# Patient Record
Sex: FEMALE | Race: BLACK OR AFRICAN AMERICAN | NOT HISPANIC OR LATINO | URBAN - METROPOLITAN AREA
[De-identification: names, ages, dates, MRNs, and addresses within clinical notes are randomized per-mention and may not be internally consistent; named-entity substitution may affect disease eponyms.]

---

## 2019-06-28 ENCOUNTER — INPATIENT (INPATIENT)
Facility: HOSPITAL | Age: 54
LOS: 58 days | Discharge: HOME | DRG: 64 | End: 2019-08-26
Attending: INTERNAL MEDICINE | Admitting: PSYCHIATRY & NEUROLOGY
Payer: MEDICAID

## 2019-06-28 VITALS
RESPIRATION RATE: 24 BRPM | OXYGEN SATURATION: 100 % | HEART RATE: 96 BPM | DIASTOLIC BLOOD PRESSURE: 60 MMHG | WEIGHT: 293 LBS | SYSTOLIC BLOOD PRESSURE: 135 MMHG

## 2019-06-28 DIAGNOSIS — R29.898 OTHER SYMPTOMS AND SIGNS INVOLVING THE MUSCULOSKELETAL SYSTEM: ICD-10-CM

## 2019-06-28 LAB
ALBUMIN SERPL ELPH-MCNC: 3.8 G/DL — SIGNIFICANT CHANGE UP (ref 3.3–5)
ALP SERPL-CCNC: 84 U/L — SIGNIFICANT CHANGE UP (ref 40–120)
ALT FLD-CCNC: 11 U/L — SIGNIFICANT CHANGE UP (ref 10–45)
ANION GAP SERPL CALC-SCNC: 12 MMOL/L — SIGNIFICANT CHANGE UP (ref 5–17)
APTT BLD: 25.6 SEC — LOW (ref 27.5–36.3)
AST SERPL-CCNC: 7 U/L — LOW (ref 10–40)
BASOPHILS # BLD AUTO: 0.1 K/UL — SIGNIFICANT CHANGE UP (ref 0–0.2)
BASOPHILS NFR BLD AUTO: 0.6 % — SIGNIFICANT CHANGE UP (ref 0–2)
BILIRUB SERPL-MCNC: 0.8 MG/DL — SIGNIFICANT CHANGE UP (ref 0.2–1.2)
BUN SERPL-MCNC: 18 MG/DL — SIGNIFICANT CHANGE UP (ref 7–23)
CALCIUM SERPL-MCNC: 9.3 MG/DL — SIGNIFICANT CHANGE UP (ref 8.4–10.5)
CHLORIDE SERPL-SCNC: 98 MMOL/L — SIGNIFICANT CHANGE UP (ref 96–108)
CO2 SERPL-SCNC: 24 MMOL/L — SIGNIFICANT CHANGE UP (ref 22–31)
CREAT SERPL-MCNC: 1.11 MG/DL — SIGNIFICANT CHANGE UP (ref 0.5–1.3)
EOSINOPHIL # BLD AUTO: 0.1 K/UL — SIGNIFICANT CHANGE UP (ref 0–0.5)
EOSINOPHIL NFR BLD AUTO: 1.4 % — SIGNIFICANT CHANGE UP (ref 0–6)
GLUCOSE SERPL-MCNC: 315 MG/DL — HIGH (ref 70–99)
HCT VFR BLD CALC: 37.1 % — SIGNIFICANT CHANGE UP (ref 34.5–45)
HGB BLD-MCNC: 13.1 G/DL — SIGNIFICANT CHANGE UP (ref 11.5–15.5)
INR BLD: 0.95 RATIO — SIGNIFICANT CHANGE UP (ref 0.88–1.16)
LYMPHOCYTES # BLD AUTO: 2.3 K/UL — SIGNIFICANT CHANGE UP (ref 1–3.3)
LYMPHOCYTES # BLD AUTO: 21.9 % — SIGNIFICANT CHANGE UP (ref 13–44)
MCHC RBC-ENTMCNC: 29.9 PG — SIGNIFICANT CHANGE UP (ref 27–34)
MCHC RBC-ENTMCNC: 35.2 GM/DL — SIGNIFICANT CHANGE UP (ref 32–36)
MCV RBC AUTO: 84.9 FL — SIGNIFICANT CHANGE UP (ref 80–100)
MONOCYTES # BLD AUTO: 0.7 K/UL — SIGNIFICANT CHANGE UP (ref 0–0.9)
MONOCYTES NFR BLD AUTO: 7.1 % — SIGNIFICANT CHANGE UP (ref 2–14)
NEUTROPHILS # BLD AUTO: 7.2 K/UL — SIGNIFICANT CHANGE UP (ref 1.8–7.4)
NEUTROPHILS NFR BLD AUTO: 69.2 % — SIGNIFICANT CHANGE UP (ref 43–77)
PLATELET # BLD AUTO: 301 K/UL — SIGNIFICANT CHANGE UP (ref 150–400)
POTASSIUM SERPL-MCNC: 4.4 MMOL/L — SIGNIFICANT CHANGE UP (ref 3.5–5.3)
POTASSIUM SERPL-SCNC: 4.4 MMOL/L — SIGNIFICANT CHANGE UP (ref 3.5–5.3)
PROT SERPL-MCNC: 6.5 G/DL — SIGNIFICANT CHANGE UP (ref 6–8.3)
PROTHROM AB SERPL-ACNC: 10.9 SEC — SIGNIFICANT CHANGE UP (ref 10–12.9)
RBC # BLD: 4.37 M/UL — SIGNIFICANT CHANGE UP (ref 3.8–5.2)
RBC # FLD: 13.2 % — SIGNIFICANT CHANGE UP (ref 10.3–14.5)
SODIUM SERPL-SCNC: 134 MMOL/L — LOW (ref 135–145)
WBC # BLD: 10.5 K/UL — SIGNIFICANT CHANGE UP (ref 3.8–10.5)
WBC # FLD AUTO: 10.5 K/UL — SIGNIFICANT CHANGE UP (ref 3.8–10.5)

## 2019-06-28 PROCEDURE — 70498 CT ANGIOGRAPHY NECK: CPT | Mod: 26

## 2019-06-28 PROCEDURE — 70450 CT HEAD/BRAIN W/O DYE: CPT | Mod: 26,77

## 2019-06-28 PROCEDURE — 99291 CRITICAL CARE FIRST HOUR: CPT

## 2019-06-28 PROCEDURE — 71045 X-RAY EXAM CHEST 1 VIEW: CPT | Mod: 26

## 2019-06-28 PROCEDURE — 70450 CT HEAD/BRAIN W/O DYE: CPT | Mod: 26,59

## 2019-06-28 PROCEDURE — 70496 CT ANGIOGRAPHY HEAD: CPT | Mod: 26

## 2019-06-28 RX ORDER — ASPIRIN/CALCIUM CARB/MAGNESIUM 324 MG
1 TABLET ORAL
Qty: 0 | Refills: 0 | DISCHARGE

## 2019-06-28 RX ORDER — LEVETIRACETAM 250 MG/1
500 TABLET, FILM COATED ORAL EVERY 12 HOURS
Refills: 0 | Status: DISCONTINUED | OUTPATIENT
Start: 2019-06-29 | End: 2019-06-30

## 2019-06-28 RX ORDER — METFORMIN HYDROCHLORIDE 850 MG/1
1 TABLET ORAL
Qty: 0 | Refills: 0 | DISCHARGE

## 2019-06-28 RX ORDER — LEVETIRACETAM 250 MG/1
500 TABLET, FILM COATED ORAL ONCE
Refills: 0 | Status: COMPLETED | OUTPATIENT
Start: 2019-06-28 | End: 2019-06-28

## 2019-06-28 RX ADMIN — LEVETIRACETAM 400 MILLIGRAM(S): 250 TABLET, FILM COATED ORAL at 22:45

## 2019-06-28 RX ADMIN — LEVETIRACETAM 400 MILLIGRAM(S): 250 TABLET, FILM COATED ORAL at 23:26

## 2019-06-28 RX ADMIN — Medication 2 MILLIGRAM(S): at 22:43

## 2019-06-28 RX ADMIN — Medication 2 MILLIGRAM(S): at 23:26

## 2019-06-28 NOTE — H&P ADULT - ASSESSMENT
54 year old Female w/ PMHx of HTN, HLD, DM transferred from [facility] for evaluation of stroke.  Neurological exam significant for L sided hemiplegia, R gaze preference, L facial droop.  CTA head/neck showing R ICA occlusion.  Patient s/p tPA at 5:30 from [facility].      Impression: L hemiplegia secondary to R ICA occlusion    Plan:  - goal BP < 180/110   - MRI brain w/o cont  - MRA brain w/o cont   - MRA neck w/ cont  - HgA1c  - Lipid profile  - c/w ASA 81mg after 24hrs,  - Lipitor 40mg qhs  - TTE  - Telemetry monitoring   - Neuro checks q2hr in Stroke Unit  - Repeat Head CT in 24hrs evaluate for hemorrhagic conversion or earlier for change in mental status  - PT/ OT  - NPO x 24hrs  - Dysphagia screen in 12-24hrs  - Speech and Swallow Evaluation

## 2019-06-28 NOTE — ED PROVIDER NOTE - CRITICAL CARE PROVIDED
direct patient care (not related to procedure)/consultation with other physicians/additional history taking/interpretation of diagnostic studies/documentation

## 2019-06-28 NOTE — H&P ADULT - HISTORY OF PRESENT ILLNESS
Patient is a 54 year old Female w/ PMHx of HTN, HLD, DM transferred from [facility] for evaluation of stroke. Per daughter at bedside, last known normal 2pm, patient had sudden onset left sided paralysis, slurred speech, received TPA at at [place] at 5;30PM, completed at 6PM. NIHSS 13, MRS 1

## 2019-06-28 NOTE — ED PROVIDER NOTE - ATTENDING CONTRIBUTION TO CARE
***Anshul Sanderson MD FACEP*** Attending physician was available for the key components of the procedure, the patient tolerated well. There were no complications with the procedure.   Based on patient's history and physical exam, as well as the results of today's workup, I feel that patient warrants admission to the hospital for further workup/evaluation and continued management. I discussed the findings of today's workup with the patient and addressed the patient's questions and concerns. The patient was agreeable with admission. Our team spoke with the inpatient receiving team who accepted the patient for admission and subsequently took over the patient's care at the time of admission. Anshul Sanderson MD, FACEP   Based on patient's history and physical exam, as well as the results of today's workup, I feel that patient warrants admission to the hospital for further workup/evaluation and continued management. I discussed the findings of today's workup with the patient and addressed the patient's questions and concerns. The patient was agreeable with admission. Our team spoke with the inpatient receiving team who accepted the patient for admission and subsequently took over the patient's care at the time of admission.

## 2019-06-28 NOTE — ED PROVIDER NOTE - OBJECTIVE STATEMENT
AV: 54y F transferred from outside hospital for stroke. Per daughter at bedside, last known normal 2pm, patient had sudden onset left sided paralysis, slurred speech, received TPA at outside hospital. On arrival patient awake and alert, slurring speech, unable to move LUE or LLE, left sided facial droop. Code stroke called, neuro escorted patient to CT. AV: 54y F transferred from outside hospital for stroke. Per daughter at bedside, last known normal 2pm, patient had sudden onset left sided paralysis, slurred speech, received TPA at outside hospital. On arrival patient awake and alert, slurring speech, unable to move LUE or LLE, left sided facial droop. Code stroke called, neuro escorted patient to CT. 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 AV: 54y F transferred from outside hospital for stroke. Per daughter at bedside, last known normal 2pm, patient had sudden onset left sided paralysis, slurred speech, dropped her cell phone, patient received TPA at outside hospital. On arrival patient awake and alert, slurring speech, unable to move LUE or LLE, left sided facial droop. Code stroke called, neuro escorted patient to CT. 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

## 2019-06-28 NOTE — ED ADULT NURSE NOTE - NSIMPLEMENTINTERV_GEN_ALL_ED
Implemented All Fall with Harm Risk Interventions:  [place] to call system. Call bell, personal items and telephone within reach. Instruct patient to call for assistance. Room bathroom lighting operational. Non-slip footwear when patient is off stretcher. Physically safe environment: no spills, clutter or unnecessary equipment. Stretcher in lowest position, wheels locked, appropriate side rails in place. Provide visual cue, wrist band, yellow gown, etc. Monitor gait and stability. Monitor for mental status changes and reorient to person, place, and time. Review medications for side effects contributing to fall risk. Reinforce activity limits and safety measures with patient and family. Provide visual clues: red socks.

## 2019-06-28 NOTE — H&P ADULT - CRANIAL NERVE
Cranial Nerves: L eye chronic vision loss, R no BTT, pupils equal round and reactive to light, R gaze preferrence, facial sensation intact symmetrically in V1-V3 bilaterally, Mild L facial droop

## 2019-06-28 NOTE — ED ADULT NURSE NOTE - OBJECTIVE STATEMENT
55 YO female with PMH HTN, HLD, DMII on insulin, & arthritis, via EMS from [facility] presenting with complaints of extremity weakness. As per patient, EMS, and family at bedside, at approximately 1500, for ten minutes, pt was exhibiting slurred speech, and left sided extremity weakness, which resolved. Pt initially was evaluated by [facility], and tPA was initated at 1730 with appropriate bolus and drip finished at 1830, prior to arrival to [facility] ED. Upon arrival to [facility] ED, pt directly to CT scan and code stroke protocol initiated. Pt denies chest pain, shortness of breath, visual disturbances, numbness/tingling, fever, chills, diaphoresis, headache, nausea, vomiting, constipation, diarrhea, or urinary symptoms.   Pt Axox4, gross neuro intact, PERRL 3 mm. Lungs clear throughout bilateral. S1S2 heard. Abdomen soft, non-tender, distended. Skin warm, dry, and intact, no obvious signs of bleeding noted. Pt placed in position of comfort. Pt educated on call bell system and provided call bell. Bed in lowest position, wheels locked, appropriate side rails raised. Pt denies needs at this time.

## 2019-06-28 NOTE — ED PROVIDER NOTE - PMH
Diabetes mellitus type 2 in obese    Hyperlipidemia, unspecified hyperlipidemia type    Hypertension

## 2019-06-28 NOTE — H&P ADULT - NSICDXPASTMEDICALHX_GEN_ALL_CORE_FT
PAST MEDICAL HISTORY:  Diabetes mellitus type 2 in obese     Hyperlipidemia, unspecified hyperlipidemia type     Hypertension

## 2019-06-28 NOTE — H&P ADULT - ATTENDING COMMENTS
This is a 53y/o R handed  descendant female morbidly obese with vascular risk factors as stated above and including apparent CHF. As per niece who provide history at bedside patient was enjoying a summer day at the backyard when she was noticed with droopy face and left sided weakness unable to walk and slurred speech. She was initially evaluated at [place] and was deemed candidate for tPA. CT angiography was obtain and showed right carotid stenosis and she was transfer for further management. Upon initial evaluation at [facility] her NIHSS was deemed to be approximately 13 and repeated CT angiography of head and neck did not show any intracranial large vessel occlusion reason why endovascular therapy was not offered. Head CT showed chronic right parietal and frontal hypodensities, the latter less conspicuous. Also noticeable left paramedian pontine hypodensity.   Patient was reported to have several episodes of seizure upon admission and was given ativan and loaded with levetiracetam.  Neurological exam today pertinent for:    Mental status:  The patient is alert, eyes closed(possible eyelid apraxia) left eye blindness, attentive, and oriented in person, time and place. Speech severely dysarthric almost unintelligible but fluent with good repetition and comprehension. No asomatognosia or anosognosia.     Cranial nerves:  CN II: Visual fields difficult to asses. Left eye blind.  CN III, IV, VI: Partial left gaze palsy.   CN V: Facial sensation is intact to soft touch in all 3 divisions bilaterally.   CN VII: Severe left facial droop.  CN VII: Hearing is normal to voice tone.   CN IX, X: Not assessed.  CN XI: Not assessed.  CN XII: Not assessed.     Motor:  Increase tone left upper extremity with flexor posture. Decrease tone left lower extremity with paretic posture.   She moves right side spontaneously.      	Deltoid	Biceps	Triceps	Handgrip	  L	0	0	0	0	  R            at least 3/5 moves against gravity       Hip flexion	Hip extension	Knee flexion	Knee extension  L	0	0		0                                0  R	at least 3/5 moves against gravity  Sensory:  Diminished pinprick left upper and lower extremities  Coordination:  Unable to asses. No nystagmus.            Impression diagnosis: Cerebral infarction right MCA stroke watershed region in the setting of right cervical internal carotid artery stenosis with calcified component most probable etiology symptomatic large vessel disease due to hypoperfusion vs artery to artery embolism. Other source of embolism as cardiac and paradoxical source of embolism can not be completely excluded.    Plan:   - Cont with IV tPA precautions including BP goal <180/105 mmHg for first 24 hours after bolus followed by gradual normotension    -  Aspirin and plavix in preparation for possible carotid stenting vs CEA and when enteral access established    - SCDs for DVT prophylaxis in the interim. If 24 hr CT scan with no evidence of large hypodensity or hemorrhagic conversion initiate heparin drip goal PTT 60-80 with no bolus in preparation for CEA.     - Atorvastatin 80 mg after establishing enteral access or passing swallow evaluation    - TTE with bubble study and telemetry to screen for possible cardiac source of embolism    - LDL and HbA1C, continue with aggressive vascular risk factors modifications     - PT/OT/Speech and swallow evaluation     -Cardiology evaluation for medical clearance for CEA vs stenting.

## 2019-06-28 NOTE — ED ADULT NURSE NOTE - CAS EDN DISCHARGE ASSESSMENT
Alert and oriented to person, place and time Alert and oriented to person, place and time/Patient baseline mental status

## 2019-06-28 NOTE — ED PROVIDER NOTE - CRITICAL CARE ATTESTATION
I have personally provided the amount of critical care time documented below excluding time spent on separate procedures
uncorrected

## 2019-06-28 NOTE — ED PROVIDER NOTE - CLINICAL SUMMARY MEDICAL DECISION MAKING FREE TEXT BOX
AV: stroke transfer with left facial droop, left sided weakness. vitals wnl. left hemiparesis, absent sensation. Will obtain rpt CT, labs, admit to stroke service. AV: stroke transfer with left facial droop, left sided weakness. vitals wnl. left hemiparesis, absent sensation. Will obtain rpt CT, labs, admit to stroke service. monitoring closely for deterioration, frequent neurochecks and will admit to neurology

## 2019-06-29 DIAGNOSIS — E11.69 TYPE 2 DIABETES MELLITUS WITH OTHER SPECIFIED COMPLICATION: ICD-10-CM

## 2019-06-29 LAB
AMPHET UR-MCNC: NEGATIVE — SIGNIFICANT CHANGE UP
ANION GAP SERPL CALC-SCNC: 15 MMOL/L — SIGNIFICANT CHANGE UP (ref 5–17)
APPEARANCE UR: CLEAR — SIGNIFICANT CHANGE UP
BARBITURATES UR SCN-MCNC: NEGATIVE — SIGNIFICANT CHANGE UP
BENZODIAZ UR-MCNC: NEGATIVE — SIGNIFICANT CHANGE UP
BILIRUB UR-MCNC: NEGATIVE — SIGNIFICANT CHANGE UP
BUN SERPL-MCNC: 15 MG/DL — SIGNIFICANT CHANGE UP (ref 7–23)
CALCIUM SERPL-MCNC: 9.4 MG/DL — SIGNIFICANT CHANGE UP (ref 8.4–10.5)
CHLORIDE SERPL-SCNC: 99 MMOL/L — SIGNIFICANT CHANGE UP (ref 96–108)
CHOLEST SERPL-MCNC: 195 MG/DL — SIGNIFICANT CHANGE UP (ref 10–199)
CO2 SERPL-SCNC: 23 MMOL/L — SIGNIFICANT CHANGE UP (ref 22–31)
COCAINE METAB.OTHER UR-MCNC: NEGATIVE — SIGNIFICANT CHANGE UP
COLOR SPEC: COLORLESS — SIGNIFICANT CHANGE UP
CREAT SERPL-MCNC: 0.98 MG/DL — SIGNIFICANT CHANGE UP (ref 0.5–1.3)
DIFF PNL FLD: NEGATIVE — SIGNIFICANT CHANGE UP
GLUCOSE BLDC GLUCOMTR-MCNC: 213 MG/DL — HIGH (ref 70–99)
GLUCOSE BLDC GLUCOMTR-MCNC: 227 MG/DL — HIGH (ref 70–99)
GLUCOSE SERPL-MCNC: 376 MG/DL — HIGH (ref 70–99)
GLUCOSE UR QL: ABNORMAL
HBA1C BLD-MCNC: 10.4 % — HIGH (ref 4–5.6)
HCT VFR BLD CALC: 39.2 % — SIGNIFICANT CHANGE UP (ref 34.5–45)
HCV AB S/CO SERPL IA: 0.07 S/CO — SIGNIFICANT CHANGE UP (ref 0–0.99)
HCV AB SERPL-IMP: SIGNIFICANT CHANGE UP
HDLC SERPL-MCNC: 49 MG/DL — LOW
HGB BLD-MCNC: 13.5 G/DL — SIGNIFICANT CHANGE UP (ref 11.5–15.5)
KETONES UR-MCNC: NEGATIVE — SIGNIFICANT CHANGE UP
LEUKOCYTE ESTERASE UR-ACNC: NEGATIVE — SIGNIFICANT CHANGE UP
LIPID PNL WITH DIRECT LDL SERPL: 131 MG/DL — HIGH
MCHC RBC-ENTMCNC: 29.5 PG — SIGNIFICANT CHANGE UP (ref 27–34)
MCHC RBC-ENTMCNC: 34.4 GM/DL — SIGNIFICANT CHANGE UP (ref 32–36)
MCV RBC AUTO: 85.7 FL — SIGNIFICANT CHANGE UP (ref 80–100)
METHADONE UR-MCNC: NEGATIVE — SIGNIFICANT CHANGE UP
NITRITE UR-MCNC: NEGATIVE — SIGNIFICANT CHANGE UP
OPIATES UR-MCNC: NEGATIVE — SIGNIFICANT CHANGE UP
OXYCODONE UR-MCNC: NEGATIVE — SIGNIFICANT CHANGE UP
PCP SPEC-MCNC: SIGNIFICANT CHANGE UP
PCP UR-MCNC: NEGATIVE — SIGNIFICANT CHANGE UP
PH UR: 6.5 — SIGNIFICANT CHANGE UP (ref 5–8)
PLATELET # BLD AUTO: 291 K/UL — SIGNIFICANT CHANGE UP (ref 150–400)
POTASSIUM SERPL-MCNC: 4.2 MMOL/L — SIGNIFICANT CHANGE UP (ref 3.5–5.3)
POTASSIUM SERPL-SCNC: 4.2 MMOL/L — SIGNIFICANT CHANGE UP (ref 3.5–5.3)
PROT UR-MCNC: NEGATIVE — SIGNIFICANT CHANGE UP
RBC # BLD: 4.57 M/UL — SIGNIFICANT CHANGE UP (ref 3.8–5.2)
RBC # FLD: 13.4 % — SIGNIFICANT CHANGE UP (ref 10.3–14.5)
SODIUM SERPL-SCNC: 137 MMOL/L — SIGNIFICANT CHANGE UP (ref 135–145)
SP GR SPEC: 1.03 — HIGH (ref 1.01–1.02)
THC UR QL: NEGATIVE — SIGNIFICANT CHANGE UP
TOTAL CHOLESTEROL/HDL RATIO MEASUREMENT: 4 RATIO — SIGNIFICANT CHANGE UP (ref 3.3–7.1)
TRIGL SERPL-MCNC: 75 MG/DL — SIGNIFICANT CHANGE UP (ref 10–149)
UROBILINOGEN FLD QL: NEGATIVE — SIGNIFICANT CHANGE UP
WBC # BLD: 8 K/UL — SIGNIFICANT CHANGE UP (ref 3.8–10.5)
WBC # FLD AUTO: 8 K/UL — SIGNIFICANT CHANGE UP (ref 3.8–10.5)

## 2019-06-29 PROCEDURE — 99254 IP/OBS CNSLTJ NEW/EST MOD 60: CPT

## 2019-06-29 PROCEDURE — 70551 MRI BRAIN STEM W/O DYE: CPT | Mod: 26

## 2019-06-29 PROCEDURE — 70450 CT HEAD/BRAIN W/O DYE: CPT | Mod: 26

## 2019-06-29 PROCEDURE — 99223 1ST HOSP IP/OBS HIGH 75: CPT

## 2019-06-29 RX ORDER — CLOPIDOGREL BISULFATE 75 MG/1
75 TABLET, FILM COATED ORAL DAILY
Refills: 0 | Status: DISCONTINUED | OUTPATIENT
Start: 2019-06-29 | End: 2019-07-03

## 2019-06-29 RX ORDER — DEXTROSE 50 % IN WATER 50 %
12.5 SYRINGE (ML) INTRAVENOUS ONCE
Refills: 0 | Status: DISCONTINUED | OUTPATIENT
Start: 2019-06-29 | End: 2019-08-26

## 2019-06-29 RX ORDER — INSULIN GLARGINE 100 [IU]/ML
20 INJECTION, SOLUTION SUBCUTANEOUS AT BEDTIME
Refills: 0 | Status: DISCONTINUED | OUTPATIENT
Start: 2019-06-29 | End: 2019-06-30

## 2019-06-29 RX ORDER — DEXTROSE 50 % IN WATER 50 %
25 SYRINGE (ML) INTRAVENOUS ONCE
Refills: 0 | Status: DISCONTINUED | OUTPATIENT
Start: 2019-06-29 | End: 2019-08-26

## 2019-06-29 RX ORDER — HYDRALAZINE HCL 50 MG
5 TABLET ORAL ONCE
Refills: 0 | Status: DISCONTINUED | OUTPATIENT
Start: 2019-06-29 | End: 2019-06-29

## 2019-06-29 RX ORDER — LABETALOL HCL 100 MG
5 TABLET ORAL ONCE
Refills: 0 | Status: COMPLETED | OUTPATIENT
Start: 2019-06-29 | End: 2019-06-29

## 2019-06-29 RX ORDER — INSULIN LISPRO 100/ML
VIAL (ML) SUBCUTANEOUS EVERY 6 HOURS
Refills: 0 | Status: DISCONTINUED | OUTPATIENT
Start: 2019-06-29 | End: 2019-06-30

## 2019-06-29 RX ORDER — SODIUM CHLORIDE 9 MG/ML
1000 INJECTION INTRAMUSCULAR; INTRAVENOUS; SUBCUTANEOUS
Refills: 0 | Status: DISCONTINUED | OUTPATIENT
Start: 2019-06-29 | End: 2019-07-01

## 2019-06-29 RX ORDER — GLUCAGON INJECTION, SOLUTION 0.5 MG/.1ML
1 INJECTION, SOLUTION SUBCUTANEOUS ONCE
Refills: 0 | Status: DISCONTINUED | OUTPATIENT
Start: 2019-06-29 | End: 2019-08-26

## 2019-06-29 RX ORDER — ASPIRIN/CALCIUM CARB/MAGNESIUM 324 MG
81 TABLET ORAL DAILY
Refills: 0 | Status: DISCONTINUED | OUTPATIENT
Start: 2019-06-29 | End: 2019-06-30

## 2019-06-29 RX ORDER — INSULIN LISPRO 100/ML
VIAL (ML) SUBCUTANEOUS AT BEDTIME
Refills: 0 | Status: DISCONTINUED | OUTPATIENT
Start: 2019-06-29 | End: 2019-06-29

## 2019-06-29 RX ORDER — INSULIN LISPRO 100/ML
VIAL (ML) SUBCUTANEOUS
Refills: 0 | Status: DISCONTINUED | OUTPATIENT
Start: 2019-06-29 | End: 2019-06-29

## 2019-06-29 RX ORDER — DEXTROSE 50 % IN WATER 50 %
15 SYRINGE (ML) INTRAVENOUS ONCE
Refills: 0 | Status: DISCONTINUED | OUTPATIENT
Start: 2019-06-29 | End: 2019-08-26

## 2019-06-29 RX ORDER — SODIUM CHLORIDE 9 MG/ML
1000 INJECTION, SOLUTION INTRAVENOUS
Refills: 0 | Status: DISCONTINUED | OUTPATIENT
Start: 2019-06-29 | End: 2019-08-26

## 2019-06-29 RX ADMIN — Medication 5 MILLIGRAM(S): at 20:58

## 2019-06-29 RX ADMIN — SODIUM CHLORIDE 75 MILLILITER(S): 9 INJECTION INTRAMUSCULAR; INTRAVENOUS; SUBCUTANEOUS at 05:18

## 2019-06-29 RX ADMIN — Medication 4: at 07:40

## 2019-06-29 RX ADMIN — Medication 2: at 17:59

## 2019-06-29 RX ADMIN — LEVETIRACETAM 400 MILLIGRAM(S): 250 TABLET, FILM COATED ORAL at 18:01

## 2019-06-29 RX ADMIN — LEVETIRACETAM 400 MILLIGRAM(S): 250 TABLET, FILM COATED ORAL at 05:17

## 2019-06-29 RX ADMIN — Medication 2: at 11:29

## 2019-06-29 RX ADMIN — SODIUM CHLORIDE 75 MILLILITER(S): 9 INJECTION INTRAMUSCULAR; INTRAVENOUS; SUBCUTANEOUS at 21:00

## 2019-06-29 NOTE — CONSULT NOTE ADULT - SUBJECTIVE AND OBJECTIVE BOX
CHIEF COMPLAINT:Patient is a 54y old  Female who presents with a chief complaint of     HISTORY OF PRESENT ILLNESS:    54 year old Female w/ PMHx of HTN, HLD, DM admitted with acute cva  pt is aphasic  family at bedside  Due to altered mental status, subjective information were not able to be obtained from the patient. History was obtained, to the extent possible, from review of the chart and collateral sources of information.     PAST MEDICAL & SURGICAL HISTORY:  Diabetes mellitus type 2 in obese  Hyperlipidemia, unspecified hyperlipidemia type  Hypertension          MEDICATIONS:        levETIRAcetam  IVPB 500 milliGRAM(s) IV Intermittent every 12 hours  LORazepam   Injectable 2 milliGRAM(s) IV Push once PRN      insulin lispro (HumaLOG) corrective regimen sliding scale   SubCutaneous every 6 hours    sodium chloride 0.9%. 1000 milliLiter(s) IV Continuous <Continuous>      FAMILY HISTORY:      Non-contributory    SOCIAL HISTORY:    No tobacco, drugs or etoh    Allergies    No Known Allergies    Intolerances    	    REVIEW OF SYSTEMS:  as above  The rest of the 14 points ROS reviewed and except above they are unremarkable.        PHYSICAL EXAM:  T(C): 36.8 (06-29-19 @ 04:00), Max: 37 (06-28-19 @ 20:00)  HR: 78 (06-29-19 @ 10:00) (78 - 98)  BP: 165/59 (06-29-19 @ 10:00) (100/54 - 167/71)  RR: 16 (06-29-19 @ 10:00) (14 - 25)  SpO2: 100% (06-29-19 @ 10:00) (95% - 100%)  Wt(kg): --  I&O's Summary    28 Jun 2019 07:01  -  29 Jun 2019 07:00  --------------------------------------------------------  IN: 525 mL / OUT: 600 mL / NET: -75 mL        JVP: Normal  Neck: supple  Lung: clear   CV: S1 S2 , Murmur:  Abd: soft  Ext: No edema  neuro: Awake / alert  Psych: flat affect  Skin: normal      LABS/DATA:    TELEMETRY: 	    ECG:  	   	  CARDIAC MARKERS:                                      13.5   8.0   )-----------( 291      ( 29 Jun 2019 06:08 )             39.2     06-29    137  |  99  |  15  ----------------------------<  376<H>  4.2   |  23  |  0.98    Ca    9.4      29 Jun 2019 06:08    TPro  6.5  /  Alb  3.8  /  TBili  0.8  /  DBili  x   /  AST  7<L>  /  ALT  11  /  AlkPhos  84  06-28    proBNP:   Lipid Profile:   HgA1c:   TSH:

## 2019-06-29 NOTE — OCCUPATIONAL THERAPY INITIAL EVALUATION ADULT - ADDITIONAL COMMENTS
CONTINUED. CT ANGIO NECK: Apparent severe stenosis in the distal supraclinoid right internal carotid artery and right carotid terminus with possible occlusion of the A1 segment of the right anterior cerebral artery.There appears to be posterior distal right ENRIQUE vessels within the medial right frontal lobe, raising the possibility of early cerebral infarction.

## 2019-06-29 NOTE — PHYSICAL THERAPY INITIAL EVALUATION ADULT - GENERAL OBSERVATIONS, REHAB EVAL
7/3: received supine with head of bed elevated +ngt, +R wrist restraint, + bilateral sequential compression devices +external female catheter

## 2019-06-29 NOTE — OCCUPATIONAL THERAPY INITIAL EVALUATION ADULT - LIVES WITH, PROFILE
Pt lives with adult children and other adult family members in a private house with 8 stairs to bed/bath and railing on R. Pt has tub shower, and was previously independent in all ADLS/IADLs, ambulating with a straight cane. Pt is right handed and wears glasses for reading and distance./children/other relative

## 2019-06-29 NOTE — OCCUPATIONAL THERAPY INITIAL EVALUATION ADULT - PERTINENT HX OF CURRENT PROBLEM, REHAB EVAL
Patient is a 54 year old Female w/ PMHx of HTN, HLD, DM transferred from [facility] for evaluation of stroke. Per daughter at bedside, last known normal 2pm, patient had sudden onset left sided paralysis, slurred speech, received TPA at at [place] at 5;30PM, completed at 6PM. NIHSS . See below

## 2019-06-29 NOTE — OCCUPATIONAL THERAPY INITIAL EVALUATION ADULT - BALANCE TRAINING, PT EVAL
GOAL: Pt will increase static/dynamic sitting/standing balance to fair within 2 weeks to increase safety and independence with ADLs/IADLs.

## 2019-06-29 NOTE — PHYSICAL THERAPY INITIAL EVALUATION ADULT - PLANNED THERAPY INTERVENTIONS, PT EVAL
bed mobility training/gait training/strengthening/transfer training strengthening/transfer training/balance training/bed mobility training/gait training

## 2019-06-29 NOTE — SPEECH LANGUAGE PATHOLOGY EVALUATION - COMMENTS
unable to assess at this time will need further assessment as alertness level improves not tested at this time Continued:   : Change in status notification: Pt s/p tPA  @1730 ([facility] RN went to bedside report in ED. RN notices pt right facial twitch and foaming at the mouth. Pt was following commands seconds prior, pt no longer speaking or following commands.  : Provider contact note: Pt had 3 more seizure episodes en route to floor  Pt given ativan and keppra   MRI pending at time of this evaluation     Imagin/28: CT Brain stroke protocol: IMPRESSION:  Age-indeterminate left pontine lacunar infarction. Recommend MRI brain for further characterization. Chronic right parietal and right frontal infarctions.    Repeat CT Brain : IMPRESSION: Redemonstration of decreased attenuation in the basis pontis and midbrain. Findings could represent the presence of recent or chronic infarction. Chronic appearing right frontoparietal infarct.

## 2019-06-29 NOTE — CONSULT NOTE ADULT - ASSESSMENT
Sergo Chambers  54F pmhx HTN, HLD, DM admitted yesterday for patchy R MCA infarcts s/p TPA found to have apparent distal R ICA severe stenosis and with possible occlusion of the R A1 although CTA limited by body habitus. On exam Eyes closed, AOx2, FC on R, L side 0/5.  - MRI/MRA head and neck  - carotid dopplers  - Continue care per stroke neurology Sergo Chambers  54F pmhx HTN, HLD, DM admitted yesterday for patchy R sided infarcts in multiple vascular territories s/p TPA found to have apparentm multiple areas of R ICA severe stenosis and with possible occlusion of the R A1 although CTA limited by body habitus. On exam Eyes closed, AOx2, FC on R, L side 0/5.  - MRI/MRA head and neck  - carotid dopplers  - Continue care per stroke neurology

## 2019-06-29 NOTE — PHYSICAL THERAPY INITIAL EVALUATION ADULT - TRANSFER TRAINING, PT EVAL
GOAL: Pt will perform all transfers with min Ax1  with use of appropriate AD in 2 weeks. GOAL: Pt will perform all transfers with min Ax1  with use of appropriate AD in 4 weeks.

## 2019-06-29 NOTE — PHYSICAL THERAPY INITIAL EVALUATION ADULT - GAIT TRAINING, PT EVAL
GOAL: Pt will amb 300ft independently with use of appropriate AD in 2 weeks. GOAL: Pt will amb 300ft independently with use of appropriate AD in 4 weeks.

## 2019-06-29 NOTE — PHYSICAL THERAPY INITIAL EVALUATION ADULT - BED MOBILITY TRAINING, PT EVAL
GOAL: Pt will perform bed mobility with min A x1  in 2 weeks. GOAL: Pt will perform bed mobility with min A x1  in 4 weeks.

## 2019-06-29 NOTE — OCCUPATIONAL THERAPY INITIAL EVALUATION ADULT - PLANNED THERAPY INTERVENTIONS, OT EVAL
ADL retraining/balance training/cognitive, visual perceptual/bed mobility training/transfer training

## 2019-06-29 NOTE — SPEECH LANGUAGE PATHOLOGY EVALUATION - SLP PERTINENT HISTORY OF CURRENT PROBLEM
H&P: Patient is a 54 year old Female w/ PMHx of HTN, HLD, DM transferred from [facility] for evaluation of stroke. Per daughter at bedside, last known normal 2pm, patient had sudden onset left sided paralysis, slurred speech, received TPA at at [place] at 5;30PM, completed at 6PM. NIHSS 13, MRS 1. Neurological exam significant for L sided hemiplegia, R gaze preference, L facial droop.  CTA head/neck showing R ICA occlusion.  Patient s/p tPA at 5:30 from [facility].  Impression: L hemiplegia secondary to R ICA occlusion.

## 2019-06-29 NOTE — SWALLOW BEDSIDE ASSESSMENT ADULT - SPECIFY REASON(S)
to subjectively assess the swallow mechanism r/o dysphagia to subjectively assess the swallow mechanism r/o dysphagia. pt cleared for PO trials as per d/w CHAPIS Wilks

## 2019-06-29 NOTE — OCCUPATIONAL THERAPY INITIAL EVALUATION ADULT - IMPAIRMENTS CONTRIBUTING IMPAIRED BED MOBILITY, REHAB EVAL
cognition/impaired coordination/decreased strength/impaired motor control/abnormal muscle tone/impaired postural control/impaired balance

## 2019-06-29 NOTE — OCCUPATIONAL THERAPY INITIAL EVALUATION ADULT - GENERAL OBSERVATIONS, REHAB EVAL
Pt encountered semisupine, NAD, VSS, +IVL, +tele, +bp cuff, +pulse ox, +silva, +b/l venodynes, +ng tube. Pt was lethargic, easy to arouse with moderate tactile cues, and daughter present. No active/ spontaneous movement observed on LUE/LLE. Pt encountered semisupine, NAD, VSS, +IVL, +tele, +bp cuff, +pulse ox, +silva, +b/l venodynes, +ng tube. Pt was lethargic, easy to arouse with moderate tactile cues, and daughter present. No active/ spontaneous movement observed on LUE/LLE; pt would benefit from splint evaluation, team made aware.

## 2019-06-29 NOTE — PHYSICAL THERAPY INITIAL EVALUATION ADULT - STRENGTHENING, PT EVAL
GOAL: Pt will improve LLE strength to 2/5, for increased limb stability, to improve transfers in 4 weeks.

## 2019-06-29 NOTE — PHYSICAL THERAPY INITIAL EVALUATION ADULT - PERTINENT HX OF CURRENT PROBLEM, REHAB EVAL
Pt is a is a 54 year old Female w/ PMHx of HTN, HLD, DM transferred from [facility] for evaluation of stroke. Per daughter at bedside, last known normal 2pm, patient had sudden onset left sided paralysis, slurred speech, received TPA at at [place] at 530PM, completed at 6PM. As per cardiology, if no etiology revealed, possible ILR.

## 2019-06-29 NOTE — CONSULT NOTE ADULT - SUBJECTIVE AND OBJECTIVE BOX
p ([id number])     HPI:  Patient is a 54 year old Female w/ PMHx of HTN, HLD, DM transferred from [facility] for evaluation of stroke. Per daughter at bedside, last known normal 2pm, patient had sudden onset left sided paralysis, slurred speech, received TPA at at [place] at 5;30PM, completed at 6PM. NIHSS 13, MRS 1 (28 Jun 2019 19:57)    Exam:  Eyes closed, blind in L eye, R pupil reactive, AOx2  FC, 5/5 in RUE, LLE proximally 2/5 pain and effort limited, distally 5/5    --Anticoagulation:    =====================  PAST MEDICAL HISTORY   Diabetes mellitus type 2 in obese  Hyperlipidemia, unspecified hyperlipidemia type  Hypertension    PAST SURGICAL HISTORY         MEDICATIONS:  Antibiotics:    Neuro:  levETIRAcetam  IVPB 500 milliGRAM(s) IV Intermittent every 12 hours  LORazepam   Injectable 2 milliGRAM(s) IV Push once PRN    Other:  dextrose 40% Gel 15 Gram(s) Oral once PRN  dextrose 5%. 1000 milliLiter(s) IV Continuous <Continuous>  dextrose 50% Injectable 12.5 Gram(s) IV Push once  dextrose 50% Injectable 25 Gram(s) IV Push once  dextrose 50% Injectable 25 Gram(s) IV Push once  glucagon  Injectable 1 milliGRAM(s) IntraMuscular once PRN  insulin glargine Injectable (LANTUS) 20 Unit(s) SubCutaneous at bedtime  insulin lispro (HumaLOG) corrective regimen sliding scale   SubCutaneous every 6 hours  sodium chloride 0.9%. 1000 milliLiter(s) IV Continuous <Continuous>      SOCIAL HISTORY:   Occupation:   Marital Status:     FAMILY HISTORY:      ROS: Negative except per HPI    LABS:  PT/INR - ( 28 Jun 2019 20:06 )   PT: 10.9 sec;   INR: 0.95 ratio         PTT - ( 28 Jun 2019 20:06 )  PTT:25.6 sec                        13.5   8.0   )-----------( 291      ( 29 Jun 2019 06:08 )             39.2     06-29    137  |  99  |  15  ----------------------------<  376<H>  4.2   |  23  |  0.98    Ca    9.4      29 Jun 2019 06:08    TPro  6.5  /  Alb  3.8  /  TBili  0.8  /  DBili  x   /  AST  7<L>  /  ALT  11  /  AlkPhos  84  06-28

## 2019-06-29 NOTE — SWALLOW BEDSIDE ASSESSMENT ADULT - SWALLOW EVAL: DIAGNOSIS
Pt presents with 1) an oropharyngeal dysphagia characterized by poor orientation to feeding task despite clinician providing verbal/tactile cues, reduced labial seal, anterior spillage of material and an absent trigger of the pharyngeal swallow. 2) Moderate-severe dysarthria of speech 3) Cognitive-linguistic deficits

## 2019-06-29 NOTE — OCCUPATIONAL THERAPY INITIAL EVALUATION ADULT - LEVEL OF CONSCIOUSNESS, OT EVAL
lethargic/somnolent/Pt was lethargic, easily aroused; requiring moderate verbal and tactile cues to maintain arousal for periods of 30s-1 minute.

## 2019-06-29 NOTE — SWALLOW BEDSIDE ASSESSMENT ADULT - COMMENTS
Continued:   : Change in status notification: Pt s/p tPA  @1730 ([facility]). RN went to bedside report in ED. RN notices pt right facial twitch and foaming at the mouth. Pt was following commands seconds prior, pt no longer speaking or following commands.  : Provider contact note: Pt had 3 more seizure episodes en route to floor    Imagin/28: CT Brain stroke protocol: IMPRESSION:  Age-indeterminate left pontine lacunar infarction. Recommend MRI brain for further characterization. Chronic right parietal and right frontal infarctions.    Repeat CT Brain : IMPRESSION: Redemonstration of decreased attenuation in the basis pontis and midbrain. Findings could represent the presence of recent or chronic infarction. Chronic appearing right frontoparietal infarct. Continued:   : Change in status notification: Pt s/p tPA  @1730 ([facility]). RN went to bedside report in ED. RN notices pt right facial twitch and foaming at the mouth. Pt was following commands seconds prior, pt no longer speaking or following commands.  : Provider contact note: Pt had 3 more seizure episodes en route to floor  Pt given ativan and keppra   MRI pending at time of this evaluation     Imagin/28: CT Brain stroke protocol: IMPRESSION:  Age-indeterminate left pontine lacunar infarction. Recommend MRI brain for further characterization. Chronic right parietal and right frontal infarctions.    Repeat CT Brain : IMPRESSION: Redemonstration of decreased attenuation in the basis pontis and midbrain. Findings could represent the presence of recent or chronic infarction. Chronic appearing right frontoparietal infarct.

## 2019-06-29 NOTE — PHYSICAL THERAPY INITIAL EVALUATION ADULT - DISCHARGE DISPOSITION, PT EVAL
Breath Sounds equal & clear to percussion & auscultation, no accessory muscle use acute/rehabilitation facility

## 2019-06-29 NOTE — OCCUPATIONAL THERAPY INITIAL EVALUATION ADULT - DIAGNOSIS, OT EVAL
Pt demonstrates decreased strength, balance, coordination, ?cognition, arousal/alertness, & decreased command follow, which influences functional performance.

## 2019-06-29 NOTE — CONSULT NOTE ADULT - ASSESSMENT
acute CVA  ongoing neuro work up , MRI/MRA as per stroke service  Echo   monitor on tele   if no etiology is discovered, then ILR is recommended     HTN  permissive HTN as per stroke service   will treat once deemed safe    DM  not well controlled  check hga1c  consider neuro eval  Monitor finger stick. Insulin coverage. Diabetic education and Diabetic diet. Consider nutrition consultation.     suspect SUDEEP  outpt sleep study

## 2019-06-29 NOTE — OCCUPATIONAL THERAPY INITIAL EVALUATION ADULT - ADL RETRAINING, OT EVAL
GOAL: Pt will lower body dress with moderate assistance within 2 weeks. GOAL: Pt will upper body dress, using see dressing techniques as needed, with mod A within 2 weeks.

## 2019-06-29 NOTE — PROVIDER CONTACT NOTE (CHANGE IN STATUS NOTIFICATION) - SITUATION
Pt s/p tPA 6/28 @1730 ([facility]). RN went to bedside report in ED. RN notices pt right facial twitch and foaming at the mouth. Pt was following commands seconds prior, pt no longer speaking or following commands.

## 2019-06-29 NOTE — PHYSICAL THERAPY INITIAL EVALUATION ADULT - ADDITIONAL COMMENTS
Pt lives in a private house with sister and dtr with 3 steps to enter + HR and one flight of steps to the basement which is where her bedroom is. Pt was Ind with all ADLs and amb with SC Pt lives in a private house with sister and dtr with 3 steps to enter + HR and one flight of steps to the basement which is where her bedroom is. Pt was Ind with all ADLs and amb with SC    noted 2-4sec processing delay at times

## 2019-06-29 NOTE — PHYSICAL THERAPY INITIAL EVALUATION ADULT - PRECAUTIONS/LIMITATIONS, REHAB EVAL
aspiration precautions/cardiac precautions CTA NECK:Tandem moderate to severe stenoses are suspected in the right carotid bulb and proximal right internal carotid artery.  CTA BRAIN: Limited evaluation of the intracranial internal carotid arteries.  Stenoses or occlusion in the distal cavernous segments and ophthalmic  segments of the bilateral internal carotid arteries cannot be excluded.  Apparent severe stenosis in the distal supraclinoid right internal   carotid artery and right carotid terminus with possible occlusion of the A1 segment of the right anterior cerebral artery./cardiac precautions/aspiration precautions fall precautions/aspiration precautions/CTA NECK:Tandem moderate to severe stenoses are suspected in the right carotid bulb and proximal right internal carotid artery.  CTA BRAIN: Limited evaluation of the intracranial internal carotid arteries.  Stenoses or occlusion in the distal cavernous segments and ophthalmic  segments of the bilateral internal carotid arteries cannot be excluded.  Apparent severe stenosis in the distal supraclinoid right internal   carotid artery and right carotid terminus with possible occlusion of the A1 segment of the right anterior cerebral artery./cardiac precautions

## 2019-06-29 NOTE — CONSULT NOTE ADULT - SUBJECTIVE AND OBJECTIVE BOX
HPI:  Patient is a 54 year old Female w/ PMHx of HTN, HLD, DM transferred from [facility] for evaluation of stroke. Per daughter at bedside, last known normal 2pm, patient had sudden onset left sided paralysis, slurred speech, received TPA at at [place] at 5;30PM, completed at 6PM. NIHSS 13, MRS 1 (28 Jun 2019 19:57)  Diabetes (history from family) Type 2 DM for many years, followed in clinic in [facility] . Maintained on Lantus 70 and premeal Novolog about 20 units, Victoza and metformin. Family does not know prior control, A1c etc, although A1c here >10. Has left sided blindness, unclear if due to diabetic retinopathy  Pt written for sliding scale insulin coverage. Adm , last .       PAST MEDICAL & SURGICAL HISTORY:  Diabetes mellitus type 2 in obese  Hyperlipidemia, unspecified hyperlipidemia type  Hypertension      FAMILY HISTORY: + DM father    Social History: -cgis    Outpatient Medications: see DM meds above    MEDICATIONS  (STANDING):  insulin lispro (HumaLOG) corrective regimen sliding scale   SubCutaneous every 6 hours  levETIRAcetam  IVPB 500 milliGRAM(s) IV Intermittent every 12 hours  sodium chloride 0.9%. 1000 milliLiter(s) (75 mL/Hr) IV Continuous <Continuous>    MEDICATIONS  (PRN):  LORazepam   Injectable 2 milliGRAM(s) IV Push once PRN Seizure Activity      Allergies: No Known Allergies    Review of Systems:  Constitutional: No fever gradual weigh gain  Eyes: left blindness  Neuro: No tremors  HEENT: No pain  Cardiovascular: No chest pain, palpitations  Respiratory: No SOB, no cough  GI: No nausea, vomiting, abdominal pain  : No dysuria  Skin: no rash  Psych: no depression  Endocrine: no polyuria, polydipsia  Hem/lymph: no swelling  Osteoporosis: no fractures  Menopause within last few years, no HRT  ALL OTHER SYSTEMS REVIEWED AND NEGATIVE    UNABLE TO OBTAIN    PHYSICAL EXAM:  VITALS: T(C): 36.8 (06-29-19 @ 04:00)  T(F): 98.2 (06-29-19 @ 04:00), Max: 98.6 (06-28-19 @ 20:00)  HR: 84 (06-29-19 @ 14:00) (78 - 98)  BP: 161/92 (06-29-19 @ 14:00) (100/54 - 174/97)  RR:  (14 - 28)  SpO2:  (95% - 100%)  Wt(kg): -137 kg-  GENERAL: NAD, well-groomed, well-developed  EYES: No proptosis, no lid lag, anicteric  HEENT:  Atraumatic, Normocephalic, moist mucous membranes  THYROID: Normal size, no palpable nodules  RESPIRATORY: Clear to auscultation bilaterally; No rales, rhonchi, wheezing, or rubs  CARDIOVASCULAR: Regular rate and rhythm; No murmurs; no peripheral edema  GI: Soft, nontender, non distended, normal bowel sounds  SKIN: Dry, intact, No rashes or lesions. Pedal pulses present  MUSCULOSKELETAL: Full range of motion, normal strength  NEURO: deferred to neuro  PSYCH: lethargic, minimally verbal   CUSHING'S SIGNS: no striae    POCT Blood Glucose.: 213 mg/dL (06-29-19 @ 11:24)                            13.5   8.0   )-----------( 291      ( 29 Jun 2019 06:08 )             39.2       06-29    137  |  99  |  15  ----------------------------<  376<H>  4.2   |  23  |  0.98    EGFR if : 76  EGFR if non : 65    Ca    9.4      06-29    TPro  6.5  /  Alb  3.8  /  TBili  0.8  /  DBili  x   /  AST  7<L>  /  ALT  11  /  AlkPhos  84  06-28      Thyroid Function Tests:      Hemoglobin A1C, Whole Blood: 10.4 % <H> [4.0 - 5.6] (06-29-19 @ 09:33)      06-29 Chol 195 <H> HDL 49<L> Trig 75    Radiology:

## 2019-06-29 NOTE — SWALLOW BEDSIDE ASSESSMENT ADULT - SWALLOW EVAL: CURRENT DIET
NPO NPO. patient profile + for difficulty swallowing foods and liquids. dysphagia screen 6/28 @ 19:15: NPO indications: patient unable to maintain control of saliva

## 2019-06-29 NOTE — PATIENT PROFILE ADULT - FALL HARM RISK TYPE OF ASSESSMENT
On august 21st patient slammed left thumb in a door. Has been bothering her since incident. Patient has been seen once since this happened, mom feels there is more swelling and more bruising.  
admission

## 2019-06-29 NOTE — CONSULT NOTE ADULT - ASSESSMENT
55 yo female with poorly controlled Type 2 DM presents with CVA. A1c high despite reported use of Lantus 70 , premeal Novolog, Victoza and metformin.   Goals of in-patient DM care reviewed with family in detail. Target BS mid 100s with avoidance of hypoglycemia. Pt currently NPO due to CVA. Recommend restarting Lantus but at much lower dose than usual outpatient dose as pt is NPO.

## 2019-06-29 NOTE — OCCUPATIONAL THERAPY INITIAL EVALUATION ADULT - RANGE OF MOTION EXAMINATION, UPPER EXTREMITY
Left UE Passive ROM was WFL  (within functional limits)/No active/ spontaneous movement on LUE/Right UE Active ROM was WFL (within functional limits)

## 2019-06-30 DIAGNOSIS — E11.65 TYPE 2 DIABETES MELLITUS WITH HYPERGLYCEMIA: ICD-10-CM

## 2019-06-30 LAB
ANION GAP SERPL CALC-SCNC: 19 MMOL/L — HIGH (ref 5–17)
BUN SERPL-MCNC: 11 MG/DL — SIGNIFICANT CHANGE UP (ref 7–23)
CALCIUM SERPL-MCNC: 9.4 MG/DL — SIGNIFICANT CHANGE UP (ref 8.4–10.5)
CHLORIDE SERPL-SCNC: 97 MMOL/L — SIGNIFICANT CHANGE UP (ref 96–108)
CO2 SERPL-SCNC: 22 MMOL/L — SIGNIFICANT CHANGE UP (ref 22–31)
CREAT SERPL-MCNC: 0.91 MG/DL — SIGNIFICANT CHANGE UP (ref 0.5–1.3)
GLUCOSE BLDC GLUCOMTR-MCNC: 194 MG/DL — HIGH (ref 70–99)
GLUCOSE BLDC GLUCOMTR-MCNC: 212 MG/DL — HIGH (ref 70–99)
GLUCOSE BLDC GLUCOMTR-MCNC: 217 MG/DL — HIGH (ref 70–99)
GLUCOSE BLDC GLUCOMTR-MCNC: 218 MG/DL — HIGH (ref 70–99)
GLUCOSE BLDC GLUCOMTR-MCNC: 307 MG/DL — HIGH (ref 70–99)
GLUCOSE BLDC GLUCOMTR-MCNC: 311 MG/DL — HIGH (ref 70–99)
GLUCOSE SERPL-MCNC: 255 MG/DL — HIGH (ref 70–99)
HCT VFR BLD CALC: 37.3 % — SIGNIFICANT CHANGE UP (ref 34.5–45)
HCT VFR BLD CALC: 42.2 % — SIGNIFICANT CHANGE UP (ref 34.5–45)
HGB BLD-MCNC: 12.5 G/DL — SIGNIFICANT CHANGE UP (ref 11.5–15.5)
HGB BLD-MCNC: 14.3 G/DL — SIGNIFICANT CHANGE UP (ref 11.5–15.5)
MCHC RBC-ENTMCNC: 28.5 PG — SIGNIFICANT CHANGE UP (ref 27–34)
MCHC RBC-ENTMCNC: 29 PG — SIGNIFICANT CHANGE UP (ref 27–34)
MCHC RBC-ENTMCNC: 33.5 GM/DL — SIGNIFICANT CHANGE UP (ref 32–36)
MCHC RBC-ENTMCNC: 34 GM/DL — SIGNIFICANT CHANGE UP (ref 32–36)
MCV RBC AUTO: 85 FL — SIGNIFICANT CHANGE UP (ref 80–100)
MCV RBC AUTO: 85.5 FL — SIGNIFICANT CHANGE UP (ref 80–100)
PLATELET # BLD AUTO: 286 K/UL — SIGNIFICANT CHANGE UP (ref 150–400)
PLATELET # BLD AUTO: 319 K/UL — SIGNIFICANT CHANGE UP (ref 150–400)
POTASSIUM SERPL-MCNC: 5.3 MMOL/L — SIGNIFICANT CHANGE UP (ref 3.5–5.3)
POTASSIUM SERPL-SCNC: 5.3 MMOL/L — SIGNIFICANT CHANGE UP (ref 3.5–5.3)
RBC # BLD: 4.39 M/UL — SIGNIFICANT CHANGE UP (ref 3.8–5.2)
RBC # BLD: 4.94 M/UL — SIGNIFICANT CHANGE UP (ref 3.8–5.2)
RBC # FLD: 13.3 % — SIGNIFICANT CHANGE UP (ref 10.3–14.5)
RBC # FLD: 13.7 % — SIGNIFICANT CHANGE UP (ref 10.3–14.5)
SODIUM SERPL-SCNC: 138 MMOL/L — SIGNIFICANT CHANGE UP (ref 135–145)
WBC # BLD: 8.93 K/UL — SIGNIFICANT CHANGE UP (ref 3.8–10.5)
WBC # BLD: 9.1 K/UL — SIGNIFICANT CHANGE UP (ref 3.8–10.5)
WBC # FLD AUTO: 8.93 K/UL — SIGNIFICANT CHANGE UP (ref 3.8–10.5)
WBC # FLD AUTO: 9.1 K/UL — SIGNIFICANT CHANGE UP (ref 3.8–10.5)

## 2019-06-30 PROCEDURE — 71045 X-RAY EXAM CHEST 1 VIEW: CPT | Mod: 26

## 2019-06-30 PROCEDURE — 99233 SBSQ HOSP IP/OBS HIGH 50: CPT

## 2019-06-30 PROCEDURE — 99232 SBSQ HOSP IP/OBS MODERATE 35: CPT

## 2019-06-30 RX ORDER — ENOXAPARIN SODIUM 100 MG/ML
40 INJECTION SUBCUTANEOUS DAILY
Refills: 0 | Status: DISCONTINUED | OUTPATIENT
Start: 2019-06-30 | End: 2019-08-26

## 2019-06-30 RX ORDER — LOSARTAN POTASSIUM 100 MG/1
25 TABLET, FILM COATED ORAL DAILY
Refills: 0 | Status: DISCONTINUED | OUTPATIENT
Start: 2019-06-30 | End: 2019-07-01

## 2019-06-30 RX ORDER — ACETAMINOPHEN 500 MG
650 TABLET ORAL EVERY 6 HOURS
Refills: 0 | Status: DISCONTINUED | OUTPATIENT
Start: 2019-06-30 | End: 2019-07-03

## 2019-06-30 RX ORDER — HUMAN INSULIN 100 [IU]/ML
18 INJECTION, SUSPENSION SUBCUTANEOUS EVERY 6 HOURS
Refills: 0 | Status: DISCONTINUED | OUTPATIENT
Start: 2019-06-30 | End: 2019-07-01

## 2019-06-30 RX ORDER — ASPIRIN/CALCIUM CARB/MAGNESIUM 324 MG
81 TABLET ORAL DAILY
Refills: 0 | Status: DISCONTINUED | OUTPATIENT
Start: 2019-06-30 | End: 2019-07-03

## 2019-06-30 RX ORDER — ASPIRIN/CALCIUM CARB/MAGNESIUM 324 MG
300 TABLET ORAL DAILY
Refills: 0 | Status: DISCONTINUED | OUTPATIENT
Start: 2019-06-30 | End: 2019-06-30

## 2019-06-30 RX ORDER — INSULIN LISPRO 100/ML
5 VIAL (ML) SUBCUTANEOUS ONCE
Refills: 0 | Status: COMPLETED | OUTPATIENT
Start: 2019-06-30 | End: 2019-06-30

## 2019-06-30 RX ORDER — INSULIN LISPRO 100/ML
VIAL (ML) SUBCUTANEOUS EVERY 6 HOURS
Refills: 0 | Status: DISCONTINUED | OUTPATIENT
Start: 2019-06-30 | End: 2019-07-10

## 2019-06-30 RX ORDER — ACETAMINOPHEN 500 MG
650 TABLET ORAL EVERY 6 HOURS
Refills: 0 | Status: DISCONTINUED | OUTPATIENT
Start: 2019-06-30 | End: 2019-07-02

## 2019-06-30 RX ORDER — ATORVASTATIN CALCIUM 80 MG/1
80 TABLET, FILM COATED ORAL AT BEDTIME
Refills: 0 | Status: DISCONTINUED | OUTPATIENT
Start: 2019-06-30 | End: 2019-07-03

## 2019-06-30 RX ORDER — ACETAMINOPHEN 500 MG
1000 TABLET ORAL ONCE
Refills: 0 | Status: COMPLETED | OUTPATIENT
Start: 2019-06-30 | End: 2019-06-30

## 2019-06-30 RX ORDER — LEVETIRACETAM 250 MG/1
500 TABLET, FILM COATED ORAL DAILY
Refills: 0 | Status: DISCONTINUED | OUTPATIENT
Start: 2019-07-01 | End: 2019-07-02

## 2019-06-30 RX ADMIN — Medication 400 MILLIGRAM(S): at 21:26

## 2019-06-30 RX ADMIN — HUMAN INSULIN 18 UNIT(S): 100 INJECTION, SUSPENSION SUBCUTANEOUS at 23:54

## 2019-06-30 RX ADMIN — HUMAN INSULIN 18 UNIT(S): 100 INJECTION, SUSPENSION SUBCUTANEOUS at 18:02

## 2019-06-30 RX ADMIN — LOSARTAN POTASSIUM 25 MILLIGRAM(S): 100 TABLET, FILM COATED ORAL at 12:51

## 2019-06-30 RX ADMIN — Medication 4: at 23:54

## 2019-06-30 RX ADMIN — Medication 5 UNIT(S): at 14:12

## 2019-06-30 RX ADMIN — LEVETIRACETAM 400 MILLIGRAM(S): 250 TABLET, FILM COATED ORAL at 05:02

## 2019-06-30 RX ADMIN — INSULIN GLARGINE 20 UNIT(S): 100 INJECTION, SOLUTION SUBCUTANEOUS at 01:22

## 2019-06-30 RX ADMIN — Medication 4: at 18:03

## 2019-06-30 RX ADMIN — CLOPIDOGREL BISULFATE 75 MILLIGRAM(S): 75 TABLET, FILM COATED ORAL at 05:02

## 2019-06-30 RX ADMIN — ENOXAPARIN SODIUM 40 MILLIGRAM(S): 100 INJECTION SUBCUTANEOUS at 14:15

## 2019-06-30 RX ADMIN — Medication 1000 MILLIGRAM(S): at 22:15

## 2019-06-30 RX ADMIN — Medication 1: at 01:22

## 2019-06-30 RX ADMIN — Medication 4: at 12:48

## 2019-06-30 RX ADMIN — ATORVASTATIN CALCIUM 80 MILLIGRAM(S): 80 TABLET, FILM COATED ORAL at 21:27

## 2019-06-30 RX ADMIN — Medication 2: at 05:10

## 2019-06-30 RX ADMIN — Medication 81 MILLIGRAM(S): at 05:02

## 2019-06-30 NOTE — PROGRESS NOTE ADULT - SUBJECTIVE AND OBJECTIVE BOX
THE PATIENT WAS SEEN AND EXAMINED BY ME WITH THE HOUSESTAFF AND STROKE TEAM DURING MORNING ROUNDS.   HPI:  Patient is a 54 year old Female w/ PMHx of HTN, HLD, DM transferred from [facility] for evaluation of stroke. Per daughter at bedside, last known normal 2pm on day of admission, patient had sudden onset left sided paralysis, slurred speech, received TPA at at [place]. NIHSS 13, MRS 1       SUBJECTIVE: No events overnight.  No new neurologic complaints.      aspirin  chewable 81 milliGRAM(s) Oral daily  clopidogrel Tablet 75 milliGRAM(s) Oral daily  dextrose 40% Gel 15 Gram(s) Oral once PRN  dextrose 5%. 1000 milliLiter(s) IV Continuous <Continuous>  dextrose 50% Injectable 12.5 Gram(s) IV Push once  dextrose 50% Injectable 25 Gram(s) IV Push once  dextrose 50% Injectable 25 Gram(s) IV Push once  glucagon  Injectable 1 milliGRAM(s) IntraMuscular once PRN  insulin glargine Injectable (LANTUS) 20 Unit(s) SubCutaneous at bedtime  insulin lispro (HumaLOG) corrective regimen sliding scale   SubCutaneous every 6 hours  LORazepam   Injectable 2 milliGRAM(s) IV Push once PRN  sodium chloride 0.9%. 1000 milliLiter(s) IV Continuous <Continuous>      PHYSICAL EXAM:   Vital Signs Last 24 Hrs  T(C): 37.4 (30 Jun 2019 09:57), Max: 37.6 (30 Jun 2019 04:00)  T(F): 99.4 (30 Jun 2019 09:57), Max: 99.6 (30 Jun 2019 04:00)  HR: 87 (30 Jun 2019 10:48) (84 - 93)  BP: 166/87 (30 Jun 2019 10:48) (128/65 - 195/79)  BP(mean): 108 (30 Jun 2019 10:48) (73 - 132)  RR: 11 (30 Jun 2019 08:00) (11 - 25)  SpO2: 98% (30 Jun 2019 10:48) (97% - 100%)    General: No acute distress  HEENT: EOM intact, visual fields full  Abdomen: Soft, nontender, nondistended   Extremities: No edema    NEUROLOGICAL EXAM:  Mental status: Awake, alert, oriented x3, no aphasia, no neglect, normal memory   Cranial Nerves: No facial asymmetry, no nystagmus, no dysarthria, no dysphagia, tongue midline, shoulder shrug intact bilaterally.  Motor exam: Normal tone, no drift, RUE 5/5, RLE 5/5, LUE 5/5, LLE 5/5, normal fine finger movements.  Sensation: Intact to light touch   Coordination/ Gait: No dysmetria, ALDAIR intact and symmetric bilaterally, on bedrest     LABS:                        14.3   9.1   )-----------( 319      ( 30 Jun 2019 02:36 )             42.2    06-30    138  |  97  |  11  ----------------------------<  255<H>  5.3   |  22  |  0.91    Ca    9.4      30 Jun 2019 02:36    TPro  6.5  /  Alb  3.8  /  TBili  0.8  /  DBili  x   /  AST  7<L>  /  ALT  11  /  AlkPhos  84  06-28  PT/INR - ( 28 Jun 2019 20:06 )   PT: 10.9 sec;   INR: 0.95 ratio         PTT - ( 28 Jun 2019 20:06 )  PTT:25.6 sec  Hemoglobin A1C, Whole Blood: 10.4 % (06-29 @ 09:33)      IMAGING: Reviewed by me.     Head CT (06/30/19) Abnormal areas of low-attenuation involving the right frontal and right parietal cortical subcortical region are again seen. These findings are compatible with areas of old infarcts. Abnormal low-attenuation involving the left alla is also again seen which could be compatible with an age-indeterminate infarct. There is no evidence of abnormal low-attenuation seen involving the high right frontal cortical subcortical region. Involvement of the right basal   ganglia and anterior corona radiata regions as well. This is predominantly seen medially and compatible with an acute right anterior cerebral artery infarct. There is localized mass effect consisting of sulcal effacement. No significant shift or herniation is seen.     CTA Head/Neck (06/28/19) CT ANGIOGRAPHY NECK:  Markedly limited examination. Tandem moderate to severe stenoses are suspected in the right carotid bulb and proximal right internal carotid artery. 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    CT ANGIOGRAPHY BRAIN: Markedly limited examination. Limited evaluation of the intracranial internal carotid arteries. Stenoses or occlusion in the distal cavernous segments and ophthalmic segments of the bilateral internal carotid arteries cannot be excluded. Apparent severe stenosis in the distal supraclinoid right internal carotid artery and right carotid terminus with possible occlusion of the A1 segment of the right anterior cerebral artery.  The right A2 segment   fills across the anterior to indicating artery.  There appears to be posterior distal right ENRIQUE vessels within the medial right frontal lobe, raising the possibility of early cerebral infarction. THE PATIENT WAS SEEN AND EXAMINED BY ME WITH THE HOUSESTAFF AND STROKE TEAM DURING MORNING ROUNDS.   HPI:  Patient is a 54 year old Female w/ PMHx of HTN, HLD, DM transferred from [facility] for evaluation of stroke. Per daughter at bedside, last known normal 2pm on day of admission, patient had sudden onset left sided paralysis, slurred speech, received TPA at at [place]. NIHSS 13, MRS 1       SUBJECTIVE: No events overnight.  No new neurologic complaints.      aspirin  chewable 81 milliGRAM(s) Oral daily  clopidogrel Tablet 75 milliGRAM(s) Oral daily  dextrose 40% Gel 15 Gram(s) Oral once PRN  dextrose 5%. 1000 milliLiter(s) IV Continuous <Continuous>  dextrose 50% Injectable 12.5 Gram(s) IV Push once  dextrose 50% Injectable 25 Gram(s) IV Push once  dextrose 50% Injectable 25 Gram(s) IV Push once  glucagon  Injectable 1 milliGRAM(s) IntraMuscular once PRN  insulin glargine Injectable (LANTUS) 20 Unit(s) SubCutaneous at bedtime  insulin lispro (HumaLOG) corrective regimen sliding scale   SubCutaneous every 6 hours  LORazepam   Injectable 2 milliGRAM(s) IV Push once PRN  sodium chloride 0.9%. 1000 milliLiter(s) IV Continuous <Continuous>      PHYSICAL EXAM:   Vital Signs Last 24 Hrs  T(C): 37.4 (30 Jun 2019 09:57), Max: 37.6 (30 Jun 2019 04:00)  T(F): 99.4 (30 Jun 2019 09:57), Max: 99.6 (30 Jun 2019 04:00)  HR: 87 (30 Jun 2019 10:48) (84 - 93)  BP: 166/87 (30 Jun 2019 10:48) (128/65 - 195/79)  BP(mean): 108 (30 Jun 2019 10:48) (73 - 132)  RR: 11 (30 Jun 2019 08:00) (11 - 25)  SpO2: 98% (30 Jun 2019 10:48) (97% - 100%)    General: No acute distress  HEENT: EOM intact, left eye legally blind  Abdomen: Soft, nontender, nondistended   Extremities: No edema    NEUROLOGICAL EXAM:  Mental status: Eyes closed, opens to vice and light touch ut requires constant stimulation, awake, alert, eye opening apraxia, oriented to name, place and year, fluent speech, repetition intact,  No asomatognosia or anosognosia  Cranial Nerves: No facial asymmetry, no nystagmus, moderate-severe dysarthria  Motor exam: Normal tone, RUE /RLE spontaneous movement against gravity, Left side hemiparesis: LUE increased tone 1/5, LLE decreased tone, externally rotated, withdraws to noxious stimuli  Sensation: Left side appears Intact to noxious stimuli  Coordination/ Gait: No dysmetria on right side     LABS:                        14.3   9.1   )-----------( 319      ( 30 Jun 2019 02:36 )             42.2    06-30    138  |  97  |  11  ----------------------------<  255<H>  5.3   |  22  |  0.91    Ca    9.4      30 Jun 2019 02:36    TPro  6.5  /  Alb  3.8  /  TBili  0.8  /  DBili  x   /  AST  7<L>  /  ALT  11  /  AlkPhos  84  06-28  PT/INR - ( 28 Jun 2019 20:06 )   PT: 10.9 sec;   INR: 0.95 ratio         PTT - ( 28 Jun 2019 20:06 )  PTT:25.6 sec  Hemoglobin A1C, Whole Blood: 10.4 % (06-29 @ 09:33)      IMAGING: Reviewed by me.     Brain MRI (06/29/19) There is evidence of abnormal stricture diffusion seen involving the right frontal parietal region. Involvement of the right basal ganglia region is seen as well. This is compatible with an area of acute infarct.   Involvement of the right anterior cerebral and middle cerebral artery territories are identified. Scattered areas of abnormal susceptibility are identified which is likely compatible with some hemorrhagic transformation. Localized mass effect is seen consisting of sulcal effacement. No significant shift or herniation is seen.    Head CT (06/30/19) Abnormal areas of low-attenuation involving the right frontal and right parietal cortical subcortical region are again seen. These findings are compatible with areas of old infarcts. Abnormal low-attenuation involving the left alla is also again seen which could be compatible with an age-indeterminate infarct. There is no evidence of abnormal low-attenuation seen involving the high right frontal cortical subcortical region. Involvement of the right basal   ganglia and anterior corona radiata regions as well. This is predominantly seen medially and compatible with an acute right anterior cerebral artery infarct. There is localized mass effect consisting of sulcal effacement. No significant shift or herniation is seen.     CTA Head/Neck (06/28/19) CT ANGIOGRAPHY NECK:  Markedly limited examination. Tandem moderate to severe stenoses are suspected in the right carotid bulb and proximal right internal carotid artery. 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    CT ANGIOGRAPHY BRAIN: Markedly limited examination. Limited evaluation of the intracranial internal carotid arteries. Stenoses or occlusion in the distal cavernous segments and ophthalmic segments of the bilateral internal carotid arteries cannot be excluded. Apparent severe stenosis in the distal supraclinoid right internal carotid artery and right carotid terminus with possible occlusion of the A1 segment of the right anterior cerebral artery.  The right A2 segment   fills across the anterior to indicating artery.  There appears to be posterior distal right ENRIQUE vessels within the medial right frontal lobe, raising the possibility of early cerebral infarction. THE PATIENT WAS SEEN AND EXAMINED BY ME WITH THE HOUSESTAFF AND STROKE TEAM DURING MORNING ROUNDS.   HPI:  Patient is a 54 year old Female w/ PMHx of HTN, HLD, DM transferred from [facility] for evaluation of stroke. Per daughter at bedside, last known normal 2pm on day of admission, patient had sudden onset left sided paralysis, slurred speech, received TPA at at [place]. NIHSS 13, MRS 1       SUBJECTIVE: No events overnight.  No new neurologic complaints.      aspirin  chewable 81 milliGRAM(s) Oral daily  clopidogrel Tablet 75 milliGRAM(s) Oral daily  dextrose 40% Gel 15 Gram(s) Oral once PRN  dextrose 5%. 1000 milliLiter(s) IV Continuous <Continuous>  dextrose 50% Injectable 12.5 Gram(s) IV Push once  dextrose 50% Injectable 25 Gram(s) IV Push once  dextrose 50% Injectable 25 Gram(s) IV Push once  glucagon  Injectable 1 milliGRAM(s) IntraMuscular once PRN  insulin glargine Injectable (LANTUS) 20 Unit(s) SubCutaneous at bedtime  insulin lispro (HumaLOG) corrective regimen sliding scale   SubCutaneous every 6 hours  LORazepam   Injectable 2 milliGRAM(s) IV Push once PRN  sodium chloride 0.9%. 1000 milliLiter(s) IV Continuous <Continuous>      PHYSICAL EXAM:   Vital Signs Last 24 Hrs  T(C): 37.4 (30 Jun 2019 09:57), Max: 37.6 (30 Jun 2019 04:00)  T(F): 99.4 (30 Jun 2019 09:57), Max: 99.6 (30 Jun 2019 04:00)  HR: 87 (30 Jun 2019 10:48) (84 - 93)  BP: 166/87 (30 Jun 2019 10:48) (128/65 - 195/79)  BP(mean): 108 (30 Jun 2019 10:48) (73 - 132)  RR: 11 (30 Jun 2019 08:00) (11 - 25)  SpO2: 98% (30 Jun 2019 10:48) (97% - 100%)    General: No acute distress  HEENT: EOM intact, left eye legally blind  Abdomen: Soft, nontender, nondistended   Extremities: No edema    NEUROLOGICAL EXAM:  Mental status: Eyes closed, opens to voice and light touch but requires constant stimulation, awake, alert, eye opening apraxia, oriented to name, place and year, fluent speech, repetition intact,  No asomatognosia or anosognosia  Cranial Nerves: No facial asymmetry, no nystagmus, moderate to severe dysarthria  Motor exam: Normal tone, RUE /RLE spontaneous movement against gravity, Left side hemiparesis: LUE increased tone 1/5, LLE decreased tone, externally rotated, withdraws to noxious stimuli  Sensation: Left side appears intact to noxious stimuli, left side neglect to double simultaneous stimulation   Coordination/ Gait: No dysmetria on right side     LABS:                        14.3   9.1   )-----------( 319      ( 30 Jun 2019 02:36 )             42.2    06-30    138  |  97  |  11  ----------------------------<  255<H>  5.3   |  22  |  0.91    Ca    9.4      30 Jun 2019 02:36    TPro  6.5  /  Alb  3.8  /  TBili  0.8  /  DBili  x   /  AST  7<L>  /  ALT  11  /  AlkPhos  84  06-28  PT/INR - ( 28 Jun 2019 20:06 )   PT: 10.9 sec;   INR: 0.95 ratio         PTT - ( 28 Jun 2019 20:06 )  PTT:25.6 sec  Hemoglobin A1C, Whole Blood: 10.4 % (06-29 @ 09:33)      IMAGING: Reviewed by me.     Brain MRI (06/29/19) There is evidence of abnormal stricture diffusion seen involving the right frontal parietal region. Involvement of the right basal ganglia region is seen as well. This is compatible with an area of acute infarct.   Involvement of the right anterior cerebral and middle cerebral artery territories are identified. Scattered areas of abnormal susceptibility are identified which is likely compatible with some hemorrhagic transformation. Localized mass effect is seen consisting of sulcal effacement. No significant shift or herniation is seen.    Head CT (06/30/19) Abnormal areas of low-attenuation involving the right frontal and right parietal cortical subcortical region are again seen. These findings are compatible with areas of old infarcts. Abnormal low-attenuation involving the left alla is also again seen which could be compatible with an age-indeterminate infarct. There is no evidence of abnormal low-attenuation seen involving the high right frontal cortical subcortical region. Involvement of the right basal   ganglia and anterior corona radiata regions as well. This is predominantly seen medially and compatible with an acute right anterior cerebral artery infarct. There is localized mass effect consisting of sulcal effacement. No significant shift or herniation is seen.     CTA Head/Neck (06/28/19) CT ANGIOGRAPHY NECK:  Markedly limited examination. Tandem moderate to severe stenoses are suspected in the right carotid bulb and proximal right internal carotid artery. 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    CT ANGIOGRAPHY BRAIN: Markedly limited examination. Limited evaluation of the intracranial internal carotid arteries. Stenoses or occlusion in the distal cavernous segments and ophthalmic segments of the bilateral internal carotid arteries cannot be excluded. Apparent severe stenosis in the distal supraclinoid right internal carotid artery and right carotid terminus with possible occlusion of the A1 segment of the right anterior cerebral artery.  The right A2 segment   fills across the anterior to indicating artery.  There appears to be posterior distal right ENRIQUE vessels within the medial right frontal lobe, raising the possibility of early cerebral infarction.

## 2019-06-30 NOTE — PROGRESS NOTE ADULT - ASSESSMENT
ASSESSMENT: This is a 54ywoman with    NEURO: Continue close monitoring for neurologic deterioration, permissive HTN, titrate statin to LDL goal less than 70, MRI Brain w/o, MRA Head w/o and Neck w/contrast. Physical therapy/OT/Speech eval/treatment.     ANTITHROMBOTIC THERAPY:     PULMONARY: CXR clear, protecting airway, saturating well     CARDIOVASCULAR: check TTE, cardiac monitoring                              SBP goal:     GASTROINTESTINAL: Ulcer prophylaxis, dysphagia screen       Diet:     RENAL: BUN/Cr stable, good urine output      Na Goal: Greater than 135     [place]:    HEMATOLOGY: H/H stable, Platelets normal      DVT ppx: Heparin s.c [] LMWH []     ID: afebrile, no leukocytosis     OTHER: HgbA1C 10.4, continue Humalog sliding scale and will contact endocrine for eval    DISPOSITION: Rehab or home depending on PT eval once stable and workup is complete    CORE MEASURES:        Admission NIHSS: 13     TPA:  YES       LDL/HDL: 131/49     Depression Screen:      Statin Therapy:     Dysphagia Screen: [] PASS [] FAIL     Smoking [] YES [] NO      Afib [] YES [] NO     Stroke Education [] YES [] NO    Obtain screening ultrasound in patients who meet 1 or more of the following criteria as patient is high risk for DVT/PE upon admission:   [] History of DVT/PE  []Hypercoagulable states (Factor V Leiden, Cancer, OCP, etc. )  []Prolonged immobility (hemiplegia/hemiparesis/post operative or any other extended immboliziation)  [] Transferred from outside facility (Rehab or Long term care)  [] Age </= to 50 ASSESSMENT: 55y/o R handed  descendant female morbidly obese with vascular risk factors as stated above and including apparent CHF. As per niece who provide history at bedside, patient was noticed with droopy face and left sided weakness unable to walk and slurred speech. She was initially evaluated at [place] and was deemed candidate for tPA. CT angiography was obtain and showed right carotid stenosis and she was transfer for further management. Upon initial evaluation at [facility] her NIHSS was deemed to be approximately 13 and repeated CT angiography of head and neck did not show any intracranial large vessel occlusion reason why endovascular therapy was not offered. Head CT showed chronic right parietal and frontal hypodensity the latter less conspicuous. Also noticeable left paramedian pontine hypodensity. Patient was reported to have several episodes of seizure upon admission and was given ativan and loaded with levetiracetam.    Impression diagnosis: Cerebral infarction right MCA stroke watershed region in the setting of right cervical internal carotid artery stenosis with calcified component most probable etiology symptomatic large vessel disease due to hypoperfusion vs artery to artery embolism. Other source of embolism as cardiac and paradoxical source of embolism can not be completely excluded.      NEURO: Neuro exam is improving vs admission. Continue close monitoring for neurologic deterioration, Maintain -180 due to CHRISTINE stenosis and hemorrhagic conversion, continue ASA/Plavix and high dose statin for secondary stroke prevention, titrate statin to LDL goal less than 70, Plan for possible carotid stenting vs CEA, Neurosurgery eval appreciated. Will decreased frequency of Keppra 500mg to once daily due to increase somnolence, Will obtain VEEG to eval for possible seizures, MRI Brain w/o results as above, Will obtain MRA Head w/contrast and Neck w/contrast, and carotid doppler, CTA limited by body habitus. Pending Physical therapy/OT/Speech eval/treatment.     ANTITHROMBOTIC THERAPY: ASA/Plavix for secondary stroke prevention. Will obtain P2Y12 in am (07/01/19)    PULMONARY: CXR (06/29/19)  Clear lungs. Protecting airway, saturating well     CARDIOVASCULAR: check TTE, cardiac monitoring no events, Dr Luz (cardiology) eval appreciated, will obtain cardiac clearance for RCEA vs carotid stent                             SBP goal: 160-180    GASTROINTESTINAL: Dysphagia screen  failed, NGT in place, tolerating diet, S/S eval once pt able to cooperate with exam     Diet: NGT    RENAL: BUN/Cr stable, good urine output, urine toxicology: Negative      Na Goal: Greater than 135     [place]: N    HEMATOLOGY: H/H stable, Platelets normal, LE doppler ordered to r/o DVT     DVT ppx:  LMWH    ID: afebrile, no leukocytosis     OTHER: HgbA1C 10.4, continue Humalog sliding scale and DM meds as per endocrine recommendations, RUE: soft wrist restrained applied to prevent pt form pulling NGT    DISPOSITION: Rehab or home depending on PT eval once stable and workup is complete    CORE MEASURES:        Admission NIHSS: 13     TPA:  YES       LDL/HDL: 131/49     Depression Screen:      Statin Therapy: Y     Dysphagia Screen:  FAIL     Smoking  NO      Afib  NO     Stroke Education  YES    Obtain screening ultrasound in patients who meet 1 or more of the following criteria as patient is high risk for DVT/PE upon admission:   [] History of DVT/PE  []Hypercoagulable states (Factor V Leiden, Cancer, OCP, etc. )  [X]Prolonged immobility (hemiplegia/hemiparesis/post operative or any other extended immobilization)   [] Transferred from outside facility (Rehab or Long term care)  [] Age </= to 50 ASSESSMENT: 53y/o R handed  descendant female morbidly obese with vascular risk factors as stated above and including apparent CHF. As per niece who provide history at bedside, patient was noticed with droopy face and left sided weakness unable to walk and slurred speech. She was initially evaluated at [place] and was deemed candidate for tPA. CT angiography was obtain and showed right carotid stenosis and she was transfer for further management. Upon initial evaluation at [facility] her NIHSS was deemed to be approximately 13 and repeated CT angiography of head and neck did not show any intracranial large vessel occlusion reason why endovascular therapy was not offered. Head CT showed chronic right parietal and frontal hypodensity the latter less conspicuous. Also noticeable left paramedian pontine hypodensity. Patient was reported to have several episodes of seizure upon admission and was given ativan and loaded with levetiracetam. Patient underwent evaluation with brain MRI that showed areas of right middle cerebral artery territory and anterior cerebral artery stroke with hemorrhagic conversion (HI1).    Impression diagnosis: Cerebral infarction right MCA/ENRIQUE stroke in the setting of right cervical internal carotid artery stenosis with calcified component most probable etiology symptomatic large vessel disease due to artery to artery embolism. Other source of embolism as cardiac and paradoxical source of embolism can not be completely excluded.      NEURO: Neuro exam is improving vs admission. Continue close monitoring for neurologic deterioration, Maintain -180 due to CHRISTINE stenosis and minimal hemorrhagic conversion, continue ASA/Plavix and high dose statin for secondary stroke prevention in preparation for possible stent. Titrate statin to LDL goal less than 70, Plan for possible carotid stenting vs CEA, Neurosurgery eval appreciated. Will decreased frequency of Keppra 500mg to once daily due to increase somnolence, Will obtain VEEG to eval for possible seizures, MRI Brain w/o results as above, Will obtain MRA Head w/contrast and Neck w/contrast, and carotid doppler, CTA limited by body habitus. Pending Physical therapy/OT/Speech eval/treatment.     ANTITHROMBOTIC THERAPY: ASA/Plavix for secondary stroke prevention. Will obtain P2Y12 in am (07/01/19)    PULMONARY: CXR (06/29/19)  Clear lungs. Protecting airway, saturating well     CARDIOVASCULAR: check TTE, cardiac monitoring no events, Dr Luz (cardiology) eval appreciated, will obtain cardiac clearance for RCEA vs carotid stent. Gradual normotension in the setting of carotid stenosis careful initiation of home meds and will monitor for heart failure signs since patient endorses hx of CHF.                             SBP goal: 160-180    GASTROINTESTINAL: Dysphagia screen  failed, NGT in place, tolerating diet, S/S eval once pt able to cooperate with exam     Diet: NGT    RENAL: BUN/Cr stable, good urine output, urine toxicology: Negative      Na Goal: Greater than 135     [place]: N    HEMATOLOGY: H/H stable, Platelets normal, LE doppler ordered to r/o DVT     DVT ppx:  LMWH    ID: afebrile, no leukocytosis     OTHER: HgbA1C 10.4, continue Humalog sliding scale and DM meds as per endocrine recommendations, RUE: soft wrist restrained applied to prevent pt form pulling NGT    DISPOSITION: Rehab or home depending on PT eval once stable and workup is complete    CORE MEASURES:        Admission NIHSS: 13     TPA:  YES       LDL/HDL: 131/49     Depression Screen:      Statin Therapy: Y     Dysphagia Screen:  FAIL     Smoking  NO      Afib  NO     Stroke Education  YES    Obtain screening ultrasound in patients who meet 1 or more of the following criteria as patient is high risk for DVT/PE upon admission:   [] History of DVT/PE  []Hypercoagulable states (Factor V Leiden, Cancer, OCP, etc. )  [X]Prolonged immobility (hemiplegia/hemiparesis/post operative or any other extended immobilization)   [] Transferred from outside facility (Rehab or Long term care)  [] Age </= to 50

## 2019-06-30 NOTE — PROGRESS NOTE ADULT - SUBJECTIVE AND OBJECTIVE BOX
Diabetes Follow up note:  Interval Hx:  54 year old female w/uncontrolled T2DM (on insulin PTA) w/retinopathy, morbid obesity here w/CVA. Pt started on Glucerna, being titrated up to goal and currently at 40 cc/hr (goal 60). Pt seen in stroke unit at bedside w/daughter present. Tolerating TF. Able to nod yes/no to my questions.       Review of Systems:  General: denies pain. L sided weakness  GI: Tolerating TFs without any N/V/D/ABD PAIN.  CV: No CP/SOB  ENDO: No S&Sx of hypoglycemia  MEDS:  atorvastatin 80 milliGRAM(s) Oral at bedtime    insulin glargine Injectable (LANTUS) 20 Unit(s) SubCutaneous at bedtime  insulin lispro (HumaLOG) corrective regimen sliding scale   SubCutaneous every 6 hours      Allergies    No Known Allergies        PE:  General: Female sitting in chair. NAD.   Vital Signs Last 24 Hrs  T(C): 37.4 (30 Jun 2019 09:57), Max: 37.6 (30 Jun 2019 04:00)  T(F): 99.4 (30 Jun 2019 09:57), Max: 99.6 (30 Jun 2019 04:00)  HR: 87 (30 Jun 2019 10:48) (84 - 93)  BP: 166/87 (30 Jun 2019 10:48) (128/65 - 195/79)  BP(mean): 108 (30 Jun 2019 10:48) (73 - 132)  RR: 23 (30 Jun 2019 10:00) (11 - 25)  SpO2: 98% (30 Jun 2019 10:48) (97% - 100%)  HEENT: NGT in place.   CV: S1, S2. NSR on monitor.   Abd: Soft, NT,ND, Obese.   Extremities: Warm  Neuro: Awake. Follows commands. L sided hemiparesis. non-verbal when at bedside.     LABS:    POCT Blood Glucose.: 307 mg/dL (06-30-19 @ 12:38)  POCT Blood Glucose.: 212 mg/dL (06-30-19 @ 05:06)  POCT Blood Glucose.: 194 mg/dL (06-30-19 @ 01:17)  POCT Blood Glucose.: 227 mg/dL (06-29-19 @ 17:17)  POCT Blood Glucose.: 213 mg/dL (06-29-19 @ 11:24)                            14.3   9.1   )-----------( 319      ( 30 Jun 2019 02:36 )             42.2       06-30    138  |  97  |  11  ----------------------------<  255<H>  5.3   |  22  |  0.91    Ca    9.4      30 Jun 2019 02:36    TPro  6.5  /  Alb  3.8  /  TBili  0.8  /  DBili  x   /  AST  7<L>  /  ALT  11  /  AlkPhos  84  06-28        Hemoglobin A1C, Whole Blood: 10.4 % <H> [4.0 - 5.6] (06-29-19 @ 09:33)            Contact number: [place] [phone] or [phone]

## 2019-06-30 NOTE — PROGRESS NOTE ADULT - PROBLEM SELECTOR PLAN 1
-test BG Q6h  -Discontinue lantus  -give Humalog 5 units stat now.   -Start NPH 18 units Q6h (first dose 6pm/hold NPH if tube feeds off). Will likely need to be further titrated up as tube feeds are closer to goal  -Change Humalog to moderate correction scale q6h  -Discharge plan: pending hospital/PO course.   -discussed w/pt's family and team  pager: [phone]

## 2019-06-30 NOTE — PROGRESS NOTE ADULT - ASSESSMENT
54 year old female w/uncontrolled T2DM w/retinopathy here w/CVA. Pt w/dysphagia on continuous tube feeds of Glucerna being titrated up to goal. BG 300s this afternoon. Pt received low dose Lantus last night, will transition to NPH regimen so we can titrate insulin more efficiently. BG goal (100-180mg/dl).

## 2019-07-01 LAB
ANION GAP SERPL CALC-SCNC: 13 MMOL/L — SIGNIFICANT CHANGE UP (ref 5–17)
BUN SERPL-MCNC: 16 MG/DL — SIGNIFICANT CHANGE UP (ref 7–23)
CALCIUM SERPL-MCNC: 9.1 MG/DL — SIGNIFICANT CHANGE UP (ref 8.4–10.5)
CHLORIDE SERPL-SCNC: 102 MMOL/L — SIGNIFICANT CHANGE UP (ref 96–108)
CO2 SERPL-SCNC: 24 MMOL/L — SIGNIFICANT CHANGE UP (ref 22–31)
CREAT SERPL-MCNC: 1.07 MG/DL — SIGNIFICANT CHANGE UP (ref 0.5–1.3)
GLUCOSE BLDC GLUCOMTR-MCNC: 152 MG/DL — HIGH (ref 70–99)
GLUCOSE BLDC GLUCOMTR-MCNC: 220 MG/DL — HIGH (ref 70–99)
GLUCOSE BLDC GLUCOMTR-MCNC: 229 MG/DL — HIGH (ref 70–99)
GLUCOSE BLDC GLUCOMTR-MCNC: 248 MG/DL — HIGH (ref 70–99)
GLUCOSE BLDC GLUCOMTR-MCNC: 301 MG/DL — HIGH (ref 70–99)
GLUCOSE SERPL-MCNC: 257 MG/DL — HIGH (ref 70–99)
HCT VFR BLD CALC: 40.2 % — SIGNIFICANT CHANGE UP (ref 34.5–45)
HGB BLD-MCNC: 12.9 G/DL — SIGNIFICANT CHANGE UP (ref 11.5–15.5)
MCHC RBC-ENTMCNC: 27.7 PG — SIGNIFICANT CHANGE UP (ref 27–34)
MCHC RBC-ENTMCNC: 32.1 GM/DL — SIGNIFICANT CHANGE UP (ref 32–36)
MCV RBC AUTO: 86.2 FL — SIGNIFICANT CHANGE UP (ref 80–100)
PA ADP PRP-ACNC: 215 PRU — SIGNIFICANT CHANGE UP (ref 194–417)
PLATELET # BLD AUTO: 274 K/UL — SIGNIFICANT CHANGE UP (ref 150–400)
POTASSIUM SERPL-MCNC: 4.2 MMOL/L — SIGNIFICANT CHANGE UP (ref 3.5–5.3)
POTASSIUM SERPL-SCNC: 4.2 MMOL/L — SIGNIFICANT CHANGE UP (ref 3.5–5.3)
RBC # BLD: 4.66 M/UL — SIGNIFICANT CHANGE UP (ref 3.8–5.2)
RBC # FLD: 13.2 % — SIGNIFICANT CHANGE UP (ref 10.3–14.5)
SODIUM SERPL-SCNC: 139 MMOL/L — SIGNIFICANT CHANGE UP (ref 135–145)
WBC # BLD: 8.2 K/UL — SIGNIFICANT CHANGE UP (ref 3.8–10.5)
WBC # FLD AUTO: 8.2 K/UL — SIGNIFICANT CHANGE UP (ref 3.8–10.5)

## 2019-07-01 PROCEDURE — 99233 SBSQ HOSP IP/OBS HIGH 50: CPT

## 2019-07-01 PROCEDURE — 95951: CPT | Mod: 26

## 2019-07-01 PROCEDURE — 99255 IP/OBS CONSLTJ NEW/EST HI 80: CPT

## 2019-07-01 PROCEDURE — 95816 EEG AWAKE AND DROWSY: CPT | Mod: 26

## 2019-07-01 RX ORDER — HUMAN INSULIN 100 [IU]/ML
21 INJECTION, SUSPENSION SUBCUTANEOUS EVERY 6 HOURS
Refills: 0 | Status: DISCONTINUED | OUTPATIENT
Start: 2019-07-01 | End: 2019-07-03

## 2019-07-01 RX ORDER — NYSTATIN CREAM 100000 [USP'U]/G
1 CREAM TOPICAL
Refills: 0 | Status: DISCONTINUED | OUTPATIENT
Start: 2019-07-01 | End: 2019-08-26

## 2019-07-01 RX ADMIN — Medication 4: at 17:33

## 2019-07-01 RX ADMIN — CLOPIDOGREL BISULFATE 75 MILLIGRAM(S): 75 TABLET, FILM COATED ORAL at 12:40

## 2019-07-01 RX ADMIN — Medication 4: at 12:40

## 2019-07-01 RX ADMIN — Medication 4: at 05:22

## 2019-07-01 RX ADMIN — LOSARTAN POTASSIUM 25 MILLIGRAM(S): 100 TABLET, FILM COATED ORAL at 05:23

## 2019-07-01 RX ADMIN — ATORVASTATIN CALCIUM 80 MILLIGRAM(S): 80 TABLET, FILM COATED ORAL at 21:13

## 2019-07-01 RX ADMIN — HUMAN INSULIN 18 UNIT(S): 100 INJECTION, SUSPENSION SUBCUTANEOUS at 05:23

## 2019-07-01 RX ADMIN — HUMAN INSULIN 18 UNIT(S): 100 INJECTION, SUSPENSION SUBCUTANEOUS at 12:40

## 2019-07-01 RX ADMIN — Medication 81 MILLIGRAM(S): at 12:40

## 2019-07-01 RX ADMIN — Medication 2: at 23:41

## 2019-07-01 RX ADMIN — HUMAN INSULIN 21 UNIT(S): 100 INJECTION, SUSPENSION SUBCUTANEOUS at 23:41

## 2019-07-01 RX ADMIN — HUMAN INSULIN 21 UNIT(S): 100 INJECTION, SUSPENSION SUBCUTANEOUS at 17:33

## 2019-07-01 RX ADMIN — NYSTATIN CREAM 1 APPLICATION(S): 100000 CREAM TOPICAL at 23:44

## 2019-07-01 RX ADMIN — ENOXAPARIN SODIUM 40 MILLIGRAM(S): 100 INJECTION SUBCUTANEOUS at 12:40

## 2019-07-01 RX ADMIN — LEVETIRACETAM 400 MILLIGRAM(S): 250 TABLET, FILM COATED ORAL at 12:41

## 2019-07-01 NOTE — SWALLOW BEDSIDE ASSESSMENT ADULT - SWALLOW EVAL: DIAGNOSIS
Pt presents with an insufficient mental status to support PO feeding at this time. Poor orientation to feeding evident despite re-direction to task given verbal cues/tactile stimulation.

## 2019-07-01 NOTE — DIETITIAN INITIAL EVALUATION ADULT. - PERTINENT MEDS FT
MEDICATIONS  (STANDING):  aspirin  chewable 81 milliGRAM(s) Oral daily  atorvastatin 80 milliGRAM(s) Oral at bedtime  clopidogrel Tablet 75 milliGRAM(s) Oral daily  dextrose 5%. 1000 milliLiter(s) (50 mL/Hr) IV Continuous <Continuous>  dextrose 50% Injectable 12.5 Gram(s) IV Push once  dextrose 50% Injectable 25 Gram(s) IV Push once  dextrose 50% Injectable 25 Gram(s) IV Push once  enoxaparin Injectable 40 milliGRAM(s) SubCutaneous daily  insulin lispro (HumaLOG) corrective regimen sliding scale   SubCutaneous every 6 hours  insulin NPH human recombinant 18 Unit(s) SubCutaneous every 6 hours  levETIRAcetam  IVPB 500 milliGRAM(s) IV Intermittent daily  losartan 25 milliGRAM(s) Oral daily  sodium chloride 0.9%. 1000 milliLiter(s) (75 mL/Hr) IV Continuous <Continuous>    MEDICATIONS  (PRN):  acetaminophen    Suspension .. 650 milliGRAM(s) Oral every 6 hours PRN Temp greater or equal to 38C (100.4F)  acetaminophen   Tablet .. 650 milliGRAM(s) Oral every 6 hours PRN Temp greater or equal to 38C (100.4F)  dextrose 40% Gel 15 Gram(s) Oral once PRN Blood Glucose LESS THAN 70 milliGRAM(s)/deciliter  glucagon  Injectable 1 milliGRAM(s) IntraMuscular once PRN Glucose LESS THAN 70 milligrams/deciliter  LORazepam   Injectable 2 milliGRAM(s) IV Push once PRN Seizure Activity

## 2019-07-01 NOTE — DIETITIAN INITIAL EVALUATION ADULT. - ENERGY NEEDS
Ht: 66"  Wt: 302  BMI: 48.9 kg/m2   IBW: 130 (+/-10%)     232% IBW  Edema: none       Skin: no pressure injuries documented

## 2019-07-01 NOTE — PROGRESS NOTE ADULT - SUBJECTIVE AND OBJECTIVE BOX
Chief Complaint/Follow-up on: T2DM     Subjective: 53 y/o female w/ PMHx of HTN, HLD, DM transferred from [facility] for evaluation of stroke. Patient was reported to have several episodes of seizures on admission. Patient underwent evaluation with brain MRI that showed areas of right middle cerebral artery territory and anterior cerebral artery stroke with hemorrhagic conversion.    Interval Events: On Glucerna 24 hour TF at 60 ml/hr. Patient has insufficient mental status for swallow eval today.    MEDICATIONS  (STANDING):  aspirin  chewable 81 milliGRAM(s) Oral daily  atorvastatin 80 milliGRAM(s) Oral at bedtime  clopidogrel Tablet 75 milliGRAM(s) Oral daily  dextrose 5%. 1000 milliLiter(s) (50 mL/Hr) IV Continuous <Continuous>  dextrose 50% Injectable 12.5 Gram(s) IV Push once  dextrose 50% Injectable 25 Gram(s) IV Push once  dextrose 50% Injectable 25 Gram(s) IV Push once  enoxaparin Injectable 40 milliGRAM(s) SubCutaneous daily  insulin lispro (HumaLOG) corrective regimen sliding scale MOD SubCutaneous every 6 hours  insulin NPH human recombinant 18 Unit(s) SubCutaneous every 6 hours  levETIRAcetam  IVPB 500 milliGRAM(s) IV Intermittent daily  sodium chloride 0.9%. 1000 milliLiter(s) (75 mL/Hr) IV Continuous <Continuous>    MEDICATIONS  (PRN):  acetaminophen    Suspension .. 650 milliGRAM(s) Oral every 6 hours PRN Temp greater or equal to 38C (100.4F)  acetaminophen   Tablet .. 650 milliGRAM(s) Oral every 6 hours PRN Temp greater or equal to 38C (100.4F)  dextrose 40% Gel 15 Gram(s) Oral once PRN Blood Glucose LESS THAN 70 milliGRAM(s)/deciliter  glucagon  Injectable 1 milliGRAM(s) IntraMuscular once PRN Glucose LESS THAN 70 milligrams/deciliter  LORazepam   Injectable 2 milliGRAM(s) IV Push once PRN Seizure Activity      PHYSICAL EXAM:  VITALS: T(C): 37.6 (07-01-19 @ 14:00)  T(F): 99.6 (07-01-19 @ 14:00), Max: 101.4 (06-30-19 @ 20:41)  HR: 77 (07-01-19 @ 14:00) (71 - 88)  BP: 148/68 (07-01-19 @ 14:00) (122/54 - 175/101)  RR:  (12 - 23)  SpO2:  (97% - 100%)  Wt(kg): 136.9 kg    GENERAL: NAD, well-groomed, well-developed  EYES: No proptosis, no injection  HEENT:  Atraumatic, Normocephalic, moist mucous membranes  THYROID: Normal size, no palpable nodules  RESPIRATORY: Clear to auscultation bilaterally; No rales, rhonchi, wheezing, or rubs  CARDIOVASCULAR: Regular rate and rhythm; No murmurs; no peripheral edema  GI: Soft, nontender, non distended, normal bowel sounds  CUSHING'S SIGNS: no striae    POCT Blood Glucose.: 248 mg/dL (07-01-19 @ 12:17) N18+H4  POCT Blood Glucose.: 229 mg/dL (07-01-19 @ 05:18) N18+H4    POCT Blood Glucose.: 217 mg/dL (06-30-19 @ 23:49) N18+H4  POCT Blood Glucose.: 218 mg/dL (06-30-19 @ 17:37) N18+H4  POCT Blood Glucose.: 311 mg/dL (06-30-19 @ 14:11)  POCT Blood Glucose.: 307 mg/dL (06-30-19 @ 12:38)  POCT Blood Glucose.: 212 mg/dL (06-30-19 @ 05:06)  POCT Blood Glucose.: 194 mg/dL (06-30-19 @ 01:17)    POCT Blood Glucose.: 227 mg/dL (06-29-19 @ 17:17)  POCT Blood Glucose.: 213 mg/dL (06-29-19 @ 11:24)  POCT Blood Glucose.: 301 mg/dL (06-29-19 @ 07:32)    07-01    139  |  102  |  16  ----------------------------<  257<H>  4.2   |  24  |  1.07    EGFR if : 68  EGFR if non : 59<L>    Ca    9.1      07-01    TPro  6.5  /  Alb  3.8  /  TBili  0.8  /  DBili  x   /  AST  7<L>  /  ALT  11  /  AlkPhos  84  06-28    Hemoglobin A1C, Whole Blood: 10.4 % <H> [4.0 - 5.6] (06-29-19 @ 09:33) Chief Complaint/Follow-up on: T2DM     Subjective: 55 y/o female w/ PMHx of HTN, HLD, DM transferred from [facility] for evaluation of stroke. Patient was reported to have several episodes of seizures on admission. Patient underwent evaluation with brain MRI that showed areas of right middle cerebral artery territory and anterior cerebral artery stroke with hemorrhagic conversion.    Interval Events: On Glucerna 24 hour TF at 60 ml/hr. Patient has insufficient mental status for swallow eval today. Patient seen with daughter at bedside. Daughter states normally A1c is usually 6% with the exception of recent A1c 10%. Also daughter states patient was on basal insulin and oral prior to admission. Spoke with RN, there has been no interruptions in her TF.     MEDICATIONS  (STANDING):  aspirin  chewable 81 milliGRAM(s) Oral daily  atorvastatin 80 milliGRAM(s) Oral at bedtime  clopidogrel Tablet 75 milliGRAM(s) Oral daily  dextrose 5%. 1000 milliLiter(s) (50 mL/Hr) IV Continuous <Continuous>  dextrose 50% Injectable 12.5 Gram(s) IV Push once  dextrose 50% Injectable 25 Gram(s) IV Push once  dextrose 50% Injectable 25 Gram(s) IV Push once  enoxaparin Injectable 40 milliGRAM(s) SubCutaneous daily  insulin lispro (HumaLOG) corrective regimen sliding scale MOD SubCutaneous every 6 hours  insulin NPH human recombinant 18 Unit(s) SubCutaneous every 6 hours  levETIRAcetam  IVPB 500 milliGRAM(s) IV Intermittent daily  sodium chloride 0.9%. 1000 milliLiter(s) (75 mL/Hr) IV Continuous <Continuous>    MEDICATIONS  (PRN):  acetaminophen    Suspension .. 650 milliGRAM(s) Oral every 6 hours PRN Temp greater or equal to 38C (100.4F)  acetaminophen   Tablet .. 650 milliGRAM(s) Oral every 6 hours PRN Temp greater or equal to 38C (100.4F)  dextrose 40% Gel 15 Gram(s) Oral once PRN Blood Glucose LESS THAN 70 milliGRAM(s)/deciliter  glucagon  Injectable 1 milliGRAM(s) IntraMuscular once PRN Glucose LESS THAN 70 milligrams/deciliter  LORazepam   Injectable 2 milliGRAM(s) IV Push once PRN Seizure Activity      PHYSICAL EXAM:  VITALS: T(C): 37.6 (07-01-19 @ 14:00)  T(F): 99.6 (07-01-19 @ 14:00), Max: 101.4 (06-30-19 @ 20:41)  HR: 77 (07-01-19 @ 14:00) (71 - 88)  BP: 148/68 (07-01-19 @ 14:00) (122/54 - 175/101)  RR:  (12 - 23)  SpO2:  (97% - 100%)  Wt(kg): 136.9 kg    GENERAL: NAD, well-groomed, well-developed, obese female with NG tube  EYES: No proptosis, no injection  HEENT:  Atraumatic, Normocephalic, moist mucous membranes  THYROID: Normal size, no palpable nodules  RESPIRATORY: Clear to auscultation bilaterally; No rales, rhonchi, wheezing, or rubs  CARDIOVASCULAR: Regular rate and rhythm; No murmurs; no peripheral edema  GI: Soft, nontender, non distended, normal bowel sounds  CUSHING'S SIGNS: no striae    POCT Blood Glucose.: 248 mg/dL (07-01-19 @ 12:17) N18+H4  POCT Blood Glucose.: 229 mg/dL (07-01-19 @ 05:18) N18+H4    POCT Blood Glucose.: 217 mg/dL (06-30-19 @ 23:49) N18+H4  POCT Blood Glucose.: 218 mg/dL (06-30-19 @ 17:37) N18+H4  POCT Blood Glucose.: 311 mg/dL (06-30-19 @ 14:11)  POCT Blood Glucose.: 307 mg/dL (06-30-19 @ 12:38)  POCT Blood Glucose.: 212 mg/dL (06-30-19 @ 05:06)  POCT Blood Glucose.: 194 mg/dL (06-30-19 @ 01:17)    POCT Blood Glucose.: 227 mg/dL (06-29-19 @ 17:17)  POCT Blood Glucose.: 213 mg/dL (06-29-19 @ 11:24)  POCT Blood Glucose.: 301 mg/dL (06-29-19 @ 07:32)    07-01    139  |  102  |  16  ----------------------------<  257<H>  4.2   |  24  |  1.07    EGFR if : 68  EGFR if non : 59<L>    Ca    9.1      07-01    TPro  6.5  /  Alb  3.8  /  TBili  0.8  /  DBili  x   /  AST  7<L>  /  ALT  11  /  AlkPhos  84  06-28    Hemoglobin A1C, Whole Blood: 10.4 % <H> [4.0 - 5.6] (06-29-19 @ 09:33)

## 2019-07-01 NOTE — PROGRESS NOTE ADULT - ASSESSMENT
acute CVA  ongoing neuro work up , MRI/MRA as per stroke service  Echo   monitor on tele   if no etiology is discovered, then ILR is recommended     HTN  permissive HTN as per stroke service   will treat once deemed safe    DM  fu with endo    suspect SUDEEP  outpt sleep study     Fever  ID work up   check lower ext venous doppler

## 2019-07-01 NOTE — PROGRESS NOTE ADULT - ASSESSMENT
Sergo Chambers  54F pmhx HTN, HLD, DM admitted yesterday for patchy R sided infarcts in multiple vascular territories s/p TPA found to have apparentm multiple areas of R ICA severe stenosis and with possible occlusion of the R A1 although CTA limited by body habitus. Found to have watershed infarct on R side.  - Plan for R CEA when medically optimized  - Continue care per stroke neurology

## 2019-07-01 NOTE — DIETITIAN INITIAL EVALUATION ADULT. - OTHER INFO
Pt seen for: Stroke Unit length of stay   Adm dx: CVA    GI issues: no N/V   Last BM: 6/30    Food allergies/Intolerances: NKFA    Vit/supplement PTA: none    Pt unable to participate in interview, daughter at bedside provided info. Doesn't consistently check BG at home. States last A1c a few months ago was in the 8s. Po intake was variable PTA, sometimes would skip dinner. Breakfast has banana, coffee, bread, lunch sandwich, dinner rice, veg, chicken. Drinks a lot of juice, cocnut water, explained that theses are concentrated sources of carbs.    Endocrine following BG control.

## 2019-07-01 NOTE — PROGRESS NOTE ADULT - ASSESSMENT
53y/o R handed  descendant female morbidly obese with vascular risk factors as stated above and including apparent CHF. As per niece who provide history at bedside, patient was noticed with droopy face and left sided weakness unable to walk and slurred speech. She was initially evaluated at [facility] and was deemed candidate for tPA. CT angiography was obtain and showed right carotid stenosis and she was transfer for further management. Upon initial evaluation at [facility] her NIHSS was deemed to be approximately 13 and repeated CT angiography of head and neck did not show any intracranial large vessel occlusion reason why endovascular therapy was not offered. Head CT showed chronic right parietal and frontal hypodensity the latter less conspicuous. Also noticeable left paramedian pontine hypodensity. Patient was reported to have several episodes of seizure upon admission and was given ativan and loaded with levetiracetam. Patient underwent evaluation with brain MRI that showed areas of right middle cerebral artery territory and anterior cerebral artery stroke with hemorrhagic conversion (HI1).    Impression diagnosis: Cerebral infarction right MCA/ENRIQUE stroke in the setting of right cervical internal carotid artery stenosis with calcified component most probable etiology symptomatic large vessel disease due to artery to artery embolism. Other source of embolism as cardiac and paradoxical source of embolism can not be completely excluded.      NEURO: Neuro exam is improving vs admission. Continue close monitoring for neurologic deterioration, Maintain -180 due to CHRISTINE stenosis and minimal hemorrhagic conversion, continue ASA/Plavix and high dose statin for secondary stroke prevention in preparation for possible stent. Titrate statin to LDL goal less than 70, Plan for possible carotid stenting vs CEA, Neurosurgery eval appreciated. Will decreased frequency of Keppra 500mg to once daily due to increase somnolence, VEEG: Structural or functional abnormality in the right hemisphere, No epileptiform pattern or seizure seen , MRI Brain w/o results as above, Will obtain MRA Head w/contrast and Neck w/contrast, and carotid doppler, CTA limited by body habitus. Pending Physical therapy/OT/Speech eval/treatment.     ANTITHROMBOTIC THERAPY: ASA/Plavix for secondary stroke prevention. Will obtain P2Y12 in am (07/01/19)    PULMONARY: CXR (06/29/19)  Clear lungs. Protecting airway, saturating well     CARDIOVASCULAR: check TTE, cardiac monitoring no events, Dr Luz (cardiology) eval appreciated, will obtain cardiac clearance for RCEA vs carotid stent. Gradual normotension in the setting of carotid stenosis careful initiation of home meds and will monitor for heart failure signs since patient endorses hx of CHF.                             SBP goal: 160-180    GASTROINTESTINAL: Dysphagia screen  failed, NGT in place, tolerating diet, S/S eval once pt able to cooperate with exam     Diet: NGT    RENAL: BUN/Cr stable, good urine output, urine toxicology: Negative      Na Goal: Greater than 135     [place]rena: N    HEMATOLOGY: H/H stable, Platelets normal, LE doppler ordered to r/o DVT     DVT ppx:  LMWH    ID: afebrile, no leukocytosis     OTHER: HgbA1C 10.4, continue Humalog sliding scale and DM meds as per endocrine recommendations, RUE: soft wrist restrained applied to prevent pt form pulling NGT    DISPOSITION: Rehab or home depending on PT eval once stable and workup is complete    CORE MEASURES:        Admission NIHSS: 13     TPA:  YES       LDL/HDL: 131/49     Depression Screen:      Statin Therapy: Y     Dysphagia Screen:  FAIL     Smoking  NO      Afib  NO     Stroke Education  YES    Obtain screening ultrasound in patients who meet 1 or more of the following criteria as patient is high risk for DVT/PE upon admission:   [] History of DVT/PE  []Hypercoagulable states (Factor V Leiden, Cancer, OCP, etc. )  [X]Prolonged immobility (hemiplegia/hemiparesis/post operative or any other extended immobilization)   [] Transferred from outside facility (Rehab or Long term care)  [] Age </= to 50 53y/o R handed  descendant female morbidly obese with vascular risk factors as stated above and including apparent CHF. As per niece who provide history at bedside, patient was noticed with droopy face and left sided weakness unable to walk and slurred speech. She was initially evaluated at [facility] and was deemed candidate for tPA. CT angiography was obtain and showed right carotid stenosis and she was transfer for further management. Upon initial evaluation at [facility] her NIHSS was deemed to be approximately 13 and repeated CT angiography of head and neck did not show any intracranial large vessel occlusion reason why endovascular therapy was not offered. Head CT showed chronic right parietal and frontal hypodensity the latter less conspicuous. Also noticeable left paramedian pontine hypodensity. Patient was reported to have several episodes of seizure upon admission and was given ativan and loaded with levetiracetam. Patient underwent evaluation with brain MRI that showed areas of right middle cerebral artery territory and anterior cerebral artery stroke with hemorrhagic conversion (HI1).    Impression diagnosis: Cerebral infarction right MCA/ENRIQUE stroke in the setting of right cervical internal carotid artery stenosis with calcified component most probable etiology symptomatic large vessel disease due to artery to artery embolism. Other source of embolism as cardiac and paradoxical source of embolism can not be completely excluded.      NEURO: Neuro exam is improving vs admission. Continue close monitoring for neurologic deterioration, Maintain -180 due to CHRISTINE stenosis and minimal hemorrhagic conversion, continue ASA/Plavix and high dose statin for secondary stroke prevention in preparation for possible stent. Titrate statin to LDL goal less than 70, Plan for possible carotid stenting vs CEA, Neurosurgery eval appreciated. Will decreased frequency of Keppra 500mg to once daily due to increase somnolence, VEEG: Structural or functional abnormality in the right hemisphere, No epileptiform pattern or seizure seen , MRI Brain w/o results as above, Will obtain MRA Head w/contrast and Neck w/contrast, and carotid doppler, CTA limited by body habitus. Pending Physical therapy/OT recommended Acute Rehab.     ANTITHROMBOTIC THERAPY: ASA/Plavix for secondary stroke prevention. Will obtain P2Y12 215 on (07/01/19)    PULMONARY: CXR (06/29/19)  Clear lungs. Protecting airway, saturating well     CARDIOVASCULAR: check TTE, cardiac monitoring no events, Dr Luz (cardiology) eval appreciated, will obtain cardiac clearance for RCEA vs carotid stent. Gradual normotension in the setting of carotid stenosis careful initiation of home meds and will monitor for heart failure signs since patient endorses hx of CHF.                             SBP goal: 160-180    GASTROINTESTINAL: Dysphagia screen  failed, NGT in place, tolerating diet, S/S eval once pt able to cooperate with exam     Diet: NGT    RENAL: BUN/Cr stable, good urine output, urine toxicology: Negative      Na Goal: Greater than 135     [place]: N    HEMATOLOGY: H/H without acute change, Platelets 274, LE doppler ordered to r/o DVT     DVT ppx:  LMWH    ID: afebrile, no leukocytosis     OTHER: HgbA1C 10.4, continue Humalog sliding scale and DM meds as per endocrine recommendations, RUE: soft wrist restrained applied to prevent pt form pulling NGT    DISPOSITION: Acute Rehab once stable and workup is complete    CORE MEASURES:        Admission NIHSS: 13     TPA:  YES       LDL/HDL: 131/49     Depression Screen:      Statin Therapy: Y     Dysphagia Screen:  FAIL     Smoking  NO      Afib  NO     Stroke Education  YES    Obtain screening ultrasound in patients who meet 1 or more of the following criteria as patient is high risk for DVT/PE upon admission:   [] History of DVT/PE  []Hypercoagulable states (Factor V Leiden, Cancer, OCP, etc. )  [X]Prolonged immobility (hemiplegia/hemiparesis/post operative or any other extended immobilization)   [] Transferred from outside facility (Rehab or Long term care)  [] Age </= to 50 54 years old woman with multiple vascular risk factors including age, HTN, DM II and HPLD was initially evaluated [facility] for acute onset of left-sided weakness through telestroke. She was treated with IV tPA and was transferred to [facility] for further management. CTA head and neck on admission showed critical stenosis versus occlusion of right supraclinoid ICA and severe to critical stenosis of right proximal ICA. MRI brain during hospitalization showed acute infarcts in the right MCA and ENRIQUE distribution with minimal hemorrhagic transformation (HI-1).     Impression:  Cerebral embolism with cerebral infarction. Right MCA and ENRIQUE distribution stroke - likely mechanism being large vessel disease i.e. symptomatic intracranial atherosclerosis leading to artery to artery embolism versus extracranial atherosclerotic stenosis, favoring the former; less likely to be due to an embolic stroke from undetermined source    NEURO: Neuro exam is improving vs admission. Continue close monitoring for neurologic deterioration, Maintain -180 due to CHRISTINE stenosis and minimal hemorrhagic conversion, continue ASA/Plavix and high dose statin for secondary stroke prevention in the setting of likely atheroembolic etiology of her stroke. Titrate statin to LDL goal less than 70, Plan for possible carotid stenting vs CEA based on results of MRA H/N and CUS, Neurosurgery eval appreciated. Will decreased frequency of Keppra 500mg to once daily due to increase somnolence, VEEG: Structural or functional abnormality in the right hemisphere, No epileptiform pattern or seizure seen , MRI Brain w/o results as above, Will obtain MRA Head w/contrast and Neck w/contrast, and carotid doppler, CTA limited by body habitus. MRA head and neck and carotid Dopplers to better quantify the degree of extracranial/intracranial atherosclerotic stenoses. Pending Physical therapy/OT recommended Acute Rehab.     ANTITHROMBOTIC THERAPY: Dual antiplatelet therapy with aspirin clopidogrel for aggressive secondary stroke prevention in the setting of symptomatic intracranial atherosclerosis for 3 months followed by aspirin. Indications for carotid revascularization with CEA versus ASHLEY to be considered based on results of MRA head and neck and CUS. Would consider conventional angiogram in case of equivocal results.    PULMONARY: CXR (06/29/19)  Clear lungs. Protecting airway, saturating well     CARDIOVASCULAR: check TTE with bubble study, cardiac monitoring no events, Dr Luz (cardiology) eval appreciated, will obtain cardiac clearance for RCEA vs carotid stent. Gradual normotension in the setting of carotid stenosis careful initiation of home meds and will monitor for heart failure signs since patient endorses hx of CHF.                             SBP goal: Permissive HTN up to 220/110 mmHg in the setting of severe intracranial and extracranial atherosclerosis stenoses    GASTROINTESTINAL: Dysphagia screen  failed, NGT in place, tolerating diet, S/S eval once pt able to cooperate with exam     Diet: NGT    RENAL: BUN/Cr stable, good urine output, urine toxicology: Negative      Na Goal: Greater than 135     [place]: N    HEMATOLOGY: H/H without acute change, Platelets 274, LE doppler ordered to r/o DVT     DVT ppx:  LMWH    ID: afebrile, no leukocytosis     OTHER: HgbA1C 10.4, continue Humalog sliding scale and DM meds as per endocrine recommendations, RUE: soft wrist restrained applied to prevent pt form pulling NGT    DISPOSITION: Acute Rehab once stable and workup is complete    CORE MEASURES:        Admission NIHSS: 13     TPA:  YES       LDL/HDL: 131/49     Depression Screen:      Statin Therapy: Y     Dysphagia Screen:  FAIL     Smoking  NO      Afib  NO     Stroke Education  YES    Obtain screening ultrasound in patients who meet 1 or more of the following criteria as patient is high risk for DVT/PE upon admission:   [] History of DVT/PE  []Hypercoagulable states (Factor V Leiden, Cancer, OCP, etc. )  [X]Prolonged immobility (hemiplegia/hemiparesis/post operative or any other extended immobilization)   [] Transferred from outside facility (Rehab or Long term care)  [] Age </= to 50 54 years old woman with multiple vascular risk factors including age, HTN, DM II and HPLD was initially evaluated at [facility] for acute onset of left-sided weakness through telestroke. She was treated with IV tPA and was transferred to [facility] for further management. CTA head and neck on admission showed critical stenosis versus occlusion of right supraclinoid ICA and severe to critical stenosis of right proximal ICA. MRI brain during hospitalization showed acute infarcts in the right MCA and ENRIQUE distribution with minimal hemorrhagic transformation (HI-1). Her hospital course was complicated by episodes concerning for seizure-like activity on admission.     Impression:  Cerebral embolism with cerebral infarction. Right MCA and ENRIQUE distribution stroke - likely mechanism being large vessel disease i.e. symptomatic intracranial atherosclerosis leading to artery to artery embolism versus extracranial atherosclerotic stenosis, favoring the former; less likely to be due to an embolic stroke from undetermined source  Likely symptomatic seizures in the setting of an acute stroke    NEURO: Neuro exam is improving vs admission. Continue close monitoring for neurologic deterioration, Maintain -180 due to CHRISTINE stenosis and minimal hemorrhagic conversion, continue ASA/Plavix and high dose statin for secondary stroke prevention in the setting of likely atheroembolic etiology of her stroke. Titrate statin to LDL goal less than 70, Plan for possible carotid stenting vs CEA based on results of MRA H/N and CUS, Neurosurgery eval appreciated. Continue with Keppra 500 mg to once daily for seizure prophylaxis (dose was reduced due to increase somnolence), duration of AEDs is expected to be 3-6 months based on clinical course and MRI/EEG evaluation at that time, VEEG: Structural or functional abnormality in the right hemisphere, No epileptiform pattern or seizure seen , MRI Brain w/o results as above, Will obtain MRA Head w/contrast and Neck w/contrast, and carotid doppler, CTA limited by body habitus. MRA head and neck and carotid Dopplers to better quantify the degree of extracranial/intracranial atherosclerotic stenoses. Pending Physical therapy/OT recommended Acute Rehab.     ANTITHROMBOTIC THERAPY: Dual antiplatelet therapy with aspirin clopidogrel for aggressive secondary stroke prevention in the setting of symptomatic intracranial atherosclerosis for 3 months followed by aspirin. Indications for carotid revascularization with CEA versus ASHLEY to be considered based on results of MRA head and neck and CUS. Would consider conventional angiogram in case of equivocal results.    PULMONARY: CXR (06/29/19)  Clear lungs. Protecting airway, saturating well     CARDIOVASCULAR: check TTE with bubble study, cardiac monitoring no events, Dr Luz (cardiology) eval appreciated, will obtain cardiac clearance for RCEA vs carotid stent. Gradual normotension in the setting of carotid stenosis careful initiation of home meds and will monitor for heart failure signs since patient endorses hx of CHF.                             SBP goal: Permissive HTN up to 220/110 mmHg in the setting of severe intracranial and extracranial atherosclerosis stenoses    GASTROINTESTINAL: Dysphagia screen  failed, NGT in place, tolerating diet, S/S eval once pt able to cooperate with exam     Diet: NGT    RENAL: BUN/Cr stable, good urine output, urine toxicology: Negative      Na Goal: Greater than 135     [place]: N    HEMATOLOGY: H/H without acute change, Platelets 274, LE doppler ordered to r/o DVT     DVT ppx:  LMWH    ID: afebrile, no leukocytosis     OTHER: HgbA1C 10.4, continue Humalog sliding scale and DM meds as per endocrine recommendations, RUE: soft wrist restrained applied to prevent pt form pulling NGT    DISPOSITION: Acute Rehab once stable and workup is complete    CORE MEASURES:        Admission NIHSS: 13     TPA:  YES       LDL/HDL: 131/49     Depression Screen:      Statin Therapy: Y     Dysphagia Screen:  FAIL     Smoking  NO      Afib  NO     Stroke Education  YES    Obtain screening ultrasound in patients who meet 1 or more of the following criteria as patient is high risk for DVT/PE upon admission:   [] History of DVT/PE  []Hypercoagulable states (Factor V Leiden, Cancer, OCP, etc. )  [X]Prolonged immobility (hemiplegia/hemiparesis/post operative or any other extended immobilization)   [] Transferred from outside facility (Rehab or Long term care)  [] Age </= to 50

## 2019-07-01 NOTE — SWALLOW BEDSIDE ASSESSMENT ADULT - COMMENTS
Continued:   : Change in status notification: Pt s/p tPA  @1730 ([facility]). RN went to bedside report in ED. RN notices pt right facial twitch and foaming at the mouth. Pt was following commands seconds prior, pt no longer speaking or following commands.  : Provider contact note: Pt had 3 more seizure episodes en route to floor  Pt given ativan and keppra   MRI pending at time of this evaluation     Imagin/28: CT Brain stroke protocol: IMPRESSION:  Age-indeterminate left pontine lacunar infarction. Recommend MRI brain for further characterization. Chronic right parietal and right frontal infarctions.    Repeat CT Brain : IMPRESSION: Redemonstration of decreased attenuation in the basis pontis and midbrain. Findings could represent the presence of recent or chronic infarction. Chronic appearing right frontoparietal infarct.

## 2019-07-01 NOTE — PROGRESS NOTE ADULT - SUBJECTIVE AND OBJECTIVE BOX
Subjective: Patient seen and examined. No new events except as noted.     SUBJECTIVE/ROS:    fever overnight     MEDICATIONS:  MEDICATIONS  (STANDING):  aspirin  chewable 81 milliGRAM(s) Oral daily  atorvastatin 80 milliGRAM(s) Oral at bedtime  clopidogrel Tablet 75 milliGRAM(s) Oral daily  dextrose 5%. 1000 milliLiter(s) (50 mL/Hr) IV Continuous <Continuous>  dextrose 50% Injectable 12.5 Gram(s) IV Push once  dextrose 50% Injectable 25 Gram(s) IV Push once  dextrose 50% Injectable 25 Gram(s) IV Push once  enoxaparin Injectable 40 milliGRAM(s) SubCutaneous daily  insulin lispro (HumaLOG) corrective regimen sliding scale   SubCutaneous every 6 hours  insulin NPH human recombinant 18 Unit(s) SubCutaneous every 6 hours  levETIRAcetam  IVPB 500 milliGRAM(s) IV Intermittent daily  sodium chloride 0.9%. 1000 milliLiter(s) (75 mL/Hr) IV Continuous <Continuous>      PHYSICAL EXAM:  T(C): 37.3 (07-01-19 @ 08:00), Max: 38.6 (06-30-19 @ 20:41)  HR: 76 (07-01-19 @ 08:00) (75 - 88)  BP: 122/54 (07-01-19 @ 08:00) (122/54 - 175/101)  RR: 20 (07-01-19 @ 08:00) (16 - 23)  SpO2: 97% (07-01-19 @ 08:00) (97% - 100%)  Wt(kg): --  I&O's Summary    30 Jun 2019 07:01  -  01 Jul 2019 07:00  --------------------------------------------------------  IN: 3110 mL / OUT: 1550 mL / NET: 1560 mL            JVP: Normal  Neck: supple  Lung: clear   CV: S1 S2 , Murmur:  Abd: soft  Ext: No edema  Psych: flat affect  Skin: normal``    LABS/DATA:    CARDIAC MARKERS:                                12.9   8.2   )-----------( 274      ( 01 Jul 2019 05:48 )             40.2     07-01    139  |  102  |  16  ----------------------------<  257<H>  4.2   |  24  |  1.07    Ca    9.1      01 Jul 2019 05:48      proBNP:   Lipid Profile:   HgA1c:   TSH:     TELE:  EKG:

## 2019-07-01 NOTE — DIETITIAN INITIAL EVALUATION ADULT. - ADD RECOMMEND
Type 2 Nutrition Therapy handout reviewed w/ dtr, advised pt to avoid juice, coconut water and check BG. Weight loss encouraged. Trend wt, labs, EN tolerance. F/U on ST recommendations

## 2019-07-01 NOTE — PROGRESS NOTE ADULT - ASSESSMENT
54 year old female w/uncontrolled T2DM w/retinopathy here w/CVA. Pt w/dysphagia on continuous tube feeds of Glucerna being titrated up to goal. BG goal (100-180mg/dl).

## 2019-07-01 NOTE — DIETITIAN INITIAL EVALUATION ADULT. - ENTERAL
recommend continue Glucerna 1.2 60cc/hr x 24 hrs provides 1728 kcals, 86 gm protein, 1159cc free water  meets  29 Kcal/Kg, 1.4Gm/kg IBW 59 kg

## 2019-07-01 NOTE — PROGRESS NOTE ADULT - SUBJECTIVE AND OBJECTIVE BOX
THE PATIENT WAS SEEN AND EXAMINED BY ME WITH THE HOUSESTAFF AND STROKE TEAM DURING MORNING ROUNDS.   HPI:  Patient is a 54 year old Female w/ PMHx of HTN, HLD, DM transferred from [facility] for evaluation of stroke. Per daughter at bedside, last known normal 2pm on day of admission, patient had sudden onset left sided paralysis, slurred speech, received TPA at at [place]. NIHSS 13, MRS 1     SUBJECTIVE: No events overnight.  No new neurologic complaints.      acetaminophen    Suspension .. 650 milliGRAM(s) Oral every 6 hours PRN  acetaminophen   Tablet .. 650 milliGRAM(s) Oral every 6 hours PRN  aspirin  chewable 81 milliGRAM(s) Oral daily  atorvastatin 80 milliGRAM(s) Oral at bedtime  clopidogrel Tablet 75 milliGRAM(s) Oral daily  dextrose 40% Gel 15 Gram(s) Oral once PRN  dextrose 5%. 1000 milliLiter(s) IV Continuous <Continuous>  dextrose 50% Injectable 12.5 Gram(s) IV Push once  dextrose 50% Injectable 25 Gram(s) IV Push once  dextrose 50% Injectable 25 Gram(s) IV Push once  enoxaparin Injectable 40 milliGRAM(s) SubCutaneous daily  glucagon  Injectable 1 milliGRAM(s) IntraMuscular once PRN  insulin lispro (HumaLOG) corrective regimen sliding scale   SubCutaneous every 6 hours  insulin NPH human recombinant 18 Unit(s) SubCutaneous every 6 hours  levETIRAcetam  IVPB 500 milliGRAM(s) IV Intermittent daily  LORazepam   Injectable 2 milliGRAM(s) IV Push once PRN  losartan 25 milliGRAM(s) Oral daily  sodium chloride 0.9%. 1000 milliLiter(s) IV Continuous <Continuous>      PHYSICAL EXAM:   Vital Signs Last 24 Hrs  T(C): 37.4 (01 Jul 2019 06:00), Max: 38.6 (30 Jun 2019 20:41)  T(F): 99.4 (01 Jul 2019 06:00), Max: 101.4 (30 Jun 2019 20:41)  HR: 76 (01 Jul 2019 08:00) (75 - 88)  BP: 122/54 (01 Jul 2019 08:00) (122/54 - 175/101)  BP(mean): 74 (01 Jul 2019 08:00) (72 - 122)  RR: 20 (01 Jul 2019 08:00) (16 - 23)  SpO2: 97% (01 Jul 2019 08:00) (97% - 100%)    General: No acute distress  HEENT: EOM intact, left eye legally blind  Abdomen: Soft, nontender, nondistended   Extremities: No edema    NEUROLOGICAL EXAM:  Mental status: Eyes closed, opens to voice and light touch but requires constant stimulation, awake, alert, eye opening apraxia, oriented to name, place and year, fluent speech, repetition intact,  No asomatognosia or anosognosia  Cranial Nerves: No facial asymmetry, no nystagmus, moderate to severe dysarthria  Motor exam: Normal tone, RUE /RLE spontaneous movement against gravity, Left side hemiparesis: LUE increased tone 1/5, LLE decreased tone, externally rotated, withdraws to noxious stimuli  Sensation: Left side appears intact to noxious stimuli, left side neglect to double simultaneous stimulation   Coordination/ Gait: No dysmetria on right side     LABS:                        12.9   8.2   )-----------( 274      ( 01 Jul 2019 05:48 )             40.2    07-01    139  |  102  |  16  ----------------------------<  257<H>  4.2   |  24  |  1.07    Ca    9.1      01 Jul 2019 05:48      Hemoglobin A1C, Whole Blood: 10.4 % (06-29 @ 09:33)      IMAGING: Reviewed by me.       Brain MRI (06/29/19) There is evidence of abnormal stricture diffusion seen involving the right frontal parietal region. Involvement of the right basal ganglia region is seen as well. This is compatible with an area of acute infarct.   Involvement of the right anterior cerebral and middle cerebral artery territories are identified. Scattered areas of abnormal susceptibility are identified which is likely compatible with some hemorrhagic transformation. Localized mass effect is seen consisting of sulcal effacement. No significant shift or herniation is seen.    Head CT (06/30/19) Abnormal areas of low-attenuation involving the right frontal and right parietal cortical subcortical region are again seen. These findings are compatible with areas of old infarcts. Abnormal low-attenuation involving the left alla is also again seen which could be compatible with an age-indeterminate infarct. There is no evidence of abnormal low-attenuation seen involving the high right frontal cortical subcortical region. Involvement of the right basal   ganglia and anterior corona radiata regions as well. This is predominantly seen medially and compatible with an acute right anterior cerebral artery infarct. There is localized mass effect consisting of sulcal effacement. No significant shift or herniation is seen.     CTA Head/Neck (06/28/19) CT ANGIOGRAPHY NECK:  Markedly limited examination. Tandem moderate to severe stenoses are suspected in the right carotid bulb and proximal right internal carotid artery. 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    CT ANGIOGRAPHY BRAIN: Markedly limited examination. Limited evaluation of the intracranial internal carotid arteries. Stenoses or occlusion in the distal cavernous segments and ophthalmic segments of the bilateral internal carotid arteries cannot be excluded. Apparent severe stenosis in the distal supraclinoid right internal carotid artery and right carotid terminus with possible occlusion of the A1 segment of the right anterior cerebral artery.  The right A2 segment fills across the anterior to indicating artery.  There appears to be posterior distal right ENRIQUE vessels within the medial right frontal lobe, raising the possibility of early cerebral infarction. THE PATIENT WAS SEEN AND EXAMINED BY ME WITH THE HOUSESTAFF AND STROKE TEAM DURING MORNING ROUNDS.   HPI:  Patient is a 54 year old Female w/ PMHx of HTN, HLD, DM transferred from [facility] for evaluation of stroke. Per daughter at bedside, last known normal 2pm on day of admission, patient had sudden onset left sided paralysis, slurred speech, received TPA at at [place]. NIHSS 13, MRS 1     SUBJECTIVE: No events overnight.  No new neurologic complaints.      acetaminophen    Suspension .. 650 milliGRAM(s) Oral every 6 hours PRN  acetaminophen   Tablet .. 650 milliGRAM(s) Oral every 6 hours PRN  aspirin  chewable 81 milliGRAM(s) Oral daily  atorvastatin 80 milliGRAM(s) Oral at bedtime  clopidogrel Tablet 75 milliGRAM(s) Oral daily  dextrose 40% Gel 15 Gram(s) Oral once PRN  dextrose 5%. 1000 milliLiter(s) IV Continuous <Continuous>  dextrose 50% Injectable 12.5 Gram(s) IV Push once  dextrose 50% Injectable 25 Gram(s) IV Push once  dextrose 50% Injectable 25 Gram(s) IV Push once  enoxaparin Injectable 40 milliGRAM(s) SubCutaneous daily  glucagon  Injectable 1 milliGRAM(s) IntraMuscular once PRN  insulin lispro (HumaLOG) corrective regimen sliding scale   SubCutaneous every 6 hours  insulin NPH human recombinant 18 Unit(s) SubCutaneous every 6 hours  levETIRAcetam  IVPB 500 milliGRAM(s) IV Intermittent daily  LORazepam   Injectable 2 milliGRAM(s) IV Push once PRN  losartan 25 milliGRAM(s) Oral daily  sodium chloride 0.9%. 1000 milliLiter(s) IV Continuous <Continuous>      PHYSICAL EXAM:   Vital Signs Last 24 Hrs  T(C): 37.4 (01 Jul 2019 06:00), Max: 38.6 (30 Jun 2019 20:41)  T(F): 99.4 (01 Jul 2019 06:00), Max: 101.4 (30 Jun 2019 20:41)  HR: 76 (01 Jul 2019 08:00) (75 - 88)  BP: 122/54 (01 Jul 2019 08:00) (122/54 - 175/101)  BP(mean): 74 (01 Jul 2019 08:00) (72 - 122)  RR: 20 (01 Jul 2019 08:00) (16 - 23)  SpO2: 97% (01 Jul 2019 08:00) (97% - 100%)    General: No acute distress  HEENT: EOM intact, left eye legally blind  Abdomen: Soft, nontender, nondistended   Extremities: No edema    NEUROLOGICAL EXAM:  Mental status: Eyes closed, opens to voice and light touch but requires constant stimulation, awake, alert, eye opening apraxia, oriented to name, place and year, fluent speech, repetition intact,  No asomatognosia or anosognosia  Cranial Nerves: No facial asymmetry, no nystagmus, moderate to severe dysarthria  Motor exam: Normal tone, RUE /RLE spontaneous movement against gravity, Left side hemiparesis: LUE increased tone 1/5, LLE decreased tone, externally rotated, withdraws to noxious stimuli  Sensation: Left side appears intact to noxious stimuli, left side neglect to double simultaneous stimulation   Coordination/ Gait: No dysmetria on right side     LABS:                        12.9   8.2   )-----------( 274      ( 01 Jul 2019 05:48 )             40.2    07-01    139  |  102  |  16  ----------------------------<  257<H>  4.2   |  24  |  1.07    Ca    9.1      01 Jul 2019 05:48      Hemoglobin A1C, Whole Blood: 10.4 % (06-29 @ 09:33)      IMAGING: Reviewed by me.       Brain MRI (06/29/19) There is evidence of abnormal stricture diffusion seen involving the right frontal parietal region. Involvement of the right basal ganglia region is seen as well. This is compatible with an area of acute infarct.   Involvement of the right anterior cerebral and middle cerebral artery territories are identified. Scattered areas of abnormal susceptibility are identified which is likely compatible with some hemorrhagic transformation. Localized mass effect is seen consisting of sulcal effacement. No significant shift or herniation is seen.    Head CT (06/30/19) Abnormal areas of low-attenuation involving the right frontal and right parietal cortical subcortical region are again seen. These findings are compatible with areas of old infarcts. Abnormal low-attenuation involving the left alla is also again seen which could be compatible with an age-indeterminate infarct. There is no evidence of abnormal low-attenuation seen involving the high right frontal cortical subcortical region. Involvement of the right basal   ganglia and anterior corona radiata regions as well. This is predominantly seen medially and compatible with an acute right anterior cerebral artery infarct. There is localized mass effect consisting of sulcal effacement. No significant shift or herniation is seen.     CTA Head/Neck (06/28/19) CT ANGIOGRAPHY NECK:  Markedly limited examination. Tandem moderate to severe stenoses are suspected in the right carotid bulb and proximal right internal carotid artery. 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    CT ANGIOGRAPHY BRAIN: Markedly limited examination. Limited evaluation of the intracranial internal carotid arteries. Stenoses or occlusion in the distal cavernous segments and ophthalmic segments of the bilateral internal carotid arteries cannot be excluded. Apparent severe stenosis in the distal supraclinoid right internal carotid artery and right carotid terminus with possible occlusion of the A1 segment of the right anterior cerebral artery.  The right A2 segment fills across the anterior to indicating artery.  There appears to be posterior distal right ENRIQUE vessels within the medial right frontal lobe, raising the possibility of early cerebral infarction. THE PATIENT WAS SEEN AND EXAMINED BY ME WITH THE HOUSESTAFF AND STROKE TEAM DURING MORNING ROUNDS.     HPI:  Patient is a 54 year old Female w/ PMHx of HTN, HLD, DM transferred from [facility] for evaluation of stroke. Per daughter at bedside, last known normal 2pm on day of admission, patient had sudden onset left sided paralysis, slurred speech, received TPA at at [place]. NIHSS 13, MRS 1     SUBJECTIVE: No events overnight.  No new neurologic complaints.      ROS: All negative except documented above.    acetaminophen    Suspension .. 650 milliGRAM(s) Oral every 6 hours PRN  acetaminophen   Tablet .. 650 milliGRAM(s) Oral every 6 hours PRN  aspirin  chewable 81 milliGRAM(s) Oral daily  atorvastatin 80 milliGRAM(s) Oral at bedtime  clopidogrel Tablet 75 milliGRAM(s) Oral daily  dextrose 40% Gel 15 Gram(s) Oral once PRN  dextrose 5%. 1000 milliLiter(s) IV Continuous <Continuous>  dextrose 50% Injectable 12.5 Gram(s) IV Push once  dextrose 50% Injectable 25 Gram(s) IV Push once  dextrose 50% Injectable 25 Gram(s) IV Push once  enoxaparin Injectable 40 milliGRAM(s) SubCutaneous daily  glucagon  Injectable 1 milliGRAM(s) IntraMuscular once PRN  insulin lispro (HumaLOG) corrective regimen sliding scale   SubCutaneous every 6 hours  insulin NPH human recombinant 18 Unit(s) SubCutaneous every 6 hours  levETIRAcetam  IVPB 500 milliGRAM(s) IV Intermittent daily  LORazepam   Injectable 2 milliGRAM(s) IV Push once PRN  losartan 25 milliGRAM(s) Oral daily  sodium chloride 0.9%. 1000 milliLiter(s) IV Continuous <Continuous>      PHYSICAL EXAM:   Vital Signs Last 24 Hrs  T(C): 37.4 (01 Jul 2019 06:00), Max: 38.6 (30 Jun 2019 20:41)  T(F): 99.4 (01 Jul 2019 06:00), Max: 101.4 (30 Jun 2019 20:41)  HR: 76 (01 Jul 2019 08:00) (75 - 88)  BP: 122/54 (01 Jul 2019 08:00) (122/54 - 175/101)  BP(mean): 74 (01 Jul 2019 08:00) (72 - 122)  RR: 20 (01 Jul 2019 08:00) (16 - 23)  SpO2: 97% (01 Jul 2019 08:00) (97% - 100%)    General: No acute distress  HEENT: EOM intact, left eye legally blind  Abdomen: Soft, nontender, nondistended   Extremities: No edema    NEUROLOGICAL EXAM:  Mental status: Eyes closed, opens to voice and light touch but requires constant stimulation, awake, alert, eye opening apraxia, oriented to name, place and year, fluent speech, repetition intact,  No asomatognosia or anosognosia  Cranial Nerves: No facial asymmetry, no nystagmus, moderate to severe dysarthria  Motor exam: RUE /RLE spontaneous movement against gravity without significant noticeable weakness, Left hypotonic hemiplegia   Sensation: Left side appears intact to noxious stimuli, left side neglect to double simultaneous stimulation   Coordination/ Gait: No dysmetria on right side     LABS:                        12.9   8.2   )-----------( 274      ( 01 Jul 2019 05:48 )             40.2    07-01    139  |  102  |  16  ----------------------------<  257<H>  4.2   |  24  |  1.07    Ca    9.1      01 Jul 2019 05:48      Hemoglobin A1C, Whole Blood: 10.4 % (06-29 @ 09:33)      IMAGING: Reviewed by me.       Brain MRI (06/29/19) There is evidence of abnormal stricture diffusion seen involving the right frontal parietal region. Involvement of the right basal ganglia region is seen as well. This is compatible with an area of acute infarct.   Involvement of the right anterior cerebral and middle cerebral artery territories are identified. Scattered areas of abnormal susceptibility are identified which is likely compatible with some hemorrhagic transformation. Localized mass effect is seen consisting of sulcal effacement. No significant shift or herniation is seen.    Head CT (06/30/19) Abnormal areas of low-attenuation involving the right frontal and right parietal cortical subcortical region are again seen. These findings are compatible with areas of old infarcts. Abnormal low-attenuation involving the left alla is also again seen which could be compatible with an age-indeterminate infarct. There is no evidence of abnormal low-attenuation seen involving the high right frontal cortical subcortical region. Involvement of the right basal   ganglia and anterior corona radiata regions as well. This is predominantly seen medially and compatible with an acute right anterior cerebral artery infarct. There is localized mass effect consisting of sulcal effacement. No significant shift or herniation is seen.     CTA Head/Neck (06/28/19) CT ANGIOGRAPHY NECK:  Markedly limited examination. Tandem moderate to severe stenoses are suspected in the right carotid bulb and proximal right internal carotid artery. 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    CT ANGIOGRAPHY BRAIN: Markedly limited examination. Limited evaluation of the intracranial internal carotid arteries. Stenoses or occlusion in the distal cavernous segments and ophthalmic segments of the bilateral internal carotid arteries cannot be excluded. Apparent severe stenosis in the distal supraclinoid right internal carotid artery and right carotid terminus with possible occlusion of the A1 segment of the right anterior cerebral artery.  The right A2 segment fills across the anterior to indicating artery.  There appears to be posterior distal right ENRIQUE vessels within the medial right frontal lobe, raising the possibility of early cerebral infarction.

## 2019-07-01 NOTE — CHART NOTE - NSCHARTNOTEFT_GEN_A_CORE
Case discussed with Dr. Neil, recommend repeat CTA Head and Neck in 1-2 weeks  Would likely plan for CEA electively once patient has began recovery from stroke

## 2019-07-01 NOTE — PROGRESS NOTE ADULT - SUBJECTIVE AND OBJECTIVE BOX
Patient seen and examined at bedside.    --Anticoagulation--  aspirin  chewable 81 milliGRAM(s) Oral daily  clopidogrel Tablet 75 milliGRAM(s) Oral daily  enoxaparin Injectable 40 milliGRAM(s) SubCutaneous daily    T(C): 37.3 (07-01-19 @ 02:00), Max: 38.6 (06-30-19 @ 20:41)  HR: 80 (07-01-19 @ 04:00) (78 - 88)  BP: 140/70 (07-01-19 @ 04:00) (123/48 - 175/101)  RR: 19 (07-01-19 @ 04:00) (11 - 23)  SpO2: 100% (07-01-19 @ 04:00) (97% - 100%)  Wt(kg): --    Exam:  Eyes closed, blind in L eye, R pupil reactive, AOx2  FC, 5/5 in RUE, LLE proximally 2/5 pain and effort limited, distally 5/5

## 2019-07-02 DIAGNOSIS — I63.9 CEREBRAL INFARCTION, UNSPECIFIED: ICD-10-CM

## 2019-07-02 DIAGNOSIS — R13.10 DYSPHAGIA, UNSPECIFIED: ICD-10-CM

## 2019-07-02 DIAGNOSIS — Z71.89 OTHER SPECIFIED COUNSELING: ICD-10-CM

## 2019-07-02 LAB
ANION GAP SERPL CALC-SCNC: 11 MMOL/L — SIGNIFICANT CHANGE UP (ref 5–17)
BUN SERPL-MCNC: 16 MG/DL — SIGNIFICANT CHANGE UP (ref 7–23)
CALCIUM SERPL-MCNC: 8.9 MG/DL — SIGNIFICANT CHANGE UP (ref 8.4–10.5)
CHLORIDE SERPL-SCNC: 105 MMOL/L — SIGNIFICANT CHANGE UP (ref 96–108)
CO2 SERPL-SCNC: 26 MMOL/L — SIGNIFICANT CHANGE UP (ref 22–31)
CREAT SERPL-MCNC: 1.06 MG/DL — SIGNIFICANT CHANGE UP (ref 0.5–1.3)
GLUCOSE BLDC GLUCOMTR-MCNC: 183 MG/DL — HIGH (ref 70–99)
GLUCOSE BLDC GLUCOMTR-MCNC: 186 MG/DL — HIGH (ref 70–99)
GLUCOSE BLDC GLUCOMTR-MCNC: 223 MG/DL — HIGH (ref 70–99)
GLUCOSE BLDC GLUCOMTR-MCNC: 232 MG/DL — HIGH (ref 70–99)
GLUCOSE SERPL-MCNC: 176 MG/DL — HIGH (ref 70–99)
HCT VFR BLD CALC: 38.8 % — SIGNIFICANT CHANGE UP (ref 34.5–45)
HGB BLD-MCNC: 13.2 G/DL — SIGNIFICANT CHANGE UP (ref 11.5–15.5)
MCHC RBC-ENTMCNC: 29.7 PG — SIGNIFICANT CHANGE UP (ref 27–34)
MCHC RBC-ENTMCNC: 34.1 GM/DL — SIGNIFICANT CHANGE UP (ref 32–36)
MCV RBC AUTO: 87.1 FL — SIGNIFICANT CHANGE UP (ref 80–100)
PLATELET # BLD AUTO: 243 K/UL — SIGNIFICANT CHANGE UP (ref 150–400)
POTASSIUM SERPL-MCNC: 4 MMOL/L — SIGNIFICANT CHANGE UP (ref 3.5–5.3)
POTASSIUM SERPL-SCNC: 4 MMOL/L — SIGNIFICANT CHANGE UP (ref 3.5–5.3)
RBC # BLD: 4.45 M/UL — SIGNIFICANT CHANGE UP (ref 3.8–5.2)
RBC # FLD: 13.3 % — SIGNIFICANT CHANGE UP (ref 10.3–14.5)
SODIUM SERPL-SCNC: 142 MMOL/L — SIGNIFICANT CHANGE UP (ref 135–145)
WBC # BLD: 8.5 K/UL — SIGNIFICANT CHANGE UP (ref 3.8–10.5)
WBC # FLD AUTO: 8.5 K/UL — SIGNIFICANT CHANGE UP (ref 3.8–10.5)

## 2019-07-02 PROCEDURE — 99232 SBSQ HOSP IP/OBS MODERATE 35: CPT

## 2019-07-02 PROCEDURE — 70549 MR ANGIOGRAPH NECK W/O&W/DYE: CPT | Mod: 26

## 2019-07-02 PROCEDURE — 99233 SBSQ HOSP IP/OBS HIGH 50: CPT

## 2019-07-02 PROCEDURE — 70546 MR ANGIOGRAPH HEAD W/O&W/DYE: CPT | Mod: 26

## 2019-07-02 PROCEDURE — 99253 IP/OBS CNSLTJ NEW/EST LOW 45: CPT | Mod: GC

## 2019-07-02 RX ORDER — LEVETIRACETAM 250 MG/1
500 TABLET, FILM COATED ORAL EVERY 12 HOURS
Refills: 0 | Status: DISCONTINUED | OUTPATIENT
Start: 2019-07-02 | End: 2019-07-03

## 2019-07-02 RX ADMIN — ATORVASTATIN CALCIUM 80 MILLIGRAM(S): 80 TABLET, FILM COATED ORAL at 21:27

## 2019-07-02 RX ADMIN — HUMAN INSULIN 21 UNIT(S): 100 INJECTION, SUSPENSION SUBCUTANEOUS at 17:42

## 2019-07-02 RX ADMIN — Medication 2: at 05:33

## 2019-07-02 RX ADMIN — Medication 81 MILLIGRAM(S): at 11:46

## 2019-07-02 RX ADMIN — Medication 2: at 23:45

## 2019-07-02 RX ADMIN — Medication 4: at 17:43

## 2019-07-02 RX ADMIN — HUMAN INSULIN 21 UNIT(S): 100 INJECTION, SUSPENSION SUBCUTANEOUS at 11:52

## 2019-07-02 RX ADMIN — CLOPIDOGREL BISULFATE 75 MILLIGRAM(S): 75 TABLET, FILM COATED ORAL at 11:46

## 2019-07-02 RX ADMIN — LEVETIRACETAM 400 MILLIGRAM(S): 250 TABLET, FILM COATED ORAL at 17:20

## 2019-07-02 RX ADMIN — ENOXAPARIN SODIUM 40 MILLIGRAM(S): 100 INJECTION SUBCUTANEOUS at 11:46

## 2019-07-02 RX ADMIN — HUMAN INSULIN 21 UNIT(S): 100 INJECTION, SUSPENSION SUBCUTANEOUS at 05:34

## 2019-07-02 RX ADMIN — Medication 4: at 11:53

## 2019-07-02 NOTE — PROGRESS NOTE ADULT - ASSESSMENT
54 year old woman with multiple vascular risk factors including age, HTN, DM II and HPLD was initially evaluated at OSH for acute onset of left-sided weakness through telestroke. She was treated with IV tPA and was transferred to [facility] for further management. CTA head and neck on admission showed critical stenosis versus occlusion of right supraclinoid ICA and severe to critical stenosis of right proximal ICA. MRI brain during hospitalization showed acute infarcts in the right MCA and ENRIQUE distribution with minimal hemorrhagic transformation (HI-1). Her hospital course was complicated by episodes concerning for seizure-like activity on admission.     Impression:  Cerebral embolism with cerebral infarction. Right MCA and ENRIQUE distribution stroke - likely mechanism being large vessel disease i.e. symptomatic intracranial atherosclerosis leading to artery to artery embolism versus extracranial atherosclerotic stenosis, favoring the former; less likely to be due to an embolic stroke from undetermined source  Likely symptomatic seizures in the setting of an acute stroke    NEURO: Neuro exam is improving vs admission. Continue close monitoring for neurologic deterioration in setting of cerebral edema with mass effect and brain compression, Maintain -180 due to CHRISTINE stenosis and minimal hemorrhagic conversion, continue ASA/Plavix and high dose statin for secondary stroke prevention in the setting of likely atheroembolic etiology of her stroke. Titrate statin to LDL goal less than 70, Plan for possible carotid stenting vs CEA based on results of MRA H/N and carotid duplex ( to better quantify the degree of extracranial/intracranial atherosclerotic stenose) , Neurosurgery eval appreciated. Continue with Keppra 500 mg to once daily for seizure prophylaxis (dose was reduced due to increase somnolence), duration of AEDs is expected to be 3-6 months based on clinical course and MRI/EEG evaluation at that time, VEEG: Structural or functional abnormality in the right hemisphere, No epileptiform pattern or seizure seen , MRI Brain w/o results as above,  CTA limited by body habitus. Physical therapy/OT recommended Acute Rehab.     ANTITHROMBOTIC THERAPY: Dual antiplatelet therapy with aspirin clopidogrel for aggressive secondary stroke prevention in the setting of symptomatic intracranial atherosclerosis for 3 months followed by aspirin. Indications for carotid revascularization with CEA versus ASHLEY to be considered based on results of MRA head and neck and CUS. Would consider conventional angiogram in case of equivocal results.    PULMONARY: CXR (06/29/19)  Clear lungs. Protecting airway, saturating well     CARDIOVASCULAR: check TTE with bubble study, cardiac monitoring no events, Dr Luz (cardiology) eval appreciated, will obtain cardiac clearance for Right CEA vs carotid stent. Gradual normotension in the setting of carotid stenosis careful initiation of home meds and will monitor for heart failure signs since patient endorses hx of chronic CHF.                             SBP goal: Permissive HTN up to 180/105 mmHg in the setting of severe intracranial and extracranial atherosclerosis stenoses and mild hemorrhagic conversion     GASTROINTESTINAL: Dysphagia screen  failed, NGT in place, tolerating diet, S/S eval once pt able to cooperate with exam     Diet: NGT    RENAL: BUN/Cr without acute change, maintain adequate hydration, good urine output, urine toxicology: Negative      Na Goal: Greater than 135     C[place]rena: N    HEMATOLOGY: H/H without acute change, Platelets 274, LE doppler ordered to r/o DVT     DVT ppx:  LMWH    ID: afebrile, no leukocytosis     OTHER: HgbA1C 10.4, continue Humalog sliding scale and DM meds as per endocrine recommendations, RUE: soft wrist restrained applied to prevent pt form pulling NGT- d/c when applicable     DISPOSITION: Acute Rehab      CORE MEASURES:        Admission NIHSS: 13     TPA:  YES       LDL/HDL: 131/49     Depression Screen:      Statin Therapy: Y     Dysphagia Screen:  FAIL     Smoking  NO      Afib  NO     Stroke Education  YES    Obtain screening ultrasound in patients who meet 1 or more of the following criteria as patient is high risk for DVT/PE upon admission:   [] History of DVT/PE  []Hypercoagulable states (Factor V Leiden, Cancer, OCP, etc. )  [X]Prolonged immobility (hemiplegia/hemiparesis/post operative or any other extended immobilization)   [] Transferred from outside facility (Rehab or Long term care)  [] Age </= to 50 54 year old woman with multiple vascular risk factors including age, HTN, DM II and HPLD was initially evaluated at OSH for acute onset of left-sided weakness through telestroke. She was treated with IV tPA and was transferred to [facility] for further management. CTA head and neck on admission showed critical stenosis versus occlusion of right supraclinoid ICA and severe to critical stenosis of right proximal ICA. MRI brain during hospitalization showed acute infarcts in the right MCA and ENRIQUE distribution with minimal hemorrhagic transformation (HI-1). Her hospital course was complicated by episodes concerning for seizure-like activity on admission.     Impression:  Cerebral embolism with cerebral infarction. Right MCA and ENRIQUE distribution stroke - likely mechanism being large vessel disease i.e. symptomatic intracranial atherosclerosis leading to artery to artery embolism versus extracranial atherosclerotic stenosis, favoring the former; less likely to be due to an embolic stroke from undetermined source  Likely symptomatic seizures in the setting of an acute stroke    NEURO: Neuro exam is improved vs admission. Continue close monitoring for neurologic deterioration in setting of cerebral edema with mass effect and brain compression, Maintain -180 due to CHRISTINE stenosis and minimal hemorrhagic conversion, continue ASA/Plavix and high dose statin for secondary stroke prevention in the setting of likely atheroembolic etiology of her stroke. Titrate statin to LDL goal less than 70, Plan for possible carotid stenting vs CEA based on results of MRA H/N and carotid duplex ( to better quantify the degree of extracranial/intracranial atherosclerotic stenose) , Neurosurgery eval appreciated. Continue with Keppra 500 mg to twice daily for seizure prophylaxis (dose was reduced previously due to increase somnolence), duration of AEDs is expected to be 3-6 months based on clinical course and MRI/EEG evaluation at that time, VEEG: Structural or functional abnormality in the right hemisphere, No epileptiform pattern or seizure seen , MRI Brain w/o results as above,  CTA limited by body habitus. Physical therapy/OT recommended Acute Rehab.     ANTITHROMBOTIC THERAPY: Dual antiplatelet therapy with aspirin clopidogrel for aggressive secondary stroke prevention in the setting of symptomatic intracranial atherosclerosis for 3 months followed by aspirin alone there after, check P2Y12 in 7 days to ensure optimal platelet inhibition. Indications for carotid revascularization with CEA versus ASHLEY to be considered based on results of MRA head and neck and CUS. Would consider conventional angiogram in case of equivocal results.    PULMONARY: CXR (06/29/19)  Clear lungs. Protecting airway, saturating well     CARDIOVASCULAR: check TTE with bubble study, cardiac monitoring no events, Dr Luz (cardiology) eval appreciated, will obtain cardiac clearance for Right CEA vs carotid stent. Gradual normotension in the setting of carotid stenosis careful initiation of home meds and will monitor for heart failure signs since patient endorses hx of chronic CHF.                             SBP goal: Permissive HTN up to 180/105 mmHg in the setting of severe intracranial and extracranial atherosclerosis stenoses and mild hemorrhagic conversion, for now -180mmHg     GASTROINTESTINAL: Dysphagia screen  failed, NGT in place, tolerating diet, S/S eval recommended NPO, d/w family re: alternate means of nutrition , risks/benefits/alternatives.  Wish to proceed with PEG at this time, GI consulted for further evaluation and input.      Diet: NGT    RENAL: BUN/Cr without acute change, maintain adequate hydration, good urine output, urine toxicology: Negative      Na Goal: Greater than 135     C[place]rena: N    HEMATOLOGY: H/H without acute change, Platelets 243, LE doppler ordered to r/o DVT     DVT ppx:  LMWH    ID: afebrile, no leukocytosis     OTHER: HgbA1C 10.4, continue Humalog sliding scale and DM meds as per endocrine recommendations, RUE: soft wrist restrained applied to prevent pt form pulling NGT- d/c when applicable     DISPOSITION: Acute Rehab      CORE MEASURES:        Admission NIHSS: 13     TPA:  YES       LDL/HDL: 131/49     Depression Screen: p     Statin Therapy: Y     Dysphagia Screen:  FAIL     Smoking  NO      Afib  NO     Stroke Education  YES    Obtain screening ultrasound in patients who meet 1 or more of the following criteria as patient is high risk for DVT/PE upon admission:   [] History of DVT/PE  []Hypercoagulable states (Factor V Leiden, Cancer, OCP, etc. )  [X]Prolonged immobility (hemiplegia/hemiparesis/post operative or any other extended immobilization)   [] Transferred from outside facility (Rehab or Long term care)  [] Age </= to 50 54 year old woman with multiple vascular risk factors including age, HTN, DM II and HPLD was initially evaluated at OSH for acute onset of left-sided weakness through telestroke. She was treated with IV tPA and was transferred to [facility] for further management. CTA head and neck on admission showed critical stenosis versus occlusion of right supraclinoid ICA and severe to critical stenosis of right proximal ICA. MRI brain during hospitalization showed acute infarcts in the right MCA and ENRIQUE distribution with minimal hemorrhagic transformation (HI-1). Her hospital course was complicated by episodes concerning for seizure-like activity on admission, treated with AEDs.      Impression:  Cerebral embolism with cerebral infarction. Right MCA and ENRIQUE distribution stroke - likely mechanism being large vessel disease i.e. symptomatic intracranial vs. extracranial atherosclerosis leading to artery to artery embolism, favoring the former; less likely to be due to an embolic stroke from undetermined source  Likely symptomatic seizures in the setting of an acute stroke    NEURO: Neuro exam is improved vs admission. Continue close monitoring for neurologic deterioration in setting of cerebral edema with mass effect and brain compression, Maintain permissive HTN up to 180/105 mmHg in the setting of R ICA stenoses and mild hemorrhagic transformation, continue ASA/Plavix and high dose statin for secondary stroke prevention in the setting of likely atheroembolic etiology of her stroke. Titrate statin to LDL goal less than 70, Plan for possible carotid stenting vs CEA based on results of MRA H/N and carotid duplex (to better quantify the degree of extracranial/intracranial atherosclerotic stenose) , Neurosurgery eval appreciated. Continue with Keppra 500 mg to twice daily for seizure prophylaxis (dose was reduced previously due to increase somnolence), duration of AEDs is expected to be 3-6 months based on clinical course and MRI/EEG evaluation at that time, VEEG: Structural or functional abnormality in the right hemisphere, No epileptiform pattern or seizure seen , MRI Brain w/o results as above,  CTA limited by body habitus. Physical therapy/OT recommended Acute Rehab.     ANTITHROMBOTIC THERAPY: Dual antiplatelet therapy with aspirin clopidogrel for aggressive secondary stroke prevention in the setting of symptomatic intracranial atherosclerosis for 3 months followed by aspirin alone there after, check P2Y12 in 7 days to ensure optimal platelet inhibition. Indications for carotid revascularization with CEA versus ASHLEY to be considered based on results of MRA head and neck and CUS. Would consider conventional angiogram in case of equivocal results. Timing of carotid revascularization is expected to be in about 10-14 days from the stroke considering the extent/large size of the infarct - to be further discussed with NSGY team     PULMONARY: CXR (06/29/19)  Clear lungs. Protecting airway, saturating well     CARDIOVASCULAR: check TTE with bubble study, cardiac monitoring no events, Dr Luz (cardiology) eval appreciated, will obtain cardiac clearance for Right CEA vs carotid stent. Gradual normotension in the setting of carotid stenosis careful initiation of home meds and will monitor for heart failure signs since patient endorses hx of chronic CHF.                             SBP goal: Permissive HTN up to 180/105 mmHg in the setting of severe intracranial and extracranial atherosclerosis stenoses and mild hemorrhagic transformation     GASTROINTESTINAL: Dysphagia screen  failed, NGT in place, tolerating diet, S/S eval recommended NPO, d/w family re: alternate means of nutrition , risks/benefits/alternatives. Wish to proceed with PEG at this time, GI consulted for further evaluation and input.      Diet: NGT    RENAL: BUN/Cr without acute change, maintain adequate hydration, good urine output, urine toxicology: Negative      Na Goal: Greater than 135     C[place]: N    HEMATOLOGY: H/H without acute change, Platelets 243, LE doppler ordered to r/o DVT     DVT ppx:  LMWH    ID: afebrile, no leukocytosis     OTHER: HgbA1C 10.4, continue Humalog sliding scale and DM meds as per endocrine recommendations, RUE: soft wrist restrained applied to prevent pt form pulling NGT- d/c when applicable     DISPOSITION: Acute Rehab      CORE MEASURES:        Admission NIHSS: 13     TPA:  YES       LDL/HDL: 131/49     Depression Screen: p     Statin Therapy: Y     Dysphagia Screen:  FAIL     Smoking  NO      Afib  NO     Stroke Education  YES    Obtain screening ultrasound in patients who meet 1 or more of the following criteria as patient is high risk for DVT/PE upon admission:   [] History of DVT/PE  []Hypercoagulable states (Factor V Leiden, Cancer, OCP, etc. )  [X]Prolonged immobility (hemiplegia/hemiparesis/post operative or any other extended immobilization)   [] Transferred from outside facility (Rehab or Long term care)  [] Age </= to 50

## 2019-07-02 NOTE — SWALLOW BEDSIDE ASSESSMENT ADULT - SWALLOW EVAL: DIAGNOSIS
Patient presents with saran-pharyngeal dysphagia compounded by cognitive deficits characterized by: reduced orientation to feeding process, reduced oral grading, significantly delayed oral manipulation/AP transit, significantly delayed swallow initiation.

## 2019-07-02 NOTE — CONSULT NOTE ADULT - PROBLEM SELECTOR RECOMMENDATION 9
- pt failed bedside speech and swallow evaluation  - recommend npo with NGT feeds as pt is at risk for aspiration  - plan for upper gastrointestinal endoscopy with PEG placement in am, daughter Keily at bedside is aware and agreeable   - hold ngt feeds after midnight, hold lovenox in am

## 2019-07-02 NOTE — PROGRESS NOTE ADULT - PROBLEM SELECTOR PLAN 1
-test BG Q6h, FS at goal 100-180 at this time  -Recommend continue NPH 21 units Q8bepnc. HOLD NPH if TF are held  -Continue Humalog mod SSI Q6 hours. DO NOT HOLD this if TF are held  -Discontinue NPH when TF are stopped and assess insulin requirements with PO intake on humalog low ssi AC and HS. Our service will follow patient  -Discharge plan: pending hospital/PO course.   -discussed w/pt's family and RN    Juice Reid DO  Pager 9AM-5PM: [phone]  Pager Nights and Weekends: [phone]

## 2019-07-02 NOTE — PROGRESS NOTE ADULT - SUBJECTIVE AND OBJECTIVE BOX
Chief Complaint/Follow-up on: T2DM on TF      Subjective: 53 y/o female w/ PMHx of HTN, HLD, DM transferred from [facility] for evaluation of stroke. Patient was reported to have several episodes of seizures on admission. Patient underwent evaluation with brain MRI that showed areas of right middle cerebral artery territory and anterior cerebral artery stroke with hemorrhagic conversion.    Interval Events: No events overnight. Demonstrated skilled with swallow study today, with saran-pharyngeal dysphagia compounded by cognitive deficits characterized by: reduced orientation to feeding process, reduced oral grading, significantly delayed oral manipulation/AP transit, significantly delayed swallow initiation. She continues to be on 24 hour tube feeds with recommendations to continue NPO status with enteral nutrition.  Patient daughter at bedside.     MEDICATIONS  (STANDING):  aspirin  chewable 81 milliGRAM(s) Oral daily  atorvastatin 80 milliGRAM(s) Oral at bedtime  clopidogrel Tablet 75 milliGRAM(s) Oral daily  dextrose 5%. 1000 milliLiter(s) (50 mL/Hr) IV Continuous <Continuous>  dextrose 50% Injectable 12.5 Gram(s) IV Push once  dextrose 50% Injectable 25 Gram(s) IV Push once  dextrose 50% Injectable 25 Gram(s) IV Push once  enoxaparin Injectable 40 milliGRAM(s) SubCutaneous daily  insulin lispro (HumaLOG) corrective regimen sliding scale   SubCutaneous every 6 hours  insulin NPH human recombinant 21 Unit(s) SubCutaneous every 6 hours  levETIRAcetam  IVPB 500 milliGRAM(s) IV Intermittent every 12 hours    MEDICATIONS  (PRN):  acetaminophen    Suspension .. 650 milliGRAM(s) Oral every 6 hours PRN Temp greater or equal to 38C (100.4F)  dextrose 40% Gel 15 Gram(s) Oral once PRN Blood Glucose LESS THAN 70 milliGRAM(s)/deciliter  glucagon  Injectable 1 milliGRAM(s) IntraMuscular once PRN Glucose LESS THAN 70 milligrams/deciliter  LORazepam   Injectable 2 milliGRAM(s) IV Push once PRN Seizure Activity  nystatin Powder 1 Application(s) Topical two times a day PRN dermatitis      PHYSICAL EXAM:  VITALS: T(C): 36.9 (07-02-19 @ 07:20)  T(F): 98.4 (07-02-19 @ 07:20), Max: 99.9 (07-01-19 @ 20:00)  HR: 78 (07-02-19 @ 10:00) (75 - 92)  BP: 174/73 (07-02-19 @ 10:00) (125/52 - 175/62)  RR:  (12 - 26)  SpO2:  (95% - 100%)  Wt(kg): 136.9 kg    GENERAL: NAD, well-groomed, well-developed. Obese. She responds to verbal commands and name calling.  EYES: No proptosis, no injection  HEENT:  Atraumatic, Normocephalic, moist mucous membranes. NG tube in place  THYROID: Normal size, no palpable nodules  RESPIRATORY: Clear to auscultation bilaterally; No rales, rhonchi, wheezing, or rubs. Poor effort on command  CARDIOVASCULAR: Regular rate and rhythm; No murmurs; no peripheral edema  GI: Soft, nontender, non distended, normal bowel sounds  CUSHING'S SIGNS: no striae    POCT Blood Glucose.: 183 mg/dL (07-02-19 @ 05:24) N21+2    POCT Blood Glucose.: 152 mg/dL (07-01-19 @ 23:34) N21+2  POCT Blood Glucose.: 220 mg/dL (07-01-19 @ 17:12) N21+4  POCT Blood Glucose.: 248 mg/dL (07-01-19 @ 12:17) N18+4  POCT Blood Glucose.: 229 mg/dL (07-01-19 @ 05:18)    POCT Blood Glucose.: 217 mg/dL (06-30-19 @ 23:49)  POCT Blood Glucose.: 218 mg/dL (06-30-19 @ 17:37)  POCT Blood Glucose.: 311 mg/dL (06-30-19 @ 14:11)  POCT Blood Glucose.: 307 mg/dL (06-30-19 @ 12:38)  POCT Blood Glucose.: 212 mg/dL (06-30-19 @ 05:06)  POCT Blood Glucose.: 194 mg/dL (06-30-19 @ 01:17)    POCT Blood Glucose.: 227 mg/dL (06-29-19 @ 17:17)  POCT Blood Glucose.: 213 mg/dL (06-29-19 @ 11:24)    07-02    142  |  105  |  16  ----------------------------<  176<H>  4.0   |  26  |  1.06    EGFR if : 69  EGFR if non : 59<L>    Ca    8.9      07-02    Hemoglobin A1C, Whole Blood: 10.4 % <H> [4.0 - 5.6] (06-29-19 @ 09:33)

## 2019-07-02 NOTE — SWALLOW BEDSIDE ASSESSMENT ADULT - COMMENTS
Continued:   : Change in status notification: Pt s/p tPA  @1730 ([facility]). RN went to bedside report in ED. RN notices pt right facial twitch and foaming at the mouth. Pt was following commands seconds prior, pt no longer speaking or following commands.  : Provider contact note: Pt had 3 more seizure episodes en route to floor  Pt given ativan and keppra   MRI pending at time of this evaluation     Imagin/28: CT Brain stroke protocol: IMPRESSION:  Age-indeterminate left pontine lacunar infarction. Recommend MRI brain for further characterization. Chronic right parietal and right frontal infarctions.    Repeat CT Brain : IMPRESSION: Redemonstration of decreased attenuation in the basis pontis and midbrain. Findings could represent the presence of recent or chronic infarction. Chronic appearing right frontoparietal infarct.    Patient seen for re-evaluation today. Initial evaluations completed 19 & 19 which noted overall feeding/swallow process negatively impacted by cognitive status.

## 2019-07-02 NOTE — CONSULT NOTE ADULT - SUBJECTIVE AND OBJECTIVE BOX
Patient is a 54y old  Female who presents with a chief complaint of CVA (29 Jun 2019 15:43)      HPI:  Patient is a 54 year old RHD Woman with a PMHx of HTN, HLD, DM transferred from [facility] for evaluation of stroke. Per daughter at bedside, last known normal 2pm, patient had sudden onset left sided paralysis, slurred speech, received TPA at [facility] at 5;30PM, completed at 6PM. She was treated with IV tPA and was transferred to [facility] for further management. CTA head and neck on admission showed critical stenosis versus occlusion of right supraclinoid ICA and severe to critical stenosis of right proximal ICA. MRI brain during hospitalization showed acute infarcts in the right MCA and ENRIQUE distribution with minimal hemorrhagic transformation (HI-1). As per Neuro likely mechanism being large vessel disease Her hospital course was complicated by episodes concerning for seizure-like activity on admission. ASA and Plavix for 3 months then ASA daily. Seen by SLT and failed dysphagia screen currently on NGT feeds. Seen for ICA stenosis by Neurosurgery Dr. Neil, recommend repeat CTA Head and Neck in 1-2 weeks Would likely plan for CEA electively once patient has began recovery from stroke. Endocrine consulted for uncontrolled Diabetes and insulin management. Patient on Keppra  500 mg to once daily for seizure prophylaxis (dose was reduced due to increase somnolence), duration of AEDs is expected to be 3-6 months based on clinical course and MRI/EEG evaluation at that time, VEEG: Structural or functional abnormality in the right hemisphere.       REVIEW OF SYSTEMS  [X] Constitutional WNL       [X] HEENT WNL  [X] Cardio WNL              [X] Resp WNL            [X] GI WNL                            [X]  WNL                               [X] MSK WNL            [X] Neuro WNL   [X] Pain WNL  [X] Cognitive WNL   [X] Psych WNL  [X] Skin WNL      [X] Heme WNL              [X] Endo WNL    PAST MEDICAL & SURGICAL HISTORY  Diabetes mellitus type 2 in obese  Hyperlipidemia, unspecified hyperlipidemia type  Hypertension      SOCIAL HISTORY  Smoking - Denied  EtOH - Denied   Drugs - Denied    FUNCTIONAL HISTORY   Pt lives with adult children and other adult family members in a private house with 8 stairs to bed/bath and railing on R. Pt has tub shower,    Independent prior with Ambulation and ADLs.     CURRENT FUNCTIONAL STATUS  - Bed Mobility: Max A  - Transfers: Max A  - Gait: unable to perform   - ADLs: Max A for ADLs    ALLERGIES  No Known Allergies      FAMILY HISTORY       MEDICATIONS   acetaminophen    Suspension .. 650 milliGRAM(s) Oral every 6 hours PRN  aspirin  chewable 81 milliGRAM(s) Oral daily  atorvastatin 80 milliGRAM(s) Oral at bedtime  clopidogrel Tablet 75 milliGRAM(s) Oral daily  dextrose 40% Gel 15 Gram(s) Oral once PRN  dextrose 5%. 1000 milliLiter(s) IV Continuous <Continuous>  dextrose 50% Injectable 12.5 Gram(s) IV Push once  dextrose 50% Injectable 25 Gram(s) IV Push once  dextrose 50% Injectable 25 Gram(s) IV Push once  enoxaparin Injectable 40 milliGRAM(s) SubCutaneous daily  glucagon  Injectable 1 milliGRAM(s) IntraMuscular once PRN  insulin lispro (HumaLOG) corrective regimen sliding scale   SubCutaneous every 6 hours  insulin NPH human recombinant 21 Unit(s) SubCutaneous every 6 hours  levETIRAcetam  IVPB 500 milliGRAM(s) IV Intermittent daily  LORazepam   Injectable 2 milliGRAM(s) IV Push once PRN  nystatin Powder 1 Application(s) Topical two times a day PRN      VITALS  T(C): 36.9 (07-02-19 @ 07:20), Max: 37.7 (07-01-19 @ 18:00)  HR: 77 (07-02-19 @ 08:00) (71 - 92)  BP: 159/60 (07-02-19 @ 08:00) (125/52 - 175/62)  RR: 18 (07-02-19 @ 08:00) (12 - 26)  SpO2: 100% (07-02-19 @ 08:00) (95% - 100%)  Wt(kg): --    RECENT LABS/IMAGING  CBC Full  -  ( 02 Jul 2019 06:26 )  WBC Count : 8.5 K/uL  RBC Count : 4.45 M/uL  Hemoglobin : 13.2 g/dL  Hematocrit : 38.8 %  Platelet Count - Automated : 243 K/uL  Mean Cell Volume : 87.1 fl  Mean Cell Hemoglobin : 29.7 pg  Mean Cell Hemoglobin Concentration : 34.1 gm/dL  Auto Neutrophil # : x  Auto Lymphocyte # : x  Auto Monocyte # : x  Auto Eosinophil # : x  Auto Basophil # : x  Auto Neutrophil % : x  Auto Lymphocyte % : x  Auto Monocyte % : x  Auto Eosinophil % : x  Auto Basophil % : x    07-02    142  |  105  |  16  ----------------------------<  176<H>  4.0   |  26  |  1.06    Ca    8.9      02 Jul 2019 06:26    Brain MRI (06/29/19) There is evidence of abnormal stricture diffusion seen involving the right frontal parietal region. Involvement of the right basal ganglia region is seen as well. This is compatible with an area of acute infarct.   Involvement of the right anterior cerebral and middle cerebral artery territories are identified. Scattered areas of abnormal susceptibility are identified which is likely compatible with some hemorrhagic transformation. Localized mass effect is seen consisting of sulcal effacement. No significant shift or herniation is seen.    Head CT (06/30/19) Abnormal areas of low-attenuation involving the right frontal and right parietal cortical subcortical region are again seen. These findings are compatible with areas of old infarcts. Abnormal low-attenuation involving the left alla is also again seen which could be compatible with an age-indeterminate infarct. There is no evidence of abnormal low-attenuation seen involving the high right frontal cortical subcortical region. Involvement of the right basal   ganglia and anterior corona radiata regions as well. This is predominantly seen medially and compatible with an acute right anterior cerebral artery infarct. There is localized mass effect consisting of sulcal effacement. No significant shift or herniation is seen.     CTA Head/Neck (06/28/19) CT ANGIOGRAPHY NECK:  Markedly limited examination. Tandem moderate to severe stenoses are suspected in the right carotid bulb and proximal right internal carotid artery. 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    CT ANGIOGRAPHY BRAIN: Markedly limited examination. Limited evaluation of the intracranial internal carotid arteries. Stenoses or occlusion in the distal cavernous segments and ophthalmic segments of the bilateral internal carotid arteries cannot be excluded. Apparent severe stenosis in the distal supraclinoid right internal carotid artery and right carotid terminus with possible occlusion of the A1 segment of the right anterior cerebral artery.  The right A2 segment fills across the anterior to indicating artery.  There appears to be posterior distal right ENRIQUE vessels within the medial right frontal lobe, raising the possibility of early cerebral infarction.           ---------------------------------------------------------------------------------------- Patient is a 54y old  Female who presents with a chief complaint of CVA (29 Jun 2019 15:43)      HPI:  Patient is a 54 year old RHD Woman with a PMHx of HTN, HLD, DM transferred from [facility] for evaluation of stroke. Per daughter at bedside, last known normal 2pm, patient had sudden onset left sided paralysis, slurred speech, received TPA at [facility] at 5;30PM, completed at 6PM. She was treated with IV tPA and was transferred to [facility] for further management. CTA head and neck on admission showed critical stenosis versus occlusion of right supraclinoid ICA and severe to critical stenosis of right proximal ICA. MRI brain during hospitalization showed acute infarcts in the right MCA and ENRIQUE distribution with minimal hemorrhagic transformation (HI-1). As per Neuro likely mechanism being large vessel disease Her hospital course was complicated by episodes concerning for seizure-like activity on admission. ASA and Plavix for 3 months then ASA daily. Seen by SLT and failed dysphagia screen currently on NGT feeds. Seen for ICA stenosis by Neurosurgery Dr. Neil, recommend repeat CTA Head and Neck in 1-2 weeks Would likely plan for CEA electively once patient has began recovery from stroke. Endocrine consulted for uncontrolled Diabetes and insulin management. Patient on Keppra  500 mg to once daily for seizure prophylaxis (dose was reduced due to increase somnolence), duration of AEDs is expected to be 3-6 months based on clinical course and MRI/EEG evaluation at that time, VEEG: Structural or functional abnormality in the right hemisphere.       REVIEW OF SYSTEMS  +limited due to neuro deficits.     PAST MEDICAL & SURGICAL HISTORY  Diabetes mellitus type 2 in obese  Hyperlipidemia, unspecified hyperlipidemia type  Hypertension      SOCIAL HISTORY  Smoking - Denied  EtOH - Denied   Drugs - Denied    FUNCTIONAL HISTORY   Pt lives with adult children and other adult family members in a private house with 8 stairs to bed/bath and railing on R. Pt has tub shower,    Independent prior with Ambulation and ADLs.     CURRENT FUNCTIONAL STATUS  - Bed Mobility: Max A  - Transfers: Max A  - Gait: unable to perform   - ADLs: Max A for ADLs    ALLERGIES  No Known Allergies      FAMILY HISTORY       MEDICATIONS   acetaminophen    Suspension .. 650 milliGRAM(s) Oral every 6 hours PRN  aspirin  chewable 81 milliGRAM(s) Oral daily  atorvastatin 80 milliGRAM(s) Oral at bedtime  clopidogrel Tablet 75 milliGRAM(s) Oral daily  dextrose 40% Gel 15 Gram(s) Oral once PRN  dextrose 5%. 1000 milliLiter(s) IV Continuous <Continuous>  dextrose 50% Injectable 12.5 Gram(s) IV Push once  dextrose 50% Injectable 25 Gram(s) IV Push once  dextrose 50% Injectable 25 Gram(s) IV Push once  enoxaparin Injectable 40 milliGRAM(s) SubCutaneous daily  glucagon  Injectable 1 milliGRAM(s) IntraMuscular once PRN  insulin lispro (HumaLOG) corrective regimen sliding scale   SubCutaneous every 6 hours  insulin NPH human recombinant 21 Unit(s) SubCutaneous every 6 hours  levETIRAcetam  IVPB 500 milliGRAM(s) IV Intermittent daily  LORazepam   Injectable 2 milliGRAM(s) IV Push once PRN  nystatin Powder 1 Application(s) Topical two times a day PRN      VITALS  T(C): 36.9 (07-02-19 @ 07:20), Max: 37.7 (07-01-19 @ 18:00)  HR: 77 (07-02-19 @ 08:00) (71 - 92)  BP: 159/60 (07-02-19 @ 08:00) (125/52 - 175/62)  RR: 18 (07-02-19 @ 08:00) (12 - 26)  SpO2: 100% (07-02-19 @ 08:00) (95% - 100%)  Wt(kg): --    RECENT LABS/IMAGING  CBC Full  -  ( 02 Jul 2019 06:26 )  WBC Count : 8.5 K/uL  RBC Count : 4.45 M/uL  Hemoglobin : 13.2 g/dL  Hematocrit : 38.8 %  Platelet Count - Automated : 243 K/uL  Mean Cell Volume : 87.1 fl  Mean Cell Hemoglobin : 29.7 pg  Mean Cell Hemoglobin Concentration : 34.1 gm/dL  Auto Neutrophil # : x  Auto Lymphocyte # : x  Auto Monocyte # : x  Auto Eosinophil # : x  Auto Basophil # : x  Auto Neutrophil % : x  Auto Lymphocyte % : x  Auto Monocyte % : x  Auto Eosinophil % : x  Auto Basophil % : x    07-02    142  |  105  |  16  ----------------------------<  176<H>  4.0   |  26  |  1.06    Ca    8.9      02 Jul 2019 06:26    Brain MRI (06/29/19) There is evidence of abnormal stricture diffusion seen involving the right frontal parietal region. Involvement of the right basal ganglia region is seen as well. This is compatible with an area of acute infarct.   Involvement of the right anterior cerebral and middle cerebral artery territories are identified. Scattered areas of abnormal susceptibility are identified which is likely compatible with some hemorrhagic transformation. Localized mass effect is seen consisting of sulcal effacement. No significant shift or herniation is seen.    Head CT (06/30/19) Abnormal areas of low-attenuation involving the right frontal and right parietal cortical subcortical region are again seen. These findings are compatible with areas of old infarcts. Abnormal low-attenuation involving the left alla is also again seen which could be compatible with an age-indeterminate infarct. There is no evidence of abnormal low-attenuation seen involving the high right frontal cortical subcortical region. Involvement of the right basal   ganglia and anterior corona radiata regions as well. This is predominantly seen medially and compatible with an acute right anterior cerebral artery infarct. There is localized mass effect consisting of sulcal effacement. No significant shift or herniation is seen.     CTA Head/Neck (06/28/19) CT ANGIOGRAPHY NECK:  Markedly limited examination. Tandem moderate to severe stenoses are suspected in the right carotid bulb and proximal right internal carotid artery. 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    CT ANGIOGRAPHY BRAIN: Markedly limited examination. Limited evaluation of the intracranial internal carotid arteries. Stenoses or occlusion in the distal cavernous segments and ophthalmic segments of the bilateral internal carotid arteries cannot be excluded. Apparent severe stenosis in the distal supraclinoid right internal carotid artery and right carotid terminus with possible occlusion of the A1 segment of the right anterior cerebral artery.  The right A2 segment fills across the anterior to indicating artery.  There appears to be posterior distal right ENRIQUE vessels within the medial right frontal lobe, raising the possibility of early cerebral infarction.           ----------------------------------------------------------------------------------------      PHYSICAL EXAM  Constitutional - NAD, Comfortable  Cardio:  no edema  Pulm: no respiratory distress  GI: Non-distended   Neuro Exam:                    Cognitive - AAOx3   Communication - non-Fluent expressive aphasia     Motor - left sided UE/LE flaccid hemiparesis     Sensory - Intact to LT  Extremities - No calf tenderness  Psychiatric - Mood depressed    ASSESSMENT/PLAN  54 year-old RHD Woman with PMH of HTN/DM, Morbid obesity, who is now with flaccid left side hemiplegia, dysarthria, left sided neglect, dysphagia  gait, ADLs, and functional deficits.     Recommend - Acute Rehab  - Disposition: ACUTE inpatient rehabilitation for functional deficits when the patient is medically stable. The patient will benefit from 3 hours of therapy per day and 24 hour nursing care. The patient will benefit  from daily PMR physician oversight for comorbid medical management and has a high level of medical complexity due to morbid obesity, flaccid hemiplegia  , , Severe ICA stenosis, Uncontrolled diabetes necessitating the need for daily physician oversight. Patient can tolerate intensive therapy consisting of PT/OT and if needed SLP.  Will continue to follow for ongoing rehab needs and recommendations.  - c/w PT for Bed Mobility, Transfers, Ambulation with AD   - c/w OT for ADLs  - Turn Q2hrs while in bed to prevent Pressure Injury and OOB to chair when possible

## 2019-07-02 NOTE — SWALLOW BEDSIDE ASSESSMENT ADULT - SLP PRECAUTIONS/LIMITATIONS: VISION
Partially impaired: cannot see medication labels or newsprint, but can see obstacles in path, and the surrounding layout; can count fingers at arm's length/impaired
glasses/impaired/assistive device in use
impaired

## 2019-07-02 NOTE — SWALLOW BEDSIDE ASSESSMENT ADULT - NS SPL SWALLOW CLINIC TRIAL FT
Patient provided with oral suctioning post PO trials; removal of clear secretions in lateral sulci noted.

## 2019-07-02 NOTE — PROGRESS NOTE ADULT - SUBJECTIVE AND OBJECTIVE BOX
Subjective: Patient seen and examined. No new events except as noted.     SUBJECTIVE/ROS:  ROS limited       MEDICATIONS:  MEDICATIONS  (STANDING):  aspirin  chewable 81 milliGRAM(s) Oral daily  atorvastatin 80 milliGRAM(s) Oral at bedtime  clopidogrel Tablet 75 milliGRAM(s) Oral daily  dextrose 5%. 1000 milliLiter(s) (50 mL/Hr) IV Continuous <Continuous>  dextrose 50% Injectable 12.5 Gram(s) IV Push once  dextrose 50% Injectable 25 Gram(s) IV Push once  dextrose 50% Injectable 25 Gram(s) IV Push once  enoxaparin Injectable 40 milliGRAM(s) SubCutaneous daily  insulin lispro (HumaLOG) corrective regimen sliding scale   SubCutaneous every 6 hours  insulin NPH human recombinant 21 Unit(s) SubCutaneous every 6 hours  levETIRAcetam  IVPB 500 milliGRAM(s) IV Intermittent daily      PHYSICAL EXAM:  T(C): 36.9 (07-02-19 @ 07:20), Max: 37.7 (07-01-19 @ 18:00)  HR: 77 (07-02-19 @ 08:00) (71 - 92)  BP: 159/60 (07-02-19 @ 08:00) (125/52 - 175/62)  RR: 18 (07-02-19 @ 08:00) (12 - 26)  SpO2: 100% (07-02-19 @ 08:00) (95% - 100%)  Wt(kg): --  I&O's Summary    01 Jul 2019 07:01  -  02 Jul 2019 07:00  --------------------------------------------------------  IN: 820 mL / OUT: 1600 mL / NET: -780 mL    02 Jul 2019 07:01  -  02 Jul 2019 08:46  --------------------------------------------------------  IN: 60 mL / OUT: 0 mL / NET: 60 mL            JVP: Normal  Neck: supple  Lung: clear   CV: S1 S2 , Murmur:  Abd: soft  Ext: No edema  neuro: Awake   Psych: flat affect  Skin: normal``    LABS/DATA:    CARDIAC MARKERS:                                13.2   8.5   )-----------( 243      ( 02 Jul 2019 06:26 )             38.8     07-02    142  |  105  |  16  ----------------------------<  176<H>  4.0   |  26  |  1.06    Ca    8.9      02 Jul 2019 06:26      proBNP:   Lipid Profile:   HgA1c:   TSH:     TELE:  EKG:

## 2019-07-02 NOTE — SWALLOW BEDSIDE ASSESSMENT ADULT - PHARYNGEAL PHASE
significantly delayed swallow initiation despite maximal verbal/tactile cues./Delayed pharyngeal swallow

## 2019-07-02 NOTE — CONSULT NOTE ADULT - PROBLEM SELECTOR RECOMMENDATION 2
- Right MCA and ENRIQUE distribution stroke   - DAPT   - care per neurology appreciated, follow their ongoing recommendations

## 2019-07-02 NOTE — PROGRESS NOTE ADULT - ASSESSMENT
acute CVA  ongoing neuro work up , MRI/MRA as per stroke service  obtain echo   monitor on tele   so far no events   if no etiology is discovered, then ILR is recommended     HTN  permissive HTN as per stroke service   will treat once deemed safe    DM  fu with endo    suspect SUDEEP  outpt sleep study     check lower ext venous doppler

## 2019-07-02 NOTE — PROGRESS NOTE ADULT - SUBJECTIVE AND OBJECTIVE BOX
THE PATIENT WAS SEEN AND EXAMINED BY ME WITH THE HOUSESTAFF AND STROKE TEAM DURING MORNING ROUNDS.   HPI:  Patient is a 54 year old Female w/ PMHx of HTN, HLD, DM transferred from [facility] for evaluation of stroke. Per daughter at bedside, last known normal 2pm on day of admission, patient had sudden onset left sided paralysis, slurred speech, received TPA at at [place]. NIHSS 13, MRS 1     SUBJECTIVE: No events overnight.  No new neurologic complaints.    ROS: All negative except documented above.    acetaminophen    Suspension .. 650 milliGRAM(s) Oral every 6 hours PRN  aspirin  chewable 81 milliGRAM(s) Oral daily  atorvastatin 80 milliGRAM(s) Oral at bedtime  clopidogrel Tablet 75 milliGRAM(s) Oral daily  dextrose 40% Gel 15 Gram(s) Oral once PRN  dextrose 5%. 1000 milliLiter(s) IV Continuous <Continuous>  dextrose 50% Injectable 12.5 Gram(s) IV Push once  dextrose 50% Injectable 25 Gram(s) IV Push once  dextrose 50% Injectable 25 Gram(s) IV Push once  enoxaparin Injectable 40 milliGRAM(s) SubCutaneous daily  glucagon  Injectable 1 milliGRAM(s) IntraMuscular once PRN  insulin lispro (HumaLOG) corrective regimen sliding scale   SubCutaneous every 6 hours  insulin NPH human recombinant 21 Unit(s) SubCutaneous every 6 hours  levETIRAcetam  IVPB 500 milliGRAM(s) IV Intermittent daily  LORazepam   Injectable 2 milliGRAM(s) IV Push once PRN  nystatin Powder 1 Application(s) Topical two times a day PRN      PHYSICAL EXAM:   Vital Signs Last 24 Hrs  T(C): 37.2 (02 Jul 2019 06:00), Max: 37.7 (01 Jul 2019 18:00)  T(F): 99 (02 Jul 2019 06:00), Max: 99.9 (01 Jul 2019 20:00)  HR: 77 (02 Jul 2019 06:00) (71 - 92)  BP: 152/68 (02 Jul 2019 06:00) (122/54 - 175/62)  BP(mean): 92 (02 Jul 2019 06:00) (74 - 98)  RR: 13 (02 Jul 2019 06:00) (12 - 26)  SpO2: 100% (02 Jul 2019 06:00) (95% - 100%)    General: No acute distress  HEENT: EOM intact, left eye legally blind  Abdomen: Soft, nontender, nondistended   Extremities: No edema    NEUROLOGICAL EXAM:  Mental status: Eyes closed, opens to voice and light touch but requires constant stimulation, awake, alert, eye opening apraxia, oriented to name, place and year, fluent speech, repetition intact,  No asomatognosia or anosognosia  Cranial Nerves: No facial asymmetry, no nystagmus, moderate to severe dysarthria  Motor exam: RUE /RLE spontaneous movement against gravity without significant noticeable weakness, Left hypotonic hemiplegia   Sensation: Left side appears intact to noxious stimuli, left side neglect to double simultaneous stimulation   Coordination/ Gait: No dysmetria on right side     LABS:                        12.9   8.2   )-----------( 274      ( 01 Jul 2019 05:48 )             40.2    07-01    139  |  102  |  16  ----------------------------<  257<H>  4.2   |  24  |  1.07    Ca    9.1      01 Jul 2019 05:48      Hemoglobin A1C, Whole Blood: 10.4 % (06-29 @ 09:33)      IMAGING: Reviewed by me.     Brain MRI (06/29/19) There is evidence of abnormal stricture diffusion seen involving the right frontal parietal region. Involvement of the right basal ganglia region is seen as well. This is compatible with an area of acute infarct.   Involvement of the right anterior cerebral and middle cerebral artery territories are identified. Scattered areas of abnormal susceptibility are identified which is likely compatible with some hemorrhagic transformation. Localized mass effect is seen consisting of sulcal effacement. No significant shift or herniation is seen.    Head CT (06/30/19) Abnormal areas of low-attenuation involving the right frontal and right parietal cortical subcortical region are again seen. These findings are compatible with areas of old infarcts. Abnormal low-attenuation involving the left alla is also again seen which could be compatible with an age-indeterminate infarct. There is no evidence of abnormal low-attenuation seen involving the high right frontal cortical subcortical region. Involvement of the right basal   ganglia and anterior corona radiata regions as well. This is predominantly seen medially and compatible with an acute right anterior cerebral artery infarct. There is localized mass effect consisting of sulcal effacement. No significant shift or herniation is seen.     CTA Head/Neck (06/28/19) CT ANGIOGRAPHY NECK:  Markedly limited examination. Tandem moderate to severe stenoses are suspected in the right carotid bulb and proximal right internal carotid artery. 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    CT ANGIOGRAPHY BRAIN: Markedly limited examination. Limited evaluation of the intracranial internal carotid arteries. Stenoses or occlusion in the distal cavernous segments and ophthalmic segments of the bilateral internal carotid arteries cannot be excluded. Apparent severe stenosis in the distal supraclinoid right internal carotid artery and right carotid terminus with possible occlusion of the A1 segment of the right anterior cerebral artery.  The right A2 segment fills across the anterior to indicating artery.  There appears to be posterior distal right ENRIQUE vessels within the medial right frontal lobe, raising the possibility of early cerebral infarction. THE PATIENT WAS SEEN AND EXAMINED BY ME WITH THE HOUSESTAFF AND STROKE TEAM DURING MORNING ROUNDS.   HPI:  Patient is a 54 year old Female w/ PMHx of HTN, HLD, DM transferred from [facility] for evaluation of stroke. Per daughter at bedside, last known normal 2pm on day of admission, patient had sudden onset left sided paralysis, slurred speech, received TPA at at [place]. NIHSS 13, MRS 1     SUBJECTIVE: No events overnight.  No new neurologic complaints.    ROS: All negative except documented above.    acetaminophen    Suspension .. 650 milliGRAM(s) Oral every 6 hours PRN  aspirin  chewable 81 milliGRAM(s) Oral daily  atorvastatin 80 milliGRAM(s) Oral at bedtime  clopidogrel Tablet 75 milliGRAM(s) Oral daily  dextrose 40% Gel 15 Gram(s) Oral once PRN  dextrose 5%. 1000 milliLiter(s) IV Continuous <Continuous>  dextrose 50% Injectable 12.5 Gram(s) IV Push once  dextrose 50% Injectable 25 Gram(s) IV Push once  dextrose 50% Injectable 25 Gram(s) IV Push once  enoxaparin Injectable 40 milliGRAM(s) SubCutaneous daily  glucagon  Injectable 1 milliGRAM(s) IntraMuscular once PRN  insulin lispro (HumaLOG) corrective regimen sliding scale   SubCutaneous every 6 hours  insulin NPH human recombinant 21 Unit(s) SubCutaneous every 6 hours  levETIRAcetam  IVPB 500 milliGRAM(s) IV Intermittent daily  LORazepam   Injectable 2 milliGRAM(s) IV Push once PRN  nystatin Powder 1 Application(s) Topical two times a day PRN      PHYSICAL EXAM:   Vital Signs Last 24 Hrs  T(C): 37.2 (02 Jul 2019 06:00), Max: 37.7 (01 Jul 2019 18:00)  T(F): 99 (02 Jul 2019 06:00), Max: 99.9 (01 Jul 2019 20:00)  HR: 77 (02 Jul 2019 06:00) (71 - 92)  BP: 152/68 (02 Jul 2019 06:00) (122/54 - 175/62)  BP(mean): 92 (02 Jul 2019 06:00) (74 - 98)  RR: 13 (02 Jul 2019 06:00) (12 - 26)  SpO2: 100% (02 Jul 2019 06:00) (95% - 100%)    General: No acute distress  HEENT: EOM intact, left eye legally blind  Abdomen: Soft, nontender, nondistended   Extremities: No edema    NEUROLOGICAL EXAM:  Mental status: Eyes closed, opens to voice and light touch but requires constant stimulation, alert, eye opening apraxia, oriented to name, place and year, fluent speech, repetition intact,  No asomatognosia or anosognosia  Cranial Nerves: No facial asymmetry, no nystagmus, moderate to severe dysarthria  Motor exam: RUE /RLE spontaneous movement against gravity without significant noticeable weakness, Left hypotonic hemiplegia   Sensation: Left side appears intact to noxious stimuli, left side neglect to double simultaneous stimulation   Coordination/ Gait: No dysmetria on right side     LABS:                        12.9   8.2   )-----------( 274      ( 01 Jul 2019 05:48 )             40.2    07-01    139  |  102  |  16  ----------------------------<  257<H>  4.2   |  24  |  1.07    Ca    9.1      01 Jul 2019 05:48      Hemoglobin A1C, Whole Blood: 10.4 % (06-29 @ 09:33)      IMAGING: Reviewed by me.     Brain MRI (06/29/19) There is evidence of abnormal stricture diffusion seen involving the right frontal parietal region. Involvement of the right basal ganglia region is seen as well. This is compatible with an area of acute infarct.   Involvement of the right anterior cerebral and middle cerebral artery territories are identified. Scattered areas of abnormal susceptibility are identified which is likely compatible with some hemorrhagic transformation. Localized mass effect is seen consisting of sulcal effacement. No significant shift or herniation is seen.    Head CT (06/30/19) Abnormal areas of low-attenuation involving the right frontal and right parietal cortical subcortical region are again seen. These findings are compatible with areas of old infarcts. Abnormal low-attenuation involving the left alla is also again seen which could be compatible with an age-indeterminate infarct. There is no evidence of abnormal low-attenuation seen involving the high right frontal cortical subcortical region. Involvement of the right basal   ganglia and anterior corona radiata regions as well. This is predominantly seen medially and compatible with an acute right anterior cerebral artery infarct. There is localized mass effect consisting of sulcal effacement. No significant shift or herniation is seen.     CTA Head/Neck (06/28/19) CT ANGIOGRAPHY NECK:  Markedly limited examination. Tandem moderate to severe stenoses are suspected in the right carotid bulb and proximal right internal carotid artery. 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    CT ANGIOGRAPHY BRAIN: Markedly limited examination. Limited evaluation of the intracranial internal carotid arteries. Stenoses or occlusion in the distal cavernous segments and ophthalmic segments of the bilateral internal carotid arteries cannot be excluded. Apparent severe stenosis in the distal supraclinoid right internal carotid artery and right carotid terminus with possible occlusion of the A1 segment of the right anterior cerebral artery.  The right A2 segment fills across the anterior to indicating artery.  There appears to be posterior distal right ENRIQUE vessels within the medial right frontal lobe, raising the possibility of early cerebral infarction. THE PATIENT WAS SEEN AND EXAMINED BY ME WITH THE HOUSESTAFF AND STROKE TEAM DURING MORNING ROUNDS.     HPI:  Patient is a 54 year old Female w/ PMHx of HTN, HLD, DM transferred from [facility] for evaluation of stroke. Per daughter at bedside, last known normal 2pm on day of admission, patient had sudden onset left sided paralysis, slurred speech, received TPA at at [place]. NIHSS 13, MRS 1     SUBJECTIVE: No events overnight.  No new neurologic complaints.      ROS: All negative except documented above.    acetaminophen    Suspension .. 650 milliGRAM(s) Oral every 6 hours PRN  aspirin  chewable 81 milliGRAM(s) Oral daily  atorvastatin 80 milliGRAM(s) Oral at bedtime  clopidogrel Tablet 75 milliGRAM(s) Oral daily  dextrose 40% Gel 15 Gram(s) Oral once PRN  dextrose 5%. 1000 milliLiter(s) IV Continuous <Continuous>  dextrose 50% Injectable 12.5 Gram(s) IV Push once  dextrose 50% Injectable 25 Gram(s) IV Push once  dextrose 50% Injectable 25 Gram(s) IV Push once  enoxaparin Injectable 40 milliGRAM(s) SubCutaneous daily  glucagon  Injectable 1 milliGRAM(s) IntraMuscular once PRN  insulin lispro (HumaLOG) corrective regimen sliding scale   SubCutaneous every 6 hours  insulin NPH human recombinant 21 Unit(s) SubCutaneous every 6 hours  levETIRAcetam  IVPB 500 milliGRAM(s) IV Intermittent daily  LORazepam   Injectable 2 milliGRAM(s) IV Push once PRN  nystatin Powder 1 Application(s) Topical two times a day PRN      PHYSICAL EXAM:   Vital Signs Last 24 Hrs  T(C): 37.2 (02 Jul 2019 06:00), Max: 37.7 (01 Jul 2019 18:00)  T(F): 99 (02 Jul 2019 06:00), Max: 99.9 (01 Jul 2019 20:00)  HR: 77 (02 Jul 2019 06:00) (71 - 92)  BP: 152/68 (02 Jul 2019 06:00) (122/54 - 175/62)  BP(mean): 92 (02 Jul 2019 06:00) (74 - 98)  RR: 13 (02 Jul 2019 06:00) (12 - 26)  SpO2: 100% (02 Jul 2019 06:00) (95% - 100%)    General: No acute distress  HEENT: EOM intact, left eye legally blind  Abdomen: Soft, nontender, nondistended   Extremities: No edema    NEUROLOGICAL EXAM:  Mental status: Eyes closed, opens to voice and light touch, drowsy but easily arousable, eyelid opening apraxia, oriented to name, place and year, fluent speech, repetition intact,  No asomatognosia or anosognosia  Cranial Nerves: UMN type left facial droop, no nystagmus, moderate to severe dysarthria  Motor exam: RUE /RLE spontaneous movement against gravity without significant noticeable weakness, Left hypotonic hemiplegia   Sensation: Left side appears intact to noxious stimuli, left side neglect to double simultaneous stimulation   Coordination/ Gait: No dysmetria on right side     LABS:                        12.9   8.2   )-----------( 274      ( 01 Jul 2019 05:48 )             40.2    07-01    139  |  102  |  16  ----------------------------<  257<H>  4.2   |  24  |  1.07    Ca    9.1      01 Jul 2019 05:48      Hemoglobin A1C, Whole Blood: 10.4 % (06-29 @ 09:33)      IMAGING: Reviewed by me.     Brain MRI (06/29/19) There is evidence of abnormal stricture diffusion seen involving the right frontal parietal region. Involvement of the right basal ganglia region is seen as well. This is compatible with an area of acute infarct.   Involvement of the right anterior cerebral and middle cerebral artery territories are identified. Scattered areas of abnormal susceptibility are identified which is likely compatible with some hemorrhagic transformation. Localized mass effect is seen consisting of sulcal effacement. No significant shift or herniation is seen.    Head CT (06/30/19) Abnormal areas of low-attenuation involving the right frontal and right parietal cortical subcortical region are again seen. These findings are compatible with areas of old infarcts. Abnormal low-attenuation involving the left alla is also again seen which could be compatible with an age-indeterminate infarct. There is no evidence of abnormal low-attenuation seen involving the high right frontal cortical subcortical region. Involvement of the right basal   ganglia and anterior corona radiata regions as well. This is predominantly seen medially and compatible with an acute right anterior cerebral artery infarct. There is localized mass effect consisting of sulcal effacement. No significant shift or herniation is seen.     CTA Head/Neck (06/28/19) CT ANGIOGRAPHY NECK:  Markedly limited examination. Tandem moderate to severe stenoses are suspected in the right carotid bulb and proximal right internal carotid artery. 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    CT ANGIOGRAPHY BRAIN: Markedly limited examination. Limited evaluation of the intracranial internal carotid arteries. Stenoses or occlusion in the distal cavernous segments and ophthalmic segments of the bilateral internal carotid arteries cannot be excluded. Apparent severe stenosis in the distal supraclinoid right internal carotid artery and right carotid terminus with possible occlusion of the A1 segment of the right anterior cerebral artery.  The right A2 segment fills across the anterior to indicating artery.  There appears to be posterior distal right ENRIQUE vessels within the medial right frontal lobe, raising the possibility of early cerebral infarction.

## 2019-07-02 NOTE — CONSULT NOTE ADULT - SUBJECTIVE AND OBJECTIVE BOX
Chief Complaint:  Patient is a 54y old  Female who presents with a chief complaint of CVA (02 Jul 2019 10:56)      HPI:54 year old woman with multiple vascular risk factors including age, HTN, DM II and HPLD was initially evaluated at OSH for acute onset of left-sided weakness through telestroke. She was treated with IV tPA and was transferred to [facility] for further management. CTA head and neck on admission showed critical stenosis versus occlusion of right supraclinoid ICA and severe to critical stenosis of right proximal ICA. MRI brain during hospitalization showed acute infarcts in the right MCA and ENRIQUE distribution with minimal hemorrhagic transformation (HI-1). Her hospital course was complicated by episodes concerning for seizure-like activity on admission, treated with AEDs.  GI consulted for dyaphagia. pt failed s/s eval. As per daughter at bedside, no hx of egd/colonoscopy in the past. Currently denies abdominal pain, n/v/d/c, brbpr/melena.       Allergies:  No Known Allergies      Medications:  acetaminophen    Suspension .. 650 milliGRAM(s) Oral every 6 hours PRN  aspirin  chewable 81 milliGRAM(s) Oral daily  atorvastatin 80 milliGRAM(s) Oral at bedtime  clopidogrel Tablet 75 milliGRAM(s) Oral daily  dextrose 40% Gel 15 Gram(s) Oral once PRN  dextrose 5%. 1000 milliLiter(s) IV Continuous <Continuous>  dextrose 50% Injectable 12.5 Gram(s) IV Push once  dextrose 50% Injectable 25 Gram(s) IV Push once  dextrose 50% Injectable 25 Gram(s) IV Push once  enoxaparin Injectable 40 milliGRAM(s) SubCutaneous daily  glucagon  Injectable 1 milliGRAM(s) IntraMuscular once PRN  insulin lispro (HumaLOG) corrective regimen sliding scale   SubCutaneous every 6 hours  insulin NPH human recombinant 21 Unit(s) SubCutaneous every 6 hours  levETIRAcetam  IVPB 500 milliGRAM(s) IV Intermittent every 12 hours  LORazepam   Injectable 2 milliGRAM(s) IV Push once PRN  nystatin Powder 1 Application(s) Topical two times a day PRN      PMHX/PSHX:  Diabetes mellitus type 2 in obese  Hyperlipidemia, unspecified hyperlipidemia type  Hypertension      Family history:      Social History:     ROS:     General:  No wt loss, fevers, chills, night sweats, fatigue,   Eyes:  Good vision, no reported pain  ENT:  No sore throat, pain, runny nose, dysphagia  CV:  No pain, palpitations, hypo/hypertension  Resp:  No dyspnea, cough, tachypnea, wheezing  GI:  No pain, No nausea, No vomiting, No diarrhea, No constipation, No weight loss, No fever, No pruritis, No rectal bleeding, No tarry stools, No dysphagia,  :  No pain, bleeding, incontinence, nocturia  Muscle:  No pain, weakness  Neuro:  No weakness, tingling, memory problems  Psych:  No fatigue, insomnia, mood problems, depression  Endocrine:  No polyuria, polydipsia, cold/heat intolerance  Heme:  No petechiae, ecchymosis, easy bruisability  Skin:  No rash, tattoos, scars, edema      PHYSICAL EXAM:   Vital Signs:  Vital Signs Last 24 Hrs  T(C): 36.9 (02 Jul 2019 07:20), Max: 37.7 (01 Jul 2019 18:00)  T(F): 98.4 (02 Jul 2019 07:20), Max: 99.9 (01 Jul 2019 20:00)  HR: 80 (02 Jul 2019 14:00) (75 - 92)  BP: 156/76 (02 Jul 2019 14:00) (125/52 - 177/65)  BP(mean): 99 (02 Jul 2019 14:00) (76 - 103)  RR: 18 (02 Jul 2019 14:00) (12 - 26)  SpO2: 99% (02 Jul 2019 14:00) (95% - 100%)  Daily     Daily     GENERAL:  Appears stated age, well-groomed, well-nourished, no distress  HEENT:  NC/AT,  conjunctivae clear and pink, no thyromegaly, nodules, adenopathy, no JVD, sclera -anicteric, + NGT  CHEST:  Full & symmetric excursion, no increased effort, breath sounds clear  HEART:  Regular rhythm, S1, S2, no murmur/rub/S3/S4, no abdominal bruit, no edema  ABDOMEN:  Soft, non-tender, non-distended, normoactive bowel sounds,  no masses ,no hepato-splenomegaly, no signs of chronic liver disease  EXTEREMITIES:  no cyanosis,clubbing or edema  SKIN:  No rash/erythema/ecchymoses/petechiae/wounds/abscess/warm/dry  NEURO: opens to voice and light touch, drowsy but easily arousable, eyelid opening apraxia, oriented to name, place and year, fluent speech    LABS:                        13.2   8.5   )-----------( 243      ( 02 Jul 2019 06:26 )             38.8     07-02    142  |  105  |  16  ----------------------------<  176<H>  4.0   |  26  |  1.06    Ca    8.9      02 Jul 2019 06:26                Imaging:

## 2019-07-03 LAB
ANION GAP SERPL CALC-SCNC: 17 MMOL/L — SIGNIFICANT CHANGE UP (ref 5–17)
BUN SERPL-MCNC: 16 MG/DL — SIGNIFICANT CHANGE UP (ref 7–23)
CALCIUM SERPL-MCNC: 9.4 MG/DL — SIGNIFICANT CHANGE UP (ref 8.4–10.5)
CHLORIDE SERPL-SCNC: 104 MMOL/L — SIGNIFICANT CHANGE UP (ref 96–108)
CO2 SERPL-SCNC: 19 MMOL/L — LOW (ref 22–31)
CREAT SERPL-MCNC: 0.88 MG/DL — SIGNIFICANT CHANGE UP (ref 0.5–1.3)
GLUCOSE BLDC GLUCOMTR-MCNC: 144 MG/DL — HIGH (ref 70–99)
GLUCOSE BLDC GLUCOMTR-MCNC: 211 MG/DL — HIGH (ref 70–99)
GLUCOSE BLDC GLUCOMTR-MCNC: 225 MG/DL — HIGH (ref 70–99)
GLUCOSE BLDC GLUCOMTR-MCNC: 226 MG/DL — HIGH (ref 70–99)
GLUCOSE SERPL-MCNC: 167 MG/DL — HIGH (ref 70–99)
HCT VFR BLD CALC: 41.7 % — SIGNIFICANT CHANGE UP (ref 34.5–45)
HGB BLD-MCNC: 13.8 G/DL — SIGNIFICANT CHANGE UP (ref 11.5–15.5)
MCHC RBC-ENTMCNC: 28.9 PG — SIGNIFICANT CHANGE UP (ref 27–34)
MCHC RBC-ENTMCNC: 33 GM/DL — SIGNIFICANT CHANGE UP (ref 32–36)
MCV RBC AUTO: 87.6 FL — SIGNIFICANT CHANGE UP (ref 80–100)
PLATELET # BLD AUTO: 175 K/UL — SIGNIFICANT CHANGE UP (ref 150–400)
POTASSIUM SERPL-MCNC: 4.9 MMOL/L — SIGNIFICANT CHANGE UP (ref 3.5–5.3)
POTASSIUM SERPL-SCNC: 4.9 MMOL/L — SIGNIFICANT CHANGE UP (ref 3.5–5.3)
RBC # BLD: 4.76 M/UL — SIGNIFICANT CHANGE UP (ref 3.8–5.2)
RBC # FLD: 13 % — SIGNIFICANT CHANGE UP (ref 10.3–14.5)
SODIUM SERPL-SCNC: 140 MMOL/L — SIGNIFICANT CHANGE UP (ref 135–145)
WBC # BLD: 6.8 K/UL — SIGNIFICANT CHANGE UP (ref 3.8–10.5)
WBC # FLD AUTO: 6.8 K/UL — SIGNIFICANT CHANGE UP (ref 3.8–10.5)

## 2019-07-03 PROCEDURE — 99233 SBSQ HOSP IP/OBS HIGH 50: CPT

## 2019-07-03 RX ORDER — HYDRALAZINE HCL 50 MG
25 TABLET ORAL THREE TIMES A DAY
Refills: 0 | Status: DISCONTINUED | OUTPATIENT
Start: 2019-07-03 | End: 2019-07-03

## 2019-07-03 RX ORDER — ASPIRIN/CALCIUM CARB/MAGNESIUM 324 MG
81 TABLET ORAL DAILY
Refills: 0 | Status: DISCONTINUED | OUTPATIENT
Start: 2019-07-03 | End: 2019-07-22

## 2019-07-03 RX ORDER — ATORVASTATIN CALCIUM 80 MG/1
80 TABLET, FILM COATED ORAL AT BEDTIME
Refills: 0 | Status: DISCONTINUED | OUTPATIENT
Start: 2019-07-03 | End: 2019-08-15

## 2019-07-03 RX ORDER — ACETAMINOPHEN 500 MG
650 TABLET ORAL EVERY 6 HOURS
Refills: 0 | Status: DISCONTINUED | OUTPATIENT
Start: 2019-07-03 | End: 2019-07-27

## 2019-07-03 RX ORDER — CLOPIDOGREL BISULFATE 75 MG/1
75 TABLET, FILM COATED ORAL DAILY
Refills: 0 | Status: DISCONTINUED | OUTPATIENT
Start: 2019-07-03 | End: 2019-07-04

## 2019-07-03 RX ORDER — LEVETIRACETAM 250 MG/1
500 TABLET, FILM COATED ORAL
Refills: 0 | Status: DISCONTINUED | OUTPATIENT
Start: 2019-07-03 | End: 2019-08-15

## 2019-07-03 RX ORDER — ASPIRIN/CALCIUM CARB/MAGNESIUM 324 MG
81 TABLET ORAL DAILY
Refills: 0 | Status: DISCONTINUED | OUTPATIENT
Start: 2019-07-03 | End: 2019-07-03

## 2019-07-03 RX ORDER — INSULIN GLARGINE 100 [IU]/ML
15 INJECTION, SOLUTION SUBCUTANEOUS ONCE
Refills: 0 | Status: COMPLETED | OUTPATIENT
Start: 2019-07-03 | End: 2019-07-03

## 2019-07-03 RX ADMIN — Medication 4: at 12:13

## 2019-07-03 RX ADMIN — ATORVASTATIN CALCIUM 80 MILLIGRAM(S): 80 TABLET, FILM COATED ORAL at 22:18

## 2019-07-03 RX ADMIN — Medication 650 MILLIGRAM(S): at 18:44

## 2019-07-03 RX ADMIN — LEVETIRACETAM 500 MILLIGRAM(S): 250 TABLET, FILM COATED ORAL at 18:43

## 2019-07-03 RX ADMIN — Medication 4: at 23:14

## 2019-07-03 RX ADMIN — LEVETIRACETAM 400 MILLIGRAM(S): 250 TABLET, FILM COATED ORAL at 06:17

## 2019-07-03 RX ADMIN — ENOXAPARIN SODIUM 40 MILLIGRAM(S): 100 INJECTION SUBCUTANEOUS at 18:46

## 2019-07-03 RX ADMIN — Medication 650 MILLIGRAM(S): at 19:15

## 2019-07-03 RX ADMIN — Medication 4: at 18:45

## 2019-07-03 RX ADMIN — Medication 81 MILLIGRAM(S): at 18:43

## 2019-07-03 RX ADMIN — INSULIN GLARGINE 15 UNIT(S): 100 INJECTION, SOLUTION SUBCUTANEOUS at 19:10

## 2019-07-03 RX ADMIN — CLOPIDOGREL BISULFATE 75 MILLIGRAM(S): 75 TABLET, FILM COATED ORAL at 18:43

## 2019-07-03 NOTE — CHART NOTE - NSCHARTNOTEFT_GEN_A_CORE
Contact Note    Adm for CVA. St recommendation 7/2 for NPO. Plan for PEG today. , 186, 223, 232, endo following w/ NPH and humalog correction recommendations. When EN resumed, recommend Glucerna goal 1.2 60cc/hr x 24 hrs provides 1728 kcals, 86 gm protein, 1159cc free water  meets  29 Kcal/Kg, 1.4Gm/kg IBW 59 kg. Will f/u on EN tolerance.

## 2019-07-03 NOTE — PROGRESS NOTE ADULT - ASSESSMENT
54 year old woman with multiple vascular risk factors including age, HTN, DM II and HPLD was initially evaluated at OSH for acute onset of left-sided weakness through telestroke. She was treated with IV tPA and was transferred to [facility] for further management. CTA head and neck on admission showed critical stenosis versus occlusion of right supraclinoid ICA and severe to critical stenosis of right proximal ICA. MRI brain during hospitalization showed acute infarcts in the right MCA and ENRIQUE distribution with minimal hemorrhagic transformation (HI-1). Her hospital course was complicated by episodes concerning for seizure-like activity on admission, treated with AEDs.      Impression:  Cerebral embolism with cerebral infarction. Right MCA and ENRIQUE distribution stroke - likely mechanism being large vessel disease i.e. symptomatic intracranial vs. extracranial atherosclerosis leading to artery to artery embolism, favoring the former; less likely to be due to an embolic stroke from undetermined source  Likely symptomatic seizures in the setting of an acute stroke    NEURO: Neurologically Continue close monitoring for neurologic deterioration in setting of cerebral edema with mass effect and brain compression, Maintain permissive HTN up to 180/105 mmHg in the setting of R ICA stenoses and mild hemorrhagic transformation, continue ASA/Plavix and high intensity statin for secondary stroke prevention in the setting of likely atheroembolic etiology of her stroke, Plan for possible carotid stenting vs CEA based on results of MRA H/N and carotid duplex (to better quantify the degree of extracranial/intracranial atherosclerotic stenose) , Neurosurgery eval appreciated. Continue with Keppra 500 mg to twice daily for seizure prophylaxis- duration of AEDs is expected to be 3-6 months based on clinical course and MRI/EEG evaluation at that time, VEEG: Structural or functional abnormality in the right hemisphere, No epileptiform pattern or seizure seen, MRI Brain w/o results as above, CTA limited by body habitus. Physical therapy/OT recommended Acute Rehab.     ANTITHROMBOTIC THERAPY: Dual antiplatelet therapy with aspirin clopidogrel for aggressive secondary stroke prevention in the setting of symptomatic intracranial atherosclerosis for 3 months followed by aspirin alone there after, check P2Y12 in 7 days (7/6/19) to ensure optimal platelet inhibition. Indications for carotid revascularization with CEA versus ASHLEY to be considered based on results of MRA head and neck and CUS. Would consider conventional angiogram in case of equivocal results. Timing of carotid revascularization is expected to be in about 10-14 days from the stroke considering the extent/large size of the infarct - to be further discussed with NSGY team     PULMONARY: CXR (06/29/19)  Clear lungs. Protecting airway, saturating well     CARDIOVASCULAR: check TTE with bubble study, cardiac monitoring no events, Dr Luz (cardiology) eval appreciated, will obtain cardiac clearance for Right CEA vs carotid stent. Gradual normotension in the setting of carotid stenosis careful initiation of home meds and will monitor for heart failure signs since patient endorses hx of chronic CHF.                             SBP goal: Permissive HTN up to 180/105 mmHg in the setting of severe intracranial and extracranial atherosclerosis stenoses and mild hemorrhagic transformation     GASTROINTESTINAL: Dysphagia screen failed. Previously had NGT in place, tolerating diet, S/S eval recommended NPO, d/w family re: alternate means of nutrition , risks/benefits/alternatives. Wish to proceed with PEG at this time, plan for PEG on 7/3     Diet: NPO after midnight     RENAL: BUN/Cr without acute change, maintain adequate hydration, good urine output, urine toxicology: Negative      Na Goal: Greater than 135     Camarena: N    HEMATOLOGY: H/H without acute change, Platelets 175, LE doppler ordered to r/o DVT     DVT ppx:  LMWH    ID: afebrile, no leukocytosis     OTHER: HgbA1C 10.4, continue Humalog sliding scale and DM meds as per endocrine recommendations, RUE: soft wrist restrained applied to prevent pt form pulling NGT- d/c after PEG placed.     DISPOSITION: Acute Rehab once tube feedings at goal.     CORE MEASURES:        Admission NIHSS: 13     TPA:  YES       LDL/HDL: 131/49     Depression Screen: p     Statin Therapy: Y     Dysphagia Screen:  FAIL     Smoking  NO      Afib  NO     Stroke Education  YES    Obtain screening ultrasound in patients who meet 1 or more of the following criteria as patient is high risk for DVT/PE upon admission:   [] History of DVT/PE  []Hypercoagulable states (Factor V Leiden, Cancer, OCP, etc. )  [X]Prolonged immobility (hemiplegia/hemiparesis/post operative or any other extended immobilization)   [] Transferred from outside facility (Rehab or Long term care)  [] Age </= to 50 54 year old woman with multiple vascular risk factors including age, HTN, DM II and HPLD was initially evaluated at OSH for acute onset of left-sided weakness through telestroke. She was treated with IV tPA and was transferred to [facility] for further management. CTA head and neck on admission showed critical stenosis versus occlusion of right supraclinoid ICA and severe to critical stenosis of right proximal ICA. MRI brain during hospitalization showed acute infarcts in the right MCA and ENRIQUE distribution with minimal hemorrhagic transformation (HI-1). Her hospital course was complicated by episodes concerning for seizure-like activity on admission, treated with AEDs.      Impression:  Cerebral embolism with cerebral infarction. Right MCA and ENRIQUE distribution stroke - likely mechanism being large vessel disease i.e. symptomatic intracranial vs. extracranial atherosclerosis leading to artery to artery embolism, favoring the former; less likely to be due to an embolic stroke from undetermined source  Likely symptomatic seizures in the setting of an acute stroke    NEURO: Neurologically with improvement in neglect since admission, Continue close monitoring for neurologic deterioration in setting of cerebral edema with mass effect and brain compression, Maintain permissive HTN up to 180/105 mmHg in the setting of R ICA stenoses and mild hemorrhagic transformation, continue ASA/Plavix and high intensity statin for secondary stroke prevention in the setting of likely atheroembolic etiology of her stroke, Plan for possible carotid stenting vs CEA based on results of carotid duplex (to better quantify the degree of extracranial/intracranial atherosclerotic stenose) , Neurosurgery eval appreciated. Continue with Keppra 500 mg to twice daily for seizure prophylaxis- duration of AEDs is expected to be 3-6 months based on clinical course and MRI/EEG evaluation at that time, VEEG: Structural or functional abnormality in the right hemisphere, No epileptiform pattern or seizure seen, MRI Brain w/o results as above, CTA limited by body habitus. Physical therapy/OT recommended Acute Rehab. Undocumented and uninsured- plan to optimize therapy while in hospital.      ANTITHROMBOTIC THERAPY: Dual antiplatelet therapy with aspirin clopidogrel for aggressive secondary stroke prevention in the setting of symptomatic intracranial atherosclerosis for 3 months followed by aspirin alone there after, check P2Y12 in 7 days (7/6/19) to ensure optimal platelet inhibition. Indications for carotid revascularization with CEA versus ASHLEY to be considered based on results of MRA head and neck and CUS. Would consider conventional angiogram in case of equivocal results. Timing of carotid revascularization is expected to be in about 10-14 days from the stroke considering the extent/large size of the infarct - to be further discussed with NSGY team     PULMONARY: CXR (06/29/19)  Clear lungs. Protecting airway, saturating well     CARDIOVASCULAR: check TTE with bubble study, cardiac monitoring no events, Dr Luz (cardiology) eval appreciated, will obtain cardiac clearance for Right CEA vs carotid stent. Gradual normotension in the setting of carotid stenosis careful initiation of home meds and will monitor for heart failure signs since patient endorses hx of chronic CHF.                             SBP goal: Permissive HTN up to 180/105 mmHg in the setting of severe intracranial and extracranial atherosclerosis stenoses and mild hemorrhagic transformation     GASTROINTESTINAL: Dysphagia screen failed. Previously had NGT in place, tolerating diet, S/S eval recommended NPO, d/w family re: alternate means of nutrition , risks/benefits/alternatives. Wish to proceed with PEG at this time, plan for PEG on 7/3     Diet: NPO after midnight     RENAL: BUN/Cr without acute change, maintain adequate hydration, good urine output, urine toxicology: Negative      Na Goal: Greater than 135     Camarena: N    HEMATOLOGY: H/H without acute change, Platelets 175, LE doppler ordered to r/o DVT     DVT ppx:  LMWH    ID: afebrile, no leukocytosis     OTHER: HgbA1C 10.4, continue Humalog sliding scale and DM meds as per endocrine recommendations, RUE: soft wrist restrained applied to prevent pt form pulling NGT- d/c after PEG placed.     DISPOSITION: Acute Rehab once tube feedings at goal.     CORE MEASURES:        Admission NIHSS: 13     TPA:  YES       LDL/HDL: 131/49     Depression Screen: p     Statin Therapy: Y     Dysphagia Screen:  FAIL     Smoking  NO      Afib  NO     Stroke Education  YES    Obtain screening ultrasound in patients who meet 1 or more of the following criteria as patient is high risk for DVT/PE upon admission:   [] History of DVT/PE  []Hypercoagulable states (Factor V Leiden, Cancer, OCP, etc. )  [X]Prolonged immobility (hemiplegia/hemiparesis/post operative or any other extended immobilization)   [] Transferred from outside facility (Rehab or Long term care)  [] Age </= to 50 54 year old woman with multiple vascular risk factors including age, HTN, DM II and HPLD was initially evaluated at OSH for acute onset of left-sided weakness through telestroke. She was treated with IV tPA and was transferred to [facility] for further management. CTA head and neck on admission showed critical stenosis versus occlusion of right supraclinoid ICA and severe to critical stenosis of right proximal ICA. MRI brain during hospitalization showed acute infarcts in the right MCA and ENRIQUE distribution with minimal hemorrhagic transformation (HI-1). Her hospital course was complicated by episodes concerning for seizure-like activity on admission, treated with AEDs. MRA head and neck showed severe to critical stenosis of right supraclinoid ICA and moderate (about 50%) stenosis of right proximal ICA.      Impression:  Cerebral embolism with cerebral infarction. Right MCA and ENRIQUE distribution stroke - likely mechanism being large vessel disease i.e. symptomatic intracranial vs. extracranial atherosclerosis leading to artery to artery embolism, favoring the former; less likely to be due to an embolic stroke from undetermined source  Likely symptomatic seizures in the setting of an acute stroke    NEURO: Neurologically with improvement in neglect since admission, Continue close monitoring for neurologic deterioration in setting of cerebral edema with mass effect and brain compression, Maintain permissive HTN up to 180/105 mmHg in the setting of R ICA stenoses and mild hemorrhagic transformation followed by very gradual normotension, continue ASA/Plavix and high intensity statin for secondary stroke prevention in the setting of likely atheroembolic etiology of her stroke, Plan for possible carotid stenting vs CEA based on results of carotid duplex (to better quantify the degree of extracranial/intracranial atherosclerotic stenose) , Neurosurgery eval appreciated. Continue with Keppra 500 mg to twice daily for seizure prophylaxis - duration of AEDs is expected to be 3-6 months based on clinical course and MRI/EEG evaluation at that time, VEEG: Structural or functional abnormality in the right hemisphere, No epileptiform pattern or seizure seen, MRI Brain w/o results as above, CTA limited by body habitus. Physical therapy/OT recommended Acute Rehab. Undocumented and uninsured- plan to optimize therapy while in hospital.      ANTITHROMBOTIC THERAPY: Dual antiplatelet therapy with aspirin clopidogrel for aggressive secondary stroke prevention in the setting of symptomatic intracranial atherosclerosis for 3 months followed by aspirin alone there after, check P2Y12 in 7 days (7/6/19) to ensure optimal platelet inhibition. Indications for carotid revascularization with CEA versus ASHLEY to be considered based on results of CUS. Would consider conventional angiogram in case of equivocal results. Timing of carotid revascularization is expected to be in about 10-14 days from the stroke considering the extent/large size of the infarct - to be further discussed with NSGY team     PULMONARY: CXR (06/29/19)  Clear lungs. Protecting airway, saturating well     CARDIOVASCULAR: Check TTE with bubble study, cardiac monitoring no events, Dr Luz (cardiology) eval appreciated, will obtain cardiac clearance for Right CEA vs carotid stent. Gradual normotension in the setting of carotid stenosis careful initiation of home meds and will monitor for heart failure signs since patient endorses hx of chronic CHF.                             SBP goal: Permissive HTN up to 180/105 mmHg in the setting of severe intracranial and extracranial atherosclerosis stenoses and mild hemorrhagic transformation     GASTROINTESTINAL: Dysphagia screen failed. Previously had NGT in place, tolerating diet, S/S eval recommended NPO, d/w family re: alternate means of nutrition , risks/benefits/alternatives. Wish to proceed with PEG at this time, plan for PEG on 7/3     Diet: NPO after midnight     RENAL: BUN/Cr without acute change, maintain adequate hydration, good urine output, urine toxicology: Negative      Na Goal: Greater than 135     Camarena: N    HEMATOLOGY: H/H without acute change, Platelets 175, LE doppler ordered to r/o DVT     DVT ppx:  LMWH    ID: afebrile, no leukocytosis     OTHER: HgbA1C 10.4, continue Humalog sliding scale and DM meds as per endocrine recommendations, RUE: soft wrist restrained applied to prevent pt form pulling NGT- d/c after PEG placed.     DISPOSITION: Acute Rehab once tube feedings at goal.     CORE MEASURES:        Admission NIHSS: 13     TPA:  YES       LDL/HDL: 131/49     Depression Screen: p     Statin Therapy: Y     Dysphagia Screen:  FAIL     Smoking  NO      Afib  NO     Stroke Education  YES    Obtain screening ultrasound in patients who meet 1 or more of the following criteria as patient is high risk for DVT/PE upon admission:   [] History of DVT/PE  []Hypercoagulable states (Factor V Leiden, Cancer, OCP, etc. )  [X]Prolonged immobility (hemiplegia/hemiparesis/post operative or any other extended immobilization)   [] Transferred from outside facility (Rehab or Long term care)  [] Age </= to 50

## 2019-07-03 NOTE — OCCUPATIONAL THERAPY INITIAL EVALUATION ADULT - HOME MANAGEMENT SKILLS, PREVIOUS LEVEL OF FUNCTION, OT EVAL
Chief Complaint   Patient presents with   • Diarrhea       HISTORY OF PRESENT ILLNESS:  Patient reports loose stools since three days ago. He has had up to 10 stools per day. Stool is described as loose, runny, watery and yellow. Patient reports no fever. Denies wilderness camping, foreign travel, antibiotic use in past 2 months. No known food contaminants or undercooked meats. No known exposures to other people with diarrhea.    REVIEW OF SYSTEMS:   tiredness, malaise  diarrhea    Exam:   Visit Vitals  /60   Pulse 100   Temp 97.7 °F (36.5 °C) (Oral)   Resp 16   SpO2 96%     Allergies as of 07/03/2019   • (No Known Allergies)     No current outpatient medications on file.     No current facility-administered medications for this visit.      Healthy, alert, in no distress, cooperative, appears tired  regular rate and rhythm  lungs are clear to auscultation   Positive bowel sounds x 4 quadrants yet hypoactive in nature, soft, nontender with no hepatosplenomegaly    Assessment/Plan:  (A08.4) Viral gastroenteritis  (primary encounter diagnosis)  Comment: Rest today and tomorrow with pushing fluids and BRAT diet.    Plan:    No orders of the defined types were placed in this encounter.    Patient instructions on diarrhea given.   Increase fluids, reintroduce normal diet slowly.   Follow-up with primary care provider if symptoms worsen or do not improve in one week.    CHRISTEL Valero    
independent

## 2019-07-03 NOTE — PROGRESS NOTE ADULT - SUBJECTIVE AND OBJECTIVE BOX
Pre-Endoscopy Evaluation      Referring Physician: dr. brooks rodriguez                                  Procedure:  upper gastrointestinal endoscopy/peg placement    Indication for Procedure: dysphagia    Pertinent History: 54 year old female with acute CVA. GI consulted for dysphagia.        Sedation by Anesthesia [x]    PAST MEDICAL & SURGICAL HISTORY:  Diabetes mellitus type 2 in obese  Hyperlipidemia, unspecified hyperlipidemia type  Hypertension      PMH of Gastroparesis [ ]  Gastric Surgery [ ]  Gastric Outlet Obstruction [ ]    Allergies:    No Known Allergies    Intolerances:    Latex allergy: [ ] yes [x] no    Medications:MEDICATIONS  (STANDING):  aspirin  chewable 81 milliGRAM(s) Oral daily  atorvastatin 80 milliGRAM(s) Oral at bedtime  clopidogrel Tablet 75 milliGRAM(s) Oral daily  dextrose 5%. 1000 milliLiter(s) (50 mL/Hr) IV Continuous <Continuous>  dextrose 50% Injectable 12.5 Gram(s) IV Push once  dextrose 50% Injectable 25 Gram(s) IV Push once  dextrose 50% Injectable 25 Gram(s) IV Push once  enoxaparin Injectable 40 milliGRAM(s) SubCutaneous daily  insulin lispro (HumaLOG) corrective regimen sliding scale   SubCutaneous every 6 hours  insulin NPH human recombinant 21 Unit(s) SubCutaneous every 6 hours  levETIRAcetam  IVPB 500 milliGRAM(s) IV Intermittent every 12 hours    MEDICATIONS  (PRN):  acetaminophen    Suspension .. 650 milliGRAM(s) Oral every 6 hours PRN Temp greater or equal to 38C (100.4F)  dextrose 40% Gel 15 Gram(s) Oral once PRN Blood Glucose LESS THAN 70 milliGRAM(s)/deciliter  glucagon  Injectable 1 milliGRAM(s) IntraMuscular once PRN Glucose LESS THAN 70 milligrams/deciliter  LORazepam   Injectable 2 milliGRAM(s) IV Push once PRN Seizure Activity  nystatin Powder 1 Application(s) Topical two times a day PRN dermatitis      Smoking: [ ] yes  [x] no    AICD/PPM: [ ] yes   [x] no    Pertinent lab data:                        13.8   6.8   )-----------( 175      ( 03 Jul 2019 06:22 )             41.7     07-03    140  |  104  |  16  ----------------------------<  167<H>  4.9   |  19<L>  |  0.88    Ca    9.4      03 Jul 2019 06:22    CAPILLARY BLOOD GLUCOSE  POCT Blood Glucose.: 225 mg/dL (03 Jul 2019 12:11)      Physical Examination:     Daily   Vital Signs Last 24 Hrs  T(C): 36.9 (03 Jul 2019 07:46), Max: 37.5 (02 Jul 2019 17:03)  T(F): 98.5 (03 Jul 2019 07:46), Max: 99.5 (02 Jul 2019 17:03)  HR: 78 (03 Jul 2019 12:00) (74 - 85)  BP: 147/73 (03 Jul 2019 12:00) (124/47 - 176/83)  BP(mean): 94 (03 Jul 2019 12:00) (79 - 105)  RR: 16 (03 Jul 2019 12:00) (13 - 25)  SpO2: 100% (03 Jul 2019 12:00) (95% - 100%)    Drug Dosing Weight    Weight (kg): 136.9 (28 Jun 2019 19:15)      Constitutional: NAD     Neck:  No JVD    Respiratory: CTAB/L    Cardiovascular: S1 and S2    Gastrointestinal: BS+, soft, NT/ND    Extremities: No peripheral edema    Neurological: opens eyes in response to name    : No Camarena    Skin: No rashes    Comments:    ASA Class: I [ ]  II [ ]  III [ ]  IV [x]    The patient is a suitable candidate for the planned procedure unless box checked [ ]  No, explain:

## 2019-07-03 NOTE — PROGRESS NOTE ADULT - SUBJECTIVE AND OBJECTIVE BOX
THE PATIENT WAS SEEN AND EXAMINED BY ME WITH THE HOUSESTAFF AND STROKE TEAM DURING MORNING ROUNDS.     HPI:  Patient is a 54 year old Female w/ PMHx of HTN, HLD, DM transferred from [facility] for evaluation of stroke. Per daughter at bedside, last known normal 2pm on day of admission, patient had sudden onset left sided paralysis, slurred speech, received TPA at at [facility]. NIHSS 13, MRS 1     SUBJECTIVE: No events overnight.  No new neurologic complaints.      ROS: All negative except documented above.     acetaminophen    Suspension .. 650 milliGRAM(s) Oral every 6 hours PRN  aspirin  chewable 81 milliGRAM(s) Oral daily  atorvastatin 80 milliGRAM(s) Oral at bedtime  clopidogrel Tablet 75 milliGRAM(s) Oral daily  dextrose 40% Gel 15 Gram(s) Oral once PRN  dextrose 5%. 1000 milliLiter(s) IV Continuous <Continuous>  dextrose 50% Injectable 12.5 Gram(s) IV Push once  dextrose 50% Injectable 25 Gram(s) IV Push once  dextrose 50% Injectable 25 Gram(s) IV Push once  enoxaparin Injectable 40 milliGRAM(s) SubCutaneous daily  glucagon  Injectable 1 milliGRAM(s) IntraMuscular once PRN  insulin lispro (HumaLOG) corrective regimen sliding scale   SubCutaneous every 6 hours  insulin NPH human recombinant 21 Unit(s) SubCutaneous every 6 hours  levETIRAcetam  IVPB 500 milliGRAM(s) IV Intermittent every 12 hours  LORazepam   Injectable 2 milliGRAM(s) IV Push once PRN  nystatin Powder 1 Application(s) Topical two times a day PRN    PHYSICAL EXAM:   Vital Signs Last 24 Hrs  T(C): 36.9 (03 Jul 2019 07:46), Max: 37.5 (02 Jul 2019 17:03)  T(F): 98.5 (03 Jul 2019 07:46), Max: 99.5 (02 Jul 2019 17:03)  HR: 75 (03 Jul 2019 06:00) (74 - 85)  BP: 150/55 (03 Jul 2019 06:00) (124/47 - 177/65)  BP(mean): 80 (03 Jul 2019 06:00) (79 - 105)  RR: 17 (03 Jul 2019 06:00) (12 - 25)  SpO2: 99% (03 Jul 2019 06:00) (95% - 100%)    General: No acute distress  HEENT: EOM intact, left eye legally blind  Abdomen: Soft, nontender, nondistended   Extremities: No edema    NEUROLOGICAL EXAM:  Mental status: Eyes closed, opens to voice and light touch, drowsy but easily arousable, eyelid opening apraxia, oriented to name, place and year, fluent speech, repetition intact, No asomatognosia or anosognosia  Cranial Nerves: UMN type left facial droop, no nystagmus, moderate to severe dysarthria  Motor exam: RUE /RLE spontaneous movement against gravity without significant noticeable weakness, Left hypotonic hemiplegia   Sensation: Left side appears intact to noxious stimuli, left side neglect to double simultaneous stimulation   Coordination/ Gait: No dysmetria on right side     LABS:                        13.8   6.8   )-----------( 175      ( 03 Jul 2019 06:22 )             41.7    07-03    140  |  104  |  16  ----------------------------<  167<H>  4.9   |  19<L>  |  0.88    Ca    9.4      03 Jul 2019 06:22    Hemoglobin A1C, Whole Blood: 10.4 % (06-29 @ 09:33)    IMAGING: Reviewed by me.     Brain MRI (06/29/19) There is evidence of abnormal stricture diffusion seen involving the right frontal parietal region. Involvement of the right basal ganglia region is seen as well. This is compatible with an area of acute infarct.   Involvement of the right anterior cerebral and middle cerebral artery territories are identified. Scattered areas of abnormal susceptibility are identified which is likely compatible with some hemorrhagic transformation. Localized mass effect is seen consisting of sulcal effacement. No significant shift or herniation is seen.    Head CT (06/30/19) Abnormal areas of low-attenuation involving the right frontal and right parietal cortical subcortical region are again seen. These findings are compatible with areas of old infarcts. Abnormal low-attenuation involving the left alla is also again seen which could be compatible with an age-indeterminate infarct. There is no evidence of abnormal low-attenuation seen involving the high right frontal cortical subcortical region. Involvement of the right basal   ganglia and anterior corona radiata regions as well. This is predominantly seen medially and compatible with an acute right anterior cerebral artery infarct. There is localized mass effect consisting of sulcal effacement. No significant shift or herniation is seen.     CTA Head/Neck (06/28/19) CT ANGIOGRAPHY NECK:  Markedly limited examination. Tandem moderate to severe stenoses are suspected in the right carotid bulb and proximal right internal carotid artery. 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    CT ANGIOGRAPHY BRAIN: Markedly limited examination. Limited evaluation of the intracranial internal carotid arteries. Stenoses or occlusion in the distal cavernous segments and ophthalmic segments of the bilateral internal carotid arteries cannot be excluded. Apparent severe stenosis in the distal supraclinoid right internal carotid artery and right carotid terminus with possible occlusion of the A1 segment of the right anterior cerebral artery.  The right A2 segment fills across the anterior to indicating artery.  There appears to be posterior distal right ENRIQUE vessels within the medial right frontal lobe, raising the possibility of early cerebral infarction.

## 2019-07-03 NOTE — CHART NOTE - NSCHARTNOTEFT_GEN_A_CORE
Non-billable visit:  Unable to see patient-off unit undergoing PEG placement in endo suite. Pt to be NPO for next 24 hours w/tube feeds to resume 7/4. Will order Lantus 15 units for today + Humalog moderate scale q6h while NPO. Endo team to follow and will restart NPH once tube feeds resumed.     pager: [phone]

## 2019-07-04 LAB
ANION GAP SERPL CALC-SCNC: 11 MMOL/L — SIGNIFICANT CHANGE UP (ref 5–17)
BUN SERPL-MCNC: 18 MG/DL — SIGNIFICANT CHANGE UP (ref 7–23)
CALCIUM SERPL-MCNC: 9.3 MG/DL — SIGNIFICANT CHANGE UP (ref 8.4–10.5)
CHLORIDE SERPL-SCNC: 105 MMOL/L — SIGNIFICANT CHANGE UP (ref 96–108)
CO2 SERPL-SCNC: 26 MMOL/L — SIGNIFICANT CHANGE UP (ref 22–31)
CREAT SERPL-MCNC: 0.97 MG/DL — SIGNIFICANT CHANGE UP (ref 0.5–1.3)
GLUCOSE BLDC GLUCOMTR-MCNC: 198 MG/DL — HIGH (ref 70–99)
GLUCOSE BLDC GLUCOMTR-MCNC: 248 MG/DL — HIGH (ref 70–99)
GLUCOSE BLDC GLUCOMTR-MCNC: 249 MG/DL — HIGH (ref 70–99)
GLUCOSE BLDC GLUCOMTR-MCNC: 275 MG/DL — HIGH (ref 70–99)
GLUCOSE SERPL-MCNC: 236 MG/DL — HIGH (ref 70–99)
HCT VFR BLD CALC: 39.7 % — SIGNIFICANT CHANGE UP (ref 34.5–45)
HGB BLD-MCNC: 13.7 G/DL — SIGNIFICANT CHANGE UP (ref 11.5–15.5)
MCHC RBC-ENTMCNC: 29.4 PG — SIGNIFICANT CHANGE UP (ref 27–34)
MCHC RBC-ENTMCNC: 34.4 GM/DL — SIGNIFICANT CHANGE UP (ref 32–36)
MCV RBC AUTO: 85.4 FL — SIGNIFICANT CHANGE UP (ref 80–100)
PLATELET # BLD AUTO: 307 K/UL — SIGNIFICANT CHANGE UP (ref 150–400)
POTASSIUM SERPL-MCNC: 4.4 MMOL/L — SIGNIFICANT CHANGE UP (ref 3.5–5.3)
POTASSIUM SERPL-SCNC: 4.4 MMOL/L — SIGNIFICANT CHANGE UP (ref 3.5–5.3)
RBC # BLD: 4.64 M/UL — SIGNIFICANT CHANGE UP (ref 3.8–5.2)
RBC # FLD: 12.7 % — SIGNIFICANT CHANGE UP (ref 10.3–14.5)
SODIUM SERPL-SCNC: 142 MMOL/L — SIGNIFICANT CHANGE UP (ref 135–145)
WBC # BLD: 10.7 K/UL — HIGH (ref 3.8–10.5)
WBC # FLD AUTO: 10.7 K/UL — HIGH (ref 3.8–10.5)

## 2019-07-04 PROCEDURE — 99232 SBSQ HOSP IP/OBS MODERATE 35: CPT

## 2019-07-04 PROCEDURE — 99233 SBSQ HOSP IP/OBS HIGH 50: CPT

## 2019-07-04 PROCEDURE — 70450 CT HEAD/BRAIN W/O DYE: CPT | Mod: 26

## 2019-07-04 PROCEDURE — 70450 CT HEAD/BRAIN W/O DYE: CPT | Mod: 26,77

## 2019-07-04 RX ORDER — ACETAMINOPHEN 500 MG
650 TABLET ORAL EVERY 6 HOURS
Refills: 0 | Status: DISCONTINUED | OUTPATIENT
Start: 2019-07-04 | End: 2019-08-15

## 2019-07-04 RX ORDER — FLUOXETINE HCL 10 MG
20 CAPSULE ORAL DAILY
Refills: 0 | Status: DISCONTINUED | OUTPATIENT
Start: 2019-07-04 | End: 2019-08-15

## 2019-07-04 RX ORDER — HUMAN INSULIN 100 [IU]/ML
12 INJECTION, SUSPENSION SUBCUTANEOUS EVERY 6 HOURS
Refills: 0 | Status: DISCONTINUED | OUTPATIENT
Start: 2019-07-04 | End: 2019-07-05

## 2019-07-04 RX ADMIN — Medication 20 MILLIGRAM(S): at 11:21

## 2019-07-04 RX ADMIN — Medication 81 MILLIGRAM(S): at 11:13

## 2019-07-04 RX ADMIN — HUMAN INSULIN 12 UNIT(S): 100 INJECTION, SUSPENSION SUBCUTANEOUS at 17:18

## 2019-07-04 RX ADMIN — ENOXAPARIN SODIUM 40 MILLIGRAM(S): 100 INJECTION SUBCUTANEOUS at 11:13

## 2019-07-04 RX ADMIN — Medication 650 MILLIGRAM(S): at 21:39

## 2019-07-04 RX ADMIN — Medication 4: at 17:17

## 2019-07-04 RX ADMIN — Medication 2: at 11:17

## 2019-07-04 RX ADMIN — Medication 6: at 23:40

## 2019-07-04 RX ADMIN — Medication 4: at 05:50

## 2019-07-04 RX ADMIN — Medication 650 MILLIGRAM(S): at 22:09

## 2019-07-04 RX ADMIN — LEVETIRACETAM 500 MILLIGRAM(S): 250 TABLET, FILM COATED ORAL at 05:35

## 2019-07-04 RX ADMIN — ATORVASTATIN CALCIUM 80 MILLIGRAM(S): 80 TABLET, FILM COATED ORAL at 21:38

## 2019-07-04 RX ADMIN — HUMAN INSULIN 12 UNIT(S): 100 INJECTION, SUSPENSION SUBCUTANEOUS at 23:39

## 2019-07-04 RX ADMIN — LEVETIRACETAM 500 MILLIGRAM(S): 250 TABLET, FILM COATED ORAL at 17:18

## 2019-07-04 NOTE — PROGRESS NOTE ADULT - PROBLEM SELECTOR PLAN 1
-test BG Q6h, FS at goal 100-180 at this time  -Restart NPH at 12 units sq q6 as of 6PM today. HOLD NPH if TF are held  -Continue Humalog mod SSI Q6 hours. DO NOT HOLD this if TF are held  -Discontinue NPH when TF are stopped and assess insulin requirements with PO intake on humalog low ssi AC and HS. Our service will follow patient    Discharge plan: basal/bolus with TF  Roxanne Montgomery MD  Nights and Weekends: [phone]

## 2019-07-04 NOTE — PROGRESS NOTE ADULT - SUBJECTIVE AND OBJECTIVE BOX
Chief Complaint/Follow-up on: T2DM    Subjective: Patient sleeping. Daughter at the bedside, notes that her mother only will speak with her and she had been up talking to her around 10am. PEG was placed yesterday and her TF are being advanced every 2 hours. She as at 30cc/hour when I saw her and her goal is 60cc/hour.    MEDICATIONS  (STANDING):  aspirin  chewable 81 milliGRAM(s) Oral daily  atorvastatin 80 milliGRAM(s) Oral at bedtime  dextrose 5%. 1000 milliLiter(s) (50 mL/Hr) IV Continuous <Continuous>  dextrose 50% Injectable 12.5 Gram(s) IV Push once  dextrose 50% Injectable 25 Gram(s) IV Push once  dextrose 50% Injectable 25 Gram(s) IV Push once  enoxaparin Injectable 40 milliGRAM(s) SubCutaneous daily  FLUoxetine Solution 20 milliGRAM(s) Oral daily  insulin lispro (HumaLOG) corrective regimen sliding scale   SubCutaneous every 6 hours  levETIRAcetam  Solution 500 milliGRAM(s) Oral two times a day    MEDICATIONS  (PRN):  acetaminophen    Suspension .. 650 milliGRAM(s) Oral every 6 hours PRN Temp greater or equal to 38C (100.4F)  dextrose 40% Gel 15 Gram(s) Oral once PRN Blood Glucose LESS THAN 70 milliGRAM(s)/deciliter  glucagon  Injectable 1 milliGRAM(s) IntraMuscular once PRN Glucose LESS THAN 70 milligrams/deciliter  LORazepam   Injectable 2 milliGRAM(s) IV Push once PRN Seizure Activity  nystatin Powder 1 Application(s) Topical two times a day PRN dermatitis      PHYSICAL EXAM:  VITALS: T(C): 36.6 (07-04-19 @ 10:00)  T(F): 97.9 (07-04-19 @ 10:00), Max: 99.3 (07-03-19 @ 20:00)  HR: 78 (07-04-19 @ 12:00) (75 - 85)  BP: 132/57 (07-04-19 @ 12:00) (132/57 - 166/65)  RR:  (17 - 23)  SpO2:  (97% - 99%)  Wt(kg): --  GENERAL: NAD, well-groomed, well-developed  HEENT:  Atraumatic, Normocephalic, moist mucous membranes, hirsute - has facial hair on chin and upper lip  RESPIRATORY: Clear to auscultation bilaterally; No rales, rhonchi, wheezing, or rubs  CARDIOVASCULAR: Regular rate and rhythm; No murmurs  GI: Soft, nontender, non distended, normal bowel sounds, +PEG in place covered by binder      POCT Blood Glucose.: 198 mg/dL (07-04-19 @ 11:15)  POCT Blood Glucose.: 248 mg/dL (07-04-19 @ 05:45)  POCT Blood Glucose.: 226 mg/dL (07-03-19 @ 23:03)  POCT Blood Glucose.: 211 mg/dL (07-03-19 @ 18:22)  POCT Blood Glucose.: 225 mg/dL (07-03-19 @ 12:11)  POCT Blood Glucose.: 144 mg/dL (07-03-19 @ 05:27)  POCT Blood Glucose.: 186 mg/dL (07-02-19 @ 23:28)  POCT Blood Glucose.: 223 mg/dL (07-02-19 @ 17:33)  POCT Blood Glucose.: 232 mg/dL (07-02-19 @ 11:51)  POCT Blood Glucose.: 183 mg/dL (07-02-19 @ 05:24)  POCT Blood Glucose.: 152 mg/dL (07-01-19 @ 23:34)  POCT Blood Glucose.: 220 mg/dL (07-01-19 @ 17:12)    07-04    142  |  105  |  18  ----------------------------<  236<H>  4.4   |  26  |  0.97    EGFR if : 77  EGFR if non : 66    Ca    9.3      07-04          Hemoglobin A1C, Whole Blood: 10.4 % <H> [4.0 - 5.6] (06-29-19 @ 09:33)

## 2019-07-04 NOTE — PROGRESS NOTE ADULT - SUBJECTIVE AND OBJECTIVE BOX
INTERVAL HPI/OVERNIGHT EVENTS:    s/p EGD with no gross lesions in esophagus.  - Gastritis.  - No gross lesions in duodenum.  - An endoscopically removable PEG placement was successfully completed.    Pt seen and examined with daughter at bedside. Tolerating feeds, no leakage/drainage from peg site    MEDICATIONS  (STANDING):  aspirin  chewable 81 milliGRAM(s) Oral daily  atorvastatin 80 milliGRAM(s) Oral at bedtime  dextrose 5%. 1000 milliLiter(s) (50 mL/Hr) IV Continuous <Continuous>  dextrose 50% Injectable 12.5 Gram(s) IV Push once  dextrose 50% Injectable 25 Gram(s) IV Push once  dextrose 50% Injectable 25 Gram(s) IV Push once  enoxaparin Injectable 40 milliGRAM(s) SubCutaneous daily  FLUoxetine Solution 20 milliGRAM(s) Oral daily  insulin lispro (HumaLOG) corrective regimen sliding scale   SubCutaneous every 6 hours  levETIRAcetam  Solution 500 milliGRAM(s) Oral two times a day    MEDICATIONS  (PRN):  acetaminophen    Suspension .. 650 milliGRAM(s) Oral every 6 hours PRN Temp greater or equal to 38C (100.4F)  dextrose 40% Gel 15 Gram(s) Oral once PRN Blood Glucose LESS THAN 70 milliGRAM(s)/deciliter  glucagon  Injectable 1 milliGRAM(s) IntraMuscular once PRN Glucose LESS THAN 70 milligrams/deciliter  LORazepam   Injectable 2 milliGRAM(s) IV Push once PRN Seizure Activity  nystatin Powder 1 Application(s) Topical two times a day PRN dermatitis      Allergies    No Known Allergies    Intolerances        Review of Systems:    General:  No wt loss, fevers, chills, night sweats,fatigue,   Eyes:  Good vision, no reported pain  ENT:  No sore throat, pain, runny nose, dysphagia  CV:  No pain, palpitations, hypo/hypertension  Resp:  No dyspnea, cough, tachypnea, wheezing  GI:  No pain, No nausea, No vomiting, No diarrhea, No constipation, No weight loss, No fever, No pruritis, No rectal bleeding, No melena, No dysphagia  :  No pain, bleeding, incontinence, nocturia  Muscle:  No pain, weakness  Neuro:  No weakness, tingling, memory problems  Psych:  No fatigue, insomnia, mood problems, depression  Endocrine:  No polyuria, polydypsia, cold/heat intolerance  Heme:  No petechiae, ecchymosis, easy bruisability  Skin:  No rash, tattoos, scars, edema      Vital Signs Last 24 Hrs  T(C): 36.6 (04 Jul 2019 10:00), Max: 37.4 (03 Jul 2019 20:00)  T(F): 97.9 (04 Jul 2019 10:00), Max: 99.3 (03 Jul 2019 20:00)  HR: 80 (04 Jul 2019 10:00) (75 - 85)  BP: 166/65 (04 Jul 2019 10:00) (137/61 - 166/65)  BP(mean): 94 (04 Jul 2019 10:00) (81 - 106)  RR: 18 (04 Jul 2019 10:00) (16 - 23)  SpO2: 99% (04 Jul 2019 10:00) (97% - 100%)    PHYSICAL EXAM:    GENERAL:  Appears stated age, well-groomed, well-nourished, no distress  HEENT:  NC/AT,  conjunctivae clear and pink, no thyromegaly, nodules, adenopathy, no JVD, sclera -anicteric  CHEST:  Full & symmetric excursion, no increased effort, breath sounds clear  HEART:  Regular rhythm, S1, S2, no murmur/rub/S3/S4, no abdominal bruit, no edema  ABDOMEN:  Soft, non-tender, non-distended, normoactive bowel sounds,  + PEG c/d/i  EXTEREMITIES:  no cyanosis,clubbing or edema  SKIN:  No rash/erythema/ecchymoses/petechiae/wounds/abscess/warm/dry  NEURO: opens to voice and light touch, drowsy but easily arousable, eyelid opening apraxia, oriented to name, place and year, fluent speech      LABS:                        13.7   10.7  )-----------( 307      ( 04 Jul 2019 04:24 )             39.7     07-04    142  |  105  |  18  ----------------------------<  236<H>  4.4   |  26  |  0.97    Ca    9.3      04 Jul 2019 04:24            RADIOLOGY & ADDITIONAL TESTS:

## 2019-07-04 NOTE — PROGRESS NOTE ADULT - ASSESSMENT
54 year old woman with multiple vascular risk factors including age, HTN, DM II and HPLD was initially evaluated at OSH for acute onset of left-sided weakness through telestroke. She was treated with IV tPA and was transferred to [facility] for further management. CTA head and neck on admission showed critical stenosis versus occlusion of right supraclinoid ICA and severe to critical stenosis of right proximal ICA. MRI brain during hospitalization showed acute infarcts in the right MCA and ENRIQUE distribution with minimal hemorrhagic transformation (HI-1). Her hospital course was complicated by episodes concerning for seizure-like activity on admission, treated with AEDs. MRA head and neck showed severe to critical stenosis of right supraclinoid ICA and moderate (about 50%) stenosis of right proximal ICA.      Impression:  Cerebral embolism with cerebral infarction. Right MCA and ENRIQUE distribution stroke - likely mechanism being large vessel disease i.e. symptomatic intracranial vs. extracranial atherosclerosis leading to artery to artery embolism, favoring the former; less likely to be due to an embolic stroke from undetermined source  Likely symptomatic seizures in the setting of an acute stroke    NEURO: Neurologically with improvement in neglect since admission, Continue close monitoring for neurologic deterioration in setting of cerebral edema with mass effect and brain compression, Maintain permissive HTN up to 180/105 mmHg in the setting of R ICA stenoses and mild hemorrhagic transformation followed by very gradual normotension, continue ASA/Plavix and high intensity statin for secondary stroke prevention in the setting of likely atheroembolic etiology of her stroke, Plan for possible carotid stenting vs CEA based on results of carotid duplex (to better quantify the degree of extracranial/intracranial atherosclerotic stenose) , Neurosurgery eval appreciated. Continue with Keppra 500 mg to twice daily for seizure prophylaxis - duration of AEDs is expected to be 3-6 months based on clinical course and MRI/EEG evaluation at that time, VEEG: Structural or functional abnormality in the right hemisphere, No epileptiform pattern or seizure seen, MRI Brain w/o results as above, CTA limited by body habitus. Physical therapy/OT recommended Acute Rehab. Undocumented and uninsured- plan to optimize therapy while in hospital.      ANTITHROMBOTIC THERAPY: Dual antiplatelet therapy with aspirin clopidogrel for aggressive secondary stroke prevention in the setting of symptomatic intracranial atherosclerosis for 3 months followed by aspirin alone there after, check P2Y12 in 7 days (7/6/19) to ensure optimal platelet inhibition. Indications for carotid revascularization with CEA versus ASHLEY to be considered based on results of CUS. Would consider conventional angiogram in case of equivocal results. Timing of carotid revascularization is expected to be in about 10-14 days from the stroke considering the extent/large size of the infarct - to be further discussed with NSGY team     PULMONARY: CXR (06/29/19)  Clear lungs. Protecting airway, saturating well     CARDIOVASCULAR: Check TTE with bubble study, cardiac monitoring no events, Dr Luz (cardiology) eval appreciated, will obtain cardiac clearance for Right CEA vs carotid stent. Gradual normotension in the setting of carotid stenosis careful initiation of home meds and will monitor for heart failure signs since patient endorses hx of chronic CHF.                             SBP goal: Permissive HTN up to 180/105 mmHg in the setting of severe intracranial and extracranial atherosclerosis stenoses and mild hemorrhagic transformation     GASTROINTESTINAL: Dysphagia screen failed. Previously had NGT in place, tolerating diet, S/S eval recommended NPO, d/w family re: alternate means of nutrition , risks/benefits/alternatives. PEG placed on 07/03     Diet: NPO with TF via PEG    RENAL: BUN/Cr without acute change, maintain adequate hydration, good urine output, urine toxicology: Negative      Na Goal: Greater than 135     [place]: N    HEMATOLOGY: H/H without acute change, Platelets 307 LE doppler ordered to r/o DVT     DVT ppx:  LMWH    ID: afebrile, slight leukocytosis- likely reactive from PEG procedure-will continue to monitor, no overt si/sx of infection    OTHER: HgbA1C 10.4, continue Humalog sliding scale and DM meds as per endocrine recommendations, RUE: soft wrist restrained applied to prevent pt form pulling NGT- d/c after PEG placed.     DISPOSITION: Acute Rehab once tube feedings at goal.     CORE MEASURES:        Admission NIHSS: 13     TPA:  YES       LDL/HDL: 131/49     Depression Screen: p     Statin Therapy: Y     Dysphagia Screen:  FAIL     Smoking  NO      Afib  NO     Stroke Education  YES    Obtain screening ultrasound in patients who meet 1 or more of the following criteria as patient is high risk for DVT/PE upon admission:   [] History of DVT/PE  []Hypercoagulable states (Factor V Leiden, Cancer, OCP, etc. )  [X]Prolonged immobility (hemiplegia/hemiparesis/post operative or any other extended immobilization)   [] Transferred from outside facility (Rehab or Long term care)  [] Age </= to 50 54 year old woman with multiple vascular risk factors including age, HTN, DM II and HPLD was initially evaluated at OSH for acute onset of left-sided weakness through telestroke. She was treated with IV tPA and was transferred to [facility] for further management. CTA head and neck on admission showed critical stenosis versus occlusion of right supraclinoid ICA and severe to critical stenosis of right proximal ICA. MRI brain during hospitalization showed acute infarcts in the right MCA and ENRIQUE distribution with minimal hemorrhagic transformation (HI-1). Her hospital course was complicated by episodes concerning for seizure-like activity on admission, treated with AEDs. MRA head and neck showed severe to critical stenosis of right supraclinoid ICA and moderate (about 50%) stenosis of right proximal ICA.      Impression:  Cerebral embolism with cerebral infarction. Right MCA and ENRIQUE distribution stroke - likely mechanism being large vessel disease i.e. symptomatic intracranial vs. extracranial atherosclerosis leading to artery to artery embolism, favoring the former; less likely to be due to an embolic stroke from undetermined source. Likely symptomatic seizures in the setting of an acute stroke    NEURO: Neurologically with improvement in neglect since admission, Continue close monitoring for neurologic deterioration in setting of cerebral edema with mass effect and brain compression, maintain BP <160 in setting of possible hemorrhagic transformation on recent CT on 07/04.  continue ASA and high intensity statin for secondary stroke prevention in the setting of likely atheroembolic etiology of her stroke, plan to restart the Plavix after stable CT scan on evening of 07/04, Plan for possible carotid stenting vs CEA based on results of carotid duplex (to better quantify the degree of extracranial/intracranial atherosclerotic stenose) , Neurosurgery eval appreciated. Continue with Keppra 500 mg to twice daily for seizure prophylaxis - duration of AEDs is expected to be 3-6 months based on clinical course and MRI/EEG evaluation at that time, VEEG: Structural or functional abnormality in the right hemisphere, No epileptiform pattern or seizure seen, MRI Brain w/o results as above, CTA limited by body habitus. Physical therapy/OT recommended Acute Rehab. Undocumented and uninsured- plan to optimize therapy while in hospital.  Fluoxetine started for motor recovery, plan to titrate off after 3 months    ANTITHROMBOTIC THERAPY: Prior on dual antiplatelet therapy with aspirin and clopidogrel for aggressive secondary stroke prevention in the setting of symptomatic intracranial atherosclerosis for 3 months followed by aspirin alone there after, check P2Y12 in 7 days (7/6/19) to ensure optimal platelet inhibition. Plavix stopped on 07/04 for hemorrhagic transformation, will restart based clinical stability.  Indications for carotid revascularization with CEA versus ASHLEY to be considered based on results of CUS. Would consider conventional angiogram in case of equivocal results. Timing of carotid revascularization is expected to be in about 10-14 days from the stroke considering the extent/large size of the infarct - to be further discussed with NSGY team     PULMONARY: CXR (06/29/19)  Clear lungs. Protecting airway, saturating well     CARDIOVASCULAR: Check TTE with bubble study, cardiac monitoring no events, Dr Luz (cardiology) eval appreciated, will obtain cardiac clearance for Right CEA vs carotid stent. Gradual normotension in the setting of carotid stenosis careful initiation of home meds and will monitor for heart failure signs since patient endorses hx of chronic CHF.                             SBP goal:<160    GASTROINTESTINAL: Dysphagia screen failed. Previously had NGT in place, tolerating diet, S/S eval recommended NPO, d/w family re: alternate means of nutrition , risks/benefits/alternatives. PEG placed on 07/03     Diet: NPO with TF via PEG    RENAL: BUN/Cr without acute change, maintain adequate hydration, good urine output, urine toxicology: Negative      Na Goal: Greater than 135     [place]: N    HEMATOLOGY: H/H without acute change, Platelets 307 LE doppler ordered to r/o DVT     DVT ppx:  LMWH    ID: afebrile, slight leukocytosis- likely reactive from PEG procedure-will continue to monitor, no overt si/sx of infection    OTHER: HgbA1C 10.4, continue Humalog sliding scale and DM meds as per endocrine recommendations    DISPOSITION: Acute Rehab once tube feedings at goal.     CORE MEASURES:        Admission NIHSS: 13     TPA:  YES       LDL/HDL: 131/49     Depression Screen: p     Statin Therapy: Y     Dysphagia Screen:  FAIL     Smoking  NO      Afib  NO     Stroke Education  YES    Obtain screening ultrasound in patients who meet 1 or more of the following criteria as patient is high risk for DVT/PE upon admission:   [] History of DVT/PE  []Hypercoagulable states (Factor V Leiden, Cancer, OCP, etc. )  [X]Prolonged immobility (hemiplegia/hemiparesis/post operative or any other extended immobilization)   [] Transferred from outside facility (Rehab or Long term care)  [] Age </= to 50 54 year old woman with multiple vascular risk factors including age, HTN, DM II and HPLD was initially evaluated at OSH for acute onset of left-sided weakness through telestroke. She was treated with IV tPA and was transferred to [facility] for further management. CTA head and neck on admission showed critical stenosis versus occlusion of right supraclinoid ICA and severe to critical stenosis of right proximal ICA. MRI brain during hospitalization showed acute infarcts in the right MCA and ENRIQUE distribution with minimal hemorrhagic transformation (HI-1). Her hospital course was complicated by episodes concerning for seizure-like activity on admission, treated with AEDs. MRA head and neck showed severe to critical stenosis of right supraclinoid ICA and moderate (about 50%) stenosis of right proximal ICA.      Impression:  Cerebral embolism with cerebral infarction. Right MCA and ENRIQUE distribution stroke - likely mechanism being large vessel disease i.e. symptomatic intracranial vs. extracranial atherosclerosis leading to artery to artery embolism, favoring the former; less likely to be due to an embolic stroke from undetermined source. Likely symptomatic seizures in the setting of an acute stroke    NEURO: Neurologically with improvement in neglect since admission, Continue close monitoring for neurologic deterioration in setting of cerebral edema with mass effect and brain compression, maintain BP <160 in setting of possible hemorrhagic transformation on recent CT on 07/04.  continue ASA and high intensity statin for secondary stroke prevention in the setting of likely atheroembolic etiology of her stroke, plan to restart the Plavix after stable CT scan on evening of 07/04, Plan for possible carotid stenting vs CEA based on results of carotid duplex (to better quantify the degree of extracranial/intracranial atherosclerotic stenose) , Neurosurgery eval appreciated. Continue with Keppra 500 mg to twice daily for seizure prophylaxis - duration of AEDs is expected to be 3-6 months based on clinical course and MRI/EEG evaluation at that time, VEEG: Structural or functional abnormality in the right hemisphere, No epileptiform pattern or seizure seen, MRI Brain w/o results as above, CTA limited by body habitus. Physical therapy/OT recommended Acute Rehab. Undocumented and uninsured - plan to optimize therapy while in hospital.  Fluoxetine 20 mg once a day started for improved motor recovery for 3 months with a plan to taper off afterwards - risks versus benefits and adverse reactions associated with medication use were discussed with patient and available family member at bedside in detail.    ANTITHROMBOTIC THERAPY: Prior on dual antiplatelet therapy with aspirin and clopidogrel for aggressive secondary stroke prevention in the setting of symptomatic intracranial atherosclerosis for 3 months followed by aspirin alone there after, check P2Y12 in 7 days (7/6/19) to ensure optimal platelet inhibition. Plavix stopped on 07/04 for hemorrhagic transformation, will restart based clinical stability.  Indications for carotid revascularization with CEA versus ASHLEY to be considered based on results of CUS. Would consider conventional angiogram in case of equivocal results. Timing of carotid revascularization is expected to be in about 10-14 days from the stroke considering the extent/large size of the infarct - to be further discussed with NSGY team     PULMONARY: CXR (06/29/19)  Clear lungs. Protecting airway, saturating well     CARDIOVASCULAR: Check TTE with bubble study, cardiac monitoring no events, Dr Luz (cardiology) eval appreciated, will obtain cardiac clearance for Right CEA vs carotid stent. Gradual normotension in the setting of carotid stenosis careful initiation of home meds and will monitor for heart failure signs since patient endorses hx of chronic CHF.                             SBP goal:<160    GASTROINTESTINAL: Dysphagia screen failed. Previously had NGT in place, tolerating diet, S/S eval recommended NPO, d/w family re: alternate means of nutrition , risks/benefits/alternatives. PEG placed on 07/03     Diet: NPO with TF via PEG    RENAL: BUN/Cr without acute change, maintain adequate hydration, good urine output, urine toxicology: Negative      Na Goal: Greater than 135     [place]: N    HEMATOLOGY: H/H without acute change, Platelets 307 LE doppler ordered to r/o DVT     DVT ppx:  LMWH    ID: afebrile, slight leukocytosis- likely reactive from PEG procedure-will continue to monitor, no overt si/sx of infection    OTHER: HgbA1C 10.4, continue Humalog sliding scale and DM meds as per endocrine recommendations    DISPOSITION: Acute Rehab once tube feedings at goal.     CORE MEASURES:        Admission NIHSS: 13     TPA:  YES       LDL/HDL: 131/49     Depression Screen: p     Statin Therapy: Y     Dysphagia Screen:  FAIL     Smoking  NO      Afib  NO     Stroke Education  YES    Obtain screening ultrasound in patients who meet 1 or more of the following criteria as patient is high risk for DVT/PE upon admission:   [] History of DVT/PE  []Hypercoagulable states (Factor V Leiden, Cancer, OCP, etc. )  [X]Prolonged immobility (hemiplegia/hemiparesis/post operative or any other extended immobilization)   [] Transferred from outside facility (Rehab or Long term care)  [] Age </= to 50

## 2019-07-04 NOTE — PROGRESS NOTE ADULT - ASSESSMENT
acute CVA  fu with neurology  obtain echo     HTN  low dose amlodipine if ok with neurology     DM  fu with endo    suspect SUDEEP  outpt sleep study

## 2019-07-04 NOTE — PROGRESS NOTE ADULT - ASSESSMENT
54 year old female w/uncontrolled T2DM (HbA1c 10.4%) w/ retinopathy here w/CVA. Pt w/dysphagia s/p PEG placement on continuous tube feeds of Glucerna being titrated up to goal today.

## 2019-07-04 NOTE — PROGRESS NOTE ADULT - SUBJECTIVE AND OBJECTIVE BOX
THE PATIENT WAS SEEN AND EXAMINED BY ME WITH THE HOUSESTAFF AND STROKE TEAM DURING MORNING ROUNDS.   HPI:  Patient is a 54 year old Female w/ PMHx of HTN, HLD, DM transferred from [facility] for evaluation of stroke. Per daughter at bedside, last known normal 2pm on day of admission, patient had sudden onset left sided paralysis, slurred speech, received TPA at at [place]. NIHSS 13, MRS 1    SUBJECTIVE: No events overnight.  No new neurologic complaints.      acetaminophen    Suspension .. 650 milliGRAM(s) Oral every 6 hours PRN  aspirin  chewable 81 milliGRAM(s) Oral daily  atorvastatin 80 milliGRAM(s) Oral at bedtime  clopidogrel Tablet 75 milliGRAM(s) Oral daily  dextrose 40% Gel 15 Gram(s) Oral once PRN  dextrose 5%. 1000 milliLiter(s) IV Continuous <Continuous>  dextrose 50% Injectable 12.5 Gram(s) IV Push once  dextrose 50% Injectable 25 Gram(s) IV Push once  dextrose 50% Injectable 25 Gram(s) IV Push once  enoxaparin Injectable 40 milliGRAM(s) SubCutaneous daily  glucagon  Injectable 1 milliGRAM(s) IntraMuscular once PRN  insulin lispro (HumaLOG) corrective regimen sliding scale   SubCutaneous every 6 hours  levETIRAcetam  Solution 500 milliGRAM(s) Oral two times a day  LORazepam   Injectable 2 milliGRAM(s) IV Push once PRN  nystatin Powder 1 Application(s) Topical two times a day PRN      PHYSICAL EXAM:   Vital Signs Last 24 Hrs  T(C): 37.4 (03 Jul 2019 20:00), Max: 37.4 (03 Jul 2019 20:00)  T(F): 99.3 (03 Jul 2019 20:00), Max: 99.3 (03 Jul 2019 20:00)  HR: 78 (04 Jul 2019 06:00) (74 - 85)  BP: 165/80 (04 Jul 2019 06:00) (137/61 - 165/80)  BP(mean): 106 (04 Jul 2019 06:00) (81 - 106)  RR: 21 (04 Jul 2019 06:00) (15 - 23)  SpO2: 97% (04 Jul 2019 06:00) (96% - 100%)    General: No acute distress  HEENT: EOM intact, left eye legally blind  Abdomen: Soft, nontender, nondistended   Extremities: No edema    NEUROLOGICAL EXAM:  Mental status: Eyes closed, opens to voice and light touch, drowsy but easily arousable, eyelid opening apraxia, oriented to name, place and year, fluent speech, repetition intact, No asomatognosia or anosognosia  Cranial Nerves: UMN type left facial droop, no nystagmus, moderate to severe dysarthria  Motor exam: RUE /RLE spontaneous movement against gravity without significant noticeable weakness, Left hypotonic hemiplegia   Sensation: Left side appears intact to noxious stimuli, left side neglect to double simultaneous stimulation   Coordination/ Gait: No dysmetria on right side       LABS:                        13.7   10.7  )-----------( 307      ( 04 Jul 2019 04:24 )             39.7    07-04    142  |  105  |  18  ----------------------------<  236<H>  4.4   |  26  |  0.97    Ca    9.3      04 Jul 2019 04:24      Hemoglobin A1C, Whole Blood: 10.4 % (06-29 @ 09:33)      IMAGING: Reviewed by me.   Brain MRI (06/29/19) There is evidence of abnormal stricture diffusion seen involving the right frontal parietal region. Involvement of the right basal ganglia region is seen as well. This is compatible with an area of acute infarct.   Involvement of the right anterior cerebral and middle cerebral artery territories are identified. Scattered areas of abnormal susceptibility are identified which is likely compatible with some hemorrhagic transformation. Localized mass effect is seen consisting of sulcal effacement. No significant shift or herniation is seen.    Head CT (06/30/19) Abnormal areas of low-attenuation involving the right frontal and right parietal cortical subcortical region are again seen. These findings are compatible with areas of old infarcts. Abnormal low-attenuation involving the left alla is also again seen which could be compatible with an age-indeterminate infarct. There is no evidence of abnormal low-attenuation seen involving the high right frontal cortical subcortical region. Involvement of the right basal   ganglia and anterior corona radiata regions as well. This is predominantly seen medially and compatible with an acute right anterior cerebral artery infarct. There is localized mass effect consisting of sulcal effacement. No significant shift or herniation is seen.     CTA Head/Neck (06/28/19) CT ANGIOGRAPHY NECK:  Markedly limited examination. Tandem moderate to severe stenoses are suspected in the right carotid bulb and proximal right internal carotid artery. 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    CT ANGIOGRAPHY BRAIN: Markedly limited examination. Limited evaluation of the intracranial internal carotid arteries. Stenoses or occlusion in the distal cavernous segments and ophthalmic segments of the bilateral internal carotid arteries cannot be excluded. Apparent severe stenosis in the distal supraclinoid right internal carotid artery and right carotid terminus with possible occlusion of the A1 segment of the right anterior cerebral artery.  The right A2 segment fills across the anterior to indicating artery.  There appears to be posterior distal right ENRIQUE vessels within the medial right frontal lobe, raising the possibility of early cerebral infarction. THE PATIENT WAS SEEN AND EXAMINED BY ME WITH THE HOUSESTAFF AND STROKE TEAM DURING MORNING ROUNDS.   HPI:  Patient is a 54 year old Female w/ PMHx of HTN, HLD, DM transferred from [facility] for evaluation of stroke. Per daughter at bedside, last known normal 2pm on day of admission, patient had sudden onset left sided paralysis, slurred speech, received TPA at at [place]. NIHSS 13, MRS 1    SUBJECTIVE: No events overnight.  No new neurologic complaints.      acetaminophen    Suspension .. 650 milliGRAM(s) Oral every 6 hours PRN  aspirin  chewable 81 milliGRAM(s) Oral daily  atorvastatin 80 milliGRAM(s) Oral at bedtime  clopidogrel Tablet 75 milliGRAM(s) Oral daily  dextrose 40% Gel 15 Gram(s) Oral once PRN  dextrose 5%. 1000 milliLiter(s) IV Continuous <Continuous>  dextrose 50% Injectable 12.5 Gram(s) IV Push once  dextrose 50% Injectable 25 Gram(s) IV Push once  dextrose 50% Injectable 25 Gram(s) IV Push once  enoxaparin Injectable 40 milliGRAM(s) SubCutaneous daily  glucagon  Injectable 1 milliGRAM(s) IntraMuscular once PRN  insulin lispro (HumaLOG) corrective regimen sliding scale   SubCutaneous every 6 hours  levETIRAcetam  Solution 500 milliGRAM(s) Oral two times a day  LORazepam   Injectable 2 milliGRAM(s) IV Push once PRN  nystatin Powder 1 Application(s) Topical two times a day PRN      PHYSICAL EXAM:   Vital Signs Last 24 Hrs  T(C): 37.4 (03 Jul 2019 20:00), Max: 37.4 (03 Jul 2019 20:00)  T(F): 99.3 (03 Jul 2019 20:00), Max: 99.3 (03 Jul 2019 20:00)  HR: 78 (04 Jul 2019 06:00) (74 - 85)  BP: 165/80 (04 Jul 2019 06:00) (137/61 - 165/80)  BP(mean): 106 (04 Jul 2019 06:00) (81 - 106)  RR: 21 (04 Jul 2019 06:00) (15 - 23)  SpO2: 97% (04 Jul 2019 06:00) (96% - 100%)    General: No acute distress  HEENT: EOM intact, left eye legally blind  Abdomen: Soft, nontender, nondistended   Extremities: No edema    NEUROLOGICAL EXAM:  Mental status: Eyes closed, opens to voice and light touch, drowsy but easily arousable, eyelid opening apraxia, oriented to name, place and year, fluent speech, repetition intact, No asomatognosia or anosognosia  Cranial Nerves: UMN type left facial droop, no nystagmus, moderate to severe dysarthria  Motor exam: RUE /RLE spontaneous movement against gravity without significant noticeable weakness, Left hypotonic hemiplegia   Sensation: Left side appears intact to noxious stimuli, left side neglect to double simultaneous stimulation   Coordination/ Gait: No dysmetria on right side       LABS:                        13.7   10.7  )-----------( 307      ( 04 Jul 2019 04:24 )             39.7    07-04    142  |  105  |  18  ----------------------------<  236<H>  4.4   |  26  |  0.97    Ca    9.3      04 Jul 2019 04:24      Hemoglobin A1C, Whole Blood: 10.4 % (06-29 @ 09:33)      IMAGING: Reviewed by me.   MRA BRAIN/NECK (07/02/19): In comparison with CTA,  atherosclerotic calcification with stenosis of the right greater than left cavernous and supraclinoid ICA segments.   Poorly delineated distal right ICA bifurcation segment,  right A1   segment, with proximal right M1 segment stenosis and poststenotic   dilatation. Stenosis with narrowing of the distal left M1 MCA segment.   There is a dominant left A1 supplying the anterior communicating artery,   there is multifocal stenosis of the distal MCA branch vasculature.    Dominant intradural right vertebral artery supplies the basilar artery,   the left vertebral artery is hypoplastic and poorly visualized in the   cervical course and intradurally with multifocal  narrowing and stenosis.   There are multifocal stenosis of the posterior cerebral arteries.    Possible bovine left common carotid artery, proximal left subclavian   artery is not well-visualized  with possible narrowing. Bilateral common   carotid arteries are patent,  right ICA origin stenosis of approximately   50%, right ICA is decreased in caliber, left ICA is dominant and patent   without ICA origin   stenosis. Vessels are tortuous likely reflecting   hypertension.      Brain MRI (06/29/19) There is evidence of abnormal stricture diffusion seen involving the right frontal parietal region. Involvement of the right basal ganglia region is seen as well. This is compatible with an area of acute infarct.   Involvement of the right anterior cerebral and middle cerebral artery territories are identified. Scattered areas of abnormal susceptibility are identified which is likely compatible with some hemorrhagic transformation. Localized mass effect is seen consisting of sulcal effacement. No significant shift or herniation is seen.    Head CT (06/30/19) Abnormal areas of low-attenuation involving the right frontal and right parietal cortical subcortical region are again seen. These findings are compatible with areas of old infarcts. Abnormal low-attenuation involving the left alla is also again seen which could be compatible with an age-indeterminate infarct. There is no evidence of abnormal low-attenuation seen involving the high right frontal cortical subcortical region. Involvement of the right basal   ganglia and anterior corona radiata regions as well. This is predominantly seen medially and compatible with an acute right anterior cerebral artery infarct. There is localized mass effect consisting of sulcal effacement. No significant shift or herniation is seen.     CTA Head/Neck (06/28/19) CT ANGIOGRAPHY NECK:  Markedly limited examination. Tandem moderate to severe stenoses are suspected in the right carotid bulb and proximal right internal carotid artery. 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    CT ANGIOGRAPHY BRAIN: Markedly limited examination. Limited evaluation of the intracranial internal carotid arteries. Stenoses or occlusion in the distal cavernous segments and ophthalmic segments of the bilateral internal carotid arteries cannot be excluded. Apparent severe stenosis in the distal supraclinoid right internal carotid artery and right carotid terminus with possible occlusion of the A1 segment of the right anterior cerebral artery.  The right A2 segment fills across the anterior to indicating artery.  There appears to be posterior distal right ENRIQUE vessels within the medial right frontal lobe, raising the possibility of early cerebral infarction. THE PATIENT WAS SEEN AND EXAMINED BY ME WITH THE HOUSESTAFF AND STROKE TEAM DURING MORNING ROUNDS.     HPI:  Patient is a 54 year old Female w/ PMHx of HTN, HLD, DM transferred from [facility] for evaluation of stroke. Per daughter at bedside, last known normal 2pm on day of admission, patient had sudden onset left sided paralysis, slurred speech, received TPA at at [place]. NIHSS 13, MRS 1    SUBJECTIVE: No events overnight.  No new neurologic complaints.      ROS: All negative except documented above.    acetaminophen    Suspension .. 650 milliGRAM(s) Oral every 6 hours PRN  aspirin  chewable 81 milliGRAM(s) Oral daily  atorvastatin 80 milliGRAM(s) Oral at bedtime  clopidogrel Tablet 75 milliGRAM(s) Oral daily  dextrose 40% Gel 15 Gram(s) Oral once PRN  dextrose 5%. 1000 milliLiter(s) IV Continuous <Continuous>  dextrose 50% Injectable 12.5 Gram(s) IV Push once  dextrose 50% Injectable 25 Gram(s) IV Push once  dextrose 50% Injectable 25 Gram(s) IV Push once  enoxaparin Injectable 40 milliGRAM(s) SubCutaneous daily  glucagon  Injectable 1 milliGRAM(s) IntraMuscular once PRN  insulin lispro (HumaLOG) corrective regimen sliding scale   SubCutaneous every 6 hours  levETIRAcetam  Solution 500 milliGRAM(s) Oral two times a day  LORazepam   Injectable 2 milliGRAM(s) IV Push once PRN  nystatin Powder 1 Application(s) Topical two times a day PRN      PHYSICAL EXAM:   Vital Signs Last 24 Hrs  T(C): 37.4 (03 Jul 2019 20:00), Max: 37.4 (03 Jul 2019 20:00)  T(F): 99.3 (03 Jul 2019 20:00), Max: 99.3 (03 Jul 2019 20:00)  HR: 78 (04 Jul 2019 06:00) (74 - 85)  BP: 165/80 (04 Jul 2019 06:00) (137/61 - 165/80)  BP(mean): 106 (04 Jul 2019 06:00) (81 - 106)  RR: 21 (04 Jul 2019 06:00) (15 - 23)  SpO2: 97% (04 Jul 2019 06:00) (96% - 100%)    General: No acute distress  HEENT: EOM intact, left eye legally blind  Abdomen: Soft, nontender, nondistended   Extremities: No edema    NEUROLOGICAL EXAM:  Mental status: Eyes closed, opens to voice and light touch, drowsy but easily arousable, eyelid opening apraxia, oriented to name, place and year, fluent speech, repetition intact, No asomatognosia or anosognosia  Cranial Nerves: UMN type left facial droop, no nystagmus, moderate to severe dysarthria  Motor exam: RUE /RLE spontaneous movement against gravity without significant noticeable weakness, Left hypotonic hemiplegia   Sensation: Left side appears intact to noxious stimuli, left side neglect to double simultaneous stimulation   Coordination/ Gait: No dysmetria on right side       LABS:                        13.7   10.7  )-----------( 307      ( 04 Jul 2019 04:24 )             39.7    07-04    142  |  105  |  18  ----------------------------<  236<H>  4.4   |  26  |  0.97    Ca    9.3      04 Jul 2019 04:24      Hemoglobin A1C, Whole Blood: 10.4 % (06-29 @ 09:33)      IMAGING: Reviewed by me.   MRA BRAIN/NECK (07/02/19): In comparison with CTA,  atherosclerotic calcification with stenosis of the right greater than left cavernous and supraclinoid ICA segments.   Poorly delineated distal right ICA bifurcation segment,  right A1   segment, with proximal right M1 segment stenosis and poststenotic   dilatation. Stenosis with narrowing of the distal left M1 MCA segment.   There is a dominant left A1 supplying the anterior communicating artery,   there is multifocal stenosis of the distal MCA branch vasculature.    Dominant intradural right vertebral artery supplies the basilar artery,   the left vertebral artery is hypoplastic and poorly visualized in the   cervical course and intradurally with multifocal  narrowing and stenosis.   There are multifocal stenosis of the posterior cerebral arteries.    Possible bovine left common carotid artery, proximal left subclavian   artery is not well-visualized  with possible narrowing. Bilateral common   carotid arteries are patent,  right ICA origin stenosis of approximately   50%, right ICA is decreased in caliber, left ICA is dominant and patent   without ICA origin   stenosis. Vessels are tortuous likely reflecting   hypertension.      Brain MRI (06/29/19) There is evidence of abnormal stricture diffusion seen involving the right frontal parietal region. Involvement of the right basal ganglia region is seen as well. This is compatible with an area of acute infarct.   Involvement of the right anterior cerebral and middle cerebral artery territories are identified. Scattered areas of abnormal susceptibility are identified which is likely compatible with some hemorrhagic transformation. Localized mass effect is seen consisting of sulcal effacement. No significant shift or herniation is seen.    Head CT (06/30/19) Abnormal areas of low-attenuation involving the right frontal and right parietal cortical subcortical region are again seen. These findings are compatible with areas of old infarcts. Abnormal low-attenuation involving the left alla is also again seen which could be compatible with an age-indeterminate infarct. There is no evidence of abnormal low-attenuation seen involving the high right frontal cortical subcortical region. Involvement of the right basal   ganglia and anterior corona radiata regions as well. This is predominantly seen medially and compatible with an acute right anterior cerebral artery infarct. There is localized mass effect consisting of sulcal effacement. No significant shift or herniation is seen.     CTA Head/Neck (06/28/19) CT ANGIOGRAPHY NECK:  Markedly limited examination. Tandem moderate to severe stenoses are suspected in the right carotid bulb and proximal right internal carotid artery. 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    CT ANGIOGRAPHY BRAIN: Markedly limited examination. Limited evaluation of the intracranial internal carotid arteries. Stenoses or occlusion in the distal cavernous segments and ophthalmic segments of the bilateral internal carotid arteries cannot be excluded. Apparent severe stenosis in the distal supraclinoid right internal carotid artery and right carotid terminus with possible occlusion of the A1 segment of the right anterior cerebral artery.  The right A2 segment fills across the anterior to indicating artery.  There appears to be posterior distal right ENRIQUE vessels within the medial right frontal lobe, raising the possibility of early cerebral infarction.

## 2019-07-04 NOTE — PROGRESS NOTE ADULT - PROBLEM SELECTOR PLAN 1
s/p  upper gastrointestinal endoscopy 7/3 with no gross lesions in esophagus.  - Gastritis.  - No gross lesions in duodenum.  - An endoscopically removable PEG placement was successfully completed  - Please follow the post-PEG recommendations including: antibiotic ointment   - feeds started, monitor residuals  - aspiration precautions

## 2019-07-04 NOTE — PROGRESS NOTE ADULT - SUBJECTIVE AND OBJECTIVE BOX
Subjective: Patient seen and examined. No new events except as noted.     SUBJECTIVE/ROS:        MEDICATIONS:  MEDICATIONS  (STANDING):  aspirin  chewable 81 milliGRAM(s) Oral daily  atorvastatin 80 milliGRAM(s) Oral at bedtime  dextrose 5%. 1000 milliLiter(s) (50 mL/Hr) IV Continuous <Continuous>  dextrose 50% Injectable 12.5 Gram(s) IV Push once  dextrose 50% Injectable 25 Gram(s) IV Push once  dextrose 50% Injectable 25 Gram(s) IV Push once  enoxaparin Injectable 40 milliGRAM(s) SubCutaneous daily  insulin lispro (HumaLOG) corrective regimen sliding scale   SubCutaneous every 6 hours  levETIRAcetam  Solution 500 milliGRAM(s) Oral two times a day      PHYSICAL EXAM:  T(C): 36.7 (07-04-19 @ 00:00), Max: 37.4 (07-03-19 @ 20:00)  HR: 78 (07-04-19 @ 06:00) (74 - 85)  BP: 165/80 (07-04-19 @ 06:00) (137/61 - 165/80)  RR: 21 (07-04-19 @ 06:00) (15 - 23)  SpO2: 97% (07-04-19 @ 06:00) (96% - 100%)  Wt(kg): --  I&O's Summary    03 Jul 2019 07:01  -  04 Jul 2019 07:00  --------------------------------------------------------  IN: 0 mL / OUT: 1000 mL / NET: -1000 mL            JVP: Normal  Neck: supple  Lung: clear   CV: S1 S2 , Murmur:  Abd: soft  Ext: No edema  neuro: Awake / alert  Psych: flat affect  Skin: normal``    LABS/DATA:    CARDIAC MARKERS:                                13.7   10.7  )-----------( 307      ( 04 Jul 2019 04:24 )             39.7     07-04    142  |  105  |  18  ----------------------------<  236<H>  4.4   |  26  |  0.97    Ca    9.3      04 Jul 2019 04:24      proBNP:   Lipid Profile:   HgA1c:   TSH:     TELE:  EKG:

## 2019-07-05 LAB
ANION GAP SERPL CALC-SCNC: 13 MMOL/L — SIGNIFICANT CHANGE UP (ref 5–17)
BUN SERPL-MCNC: 24 MG/DL — HIGH (ref 7–23)
CALCIUM SERPL-MCNC: 8.9 MG/DL — SIGNIFICANT CHANGE UP (ref 8.4–10.5)
CHLORIDE SERPL-SCNC: 104 MMOL/L — SIGNIFICANT CHANGE UP (ref 96–108)
CO2 SERPL-SCNC: 25 MMOL/L — SIGNIFICANT CHANGE UP (ref 22–31)
CREAT SERPL-MCNC: 0.97 MG/DL — SIGNIFICANT CHANGE UP (ref 0.5–1.3)
GLUCOSE BLDC GLUCOMTR-MCNC: 191 MG/DL — HIGH (ref 70–99)
GLUCOSE BLDC GLUCOMTR-MCNC: 232 MG/DL — HIGH (ref 70–99)
GLUCOSE BLDC GLUCOMTR-MCNC: 239 MG/DL — HIGH (ref 70–99)
GLUCOSE BLDC GLUCOMTR-MCNC: 255 MG/DL — HIGH (ref 70–99)
GLUCOSE SERPL-MCNC: 254 MG/DL — HIGH (ref 70–99)
HCT VFR BLD CALC: 39.4 % — SIGNIFICANT CHANGE UP (ref 34.5–45)
HGB BLD-MCNC: 13.1 G/DL — SIGNIFICANT CHANGE UP (ref 11.5–15.5)
MCHC RBC-ENTMCNC: 28.9 PG — SIGNIFICANT CHANGE UP (ref 27–34)
MCHC RBC-ENTMCNC: 33.3 GM/DL — SIGNIFICANT CHANGE UP (ref 32–36)
MCV RBC AUTO: 86.6 FL — SIGNIFICANT CHANGE UP (ref 80–100)
PLATELET # BLD AUTO: 287 K/UL — SIGNIFICANT CHANGE UP (ref 150–400)
POTASSIUM SERPL-MCNC: 4.4 MMOL/L — SIGNIFICANT CHANGE UP (ref 3.5–5.3)
POTASSIUM SERPL-SCNC: 4.4 MMOL/L — SIGNIFICANT CHANGE UP (ref 3.5–5.3)
RBC # BLD: 4.55 M/UL — SIGNIFICANT CHANGE UP (ref 3.8–5.2)
RBC # FLD: 12.9 % — SIGNIFICANT CHANGE UP (ref 10.3–14.5)
SODIUM SERPL-SCNC: 142 MMOL/L — SIGNIFICANT CHANGE UP (ref 135–145)
WBC # BLD: 7.4 K/UL — SIGNIFICANT CHANGE UP (ref 3.8–10.5)
WBC # FLD AUTO: 7.4 K/UL — SIGNIFICANT CHANGE UP (ref 3.8–10.5)

## 2019-07-05 PROCEDURE — 93970 EXTREMITY STUDY: CPT | Mod: 26

## 2019-07-05 PROCEDURE — 93880 EXTRACRANIAL BILAT STUDY: CPT | Mod: 26

## 2019-07-05 PROCEDURE — 99232 SBSQ HOSP IP/OBS MODERATE 35: CPT

## 2019-07-05 PROCEDURE — 99233 SBSQ HOSP IP/OBS HIGH 50: CPT

## 2019-07-05 RX ORDER — POLYETHYLENE GLYCOL 3350 17 G/17G
17 POWDER, FOR SOLUTION ORAL DAILY
Refills: 0 | Status: DISCONTINUED | OUTPATIENT
Start: 2019-07-05 | End: 2019-08-26

## 2019-07-05 RX ORDER — HUMAN INSULIN 100 [IU]/ML
18 INJECTION, SUSPENSION SUBCUTANEOUS EVERY 6 HOURS
Refills: 0 | Status: DISCONTINUED | OUTPATIENT
Start: 2019-07-05 | End: 2019-07-08

## 2019-07-05 RX ORDER — CLOPIDOGREL BISULFATE 75 MG/1
75 TABLET, FILM COATED ORAL DAILY
Refills: 0 | Status: DISCONTINUED | OUTPATIENT
Start: 2019-07-05 | End: 2019-07-05

## 2019-07-05 RX ADMIN — Medication 4: at 18:03

## 2019-07-05 RX ADMIN — LEVETIRACETAM 500 MILLIGRAM(S): 250 TABLET, FILM COATED ORAL at 18:03

## 2019-07-05 RX ADMIN — HUMAN INSULIN 18 UNIT(S): 100 INJECTION, SUSPENSION SUBCUTANEOUS at 18:03

## 2019-07-05 RX ADMIN — ATORVASTATIN CALCIUM 80 MILLIGRAM(S): 80 TABLET, FILM COATED ORAL at 22:27

## 2019-07-05 RX ADMIN — Medication 650 MILLIGRAM(S): at 05:50

## 2019-07-05 RX ADMIN — ENOXAPARIN SODIUM 40 MILLIGRAM(S): 100 INJECTION SUBCUTANEOUS at 12:33

## 2019-07-05 RX ADMIN — Medication 650 MILLIGRAM(S): at 12:38

## 2019-07-05 RX ADMIN — Medication 6: at 05:18

## 2019-07-05 RX ADMIN — Medication 650 MILLIGRAM(S): at 13:10

## 2019-07-05 RX ADMIN — Medication 81 MILLIGRAM(S): at 12:33

## 2019-07-05 RX ADMIN — HUMAN INSULIN 18 UNIT(S): 100 INJECTION, SUSPENSION SUBCUTANEOUS at 12:33

## 2019-07-05 RX ADMIN — Medication 4: at 12:33

## 2019-07-05 RX ADMIN — LEVETIRACETAM 500 MILLIGRAM(S): 250 TABLET, FILM COATED ORAL at 05:20

## 2019-07-05 RX ADMIN — Medication 650 MILLIGRAM(S): at 05:20

## 2019-07-05 RX ADMIN — HUMAN INSULIN 12 UNIT(S): 100 INJECTION, SUSPENSION SUBCUTANEOUS at 05:19

## 2019-07-05 RX ADMIN — Medication 20 MILLIGRAM(S): at 12:34

## 2019-07-05 NOTE — PROGRESS NOTE ADULT - ASSESSMENT
54 year old woman with multiple vascular risk factors including age, HTN, DM II and HPLD was initially evaluated at OSH for acute onset of left-sided weakness through telestroke. She was treated with IV tPA and was transferred to [facility] for further management. CTA head and neck on admission showed critical stenosis versus occlusion of right supraclinoid ICA and severe to critical stenosis of right proximal ICA. MRI brain during hospitalization showed acute infarcts in the right MCA and ENRIQUE distribution with minimal hemorrhagic transformation (HI-1). Her hospital course was complicated by episodes concerning for seizure-like activity on admission, treated with AEDs. MRA head and neck showed severe to critical stenosis of right supraclinoid ICA and moderate (about 50%) stenosis of right proximal ICA.      Impression:  Cerebral embolism with cerebral infarction. Right MCA and ENRIQUE distribution stroke - likely mechanism being large vessel disease i.e. symptomatic intracranial vs. extracranial atherosclerosis leading to artery to artery embolism, favoring the former; less likely to be due to an embolic stroke from undetermined source. Likely symptomatic seizures in the setting of an acute stroke    NEURO: Neurologically , CTH on 7/4 showed increasing lucency with new mass effect and mild midline   shift to the left involving acute right anterior cerebral artery infarct with involvement of the right corona radiata and centrum semiovale ovale, Continue close monitoring for neurologic deterioration in setting of cerebral edema with mass effect and brain compression, maintain BP <160 in setting of possible hemorrhagic transformation on recent CT on 07/04.  Continue ASA and high intensity statin for secondary stroke prevention in the setting of likely atheroembolic etiology of her stroke, plan to restart the Plavix after stable CT scan on evening of 07/04, Plan for possible carotid stenting vs CEA based on results of carotid duplex (to better quantify the degree of extracranial/intracranial atherosclerotic stenose) , Neurosurgery eval appreciated. Continue with Keppra 500 mg to twice daily for seizure prophylaxis - duration of AEDs is expected to be 3-6 months based on clinical course and MRI/EEG evaluation at that time, VEEG: Structural or functional abnormality in the right hemisphere, No epileptiform pattern or seizure seen, MRI Brain w/o results as above, CTA limited by body habitus. Physical therapy/OT recommended Acute Rehab. Fluoxetine 20 mg once a day started for improved motor recovery for 3 months with a plan to taper off afterwards - risks versus benefits and adverse reactions associated with medication use were discussed with patient and available family member at bedside in detail.    ANTITHROMBOTIC THERAPY: Prior on dual antiplatelet therapy with aspirin and clopidogrel for aggressive secondary stroke prevention in the setting of symptomatic intracranial atherosclerosis for 3 months followed by aspirin alone there after, check P2Y12 in 7 days (7/6/19) to ensure optimal platelet inhibition. Plavix stopped on 07/04 for hemorrhagic transformation, will restart based clinical stability.  Indications for carotid revascularization with CEA versus ASHLEY to be considered based on results of CUS. Would consider conventional angiogram in case of equivocal results. Timing of carotid revascularization is expected to be in about 10-14 days from the stroke considering the extent/large size of the infarct - to be further discussed with NSGY team     PULMONARY: CXR (06/29/19)  Clear lungs. Protecting airway, saturating well     CARDIOVASCULAR: Check TTE with bubble study, cardiac monitoring no events, Dr Luz (cardiology) eval appreciated, will obtain cardiac clearance for Right CEA vs carotid stent. Gradual normotension in the setting of carotid stenosis careful initiation of home meds and will monitor for heart failure signs since patient endorses hx of chronic CHF.                             SBP goal:<160    GASTROINTESTINAL: Dysphagia screen failed. S/S eval recommended NPO, d/w family re: alternate means of nutrition , risks/benefits/alternatives. PEG placed on 07/03     Diet: NPO with TF via PEG    RENAL: BUN/Cr without acute change, maintain adequate hydration, good urine output, urine toxicology: Negative      Na Goal: Greater than 135     C[place]rena: N    HEMATOLOGY: H/H without acute change, Platelets?  LE doppler ordered to r/o DVT     DVT ppx:  LMWH    ID: afebrile, no overt si/sx of infection    OTHER: HgbA1C 10.4, continue Humalog sliding scale and DM meds as per endocrine recommendations    DISPOSITION: Acute Rehab if able to receive [facility]. She is undocumented and uninsured - plan to optimize therapy while in hospital.     CORE MEASURES:        Admission NIHSS: 13     TPA:  YES       LDL/HDL: 131/49     Depression Screen: p     Statin Therapy: Y     Dysphagia Screen:  FAIL     Smoking  NO      Afib  NO     Stroke Education  YES    Obtain screening ultrasound in patients who meet 1 or more of the following criteria as patient is high risk for DVT/PE upon admission:   [] History of DVT/PE  []Hypercoagulable states (Factor V Leiden, Cancer, OCP, etc. )  [X]Prolonged immobility (hemiplegia/hemiparesis/post operative or any other extended immobilization)   [] Transferred from outside facility (Rehab or Long term care)  [] Age </= to 50 54 year old woman with multiple vascular risk factors including age, HTN, DM II and HPLD was initially evaluated at OSH for acute onset of left-sided weakness through telestroke. She was treated with IV tPA and was transferred to [facility] for further management. CTA head and neck on admission showed critical stenosis versus occlusion of right supraclinoid ICA and severe to critical stenosis of right proximal ICA. MRI brain during hospitalization showed acute infarcts in the right MCA and ENRIQUE distribution with minimal hemorrhagic transformation (HI-1). Her hospital course was complicated by episodes concerning for seizure-like activity on admission, treated with AEDs. MRA head and neck showed severe to critical stenosis of right supraclinoid ICA and moderate (about 50%) stenosis of right proximal ICA.      Impression:  Cerebral embolism with cerebral infarction. Right MCA and ENRIQUE distribution stroke - likely mechanism being large vessel disease i.e. symptomatic intracranial vs. extracranial atherosclerosis leading to artery to artery embolism, favoring the former; less likely to be due to an embolic stroke from undetermined source. Likely symptomatic seizures in the setting of an acute stroke    NEURO: Neurologically with improvement since admission, CTH on 7/4 showed increasing lucency with new mass effect and mild midline shift to the left involving acute right anterior cerebral artery infarct with involvement of the right corona radiata and centrum semiovale ovale. Plavix on hold. Continue close monitoring for neurologic deterioration in setting of cerebral edema with mass effect and brain compression, maintain BP <160 in setting of possible hemorrhagic transformation on recent CT on 07/04.  Continue ASA and high intensity statin for secondary stroke prevention in the setting of likely atheroembolic etiology of her stroke. Plan for possible carotid stenting vs CEA based on results of carotid duplex (to better quantify the degree of extracranial/intracranial atherosclerotic stenose), Neurosurgery eval appreciated. Continue with Keppra 500 mg to twice daily for seizure prophylaxis - duration of AEDs is expected to be 3-6 months based on clinical course and MRI/EEG evaluation at that time, VEEG: Structural or functional abnormality in the right hemisphere, No epileptiform pattern or seizure seen, MRI Brain w/o results as above, CTA limited by body habitus. Physical therapy/OT recommended Acute Rehab. Fluoxetine 20 mg once a day started for improved motor recovery for 3 months with a plan to taper off afterwards - risks versus benefits and adverse reactions associated with medication use were discussed with patient and available family member at bedside in detail.    ANTITHROMBOTIC THERAPY: Prior on dual antiplatelet therapy with aspirin and clopidogrel for aggressive secondary stroke prevention in the setting of symptomatic intracranial atherosclerosis for 3 months followed by aspirin alone there after, check P2Y12 after reinitiation of plavix likely next week to ensure optimal platelet inhibition. Plavix stopped on 07/04 for hemorrhagic transformation, will restart based clinical stability likely on 7/7.  Indications for carotid revascularization with CEA versus ASHLEY to be considered based on results of CUS. Would consider conventional angiogram in case of equivocal results. Timing of carotid revascularization is expected to be in about 10-14 days from the stroke considering the extent/large size of the infarct - to be further discussed with NSGY team     PULMONARY: CXR (06/29/19)  Clear lungs. Protecting airway, saturating well     CARDIOVASCULAR: Check TTE with bubble study, cardiac monitoring no events, Dr Luz (cardiology) eval appreciated, will obtain cardiac clearance for Right CEA vs carotid stent. Gradual normotension in the setting of carotid stenosis careful initiation of home meds and will monitor for heart failure signs since patient endorses hx of chronic CHF.                             SBP goal:<160    GASTROINTESTINAL: Dysphagia screen failed. S/S eval recommended NPO, d/w family re: alternate means of nutrition, risks/benefits/alternatives. PEG placed on 07/03     Diet: NPO with TF via PEG    RENAL: BUN/Cr without acute change, maintain adequate hydration, good urine output, urine toxicology: Negative      Na Goal: Greater than 135     Camarena: N    HEMATOLOGY: H/H without acute change, Platelets 287,  LE doppler ordered to r/o DVT     DVT ppx:  LMWH    ID: afebrile, no overt si/sx of infection    OTHER: HgbA1C 10.4, continue Humalog sliding scale and DM meds as per endocrine recommendations    DISPOSITION: Acute Rehab if able to receive [facility]. She is undocumented and uninsured - plan to optimize therapy while in hospital.     CORE MEASURES:        Admission NIHSS: 13     TPA:  YES       LDL/HDL: 131/49     Depression Screen: 0     Statin Therapy: Y     Dysphagia Screen:  FAIL     Smoking  NO      Afib  NO     Stroke Education  YES    Obtain screening ultrasound in patients who meet 1 or more of the following criteria as patient is high risk for DVT/PE upon admission:   [] History of DVT/PE  []Hypercoagulable states (Factor V Leiden, Cancer, OCP, etc. )  [X]Prolonged immobility (hemiplegia/hemiparesis/post operative or any other extended immobilization)   [] Transferred from outside facility (Rehab or Long term care)  [] Age </= to 50 54 year old woman with multiple vascular risk factors including age, HTN, DM II and HPLD was initially evaluated at OSH for acute onset of left-sided weakness through telestroke. She was treated with IV tPA and was transferred to [facility] for further management. CTA head and neck on admission showed critical stenosis versus occlusion of right supraclinoid ICA and severe to critical stenosis of right proximal ICA. MRI brain during hospitalization showed acute infarcts in the right MCA and ENRIQUE distribution with minimal hemorrhagic transformation (HI-1). Her hospital course was complicated by episodes concerning for seizure-like activity on admission, treated with AEDs. MRA head and neck showed severe to critical stenosis of right supraclinoid ICA and moderate (about 50%) stenosis of right proximal ICA.      Impression:  Cerebral embolism with cerebral infarction. Right MCA and ENRIQUE distribution stroke - likely mechanism being large vessel disease i.e. symptomatic intracranial vs. extracranial atherosclerosis leading to artery to artery embolism, favoring the former; less likely to be due to an embolic stroke from undetermined source. Likely symptomatic seizures in the setting of an acute stroke    NEURO: Neurologically with improvement since admission, CTH on 7/4 showed increasing lucency with new mass effect and mild midline shift to the left involving acute right anterior cerebral artery infarct with involvement of the right corona radiata and centrum semiovale ovale. Plavix on hold. Continue close monitoring for neurologic deterioration in setting of cerebral edema with mass effect and brain compression, maintain BP <160 in setting of possible hemorrhagic transformation on recent CT on 07/04.  Continue ASA and high intensity statin for secondary stroke prevention in the setting of likely atheroembolic etiology of her stroke. Plan for possible carotid stenting vs CEA based on results of carotid duplex (to better quantify the degree of extracranial/intracranial atherosclerotic stenose), Neurosurgery eval appreciated. Continue with Keppra 500 mg to twice daily for seizure prophylaxis - duration of AEDs is expected to be 3-6 months based on clinical course and MRI/EEG evaluation at that time, VEEG: Structural or functional abnormality in the right hemisphere, No epileptiform pattern or seizure seen, MRI Brain w/o results as above, CTA limited by body habitus. Physical therapy/OT recommended Acute Rehab. Fluoxetine 20 mg once a day started for improved motor recovery for 3 months with a plan to taper off afterwards - risks versus benefits and adverse reactions associated with medication use were discussed with patient and available family member at bedside in detail.    ANTITHROMBOTIC THERAPY: Prior on dual antiplatelet therapy with aspirin and clopidogrel for aggressive secondary stroke prevention in the setting of symptomatic intracranial atherosclerosis for 3 months followed by aspirin alone there after, check P2Y12 after reinitiation of plavix likely next week to ensure optimal platelet inhibition. Plavix stopped on 07/04 for hemorrhagic transformation, will restart based clinical stability likely on 7/7 after repeating brain imaging.  Indications for carotid revascularization with CEA versus ASHLEY to be considered based on results of CUS. Would consider conventional angiogram in case of equivocal results. Timing of carotid revascularization is expected to be in about 10-14 days from the stroke considering the extent/large size of the infarct - to be further discussed with NSGY team     PULMONARY: CXR (06/29/19)  Clear lungs. Protecting airway, saturating well     CARDIOVASCULAR: Check TTE with bubble study, cardiac monitoring no events, Dr Luz (cardiology) eval appreciated, will obtain cardiac clearance for Right CEA vs carotid stent. Gradual normotension in the setting of carotid stenosis careful initiation of home meds and will monitor for heart failure signs since patient endorses hx of chronic CHF.                             SBP goal:<160    GASTROINTESTINAL: Dysphagia screen failed. S/S eval recommended NPO, d/w family re: alternate means of nutrition, risks/benefits/alternatives. PEG placed on 07/03     Diet: NPO with TF via PEG    RENAL: BUN/Cr without acute change, maintain adequate hydration, good urine output, urine toxicology: Negative      Na Goal: Greater than 135     C[place]rena: N    HEMATOLOGY: H/H without acute change, Platelets 287,  LE doppler ordered to r/o DVT     DVT ppx:  LMWH    ID: afebrile, no overt si/sx of infection    OTHER: HgbA1C 10.4, continue Humalog sliding scale and DM meds as per endocrine recommendations    DISPOSITION: Acute Rehab if able to receive [facility]. She is undocumented and uninsured - plan to optimize therapy while in hospital.     CORE MEASURES:        Admission NIHSS: 13     TPA:  YES       LDL/HDL: 131/49     Depression Screen: 0     Statin Therapy: Y     Dysphagia Screen:  FAIL     Smoking  NO      Afib  NO     Stroke Education  YES    Obtain screening ultrasound in patients who meet 1 or more of the following criteria as patient is high risk for DVT/PE upon admission:   [] History of DVT/PE  []Hypercoagulable states (Factor V Leiden, Cancer, OCP, etc. )  [X]Prolonged immobility (hemiplegia/hemiparesis/post operative or any other extended immobilization)   [] Transferred from outside facility (Rehab or Long term care)  [] Age </= to 50

## 2019-07-05 NOTE — PROGRESS NOTE ADULT - SUBJECTIVE AND OBJECTIVE BOX
INTERVAL HPI/OVERNIGHT EVENTS:    Pt seen and examined with daughter at bedside. Tolerating feeds, no leakage/drainage from peg site    MEDICATIONS  (STANDING):  aspirin  chewable 81 milliGRAM(s) Oral daily  atorvastatin 80 milliGRAM(s) Oral at bedtime  dextrose 5%. 1000 milliLiter(s) (50 mL/Hr) IV Continuous <Continuous>  dextrose 50% Injectable 12.5 Gram(s) IV Push once  dextrose 50% Injectable 25 Gram(s) IV Push once  dextrose 50% Injectable 25 Gram(s) IV Push once  enoxaparin Injectable 40 milliGRAM(s) SubCutaneous daily  FLUoxetine Solution 20 milliGRAM(s) Oral daily  insulin lispro (HumaLOG) corrective regimen sliding scale   SubCutaneous every 6 hours  insulin NPH human recombinant 18 Unit(s) SubCutaneous every 6 hours  levETIRAcetam  Solution 500 milliGRAM(s) Oral two times a day    MEDICATIONS  (PRN):  acetaminophen    Suspension .. 650 milliGRAM(s) Oral every 6 hours PRN Temp greater or equal to 38C (100.4F)  acetaminophen    Suspension .. 650 milliGRAM(s) Enteral Tube every 6 hours PRN Moderate Pain (4 - 6)  dextrose 40% Gel 15 Gram(s) Oral once PRN Blood Glucose LESS THAN 70 milliGRAM(s)/deciliter  glucagon  Injectable 1 milliGRAM(s) IntraMuscular once PRN Glucose LESS THAN 70 milligrams/deciliter  LORazepam   Injectable 2 milliGRAM(s) IV Push once PRN Seizure Activity  nystatin Powder 1 Application(s) Topical two times a day PRN dermatitis      Allergies    No Known Allergies    Intolerances        Review of Systems:    General:  No wt loss, fevers, chills, night sweats,fatigue,   Eyes:  Good vision, no reported pain  ENT:  No sore throat, pain, runny nose, dysphagia  CV:  No pain, palpitations, hypo/hypertension  Resp:  No dyspnea, cough, tachypnea, wheezing  GI:  No pain, No nausea, No vomiting, No diarrhea, No constipation, No weight loss, No fever, No pruritis, No rectal bleeding, No melena, No dysphagia  :  No pain, bleeding, incontinence, nocturia  Muscle:  No pain, weakness  Neuro:  No weakness, tingling, memory problems  Psych:  No fatigue, insomnia, mood problems, depression  Endocrine:  No polyuria, polydypsia, cold/heat intolerance  Heme:  No petechiae, ecchymosis, easy bruisability  Skin:  No rash, tattoos, scars, edema      Vital Signs Last 24 Hrs  T(C): 36.7 (04 Jul 2019 20:00), Max: 36.7 (04 Jul 2019 20:00)  T(F): 98 (04 Jul 2019 20:00), Max: 98 (04 Jul 2019 20:00)  HR: 71 (05 Jul 2019 09:20) (71 - 91)  BP: 152/77 (05 Jul 2019 09:20) (122/65 - 155/72)  BP(mean): 100 (05 Jul 2019 09:20) (78 - 100)  RR: 19 (05 Jul 2019 09:20) (14 - 23)  SpO2: 95% (05 Jul 2019 09:20) (95% - 99%)    PHYSICAL EXAM:    GENERAL:  Appears stated age, well-groomed, well-nourished, no distress  HEENT:  NC/AT,  conjunctivae clear and pink, no thyromegaly, nodules, adenopathy, no JVD, sclera -anicteric  CHEST:  Full & symmetric excursion, no increased effort, breath sounds clear  HEART:  Regular rhythm, S1, S2, no murmur/rub/S3/S4, no abdominal bruit, no edema  ABDOMEN:  Soft, non-tender, non-distended, normoactive bowel sounds,  + PEG c/d/i  EXTEREMITIES:  no cyanosis,clubbing or edema  SKIN:  No rash/erythema/ecchymoses/petechiae/wounds/abscess/warm/dry  NEURO: opens to voice and light touch, drowsy but easily arousable, eyelid opening apraxia, oriented to name, place and year, fluent speech      LABS:                        13.1   7.4   )-----------( 287      ( 05 Jul 2019 04:48 )             39.4     07-05    142  |  104  |  24<H>  ----------------------------<  254<H>  4.4   |  25  |  0.97    Ca    8.9      05 Jul 2019 04:48            RADIOLOGY & ADDITIONAL TESTS:

## 2019-07-05 NOTE — PROGRESS NOTE ADULT - SUBJECTIVE AND OBJECTIVE BOX
Diabetes Follow up note:  Interval Hx:  54 year old female w/uncontrolled T2DM (on insulin PTA) w/retinopathy, morbid obesity here w/CVA. Pt s/p PEG on continuous feeds (glucerna) now at goal. Pt restarted on NPH regimen yesterday w/glucose values persistently elevated >250mg/dl. Pt seen at bedside w/daughter present. Daughter states mother was following very closely w/outpt endo and was adherent to taking meds. Follows w/Dr Wells affiliated w/[facility].       Review of Systems:  General: + endorses pain around PEG site.   GI: Tolerating TFs without any N/V/D/ABD PAIN.  CV: No CP/SOB  ENDO: No S&Sx of hypoglycemia  MEDS:  atorvastatin 80 milliGRAM(s) Oral at bedtime    insulin lispro (HumaLOG) corrective regimen sliding scale   SubCutaneous every 6 hours  insulin NPH human recombinant 12 Unit(s) SubCutaneous every 6 hours      Allergies    No Known Allergies        PE:  General: Female lying in bed. NAD.   Vital Signs Last 24 Hrs  T(C): 36.7 (04 Jul 2019 20:00), Max: 36.7 (04 Jul 2019 20:00)  T(F): 98 (04 Jul 2019 20:00), Max: 98 (04 Jul 2019 20:00)  HR: 71 (05 Jul 2019 09:20) (71 - 91)  BP: 152/77 (05 Jul 2019 09:20) (122/65 - 155/72)  BP(mean): 100 (05 Jul 2019 09:20) (78 - 100)  RR: 19 (05 Jul 2019 09:20) (14 - 23)  SpO2: 95% (05 Jul 2019 09:20) (95% - 99%)  CV: S1, S2. NSR on monitor.   Abd: Soft, NT,ND, PEG site c/d/i. Obese.   Extremities: Warm. no edema  Neuro: Awake. Nods yes/no to questions.     LABS:    POCT Blood Glucose.: 255 mg/dL (07-05-19 @ 05:14)  POCT Blood Glucose.: 275 mg/dL (07-04-19 @ 23:34)  POCT Blood Glucose.: 249 mg/dL (07-04-19 @ 17:08)  POCT Blood Glucose.: 198 mg/dL (07-04-19 @ 11:15)  POCT Blood Glucose.: 248 mg/dL (07-04-19 @ 05:45)  POCT Blood Glucose.: 226 mg/dL (07-03-19 @ 23:03)  POCT Blood Glucose.: 211 mg/dL (07-03-19 @ 18:22)  POCT Blood Glucose.: 225 mg/dL (07-03-19 @ 12:11)  POCT Blood Glucose.: 144 mg/dL (07-03-19 @ 05:27)  POCT Blood Glucose.: 186 mg/dL (07-02-19 @ 23:28)  POCT Blood Glucose.: 223 mg/dL (07-02-19 @ 17:33)  POCT Blood Glucose.: 232 mg/dL (07-02-19 @ 11:51)                            13.1   7.4   )-----------( 287      ( 05 Jul 2019 04:48 )             39.4       07-05    142  |  104  |  24<H>  ----------------------------<  254<H>  4.4   |  25  |  0.97    Ca    8.9      05 Jul 2019 04:48          Hemoglobin A1C, Whole Blood: 10.4 % <H> [4.0 - 5.6] (06-29-19 @ 09:33)            Contact number: [place] [phone] or [phone]

## 2019-07-05 NOTE — PROGRESS NOTE ADULT - SUBJECTIVE AND OBJECTIVE BOX
THE PATIENT WAS SEEN AND EXAMINED BY ME WITH THE HOUSESTAFF AND STROKE TEAM DURING MORNING ROUNDS.     HPI:  Patient is a 54 year old Female w/ PMHx of HTN, HLD, DM transferred from [facility] for evaluation of stroke. Per daughter at bedside, last known normal 2pm on day of admission, patient had sudden onset left sided paralysis, slurred speech, received TPA at at [place]. NIHSS 13, MRS 1    SUBJECTIVE: No events overnight.  No new neurologic complaints.      ROS: All negative except documented above.    acetaminophen    Suspension .. 650 milliGRAM(s) Oral every 6 hours PRN  acetaminophen    Suspension .. 650 milliGRAM(s) Enteral Tube every 6 hours PRN  aspirin  chewable 81 milliGRAM(s) Oral daily  atorvastatin 80 milliGRAM(s) Oral at bedtime  dextrose 40% Gel 15 Gram(s) Oral once PRN  dextrose 5%. 1000 milliLiter(s) IV Continuous <Continuous>  dextrose 50% Injectable 12.5 Gram(s) IV Push once  dextrose 50% Injectable 25 Gram(s) IV Push once  dextrose 50% Injectable 25 Gram(s) IV Push once  enoxaparin Injectable 40 milliGRAM(s) SubCutaneous daily  FLUoxetine Solution 20 milliGRAM(s) Oral daily  glucagon  Injectable 1 milliGRAM(s) IntraMuscular once PRN  insulin lispro (HumaLOG) corrective regimen sliding scale   SubCutaneous every 6 hours  insulin NPH human recombinant 12 Unit(s) SubCutaneous every 6 hours  levETIRAcetam  Solution 500 milliGRAM(s) Oral two times a day  LORazepam   Injectable 2 milliGRAM(s) IV Push once PRN  nystatin Powder 1 Application(s) Topical two times a day PRN    PHYSICAL EXAM:   Vital Signs Last 24 Hrs  T(C): 36.7 (04 Jul 2019 20:00), Max: 36.7 (04 Jul 2019 20:00)  T(F): 98 (04 Jul 2019 20:00), Max: 98 (04 Jul 2019 20:00)  HR: 72 (05 Jul 2019 06:00) (72 - 91)  BP: 149/72 (05 Jul 2019 06:00) (122/65 - 166/65)  BP(mean): 95 (05 Jul 2019 06:00) (78 - 95)  RR: 15 (05 Jul 2019 06:00) (14 - 23)  SpO2: 98% (05 Jul 2019 06:00) (96% - 99%)    General: No acute distress  HEENT: EOM intact, left eye legally blind  Abdomen: Soft, nontender, nondistended   Extremities: No edema    NEUROLOGICAL EXAM:  Mental status: Eyes closed, opens to voice and light touch, drowsy but easily arousable, eyelid opening apraxia, oriented to name, place and year, fluent speech, repetition intact, No asomatognosia or anosognosia  Cranial Nerves: UMN type left facial droop, no nystagmus, moderate to severe dysarthria  Motor exam: RUE /RLE spontaneous movement against gravity without significant noticeable weakness, Left hypotonic hemiplegia   Sensation: Left side appears intact to noxious stimuli, left side neglect to double simultaneous stimulation   Coordination/ Gait: No dysmetria on right side     LABS:                        13.1   7.4   )-----------( 287      ( 05 Jul 2019 04:48 )             39.4    07-05    142  |  104  |  24<H>  ----------------------------<  254<H>  4.4   |  25  |  0.97    Ca    8.9      05 Jul 2019 04:48    Hemoglobin A1C, Whole Blood: 10.4 % (06-29 @ 09:33)    IMAGING: Reviewed by me.     CT Head No Cont (07.04.19)  Increasing lucency with new mass effect and mild midline   shift to the left involving acute right anterior cerebral artery infarct   with involvement of the right corona radiata and centrum semiovale ovale   compared with 6/29/2019. Petechial hemorrhage along the high convexity.    MRA BRAIN/NECK (07/02/19): In comparison with CTA,  atherosclerotic calcification with stenosis of the right greater than left cavernous and supraclinoid ICA segments.   Poorly delineated distal right ICA bifurcation segment,  right A1   segment, with proximal right M1 segment stenosis and poststenotic   dilatation. Stenosis with narrowing of the distal left M1 MCA segment.   There is a dominant left A1 supplying the anterior communicating artery,   there is multifocal stenosis of the distal MCA branch vasculature.    Dominant intradural right vertebral artery supplies the basilar artery,   the left vertebral artery is hypoplastic and poorly visualized in the   cervical course and intradurally with multifocal  narrowing and stenosis.   There are multifocal stenosis of the posterior cerebral arteries.    Possible bovine left common carotid artery, proximal left subclavian   artery is not well-visualized  with possible narrowing. Bilateral common   carotid arteries are patent,  right ICA origin stenosis of approximately   50%, right ICA is decreased in caliber, left ICA is dominant and patent   without ICA origin   stenosis. Vessels are tortuous likely reflecting   hypertension.      Brain MRI (06/29/19) There is evidence of abnormal stricture diffusion seen involving the right frontal parietal region. Involvement of the right basal ganglia region is seen as well. This is compatible with an area of acute infarct.   Involvement of the right anterior cerebral and middle cerebral artery territories are identified. Scattered areas of abnormal susceptibility are identified which is likely compatible with some hemorrhagic transformation. Localized mass effect is seen consisting of sulcal effacement. No significant shift or herniation is seen.    Head CT (06/30/19) Abnormal areas of low-attenuation involving the right frontal and right parietal cortical subcortical region are again seen. These findings are compatible with areas of old infarcts. Abnormal low-attenuation involving the left alla is also again seen which could be compatible with an age-indeterminate infarct. There is no evidence of abnormal low-attenuation seen involving the high right frontal cortical subcortical region. Involvement of the right basal   ganglia and anterior corona radiata regions as well. This is predominantly seen medially and compatible with an acute right anterior cerebral artery infarct. There is localized mass effect consisting of sulcal effacement. No significant shift or herniation is seen.     CTA Head/Neck (06/28/19) CT ANGIOGRAPHY NECK:  Markedly limited examination. Tandem moderate to severe stenoses are suspected in the right carotid bulb and proximal right internal carotid artery. 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    CT ANGIOGRAPHY BRAIN: Markedly limited examination. Limited evaluation of the intracranial internal carotid arteries. Stenoses or occlusion in the distal cavernous segments and ophthalmic segments of the bilateral internal carotid arteries cannot be excluded. Apparent severe stenosis in the distal supraclinoid right internal carotid artery and right carotid terminus with possible occlusion of the A1 segment of the right anterior cerebral artery.  The right A2 segment fills across the anterior to indicating artery.  There appears to be posterior distal right ENRIQUE vessels within the medial right frontal lobe, raising the possibility of early cerebral infarction.

## 2019-07-05 NOTE — PROGRESS NOTE ADULT - ASSESSMENT
54 year old female w/uncontrolled T2DM (HbA1c 10.4%) w/ retinopathy here w/CVA. Pt w/dysphagia s/p PEG placement on continuous tube feeds of Glucerna now titrated up to goal. BG goal (100-180mg/dl). BG values persistently elevated so will increase NPH regimen to improve glycemic control.

## 2019-07-05 NOTE — PROGRESS NOTE ADULT - PROBLEM SELECTOR PLAN 1
-test BG Q6h  -Increase NPH 18 units Q6h (hold NPH if tube feeds off)  -c/w Humalog moderate correction scale Q6h  -Discharge planning to be dependent on tube feed regimen at discharge.   -Can f/u w/Dr Wells @ [facility]  -discussed w/pt's daughter and team  pager: [phone]

## 2019-07-06 ENCOUNTER — TRANSCRIPTION ENCOUNTER (OUTPATIENT)
Age: 54
End: 2019-07-06

## 2019-07-06 DIAGNOSIS — E78.5 HYPERLIPIDEMIA, UNSPECIFIED: ICD-10-CM

## 2019-07-06 DIAGNOSIS — I10 ESSENTIAL (PRIMARY) HYPERTENSION: ICD-10-CM

## 2019-07-06 LAB
ANION GAP SERPL CALC-SCNC: 13 MMOL/L — SIGNIFICANT CHANGE UP (ref 5–17)
BUN SERPL-MCNC: 24 MG/DL — HIGH (ref 7–23)
CALCIUM SERPL-MCNC: 9 MG/DL — SIGNIFICANT CHANGE UP (ref 8.4–10.5)
CHLORIDE SERPL-SCNC: 103 MMOL/L — SIGNIFICANT CHANGE UP (ref 96–108)
CO2 SERPL-SCNC: 26 MMOL/L — SIGNIFICANT CHANGE UP (ref 22–31)
CREAT SERPL-MCNC: 0.87 MG/DL — SIGNIFICANT CHANGE UP (ref 0.5–1.3)
CULTURE RESULTS: SIGNIFICANT CHANGE UP
GLUCOSE BLDC GLUCOMTR-MCNC: 161 MG/DL — HIGH (ref 70–99)
GLUCOSE BLDC GLUCOMTR-MCNC: 186 MG/DL — HIGH (ref 70–99)
GLUCOSE BLDC GLUCOMTR-MCNC: 187 MG/DL — HIGH (ref 70–99)
GLUCOSE BLDC GLUCOMTR-MCNC: 215 MG/DL — HIGH (ref 70–99)
GLUCOSE SERPL-MCNC: 205 MG/DL — HIGH (ref 70–99)
HCT VFR BLD CALC: 37.9 % — SIGNIFICANT CHANGE UP (ref 34.5–45)
HGB BLD-MCNC: 13 G/DL — SIGNIFICANT CHANGE UP (ref 11.5–15.5)
MCHC RBC-ENTMCNC: 29.8 PG — SIGNIFICANT CHANGE UP (ref 27–34)
MCHC RBC-ENTMCNC: 34.3 GM/DL — SIGNIFICANT CHANGE UP (ref 32–36)
MCV RBC AUTO: 86.8 FL — SIGNIFICANT CHANGE UP (ref 80–100)
PLATELET # BLD AUTO: 302 K/UL — SIGNIFICANT CHANGE UP (ref 150–400)
POTASSIUM SERPL-MCNC: 4.2 MMOL/L — SIGNIFICANT CHANGE UP (ref 3.5–5.3)
POTASSIUM SERPL-SCNC: 4.2 MMOL/L — SIGNIFICANT CHANGE UP (ref 3.5–5.3)
RBC # BLD: 4.37 M/UL — SIGNIFICANT CHANGE UP (ref 3.8–5.2)
RBC # FLD: 12.9 % — SIGNIFICANT CHANGE UP (ref 10.3–14.5)
SODIUM SERPL-SCNC: 142 MMOL/L — SIGNIFICANT CHANGE UP (ref 135–145)
SPECIMEN SOURCE: SIGNIFICANT CHANGE UP
WBC # BLD: 7.3 K/UL — SIGNIFICANT CHANGE UP (ref 3.8–10.5)
WBC # FLD AUTO: 7.3 K/UL — SIGNIFICANT CHANGE UP (ref 3.8–10.5)

## 2019-07-06 PROCEDURE — 99233 SBSQ HOSP IP/OBS HIGH 50: CPT

## 2019-07-06 RX ADMIN — Medication 2: at 06:34

## 2019-07-06 RX ADMIN — POLYETHYLENE GLYCOL 3350 17 GRAM(S): 17 POWDER, FOR SOLUTION ORAL at 11:03

## 2019-07-06 RX ADMIN — HUMAN INSULIN 18 UNIT(S): 100 INJECTION, SUSPENSION SUBCUTANEOUS at 06:35

## 2019-07-06 RX ADMIN — Medication 20 MILLIGRAM(S): at 11:03

## 2019-07-06 RX ADMIN — HUMAN INSULIN 18 UNIT(S): 100 INJECTION, SUSPENSION SUBCUTANEOUS at 23:53

## 2019-07-06 RX ADMIN — ATORVASTATIN CALCIUM 80 MILLIGRAM(S): 80 TABLET, FILM COATED ORAL at 21:48

## 2019-07-06 RX ADMIN — Medication 2: at 23:54

## 2019-07-06 RX ADMIN — Medication 4: at 17:32

## 2019-07-06 RX ADMIN — ENOXAPARIN SODIUM 40 MILLIGRAM(S): 100 INJECTION SUBCUTANEOUS at 11:03

## 2019-07-06 RX ADMIN — HUMAN INSULIN 18 UNIT(S): 100 INJECTION, SUSPENSION SUBCUTANEOUS at 00:02

## 2019-07-06 RX ADMIN — LEVETIRACETAM 500 MILLIGRAM(S): 250 TABLET, FILM COATED ORAL at 05:02

## 2019-07-06 RX ADMIN — HUMAN INSULIN 18 UNIT(S): 100 INJECTION, SUSPENSION SUBCUTANEOUS at 17:32

## 2019-07-06 RX ADMIN — Medication 81 MILLIGRAM(S): at 11:03

## 2019-07-06 RX ADMIN — HUMAN INSULIN 18 UNIT(S): 100 INJECTION, SUSPENSION SUBCUTANEOUS at 11:38

## 2019-07-06 RX ADMIN — Medication 2: at 11:37

## 2019-07-06 RX ADMIN — Medication 2: at 00:02

## 2019-07-06 RX ADMIN — LEVETIRACETAM 500 MILLIGRAM(S): 250 TABLET, FILM COATED ORAL at 17:32

## 2019-07-06 NOTE — DISCHARGE NOTE PROVIDER - CARE PROVIDERS DIRECT ADDRESSES
,DirectAddress_Unknown,DirectAddress_Unknown,DirectAddress_Unknown ,DirectAddress_Unknown,DirectAddress_Unknown,DirectAddress_Unknown,[email or web address] ,DirectAddress_Unknown,DirectAddress_Unknown,DirectAddress_Unknown,[email or web address],DirectAddress_Unknown

## 2019-07-06 NOTE — PROGRESS NOTE ADULT - SUBJECTIVE AND OBJECTIVE BOX
THE PATIENT WAS SEEN AND EXAMINED BY ME WITH THE HOUSESTAFF AND STROKE TEAM DURING MORNING ROUNDS.   HPI:  Patient is a 54 year old Female w/ PMHx of HTN, HLD, DM transferred from [facility] for evaluation of stroke. Per daughter at bedside, last known normal 2pm on day of admission, patient had sudden onset left sided paralysis, slurred speech, received TPA at at [place]. NIHSS 13, MRS 1    SUBJECTIVE: No events overnight.  No new neurologic complaints.      acetaminophen    Suspension .. 650 milliGRAM(s) Oral every 6 hours PRN  acetaminophen    Suspension .. 650 milliGRAM(s) Enteral Tube every 6 hours PRN  aspirin  chewable 81 milliGRAM(s) Oral daily  atorvastatin 80 milliGRAM(s) Oral at bedtime  dextrose 40% Gel 15 Gram(s) Oral once PRN  dextrose 5%. 1000 milliLiter(s) IV Continuous <Continuous>  dextrose 50% Injectable 12.5 Gram(s) IV Push once  dextrose 50% Injectable 25 Gram(s) IV Push once  dextrose 50% Injectable 25 Gram(s) IV Push once  enoxaparin Injectable 40 milliGRAM(s) SubCutaneous daily  FLUoxetine Solution 20 milliGRAM(s) Oral daily  glucagon  Injectable 1 milliGRAM(s) IntraMuscular once PRN  insulin lispro (HumaLOG) corrective regimen sliding scale   SubCutaneous every 6 hours  insulin NPH human recombinant 18 Unit(s) SubCutaneous every 6 hours  levETIRAcetam  Solution 500 milliGRAM(s) Oral two times a day  LORazepam   Injectable 2 milliGRAM(s) IV Push once PRN  nystatin Powder 1 Application(s) Topical two times a day PRN  polyethylene glycol 3350 17 Gram(s) Oral daily      PHYSICAL EXAM:   Vital Signs Last 24 Hrs  T(C): 36.7 (05 Jul 2019 20:00), Max: 37.3 (05 Jul 2019 12:00)  T(F): 98 (05 Jul 2019 20:00), Max: 99.1 (05 Jul 2019 12:00)  HR: 77 (06 Jul 2019 04:00) (71 - 84)  BP: 158/74 (06 Jul 2019 04:00) (142/58 - 159/62)  BP(mean): 98 (06 Jul 2019 04:00) (80 - 100)  RR: 17 (06 Jul 2019 04:00) (17 - 21)  SpO2: 97% (06 Jul 2019 04:00) (95% - 100%)    General: No acute distress  HEENT: EOM intact, left eye legally blind  Abdomen: Soft, nontender, nondistended   Extremities: No edema    NEUROLOGICAL EXAM:  Mental status: Eyes closed, opens to voice and light touch, drowsy but easily arousable, eyelid opening apraxia, oriented to name, place and year, fluent speech, repetition intact, No asomatognosia or anosognosia  Cranial Nerves: UMN type left facial droop, no nystagmus, moderate to severe dysarthria  Motor exam: RUE /RLE spontaneous movement against gravity without significant noticeable weakness, Left hypotonic hemiplegia   Sensation: Left side appears intact to noxious stimuli, left side neglect to double simultaneous stimulation   Coordination/ Gait: No dysmetria on right side     LABS:                        13.0   7.3   )-----------( 302      ( 06 Jul 2019 04:15 )             37.9    07-06    142  |  103  |  24<H>  ----------------------------<  205<H>  4.2   |  26  |  0.87    Ca    9.0      06 Jul 2019 04:15      Hemoglobin A1C, Whole Blood: 10.4 % (06-29 @ 09:33)      IMAGING: Reviewed by me.   VA Duplex Carotid, Bilat (07.05.19)   Diffuse intimal thickening without duplex evidence of   hemodynamically significant stenosis.     CT Head No Cont (07.04.19)  Increasing lucency with new mass effect and mild midline   shift to the left involving acute right anterior cerebral artery infarct   with involvement of the right corona radiata and centrum semiovale ovale   compared with 6/29/2019. Petechial hemorrhage along the high convexity.    MRA BRAIN/NECK (07/02/19): In comparison with CTA,  atherosclerotic calcification with stenosis of the right greater than left cavernous and supraclinoid ICA segments.   Poorly delineated distal right ICA bifurcation segment,  right A1   segment, with proximal right M1 segment stenosis and poststenotic   dilatation. Stenosis with narrowing of the distal left M1 MCA segment.   There is a dominant left A1 supplying the anterior communicating artery,   there is multifocal stenosis of the distal MCA branch vasculature.    Dominant intradural right vertebral artery supplies the basilar artery,   the left vertebral artery is hypoplastic and poorly visualized in the   cervical course and intradurally with multifocal  narrowing and stenosis.   There are multifocal stenosis of the posterior cerebral arteries.    Possible bovine left common carotid artery, proximal left subclavian   artery is not well-visualized  with possible narrowing. Bilateral common   carotid arteries are patent,  right ICA origin stenosis of approximately   50%, right ICA is decreased in caliber, left ICA is dominant and patent   without ICA origin   stenosis. Vessels are tortuous likely reflecting   hypertension.      Brain MRI (06/29/19) There is evidence of abnormal stricture diffusion seen involving the right frontal parietal region. Involvement of the right basal ganglia region is seen as well. This is compatible with an area of acute infarct.   Involvement of the right anterior cerebral and middle cerebral artery territories are identified. Scattered areas of abnormal susceptibility are identified which is likely compatible with some hemorrhagic transformation. Localized mass effect is seen consisting of sulcal effacement. No significant shift or herniation is seen.    Head CT (06/30/19) Abnormal areas of low-attenuation involving the right frontal and right parietal cortical subcortical region are again seen. These findings are compatible with areas of old infarcts. Abnormal low-attenuation involving the left alla is also again seen which could be compatible with an age-indeterminate infarct. There is no evidence of abnormal low-attenuation seen involving the high right frontal cortical subcortical region. Involvement of the right basal   ganglia and anterior corona radiata regions as well. This is predominantly seen medially and compatible with an acute right anterior cerebral artery infarct. There is localized mass effect consisting of sulcal effacement. No significant shift or herniation is seen.     CTA Head/Neck (06/28/19) CT ANGIOGRAPHY NECK:  Markedly limited examination. Tandem moderate to severe stenoses are suspected in the right carotid bulb and proximal right internal carotid artery. 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    CT ANGIOGRAPHY BRAIN: Markedly limited examination. Limited evaluation of the intracranial internal carotid arteries. Stenoses or occlusion in the distal cavernous segments and ophthalmic segments of the bilateral internal carotid arteries cannot be excluded. Apparent severe stenosis in the distal supraclinoid right internal carotid artery and right carotid terminus with possible occlusion of the A1 segment of the right anterior cerebral artery.  The right A2 segment fills across the anterior to indicating artery.  There appears to be posterior distal right ENRIQUE vessels within the medial right frontal lobe, raising the possibility of early cerebral infarction. THE PATIENT WAS SEEN AND EXAMINED BY ME WITH THE HOUSESTAFF AND STROKE TEAM DURING MORNING ROUNDS.   HPI:  Patient is a 54 year old Female w/ PMHx of HTN, HLD, DM transferred from [facility] for evaluation of stroke. Per daughter at bedside, last known normal 2pm on day of admission, patient had sudden onset left sided paralysis, slurred speech, received TPA at at [place]. NIHSS 13, MRS 1    SUBJECTIVE: No events overnight.  No new neurologic complaints.      acetaminophen    Suspension .. 650 milliGRAM(s) Oral every 6 hours PRN  acetaminophen    Suspension .. 650 milliGRAM(s) Enteral Tube every 6 hours PRN  aspirin  chewable 81 milliGRAM(s) Oral daily  atorvastatin 80 milliGRAM(s) Oral at bedtime  dextrose 40% Gel 15 Gram(s) Oral once PRN  dextrose 5%. 1000 milliLiter(s) IV Continuous <Continuous>  dextrose 50% Injectable 12.5 Gram(s) IV Push once  dextrose 50% Injectable 25 Gram(s) IV Push once  dextrose 50% Injectable 25 Gram(s) IV Push once  enoxaparin Injectable 40 milliGRAM(s) SubCutaneous daily  FLUoxetine Solution 20 milliGRAM(s) Oral daily  glucagon  Injectable 1 milliGRAM(s) IntraMuscular once PRN  insulin lispro (HumaLOG) corrective regimen sliding scale   SubCutaneous every 6 hours  insulin NPH human recombinant 18 Unit(s) SubCutaneous every 6 hours  levETIRAcetam  Solution 500 milliGRAM(s) Oral two times a day  LORazepam   Injectable 2 milliGRAM(s) IV Push once PRN  nystatin Powder 1 Application(s) Topical two times a day PRN  polyethylene glycol 3350 17 Gram(s) Oral daily      PHYSICAL EXAM:   Vital Signs Last 24 Hrs  T(C): 36.7 (05 Jul 2019 20:00), Max: 37.3 (05 Jul 2019 12:00)  T(F): 98 (05 Jul 2019 20:00), Max: 99.1 (05 Jul 2019 12:00)  HR: 77 (06 Jul 2019 04:00) (71 - 84)  BP: 158/74 (06 Jul 2019 04:00) (142/58 - 159/62)  BP(mean): 98 (06 Jul 2019 04:00) (80 - 100)  RR: 17 (06 Jul 2019 04:00) (17 - 21)  SpO2: 97% (06 Jul 2019 04:00) (95% - 100%)    General: No acute distress  HEENT: EOM intact, left eye legally blind  Abdomen: Soft, nontender, nondistended , PEG site C/D/ dressing intact, no drainage   Extremities: No edema    NEUROLOGICAL EXAM:  Mental status: Eyes open, awake, alert, oriented to name, place and year, fluent speech, repetition intact, No asomatognosia or anosognosia, follows simple commands   Cranial Nerves: UMN type left facial droop, no nystagmus, moderate to severe dysarthria, LHHA to threat   Motor exam: RUE /RLE spontaneous movement against gravity without significant noticeable weakness, Left hypotonic hemiplegia   Sensation: Left side appears intact to noxious stimuli, left side neglect to double simultaneous stimulation   Coordination/ Gait: No dysmetria on right side     LABS:                        13.0   7.3   )-----------( 302      ( 06 Jul 2019 04:15 )             37.9    07-06    142  |  103  |  24<H>  ----------------------------<  205<H>  4.2   |  26  |  0.87    Ca    9.0      06 Jul 2019 04:15      Hemoglobin A1C, Whole Blood: 10.4 % (06-29 @ 09:33)      IMAGING: Reviewed by me.   VA Duplex Carotid, Bilat (07.05.19)   Diffuse intimal thickening without duplex evidence of   hemodynamically significant stenosis.     CT Head No Cont (07.04.19)  Increasing lucency with new mass effect and mild midline   shift to the left involving acute right anterior cerebral artery infarct   with involvement of the right corona radiata and centrum semiovale ovale   compared with 6/29/2019. Petechial hemorrhage along the high convexity.    MRA BRAIN/NECK (07/02/19): In comparison with CTA,  atherosclerotic calcification with stenosis of the right greater than left cavernous and supraclinoid ICA segments.   Poorly delineated distal right ICA bifurcation segment,  right A1   segment, with proximal right M1 segment stenosis and poststenotic   dilatation. Stenosis with narrowing of the distal left M1 MCA segment.   There is a dominant left A1 supplying the anterior communicating artery,   there is multifocal stenosis of the distal MCA branch vasculature.    Dominant intradural right vertebral artery supplies the basilar artery,   the left vertebral artery is hypoplastic and poorly visualized in the   cervical course and intradurally with multifocal  narrowing and stenosis.   There are multifocal stenosis of the posterior cerebral arteries.    Possible bovine left common carotid artery, proximal left subclavian   artery is not well-visualized  with possible narrowing. Bilateral common   carotid arteries are patent,  right ICA origin stenosis of approximately   50%, right ICA is decreased in caliber, left ICA is dominant and patent   without ICA origin   stenosis. Vessels are tortuous likely reflecting   hypertension.      Brain MRI (06/29/19) There is evidence of abnormal stricture diffusion seen involving the right frontal parietal region. Involvement of the right basal ganglia region is seen as well. This is compatible with an area of acute infarct.   Involvement of the right anterior cerebral and middle cerebral artery territories are identified. Scattered areas of abnormal susceptibility are identified which is likely compatible with some hemorrhagic transformation. Localized mass effect is seen consisting of sulcal effacement. No significant shift or herniation is seen.    Head CT (06/30/19) Abnormal areas of low-attenuation involving the right frontal and right parietal cortical subcortical region are again seen. These findings are compatible with areas of old infarcts. Abnormal low-attenuation involving the left alla is also again seen which could be compatible with an age-indeterminate infarct. There is no evidence of abnormal low-attenuation seen involving the high right frontal cortical subcortical region. Involvement of the right basal   ganglia and anterior corona radiata regions as well. This is predominantly seen medially and compatible with an acute right anterior cerebral artery infarct. There is localized mass effect consisting of sulcal effacement. No significant shift or herniation is seen.     CTA Head/Neck (06/28/19) CT ANGIOGRAPHY NECK:  Markedly limited examination. Tandem moderate to severe stenoses are suspected in the right carotid bulb and proximal right internal carotid artery. 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    CT ANGIOGRAPHY BRAIN: Markedly limited examination. Limited evaluation of the intracranial internal carotid arteries. Stenoses or occlusion in the distal cavernous segments and ophthalmic segments of the bilateral internal carotid arteries cannot be excluded. Apparent severe stenosis in the distal supraclinoid right internal carotid artery and right carotid terminus with possible occlusion of the A1 segment of the right anterior cerebral artery.  The right A2 segment fills across the anterior to indicating artery.  There appears to be posterior distal right ENRIQUE vessels within the medial right frontal lobe, raising the possibility of early cerebral infarction. THE PATIENT WAS SEEN AND EXAMINED BY ME WITH THE HOUSESTAFF AND STROKE TEAM DURING MORNING ROUNDS.   HPI:  Patient is a 54 year old Female w/ PMHx of HTN, HLD, DM transferred from [facility] for evaluation of stroke. Per daughter at bedside, last known normal 2pm on day of admission, patient had sudden onset left sided paralysis, slurred speech, received TPA at at [place]. NIHSS 13, MRS 1    SUBJECTIVE: No events overnight.  No new neurologic complaints. C/O mild soreness at PEG site .     ROS: all negative unless otherwise specified above.      acetaminophen    Suspension .. 650 milliGRAM(s) Oral every 6 hours PRN  acetaminophen    Suspension .. 650 milliGRAM(s) Enteral Tube every 6 hours PRN  aspirin  chewable 81 milliGRAM(s) Oral daily  atorvastatin 80 milliGRAM(s) Oral at bedtime  dextrose 40% Gel 15 Gram(s) Oral once PRN  dextrose 5%. 1000 milliLiter(s) IV Continuous <Continuous>  dextrose 50% Injectable 12.5 Gram(s) IV Push once  dextrose 50% Injectable 25 Gram(s) IV Push once  dextrose 50% Injectable 25 Gram(s) IV Push once  enoxaparin Injectable 40 milliGRAM(s) SubCutaneous daily  FLUoxetine Solution 20 milliGRAM(s) Oral daily  glucagon  Injectable 1 milliGRAM(s) IntraMuscular once PRN  insulin lispro (HumaLOG) corrective regimen sliding scale   SubCutaneous every 6 hours  insulin NPH human recombinant 18 Unit(s) SubCutaneous every 6 hours  levETIRAcetam  Solution 500 milliGRAM(s) Oral two times a day  LORazepam   Injectable 2 milliGRAM(s) IV Push once PRN  nystatin Powder 1 Application(s) Topical two times a day PRN  polyethylene glycol 3350 17 Gram(s) Oral daily      PHYSICAL EXAM:   Vital Signs Last 24 Hrs  T(C): 36.7 (05 Jul 2019 20:00), Max: 37.3 (05 Jul 2019 12:00)  T(F): 98 (05 Jul 2019 20:00), Max: 99.1 (05 Jul 2019 12:00)  HR: 77 (06 Jul 2019 04:00) (71 - 84)  BP: 158/74 (06 Jul 2019 04:00) (142/58 - 159/62)  BP(mean): 98 (06 Jul 2019 04:00) (80 - 100)  RR: 17 (06 Jul 2019 04:00) (17 - 21)  SpO2: 97% (06 Jul 2019 04:00) (95% - 100%)    General: No acute distress  HEENT: EOM intact, left eye legally blind  Abdomen: Soft, nontender, nondistended , PEG site C/D/ dressing intact, no drainage   Extremities: No edema    NEUROLOGICAL EXAM:  Mental status: Eyes open, awake, alert, oriented to name, place and year, fluent speech, repetition intact, No asomatognosia or anosognosia, follows simple commands   Cranial Nerves: UMN type left facial droop, no nystagmus, moderate to severe dysarthria, LHHA to threat   Motor exam: RUE /RLE spontaneous movement against gravity without significant noticeable weakness, Left hypotonic hemiplegia   Sensation: Left side appears intact to noxious stimuli, left side neglect to double simultaneous stimulation   Coordination/ Gait: No dysmetria on right side     LABS:                        13.0   7.3   )-----------( 302      ( 06 Jul 2019 04:15 )             37.9    07-06    142  |  103  |  24<H>  ----------------------------<  205<H>  4.2   |  26  |  0.87    Ca    9.0      06 Jul 2019 04:15      Hemoglobin A1C, Whole Blood: 10.4 % (06-29 @ 09:33)      IMAGING: Reviewed by me.   VA Duplex Carotid, Bilat (07.05.19)   Diffuse intimal thickening without duplex evidence of   hemodynamically significant stenosis.     CT Head No Cont (07.04.19)  Increasing lucency with new mass effect and mild midline   shift to the left involving acute right anterior cerebral artery infarct   with involvement of the right corona radiata and centrum semiovale ovale   compared with 6/29/2019. Petechial hemorrhage along the high convexity.    MRA BRAIN/NECK (07/02/19): In comparison with CTA,  atherosclerotic calcification with stenosis of the right greater than left cavernous and supraclinoid ICA segments.   Poorly delineated distal right ICA bifurcation segment,  right A1   segment, with proximal right M1 segment stenosis and poststenotic   dilatation. Stenosis with narrowing of the distal left M1 MCA segment.   There is a dominant left A1 supplying the anterior communicating artery,   there is multifocal stenosis of the distal MCA branch vasculature.    Dominant intradural right vertebral artery supplies the basilar artery,   the left vertebral artery is hypoplastic and poorly visualized in the   cervical course and intradurally with multifocal  narrowing and stenosis.   There are multifocal stenosis of the posterior cerebral arteries.    Possible bovine left common carotid artery, proximal left subclavian   artery is not well-visualized  with possible narrowing. Bilateral common   carotid arteries are patent,  right ICA origin stenosis of approximately   50%, right ICA is decreased in caliber, left ICA is dominant and patent   without ICA origin   stenosis. Vessels are tortuous likely reflecting   hypertension.      Brain MRI (06/29/19) There is evidence of abnormal stricture diffusion seen involving the right frontal parietal region. Involvement of the right basal ganglia region is seen as well. This is compatible with an area of acute infarct.   Involvement of the right anterior cerebral and middle cerebral artery territories are identified. Scattered areas of abnormal susceptibility are identified which is likely compatible with some hemorrhagic transformation. Localized mass effect is seen consisting of sulcal effacement. No significant shift or herniation is seen.    Head CT (06/30/19) Abnormal areas of low-attenuation involving the right frontal and right parietal cortical subcortical region are again seen. These findings are compatible with areas of old infarcts. Abnormal low-attenuation involving the left alla is also again seen which could be compatible with an age-indeterminate infarct. There is no evidence of abnormal low-attenuation seen involving the high right frontal cortical subcortical region. Involvement of the right basal   ganglia and anterior corona radiata regions as well. This is predominantly seen medially and compatible with an acute right anterior cerebral artery infarct. There is localized mass effect consisting of sulcal effacement. No significant shift or herniation is seen.     CTA Head/Neck (06/28/19) CT ANGIOGRAPHY NECK:  Markedly limited examination. Tandem moderate to severe stenoses are suspected in the right carotid bulb and proximal right internal carotid artery. 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    CT ANGIOGRAPHY BRAIN: Markedly limited examination. Limited evaluation of the intracranial internal carotid arteries. Stenoses or occlusion in the distal cavernous segments and ophthalmic segments of the bilateral internal carotid arteries cannot be excluded. Apparent severe stenosis in the distal supraclinoid right internal carotid artery and right carotid terminus with possible occlusion of the A1 segment of the right anterior cerebral artery.  The right A2 segment fills across the anterior to indicating artery.  There appears to be posterior distal right ENRIQUE vessels within the medial right frontal lobe, raising the possibility of early cerebral infarction. THE PATIENT WAS SEEN AND EXAMINED BY ME WITH THE HOUSESTAFF AND STROKE TEAM DURING MORNING ROUNDS.     HPI:  Patient is a 54 year old Female w/ PMHx of HTN, HLD, DM transferred from [facility] for evaluation of stroke. Per daughter at bedside, last known normal 2pm on day of admission, patient had sudden onset left sided paralysis, slurred speech, received TPA at at [place]. NIHSS 13, MRS 1    SUBJECTIVE: No events overnight.  No new neurologic complaints. C/O mild soreness at PEG site .     ROS: all negative unless otherwise specified above.      acetaminophen    Suspension .. 650 milliGRAM(s) Oral every 6 hours PRN  acetaminophen    Suspension .. 650 milliGRAM(s) Enteral Tube every 6 hours PRN  aspirin  chewable 81 milliGRAM(s) Oral daily  atorvastatin 80 milliGRAM(s) Oral at bedtime  dextrose 40% Gel 15 Gram(s) Oral once PRN  dextrose 5%. 1000 milliLiter(s) IV Continuous <Continuous>  dextrose 50% Injectable 12.5 Gram(s) IV Push once  dextrose 50% Injectable 25 Gram(s) IV Push once  dextrose 50% Injectable 25 Gram(s) IV Push once  enoxaparin Injectable 40 milliGRAM(s) SubCutaneous daily  FLUoxetine Solution 20 milliGRAM(s) Oral daily  glucagon  Injectable 1 milliGRAM(s) IntraMuscular once PRN  insulin lispro (HumaLOG) corrective regimen sliding scale   SubCutaneous every 6 hours  insulin NPH human recombinant 18 Unit(s) SubCutaneous every 6 hours  levETIRAcetam  Solution 500 milliGRAM(s) Oral two times a day  LORazepam   Injectable 2 milliGRAM(s) IV Push once PRN  nystatin Powder 1 Application(s) Topical two times a day PRN  polyethylene glycol 3350 17 Gram(s) Oral daily      PHYSICAL EXAM:   Vital Signs Last 24 Hrs  T(C): 36.7 (05 Jul 2019 20:00), Max: 37.3 (05 Jul 2019 12:00)  T(F): 98 (05 Jul 2019 20:00), Max: 99.1 (05 Jul 2019 12:00)  HR: 77 (06 Jul 2019 04:00) (71 - 84)  BP: 158/74 (06 Jul 2019 04:00) (142/58 - 159/62)  BP(mean): 98 (06 Jul 2019 04:00) (80 - 100)  RR: 17 (06 Jul 2019 04:00) (17 - 21)  SpO2: 97% (06 Jul 2019 04:00) (95% - 100%)    General: No acute distress  HEENT: EOM intact, left eye legally blind  Abdomen: Soft, nontender, nondistended , PEG site C/D/ dressing intact, no drainage   Extremities: No edema    NEUROLOGICAL EXAM:  Mental status: Eyes open, awake, alert, oriented to name, place and year, fluent speech, repetition intact, No asomatognosia or anosognosia, follows simple commands   Cranial Nerves: UMN type left facial droop, no nystagmus, moderate to severe dysarthria, LHHA to threat   Motor exam: RUE /RLE spontaneous movement against gravity without significant noticeable weakness, Left hypotonic hemiplegia   Sensation: Left side appears intact to noxious stimuli, left side neglect to double simultaneous stimulation   Coordination/ Gait: No dysmetria on right side     LABS:                        13.0   7.3   )-----------( 302      ( 06 Jul 2019 04:15 )             37.9    07-06    142  |  103  |  24<H>  ----------------------------<  205<H>  4.2   |  26  |  0.87    Ca    9.0      06 Jul 2019 04:15      Hemoglobin A1C, Whole Blood: 10.4 % (06-29 @ 09:33)      IMAGING: Reviewed by me.   VA Duplex Carotid, Bilat (07.05.19)   Diffuse intimal thickening without duplex evidence of   hemodynamically significant stenosis.     CT Head No Cont (07.04.19)  Increasing lucency with new mass effect and mild midline   shift to the left involving acute right anterior cerebral artery infarct   with involvement of the right corona radiata and centrum semiovale ovale   compared with 6/29/2019. Petechial hemorrhage along the high convexity.    MRA BRAIN/NECK (07/02/19): In comparison with CTA,  atherosclerotic calcification with stenosis of the right greater than left cavernous and supraclinoid ICA segments.   Poorly delineated distal right ICA bifurcation segment,  right A1   segment, with proximal right M1 segment stenosis and poststenotic   dilatation. Stenosis with narrowing of the distal left M1 MCA segment.   There is a dominant left A1 supplying the anterior communicating artery,   there is multifocal stenosis of the distal MCA branch vasculature.    Dominant intradural right vertebral artery supplies the basilar artery,   the left vertebral artery is hypoplastic and poorly visualized in the   cervical course and intradurally with multifocal  narrowing and stenosis.   There are multifocal stenosis of the posterior cerebral arteries.    Possible bovine left common carotid artery, proximal left subclavian   artery is not well-visualized  with possible narrowing. Bilateral common   carotid arteries are patent,  right ICA origin stenosis of approximately   50%, right ICA is decreased in caliber, left ICA is dominant and patent   without ICA origin   stenosis. Vessels are tortuous likely reflecting   hypertension.      Brain MRI (06/29/19) There is evidence of abnormal stricture diffusion seen involving the right frontal parietal region. Involvement of the right basal ganglia region is seen as well. This is compatible with an area of acute infarct.   Involvement of the right anterior cerebral and middle cerebral artery territories are identified. Scattered areas of abnormal susceptibility are identified which is likely compatible with some hemorrhagic transformation. Localized mass effect is seen consisting of sulcal effacement. No significant shift or herniation is seen.    Head CT (06/30/19) Abnormal areas of low-attenuation involving the right frontal and right parietal cortical subcortical region are again seen. These findings are compatible with areas of old infarcts. Abnormal low-attenuation involving the left alla is also again seen which could be compatible with an age-indeterminate infarct. There is no evidence of abnormal low-attenuation seen involving the high right frontal cortical subcortical region. Involvement of the right basal   ganglia and anterior corona radiata regions as well. This is predominantly seen medially and compatible with an acute right anterior cerebral artery infarct. There is localized mass effect consisting of sulcal effacement. No significant shift or herniation is seen.     CTA Head/Neck (06/28/19) CT ANGIOGRAPHY NECK:  Markedly limited examination. Tandem moderate to severe stenoses are suspected in the right carotid bulb and proximal right internal carotid artery. 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    CT ANGIOGRAPHY BRAIN: Markedly limited examination. Limited evaluation of the intracranial internal carotid arteries. Stenoses or occlusion in the distal cavernous segments and ophthalmic segments of the bilateral internal carotid arteries cannot be excluded. Apparent severe stenosis in the distal supraclinoid right internal carotid artery and right carotid terminus with possible occlusion of the A1 segment of the right anterior cerebral artery.  The right A2 segment fills across the anterior to indicating artery.  There appears to be posterior distal right ENRIQUE vessels within the medial right frontal lobe, raising the possibility of early cerebral infarction.

## 2019-07-06 NOTE — DISCHARGE NOTE PROVIDER - CARE PROVIDER_API CALL
Cristine Pickens (NP; RN)  NP in Family Health  [address] [facility], [facility] [address]  [place] [address]  Phone: [phone]  Fax: [phone]  Follow Up Time:     Mayur Luz)  Cardiovascular Disease; Internal Medicine  [address] [facility], [facility] [address]  [place] [address]  Phone: [phone]  Fax: [phone]  Follow Up Time:     Krishna Jamil (NP; RN)  NP in Adult Health  [address] [facility] [address] [facility] [facility] [address]  [place] [address]  Phone: [phone]  Fax: [phone]  Follow Up Time: Cristine Pickens (NP; RN)  NP in Family Health  [address] [facility]  [place] [address]  Phone: [phone]  Fax: [phone]  Follow Up Time:     Mayur Luz)  Cardiovascular Disease; Internal Medicine  [address] [facility], [facility] [address]  [place] [address]  Phone: [phone]  Fax: [phone]  Follow Up Time:     Krishna Jamil (NP; RN)  NP in Adult Health  [address] [facility], [facility] [address]  [place] [address]  Phone: [phone]  Fax: [phone]  Follow Up Time:     Ashok Radford)  Neurology; Vascular Neurology  [address] [facility], [facility] [address]  [place] [address]  Phone: [phone]  Fax: [phone]  Follow Up Time: Cristine Pickens (NP; RN)  NP in Family Health  [address] [facility]  [place] [address]  Phone: [phone]  Fax: [phone]  Follow Up Time:     Mayur Luz)  Cardiovascular Disease; Internal Medicine  [address] [facility], [facility] [address]  [place] [address]  Phone: [phone]  Fax: [phone]  Follow Up Time:     Krishna Jamil (NP; RN)  NP in Adult Health  [address] [facility]  [place] [address]  Phone: [phone]  Fax: [phone]  Follow Up Time:     Ashok Radford)  Neurology; Vascular Neurology  [address] [facility]  [place] [address]  Phone: [phone]  Fax: [phone]  Follow Up Time:     [facility],   Phone: (   )    -  Fax: (   )    -  Follow Up Time:

## 2019-07-06 NOTE — DISCHARGE NOTE PROVIDER - NSDCCPTREATMENT_GEN_ALL_CORE_FT
PRINCIPAL PROCEDURE  Procedure: EGD, with PEG  Findings and Treatment: 7/3/19- Impression:          - No gross lesions in esophagus.                       - Gastritis.                       - No gross lesions in duodenum.                       - An endoscopically removable PEG placement was successfully completed.                       - No specimens collected.

## 2019-07-06 NOTE — DISCHARGE NOTE PROVIDER - PROVIDER TOKENS
PROVIDER:[TOKEN:[[id number]]],PROVIDER:[TOKEN:[[id number]]],PROVIDER:[TOKEN:[[id number]]] PROVIDER:[TOKEN:[[id number]]],PROVIDER:[TOKEN:[[id number]]],PROVIDER:[TOKEN:[[id number]]],PROVIDER:[TOKEN:[[id number]]] PROVIDER:[TOKEN:[[id number]]],PROVIDER:[TOKEN:[[id number]]],PROVIDER:[TOKEN:[[id number]]],PROVIDER:[TOKEN:[[id number]]],FREE:[LAST:[[facility]],PHONE:[(   )    -],FAX:[(   )    -]]

## 2019-07-06 NOTE — DISCHARGE NOTE PROVIDER - NSDCFUADDINST_GEN_ALL_CORE_FT
activity as tolerated  fall precaution  seizure precaution  follow up with [facility] clinic in 1 week

## 2019-07-06 NOTE — DISCHARGE NOTE PROVIDER - NSDCCPCAREPLAN_GEN_ALL_CORE_FT
PRINCIPAL DISCHARGE DIAGNOSIS  Diagnosis: Cerebral infarction  Assessment and Plan of Treatment:       SECONDARY DISCHARGE DIAGNOSES  Diagnosis: Diabetes mellitus  Assessment and Plan of Treatment: PRINCIPAL DISCHARGE DIAGNOSIS  Diagnosis: Cerebral infarction  Assessment and Plan of Treatment: seen by neuro and treatment plan per neuro.  peg tube ffeds and dysphagia 1 with nectar thick as tolerated  physical therapy  stroke education      SECONDARY DISCHARGE DIAGNOSES  Diagnosis: Type 2 diabetes mellitus with hyperglycemia, with long-term current use of insulin  Assessment and Plan of Treatment: Type 2 diabetes mellitus with hyperglycemia, with long-term current use of insulin  A!c 10.4;   seen by endocrine and adjusted insulin    Diagnosis: HTN (hypertension)  Assessment and Plan of Treatment: continue medication as prescribed PRINCIPAL DISCHARGE DIAGNOSIS  Diagnosis: Cerebral infarction  Assessment and Plan of Treatment: seen by neuro and treatment plan per neuro.  diet with dysphagia 1 with nectar thick as tolerated  physical therapy  stroke education      SECONDARY DISCHARGE DIAGNOSES  Diagnosis: Type 2 diabetes mellitus with hyperglycemia, with long-term current use of insulin  Assessment and Plan of Treatment: Type 2 diabetes mellitus with hyperglycemia, with long-term current use of insulin  A!c 10.4;   seen by endocrine and adjusted insulin    Diagnosis: HTN (hypertension)  Assessment and Plan of Treatment: continue medication as prescribed PRINCIPAL DISCHARGE DIAGNOSIS  Diagnosis: Cerebral infarction  Assessment and Plan of Treatment: seen by neuro and treatment plan per neuro.  diet with dysphagia 1 with nectar thick as tolerated  physical therapy  stroke education      SECONDARY DISCHARGE DIAGNOSES  Diagnosis: Type 2 diabetes mellitus with hyperglycemia, with long-term current use of insulin  Assessment and Plan of Treatment: Type 2 diabetes mellitus with hyperglycemia, with long-term current use of insulin  A!c 10.4;   seen by endocrine and adjusted insulin  follow up by [facility]    Diagnosis: HTN (hypertension)  Assessment and Plan of Treatment: continue medication as prescribed PRINCIPAL DISCHARGE DIAGNOSIS  Diagnosis: Cerebral infarction  Assessment and Plan of Treatment: seen by neuro and treatment plan per neuro.  diet with dysphagia 1 with nectar thick as tolerated  aspiration precaution  physical therapy  stroke education      SECONDARY DISCHARGE DIAGNOSES  Diagnosis: Type 2 diabetes mellitus with hyperglycemia, with long-term current use of insulin  Assessment and Plan of Treatment: Type 2 diabetes mellitus with hyperglycemia, with long-term current use of insulin  A!c 10.4;   seen by endocrine and adjusted insulin  follow up by [facility]    Diagnosis: HTN (hypertension)  Assessment and Plan of Treatment: continue medication as prescribed PRINCIPAL DISCHARGE DIAGNOSIS  Diagnosis: Cerebral infarction  Assessment and Plan of Treatment: seen by neuro and treatment plan per neuro.  diet with dysphagia 1 with nectar thick as tolerated  aspiration precaution  physical therapy  stroke education      SECONDARY DISCHARGE DIAGNOSES  Diagnosis: Type 2 diabetes mellitus with hyperglycemia, with long-term current use of insulin  Assessment and Plan of Treatment: Type 2 diabetes mellitus with hyperglycemia, with long-term current use of insulin  A!c 10.4;   seen by endocrine and adjusted insulin  as per discussion with pt daughter-monitor finger stick before administering insulin-hypoglycemia protocol reviewed  follow up by [facility] clinic as per pt daughter-no appointment set as per pt family as they are going to do the scheduling per availability    Diagnosis: HTN (hypertension)  Assessment and Plan of Treatment: continue medication as prescribed PRINCIPAL DISCHARGE DIAGNOSIS  Diagnosis: Cerebral infarction  Assessment and Plan of Treatment: seen by neuro and treatment plan per neuro.  diet with dysphagia 1 with nectar thick as tolerated  aspiration precaution  physical therapy  stroke education      SECONDARY DISCHARGE DIAGNOSES  Diagnosis: Type 2 diabetes mellitus with hyperglycemia, with long-term current use of insulin  Assessment and Plan of Treatment: Type 2 diabetes mellitus with hyperglycemia, with long-term current use of insulin  A!c 10.4;   seen by endocrine and adjusted insulin  as per discussion with pt daughter-monitor finger stick before administering insulin-hypoglycemia protocol reviewed  if finger stick is below 100 or pt is not eating do not give onsulin-follow up with [facility] clin[facility] in 1 week  follow up by [facility] as per pt daughter-no appointment set as per pt family as they are going to do the scheduling per availability    Diagnosis: HTN (hypertension)  Assessment and Plan of Treatment: continue medication as prescribed

## 2019-07-06 NOTE — DISCHARGE NOTE PROVIDER - HOSPITAL COURSE
Ms. Sergo Chambers is a 54 year old women w/ PMHx of HTN, HLD, DM transferred from [facility] for evaluation of stroke on June 28, 2019. Per daughter at bedside upon admission, last known normal 2pm on day of admission, patient had sudden onset left sided paralysis, slurred speech, received TPA at at [place]. NIHSS 13, MRS 1.  She was subsequently found to have cerebral embolism with cerebral infarction. Right MCA and ENRIQUE distribution stroke - likely mechanism being large vessel disease i.e. symptomatic intracranial vs. extracranial atherosclerosis leading to artery to artery embolism, favoring the former; less likely to be due to an embolic stroke from undetermined source. Treated for symptomatic seizures in the setting of an acute stroke and placed on Keppra 500mg BID to which duration can be determined as outpatient based on clinical course and epilepsy follow up, continue seizure precautions.  On 7/4 repeat CT Head demonstrated hemorrhagic transformation and Plavix was held..... Given dysphagia she was recommended for a  NPO by speech and language pathology and a PEG tube was placed by the GI team after discussion with family.  Tube feeds were initiated and titrated to goal. Repeat SLP evaluation as indicated.  During Ms. Chambers admission she was seen by endocrine given uncontrolled type II diabetes- her regimen was adjusted accordingly and she should continue close outpatient follow up as well for aggressive risk factor management.   Cardiology was consulted and patient was recommended for outpatient SUDEEP workup as well.  She should have overall long term SBP control with gradual titration to normotension and LDL titration to goal of < 70, statin should be titrated accordingly.         Imaging as follows:    VA Duplex Carotid, Bilat (07.05.19)     Diffuse intimal thickening without duplex evidence of     hemodynamically significant stenosis.         CT Head No Cont (07.04.19)    Increasing lucency with new mass effect and mild midline     shift to the left involving acute right anterior cerebral artery infarct     with involvement of the right corona radiata and centrum semiovale ovale     compared with 6/29/2019. Petechial hemorrhage along the high convexity.        MRA BRAIN/NECK (07/02/19): In comparison with CTA,  atherosclerotic calcification with stenosis of the right greater than left cavernous and supraclinoid ICA segments.     Poorly delineated distal right ICA bifurcation segment,  right A1     segment, with proximal right M1 segment stenosis and poststenotic     dilatation. Stenosis with narrowing of the distal left M1 MCA segment.     There is a dominant left A1 supplying the anterior communicating artery,     there is multifocal stenosis of the distal MCA branch vasculature.        Dominant intradural right vertebral artery supplies the basilar artery,     the left vertebral artery is hypoplastic and poorly visualized in the     cervical course and intradurally with multifocal  narrowing and stenosis.     There are multifocal stenosis of the posterior cerebral arteries.        Possible bovine left common carotid artery, proximal left subclavian     artery is not well-visualized  with possible narrowing. Bilateral common     carotid arteries are patent,  right ICA origin stenosis of approximately     50%, right ICA is decreased in caliber, left ICA is dominant and patent     without ICA origin   stenosis. Vessels are tortuous likely reflecting     hypertension.          Brain MRI (06/29/19) There is evidence of abnormal stricture diffusion seen involving the right frontal parietal region. Involvement of the right basal ganglia region is seen as well. This is compatible with an area of acute infarct.     Involvement of the right anterior cerebral and middle cerebral artery territories are identified. Scattered areas of abnormal susceptibility are identified which is likely compatible with some hemorrhagic transformation. Localized mass effect is seen consisting of sulcal effacement. No significant shift or herniation is seen.        Head CT (06/30/19) Abnormal areas of low-attenuation involving the right frontal and right parietal cortical subcortical region are again seen. These findings are compatible with areas of old infarcts. Abnormal low-attenuation involving the left alla is also again seen which could be compatible with an age-indeterminate infarct. There is no evidence of abnormal low-attenuation seen involving the high right frontal cortical subcortical region. Involvement of the right basal     ganglia and anterior corona radiata regions as well. This is predominantly seen medially and compatible with an acute right anterior cerebral artery infarct. There is localized mass effect consisting of sulcal effacement. No significant shift or herniation is seen.         CTA Head/Neck (06/28/19) CT ANGIOGRAPHY NECK:  Markedly limited examination. Tandem moderate to severe stenoses are suspected in the right carotid bulb and proximal right internal carotid artery. 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        CT ANGIOGRAPHY BRAIN: Markedly limited examination. Limited evaluation of the intracranial internal carotid arteries. Stenoses or occlusion in the distal cavernous segments and ophthalmic segments of the bilateral internal carotid arteries cannot be excluded. Apparent severe stenosis in the distal supraclinoid right internal carotid artery and right carotid terminus with possible occlusion of the A1 segment of the right anterior cerebral artery.  The right A2 segment fills across the anterior to indicating artery.  There appears to be posterior distal right ENRIQUE vessels within the medial right frontal lobe, raising the possibility of early cerebral infarction.         VA Duplex Lower Ext Vein Scan, Bilat (07.05.19)    No evidence of deep venous thrombosis in either lower extremity Ms. Sergo Chambers is a 54 year old women w/ PMHx of HTN, HLD, DM transferred from [facility] for evaluation of stroke on June 28, 2019. Per daughter at bedside upon admission, last known normal 2pm on day of admission, patient had sudden onset left sided paralysis, slurred speech, received TPA at at [place]. NIHSS 13, MRS 1.  She was subsequently found to have cerebral embolism with cerebral infarction. Right MCA and ENRIQUE distribution stroke - likely mechanism being large vessel disease i.e. symptomatic intracranial vs. extracranial atherosclerosis leading to artery to artery embolism, favoring the former; less likely to be due to an embolic stroke from undetermined source. Treated for symptomatic seizures in the setting of an acute stroke and placed on Keppra 500mg BID to which duration can be determined as outpatient based on clinical course and epilepsy follow up, continue seizure precautions.  On 7/4 repeat CT Head demonstrated hemorrhagic transformation and Plavix was held..... Given dysphagia she was recommended for a  NPO by speech and language pathology and a PEG tube was placed by the GI team after discussion with family.  Tube feeds were initiated and titrated to goal. Repeat SLP evaluation as indicated.  During Ms. Chambers admission she was seen by endocrine given uncontrolled type II diabetes- her regimen was adjusted accordingly and she should continue close outpatient follow up as well for aggressive risk factor management.   Cardiology was consulted and patient was recommended for outpatient SUDEEP workup as well.  She should have overall long term SBP control with gradual titration to normotension and LDL titration to goal of < 70, statin should be titrated accordingly.     seen by endo and adjusted insulin dose; seen by speech and swallow and on dysphagia 1 with nectar thick liquid; seen by nutrition-had calorie count and continue with oral diet as her oral intake is adequate; not requiring any peg feeds; peg tube water flush to maintain patency and can be followed up by your doctor/GI ; peg tube care/skin care; patient family is educated on care at home and follow up with [facility]; pt is cleared for discharge home;        Imaging as follows:    VA Duplex Carotid, Bilat (07.05.19)     Diffuse intimal thickening without duplex evidence of     hemodynamically significant stenosis.         CT Head No Cont (07.04.19)    Increasing lucency with new mass effect and mild midline     shift to the left involving acute right anterior cerebral artery infarct     with involvement of the right corona radiata and centrum semiovale ovale     compared with 6/29/2019. Petechial hemorrhage along the high convexity.        MRA BRAIN/NECK (07/02/19): In comparison with CTA,  atherosclerotic calcification with stenosis of the right greater than left cavernous and supraclinoid ICA segments.     Poorly delineated distal right ICA bifurcation segment,  right A1     segment, with proximal right M1 segment stenosis and poststenotic     dilatation. Stenosis with narrowing of the distal left M1 MCA segment.     There is a dominant left A1 supplying the anterior communicating artery,     there is multifocal stenosis of the distal MCA branch vasculature.        Dominant intradural right vertebral artery supplies the basilar artery,     the left vertebral artery is hypoplastic and poorly visualized in the     cervical course and intradurally with multifocal  narrowing and stenosis.     There are multifocal stenosis of the posterior cerebral arteries.        Possible bovine left common carotid artery, proximal left subclavian     artery is not well-visualized  with possible narrowing. Bilateral common     carotid arteries are patent,  right ICA origin stenosis of approximately     50%, right ICA is decreased in caliber, left ICA is dominant and patent     without ICA origin   stenosis. Vessels are tortuous likely reflecting     hypertension.          Brain MRI (06/29/19) There is evidence of abnormal stricture diffusion seen involving the right frontal parietal region. Involvement of the right basal ganglia region is seen as well. This is compatible with an area of acute infarct.     Involvement of the right anterior cerebral and middle cerebral artery territories are identified. Scattered areas of abnormal susceptibility are identified which is likely compatible with some hemorrhagic transformation. Localized mass effect is seen consisting of sulcal effacement. No significant shift or herniation is seen.        Head CT (06/30/19) Abnormal areas of low-attenuation involving the right frontal and right parietal cortical subcortical region are again seen. These findings are compatible with areas of old infarcts. Abnormal low-attenuation involving the left alla is also again seen which could be compatible with an age-indeterminate infarct. There is no evidence of abnormal low-attenuation seen involving the high right frontal cortical subcortical region. Involvement of the right basal     ganglia and anterior corona radiata regions as well. This is predominantly seen medially and compatible with an acute right anterior cerebral artery infarct. There is localized mass effect consisting of sulcal effacement. No significant shift or herniation is seen.         CTA Head/Neck (06/28/19) CT ANGIOGRAPHY NECK:  Markedly limited examination. Tandem moderate to severe stenoses are suspected in the right carotid bulb and proximal right internal carotid artery. 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        CT ANGIOGRAPHY BRAIN: Markedly limited examination. Limited evaluation of the intracranial internal carotid arteries. Stenoses or occlusion in the distal cavernous segments and ophthalmic segments of the bilateral internal carotid arteries cannot be excluded. Apparent severe stenosis in the distal supraclinoid right internal carotid artery and right carotid terminus with possible occlusion of the A1 segment of the right anterior cerebral artery.  The right A2 segment fills across the anterior to indicating artery.  There appears to be posterior distal right ENRIQUE vessels within the medial right frontal lobe, raising the possibility of early cerebral infarction.         VA Duplex Lower Ext Vein Scan, Bilat (07.05.19)    No evidence of deep venous thrombosis in either lower extremity

## 2019-07-06 NOTE — DISCHARGE NOTE PROVIDER - INSTRUCTIONS
NPO with tube feedings via PEG until otherwise advised by provider. dysphagia 1 with nectar thick liquid-low sodium, low cholesterol diabetic diet-peg tube feeds-bolus feeds dysphagia 1 with nectar thick liquid-low sodium, low cholesterol diabetic diet-peg tube feeds-bolus feeds  vital 1.2 tati 480 ml every 8 hours via peg tube dysphagia 1 with nectar thick liquid-low sodium, low cholesterol diabetic diet- dysphagia 1 with nectar thick liquid-low sodium, low cholesterol diabetic diet- flush peg tube with water 100ml three times a day to keep tube patent

## 2019-07-06 NOTE — PROGRESS NOTE ADULT - ASSESSMENT
54 year old woman with multiple vascular risk factors including age, HTN, DM II and HPLD was initially evaluated at OSH for acute onset of left-sided weakness through telestroke. She was treated with IV tPA and was transferred to [facility] for further management. CTA head and neck on admission showed critical stenosis versus occlusion of right supraclinoid ICA and severe to critical stenosis of right proximal ICA. MRI brain during hospitalization showed acute infarcts in the right MCA and ENRIQUE distribution with minimal hemorrhagic transformation (HI-1). Her hospital course was complicated by episodes concerning for seizure-like activity on admission, treated with AEDs. MRA head and neck showed severe to critical stenosis of right supraclinoid ICA and moderate (about 50%) stenosis of right proximal ICA.      Impression:  Cerebral embolism with cerebral infarction. Right MCA and ENRIQUE distribution stroke - likely mechanism being large vessel disease i.e. symptomatic intracranial vs. extracranial atherosclerosis leading to artery to artery embolism, favoring the former; less likely to be due to an embolic stroke from undetermined source. Likely symptomatic seizures in the setting of an acute stroke    NEURO: Neurologically with improvement since admission, CTH on 7/4 showed increasing lucency with new mass effect and mild midline shift to the left involving acute right anterior cerebral artery infarct with involvement of the right corona radiata and centrum semiovale ovale. Plavix on hold. Continue close monitoring for neurologic deterioration in setting of cerebral edema with mass effect and brain compression, maintain BP <160 in setting of possible hemorrhagic transformation on recent CT on 07/04.  Continue ASA and high intensity statin for secondary stroke prevention in the setting of likely atheroembolic etiology of her stroke. Plan for possible carotid stenting vs CEA based on results of carotid duplex (to better quantify the degree of extracranial/intracranial atherosclerotic stenose), Neurosurgery eval appreciated. Continue with Keppra 500 mg to twice daily for seizure prophylaxis - duration of AEDs is expected to be 3-6 months based on clinical course and MRI/EEG evaluation at that time, VEEG: Structural or functional abnormality in the right hemisphere, No epileptiform pattern or seizure seen, MRI Brain w/o results as above, CTA limited by body habitus. Physical therapy/OT recommended Acute Rehab. Fluoxetine 20 mg once a day started for improved motor recovery for 3 months with a plan to taper off afterwards - risks versus benefits and adverse reactions associated with medication use were discussed with patient and available family member at bedside in detail.    ANTITHROMBOTIC THERAPY: Prior on dual antiplatelet therapy with aspirin and clopidogrel for aggressive secondary stroke prevention in the setting of symptomatic intracranial atherosclerosis for 3 months followed by aspirin alone there after, check P2Y12 after reinitiation of plavix in 1 week to ensure optimal platelet inhibition. Plavix stopped on 07/04 for hemorrhagic transformation, will restart based clinical stability likely on 7/7 after repeating brain imaging.  Indications for carotid revascularization with CEA versus ASHLEY to be considered based on results of CUS. Would consider conventional angiogram in case of equivocal results. Timing of carotid revascularization is expected to be in about 10-14 days from the stroke considering the extent/large size of the infarct - to be further discussed with NSGY team     PULMONARY: CXR (06/29/19)  Clear lungs. Protecting airway, saturating well     CARDIOVASCULAR: Check TTE with bubble study, cardiac monitoring no events, Dr Luz (cardiology) eval appreciated, will obtain cardiac clearance for Right CEA vs carotid stent if indicated. Gradual normotension in the setting of carotid stenosis careful initiation of home meds and will monitor for heart failure signs since patient endorses hx of chronic CHF.                             SBP goal:<160    GASTROINTESTINAL: Dysphagia screen failed. S/S eval recommended NPO, d/w family re: alternate means of nutrition, risks/benefits/alternatives. PEG placed on 07/03.      Diet: NPO with TF via PEG    RENAL: BUN/Cr without acute change, maintain adequate hydration, good urine output, urine toxicology: Negative      Na Goal: Greater than 135     [place]rena: N    HEMATOLOGY: H/H without acute change, Platelets 302,  LE doppler ordered to r/o DVT negative for DVT b/l.     DVT ppx:  LMWH    ID: afebrile, no si/sx of infection    OTHER: HgbA1C 10.4, continue Humalog sliding scale and DM meds as per endocrine recommendations    DISPOSITION: Acute Rehab if able to receive [facility]. She is undocumented and uninsured - plan to optimize therapy while in hospital.     CORE MEASURES:        Admission NIHSS: 13     TPA:  YES       LDL/HDL: 131/49     Depression Screen: 0     Statin Therapy: Y     Dysphagia Screen:  FAIL     Smoking  NO      Afib  NO     Stroke Education  YES    Obtain screening ultrasound in patients who meet 1 or more of the following criteria as patient is high risk for DVT/PE upon admission:   [] History of DVT/PE  []Hypercoagulable states (Factor V Leiden, Cancer, OCP, etc. )  [X]Prolonged immobility (hemiplegia/hemiparesis/post operative or any other extended immobilization)   [] Transferred from outside facility (Rehab or Long term care)  [] Age </= to 50 54 year old woman with multiple vascular risk factors including age, HTN, DM II and HPLD was initially evaluated at OSH for acute onset of left-sided weakness through telestroke. She was treated with IV tPA and was transferred to [facility] for further management. CTA head and neck on admission showed critical stenosis versus occlusion of right supraclinoid ICA and severe to critical stenosis of right proximal ICA. MRI brain during hospitalization showed acute infarcts in the right MCA and ENRIQUE distribution with minimal hemorrhagic transformation (HI-1). Her hospital course was complicated by episodes concerning for seizure-like activity on admission, treated with AEDs. MRA head and neck showed severe to critical stenosis of right supraclinoid ICA and moderate (about 50%) stenosis of right proximal ICA.      Impression:  Cerebral embolism with cerebral infarction. Right MCA and ENRIQUE distribution stroke - likely mechanism being large vessel disease i.e. symptomatic intracranial vs. extracranial atherosclerosis leading to artery to artery embolism, favoring the former; less likely to be due to an embolic stroke from undetermined source. Likely symptomatic seizures in the setting of an acute stroke    NEURO: Neurologically with improvement since admission, CTH on 7/4 showed increasing lucency with new mass effect and mild midline shift to the left involving acute right anterior cerebral artery infarct with involvement of the right corona radiata and centrum semiovale ovale. Plavix on hold. Continue close monitoring for neurologic deterioration in setting of cerebral edema with mass effect and brain compression, maintain BP <160 in setting of possible hemorrhagic transformation on recent CT on 07/04 and repeat CTH in am 7/7/19.  Continue ASA and high intensity statin for secondary stroke prevention in the setting of likely atheroembolic etiology of her stroke. Plan for possible cerebral angiogram to further evaluate if carotid stenting vs CEA indicated given results of carotid duplex (to better quantify the degree of extracranial/intracranial atherosclerotic stenose), Neurosurgery eval appreciated. Continue with Keppra 500 mg to twice daily for seizure prophylaxis - duration of AEDs is expected to be 3-6 months based on clinical course and MRI/EEG evaluation at that time, VEEG: Structural or functional abnormality in the right hemisphere, No epileptiform pattern or seizure seen, MRI Brain w/o results as above, CTA limited by body habitus. Physical therapy/OT recommended Acute Rehab. Fluoxetine 20 mg once a day started for improved motor recovery for 3 months with a plan to taper off afterwards - risks versus benefits and adverse reactions associated with medication use were discussed with patient and available family member at bedside in detail.    ANTITHROMBOTIC THERAPY: Prior was on dual antiplatelet therapy with aspirin and clopidogrel for aggressive secondary stroke prevention in the setting of symptomatic intracranial atherosclerosis for 3 months followed by aspirin alone there after, check P2Y12 after reinitiation of plavix in 1 week to ensure optimal platelet inhibition. Plavix stopped on 07/04 for hemorrhagic transformation, will restart based clinical stability likely on 7/7 after repeating brain imaging.  Indications for carotid revascularization with CEA versus ASHLEY to be considered based on results of possible cerebral angiogram given equivocal results. Timing of carotid revascularization is expected to be in about 10-14 days from the stroke if indicated considering the extent/large size of the infarct - to be further discussed with NSGY team     PULMONARY: CXR (06/29/19)  Clear lungs. Protecting airway, saturating well     CARDIOVASCULAR: Check TTE with bubble study, cardiac monitoring no events, Dr Luz (cardiology) eval appreciated, will obtain cardiac clearance for Right CEA vs carotid stent if indicated. Gradual normotension in the setting of carotid stenosis careful initiation of home meds and will monitor for heart failure signs since patient endorses hx of chronic CHF.                             SBP goal:<160    GASTROINTESTINAL: Dysphagia screen failed. S/S eval recommended NPO, d/w family re: alternate means of nutrition, risks/benefits/alternatives. PEG placed on 07/03. Tolerating feedings, mild soreness at area- close monitoring and sx control.     Diet: NPO with TF via PEG    RENAL: BUN/Cr without acute change, maintain adequate hydration, good urine output, urine toxicology: Negative      Na Goal: Greater than 135     [place]: N    HEMATOLOGY: H/H without acute change, Platelets 302,  LE doppler ordered to r/o DVT negative for DVT b/l.     DVT ppx:  LMWH    ID: afebrile, no si/sx of infection    OTHER: HgbA1C 10.4, continue Humalog sliding scale and DM meds as per endocrine recommendations    DISPOSITION: Acute Rehab if able to receive [facility]. She is undocumented and uninsured - plan to optimize therapy while in hospital.     CORE MEASURES:        Admission NIHSS: 13     TPA:  YES       LDL/HDL: 131/49     Depression Screen: 0     Statin Therapy: Y     Dysphagia Screen:  FAIL     Smoking  NO      Afib  NO     Stroke Education  YES    Obtain screening ultrasound in patients who meet 1 or more of the following criteria as patient is high risk for DVT/PE upon admission:   [] History of DVT/PE  []Hypercoagulable states (Factor V Leiden, Cancer, OCP, etc. )  [X]Prolonged immobility (hemiplegia/hemiparesis/post operative or any other extended immobilization)   [] Transferred from outside facility (Rehab or Long term care)  [] Age </= to 50 54 year old woman with multiple vascular risk factors including age, HTN, DM II and HPLD was initially evaluated at OSH for acute onset of left-sided weakness through telestroke. She was treated with IV tPA and was transferred to [facility] for further management. CTA head and neck on admission showed critical stenosis versus occlusion of right supraclinoid ICA and severe to critical stenosis of right proximal ICA. MRI brain during hospitalization showed acute infarcts in the right MCA and ENRIQUE distribution with minimal hemorrhagic transformation (HI-1). Her hospital course was complicated by episodes concerning for seizure-like activity on admission, treated with AEDs. MRA head and neck showed severe to critical stenosis of right supraclinoid ICA and moderate (about 50%) stenosis of right proximal ICA. Carotid duplex showed diffuse intimal thickening without any evidence of hemodynamically a significant carotid stenoses.     Impression:  Cerebral embolism with cerebral infarction. Right MCA and ENRIQUE distribution stroke - likely mechanism being large vessel disease i.e. symptomatic intracranial vs. extracranial atherosclerosis leading to artery to artery embolism, favoring the former; less likely to be due to an embolic stroke from undetermined source. Likely symptomatic seizures in the setting of an acute stroke    NEURO: Neurologically with improvement since admission, CTH on 7/4 showed increasing lucency with new mass effect and mild midline shift to the left involving acute right anterior cerebral artery infarct with involvement of the right corona radiata and centrum semiovale ovale. Plavix on hold. Continue close monitoring for neurologic deterioration in setting of cerebral edema with mass effect and brain compression, maintain BP <160 in setting of possible hemorrhagic transformation on recent CT on 07/04 and repeat CTH in am 7/7/19.  Continue ASA and high intensity statin for secondary stroke prevention in the setting of likely atheroembolic etiology of her stroke. Plan for possible cerebral angiogram to further evaluate if carotid stenting vs CEA indicated given results of carotid duplex (to better quantify the degree of extracranial/intracranial atherosclerotic stenose), Neurosurgery eval appreciated. Continue with Keppra 500 mg to twice daily for seizure prophylaxis - duration of AEDs is expected to be 3-6 months based on clinical course and MRI/EEG evaluation at that time, VEEG: Structural or functional abnormality in the right hemisphere, No epileptiform pattern or seizure seen, MRI Brain w/o results as above, CTA limited by body habitus. Physical therapy/OT recommended Acute Rehab. Fluoxetine 20 mg once a day started for improved motor recovery for 3 months with a plan to taper off afterwards - risks versus benefits and adverse reactions associated with medication use were discussed with patient and available family member at bedside in detail.    ANTITHROMBOTIC THERAPY: Prior was on dual antiplatelet therapy with aspirin and clopidogrel for aggressive secondary stroke prevention in the setting of symptomatic intracranial atherosclerosis for 3 months followed by aspirin alone there after, check P2Y12 after reinitiation of plavix in 1 week to ensure optimal platelet inhibition. Plavix stopped on 07/04 for hemorrhagic transformation, will restart based clinical stability likely on 7/7 after repeating brain imaging. Based on results of carotid duplex and MRA head and neck in conjunction with technically limited CTA head and neck; doubtful significance of undergoing carotid revascularization with carotid endarterectomy - to be further evaluated with conventional angiogram in the near future    PULMONARY: CXR (06/29/19)  Clear lungs. Protecting airway, saturating well     CARDIOVASCULAR: Check TTE with bubble study, cardiac monitoring no events, Dr Luz (cardiology) eval appreciated, will obtain cardiac clearance for Right CEA vs carotid stent if indicated. Gradual normotension in the setting of carotid stenosis careful initiation of home meds and will monitor for heart failure signs since patient endorses hx of chronic CHF.                             SBP goal:<160    GASTROINTESTINAL: Dysphagia screen failed. S/S eval recommended NPO, d/w family re: alternate means of nutrition, risks/benefits/alternatives. PEG placed on 07/03. Tolerating feedings, mild soreness at area- close monitoring and sx control.     Diet: NPO with TF via PEG    RENAL: BUN/Cr without acute change, maintain adequate hydration, good urine output, urine toxicology: Negative      Na Goal: Greater than 135     [place]: N    HEMATOLOGY: H/H without acute change, Platelets 302,  LE doppler ordered to r/o DVT negative for DVT b/l.     DVT ppx:  LMWH    ID: afebrile, no si/sx of infection    OTHER: HgbA1C 10.4, continue Humalog sliding scale and DM meds as per endocrine recommendations    DISPOSITION: Acute Rehab if able to receive [facility]. She is undocumented and uninsured - plan to optimize therapy while in hospital.     CORE MEASURES:        Admission NIHSS: 13     TPA:  YES       LDL/HDL: 131/49     Depression Screen: 0     Statin Therapy: Y     Dysphagia Screen:  FAIL     Smoking  NO      Afib  NO     Stroke Education  YES    Obtain screening ultrasound in patients who meet 1 or more of the following criteria as patient is high risk for DVT/PE upon admission:   [] History of DVT/PE  []Hypercoagulable states (Factor V Leiden, Cancer, OCP, etc. )  [X]Prolonged immobility (hemiplegia/hemiparesis/post operative or any other extended immobilization)   [] Transferred from outside facility (Rehab or Long term care)  [] Age </= to 50

## 2019-07-07 LAB
GLUCOSE BLDC GLUCOMTR-MCNC: 163 MG/DL — HIGH (ref 70–99)
GLUCOSE BLDC GLUCOMTR-MCNC: 187 MG/DL — HIGH (ref 70–99)
GLUCOSE BLDC GLUCOMTR-MCNC: 197 MG/DL — HIGH (ref 70–99)
GLUCOSE BLDC GLUCOMTR-MCNC: 202 MG/DL — HIGH (ref 70–99)

## 2019-07-07 PROCEDURE — 70450 CT HEAD/BRAIN W/O DYE: CPT | Mod: 26

## 2019-07-07 PROCEDURE — 99233 SBSQ HOSP IP/OBS HIGH 50: CPT

## 2019-07-07 RX ADMIN — ATORVASTATIN CALCIUM 80 MILLIGRAM(S): 80 TABLET, FILM COATED ORAL at 21:57

## 2019-07-07 RX ADMIN — HUMAN INSULIN 18 UNIT(S): 100 INJECTION, SUSPENSION SUBCUTANEOUS at 12:09

## 2019-07-07 RX ADMIN — Medication 81 MILLIGRAM(S): at 12:13

## 2019-07-07 RX ADMIN — ENOXAPARIN SODIUM 40 MILLIGRAM(S): 100 INJECTION SUBCUTANEOUS at 12:12

## 2019-07-07 RX ADMIN — Medication 2: at 17:51

## 2019-07-07 RX ADMIN — HUMAN INSULIN 18 UNIT(S): 100 INJECTION, SUSPENSION SUBCUTANEOUS at 17:51

## 2019-07-07 RX ADMIN — LEVETIRACETAM 500 MILLIGRAM(S): 250 TABLET, FILM COATED ORAL at 06:07

## 2019-07-07 RX ADMIN — POLYETHYLENE GLYCOL 3350 17 GRAM(S): 17 POWDER, FOR SOLUTION ORAL at 12:13

## 2019-07-07 RX ADMIN — LEVETIRACETAM 500 MILLIGRAM(S): 250 TABLET, FILM COATED ORAL at 17:51

## 2019-07-07 RX ADMIN — Medication 20 MILLIGRAM(S): at 12:14

## 2019-07-07 RX ADMIN — Medication 2: at 06:07

## 2019-07-07 RX ADMIN — Medication 4: at 12:09

## 2019-07-07 RX ADMIN — NYSTATIN CREAM 1 APPLICATION(S): 100000 CREAM TOPICAL at 06:00

## 2019-07-07 RX ADMIN — HUMAN INSULIN 18 UNIT(S): 100 INJECTION, SUSPENSION SUBCUTANEOUS at 06:08

## 2019-07-07 NOTE — PROGRESS NOTE ADULT - SUBJECTIVE AND OBJECTIVE BOX
THE PATIENT WAS SEEN AND EXAMINED BY ME WITH THE HOUSESTAFF AND STROKE TEAM DURING MORNING ROUNDS.   HPI:  Patient is a 54 year old Female w/ PMHx of HTN, HLD, DM transferred from [facility] for evaluation of stroke. Per daughter at bedside, last known normal 2pm on day of admission, patient had sudden onset left sided paralysis, slurred speech, received TPA at at [place]. NIHSS 13, MRS 1    SUBJECTIVE: No events overnight.  No new neurologic complaints.      acetaminophen    Suspension .. 650 milliGRAM(s) Oral every 6 hours PRN  acetaminophen    Suspension .. 650 milliGRAM(s) Enteral Tube every 6 hours PRN  aspirin  chewable 81 milliGRAM(s) Oral daily  atorvastatin 80 milliGRAM(s) Oral at bedtime  dextrose 40% Gel 15 Gram(s) Oral once PRN  dextrose 5%. 1000 milliLiter(s) IV Continuous <Continuous>  dextrose 50% Injectable 12.5 Gram(s) IV Push once  dextrose 50% Injectable 25 Gram(s) IV Push once  dextrose 50% Injectable 25 Gram(s) IV Push once  enoxaparin Injectable 40 milliGRAM(s) SubCutaneous daily  FLUoxetine Solution 20 milliGRAM(s) Oral daily  glucagon  Injectable 1 milliGRAM(s) IntraMuscular once PRN  insulin lispro (HumaLOG) corrective regimen sliding scale   SubCutaneous every 6 hours  insulin NPH human recombinant 18 Unit(s) SubCutaneous every 6 hours  levETIRAcetam  Solution 500 milliGRAM(s) Oral two times a day  nystatin Powder 1 Application(s) Topical two times a day PRN  polyethylene glycol 3350 17 Gram(s) Oral daily      PHYSICAL EXAM:   Vital Signs Last 24 Hrs  T(C): 36.6 (07 Jul 2019 05:00), Max: 37.6 (06 Jul 2019 12:00)  T(F): 97.9 (07 Jul 2019 05:00), Max: 99.6 (06 Jul 2019 12:00)  HR: 86 (07 Jul 2019 05:00) (75 - 87)  BP: 118/63 (07 Jul 2019 05:00) (118/63 - 151/74)  BP(mean): 93 (06 Jul 2019 20:00) (74 - 93)  RR: 18 (07 Jul 2019 05:00) (18 - 20)  SpO2: 96% (07 Jul 2019 05:00) (95% - 100%)    General: No acute distress  HEENT: EOM intact, left eye legally blind  Abdomen: Soft, nontender, nondistended , PEG site C/D/ dressing intact, no drainage   Extremities: No edema    NEUROLOGICAL EXAM:  Mental status: Eyes open, awake, alert, oriented to name, place and year, fluent speech, repetition intact, No asomatognosia or anosognosia, follows simple commands   Cranial Nerves: UMN type left facial droop, no nystagmus, moderate to severe dysarthria, LHHA to threat   Motor exam: RUE /RLE spontaneous movement against gravity without significant noticeable weakness, Left hypotonic hemiplegia   Sensation: Left side appears intact to noxious stimuli, left side neglect to double simultaneous stimulation   Coordination/ Gait: No dysmetria on right side       LABS:                        13.0   7.3   )-----------( 302      ( 06 Jul 2019 04:15 )             37.9    07-06    142  |  103  |  24<H>  ----------------------------<  205<H>  4.2   |  26  |  0.87    Ca    9.0      06 Jul 2019 04:15      Hemoglobin A1C, Whole Blood: 10.4 % (06-29 @ 09:33)      IMAGING: Reviewed by me.   VA Duplex Carotid, Bilat (07.05.19)   Diffuse intimal thickening without duplex evidence of   hemodynamically significant stenosis.     CT Head No Cont (07.04.19)  Increasing lucency with new mass effect and mild midline   shift to the left involving acute right anterior cerebral artery infarct   with involvement of the right corona radiata and centrum semiovale ovale   compared with 6/29/2019. Petechial hemorrhage along the high convexity.    MRA BRAIN/NECK (07/02/19): In comparison with CTA,  atherosclerotic calcification with stenosis of the right greater than left cavernous and supraclinoid ICA segments.   Poorly delineated distal right ICA bifurcation segment,  right A1   segment, with proximal right M1 segment stenosis and poststenotic   dilatation. Stenosis with narrowing of the distal left M1 MCA segment.   There is a dominant left A1 supplying the anterior communicating artery,   there is multifocal stenosis of the distal MCA branch vasculature.    Dominant intradural right vertebral artery supplies the basilar artery,   the left vertebral artery is hypoplastic and poorly visualized in the   cervical course and intradurally with multifocal  narrowing and stenosis.   There are multifocal stenosis of the posterior cerebral arteries.    Possible bovine left common carotid artery, proximal left subclavian   artery is not well-visualized  with possible narrowing. Bilateral common   carotid arteries are patent,  right ICA origin stenosis of approximately   50%, right ICA is decreased in caliber, left ICA is dominant and patent   without ICA origin   stenosis. Vessels are tortuous likely reflecting   hypertension.      Brain MRI (06/29/19) There is evidence of abnormal stricture diffusion seen involving the right frontal parietal region. Involvement of the right basal ganglia region is seen as well. This is compatible with an area of acute infarct.   Involvement of the right anterior cerebral and middle cerebral artery territories are identified. Scattered areas of abnormal susceptibility are identified which is likely compatible with some hemorrhagic transformation. Localized mass effect is seen consisting of sulcal effacement. No significant shift or herniation is seen.    Head CT (06/30/19) Abnormal areas of low-attenuation involving the right frontal and right parietal cortical subcortical region are again seen. These findings are compatible with areas of old infarcts. Abnormal low-attenuation involving the left alla is also again seen which could be compatible with an age-indeterminate infarct. There is no evidence of abnormal low-attenuation seen involving the high right frontal cortical subcortical region. Involvement of the right basal   ganglia and anterior corona radiata regions as well. This is predominantly seen medially and compatible with an acute right anterior cerebral artery infarct. There is localized mass effect consisting of sulcal effacement. No significant shift or herniation is seen.     CTA Head/Neck (06/28/19) CT ANGIOGRAPHY NECK:  Markedly limited examination. Tandem moderate to severe stenoses are suspected in the right carotid bulb and proximal right internal carotid artery. 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    CT ANGIOGRAPHY BRAIN: Markedly limited examination. Limited evaluation of the intracranial internal carotid arteries. Stenoses or occlusion in the distal cavernous segments and ophthalmic segments of the bilateral internal carotid arteries cannot be excluded. Apparent severe stenosis in the distal supraclinoid right internal carotid artery and right carotid terminus with possible occlusion of the A1 segment of the right anterior cerebral artery.  The right A2 segment fills across the anterior to indicating artery.  There appears to be posterior distal right ENRIQUE vessels within the medial right frontal lobe, raising the possibility of early cerebral infarction. THE PATIENT WAS SEEN AND EXAMINED BY ME WITH THE HOUSESTAFF AND STROKE TEAM DURING MORNING ROUNDS.   HPI:  Patient is a 54 year old Female w/ PMHx of HTN, HLD, DM transferred from [facility] for evaluation of stroke. Per daughter at bedside, last known normal 2pm on day of admission, patient had sudden onset left sided paralysis, slurred speech, received TPA at at [place]. NIHSS 13, MRS 1    SUBJECTIVE: No events overnight.  No new neurologic complaints.      acetaminophen    Suspension .. 650 milliGRAM(s) Oral every 6 hours PRN  acetaminophen    Suspension .. 650 milliGRAM(s) Enteral Tube every 6 hours PRN  aspirin  chewable 81 milliGRAM(s) Oral daily  atorvastatin 80 milliGRAM(s) Oral at bedtime  dextrose 40% Gel 15 Gram(s) Oral once PRN  dextrose 5%. 1000 milliLiter(s) IV Continuous <Continuous>  dextrose 50% Injectable 12.5 Gram(s) IV Push once  dextrose 50% Injectable 25 Gram(s) IV Push once  dextrose 50% Injectable 25 Gram(s) IV Push once  enoxaparin Injectable 40 milliGRAM(s) SubCutaneous daily  FLUoxetine Solution 20 milliGRAM(s) Oral daily  glucagon  Injectable 1 milliGRAM(s) IntraMuscular once PRN  insulin lispro (HumaLOG) corrective regimen sliding scale   SubCutaneous every 6 hours  insulin NPH human recombinant 18 Unit(s) SubCutaneous every 6 hours  levETIRAcetam  Solution 500 milliGRAM(s) Oral two times a day  nystatin Powder 1 Application(s) Topical two times a day PRN  polyethylene glycol 3350 17 Gram(s) Oral daily      PHYSICAL EXAM:   Vital Signs Last 24 Hrs  T(C): 36.6 (07 Jul 2019 05:00), Max: 37.6 (06 Jul 2019 12:00)  T(F): 97.9 (07 Jul 2019 05:00), Max: 99.6 (06 Jul 2019 12:00)  HR: 86 (07 Jul 2019 05:00) (75 - 87)  BP: 118/63 (07 Jul 2019 05:00) (118/63 - 151/74)  BP(mean): 93 (06 Jul 2019 20:00) (74 - 93)  RR: 18 (07 Jul 2019 05:00) (18 - 20)  SpO2: 96% (07 Jul 2019 05:00) (95% - 100%)    General: No acute distress  HEENT: EOM intact, left eye legally blind  Abdomen: Soft, nontender, nondistended , PEG site C/D/ dressing intact, no drainage   Extremities: No edema    NEUROLOGICAL EXAM:  Mental status: Eyes open, awake, alert, oriented to name, place and year, fluent speech, repetition intact, No asomatognosia + anosognosia, follows simple commands   Cranial Nerves: UMN type left facial droop, no nystagmus, moderate to severe dysarthria, LHHA to threat   Motor exam: RUE /RLE spontaneous movement against gravity without significant noticeable weakness, Left hypotonic hemiplegia   Sensation: Left side appears intact to noxious stimuli, left side neglect to double simultaneous stimulation   Coordination/ Gait: No dysmetria on right side       LABS:                        13.0   7.3   )-----------( 302      ( 06 Jul 2019 04:15 )             37.9    07-06    142  |  103  |  24<H>  ----------------------------<  205<H>  4.2   |  26  |  0.87    Ca    9.0      06 Jul 2019 04:15      Hemoglobin A1C, Whole Blood: 10.4 % (06-29 @ 09:33)      	  IMAGING: Reviewed by me.     CT Head No Cont (07.07.19)   Redemonstration  unchanged acute to subacute infarcts with hemorrhagic   conversion in the right cerebral hemisphere from 7/4/2019.    VA Duplex Carotid, Bilat (07.05.19)   Diffuse intimal thickening without duplex evidence of   hemodynamically significant stenosis.     CT Head No Cont (07.04.19)  Increasing lucency with new mass effect and mild midline   shift to the left involving acute right anterior cerebral artery infarct   with involvement of the right corona radiata and centrum semiovale ovale   compared with 6/29/2019. Petechial hemorrhage along the high convexity.    MRA BRAIN/NECK (07/02/19): In comparison with CTA,  atherosclerotic calcification with stenosis of the right greater than left cavernous and supraclinoid ICA segments.   Poorly delineated distal right ICA bifurcation segment,  right A1   segment, with proximal right M1 segment stenosis and poststenotic   dilatation. Stenosis with narrowing of the distal left M1 MCA segment.   There is a dominant left A1 supplying the anterior communicating artery,   there is multifocal stenosis of the distal MCA branch vasculature.    Dominant intradural right vertebral artery supplies the basilar artery,   the left vertebral artery is hypoplastic and poorly visualized in the   cervical course and intradurally with multifocal  narrowing and stenosis.   There are multifocal stenosis of the posterior cerebral arteries.    Possible bovine left common carotid artery, proximal left subclavian   artery is not well-visualized  with possible narrowing. Bilateral common   carotid arteries are patent,  right ICA origin stenosis of approximately   50%, right ICA is decreased in caliber, left ICA is dominant and patent   without ICA origin   stenosis. Vessels are tortuous likely reflecting   hypertension.      Brain MRI (06/29/19) There is evidence of abnormal stricture diffusion seen involving the right frontal parietal region. Involvement of the right basal ganglia region is seen as well. This is compatible with an area of acute infarct.   Involvement of the right anterior cerebral and middle cerebral artery territories are identified. Scattered areas of abnormal susceptibility are identified which is likely compatible with some hemorrhagic transformation. Localized mass effect is seen consisting of sulcal effacement. No significant shift or herniation is seen.    Head CT (06/30/19) Abnormal areas of low-attenuation involving the right frontal and right parietal cortical subcortical region are again seen. These findings are compatible with areas of old infarcts. Abnormal low-attenuation involving the left alla is also again seen which could be compatible with an age-indeterminate infarct. There is no evidence of abnormal low-attenuation seen involving the high right frontal cortical subcortical region. Involvement of the right basal   ganglia and anterior corona radiata regions as well. This is predominantly seen medially and compatible with an acute right anterior cerebral artery infarct. There is localized mass effect consisting of sulcal effacement. No significant shift or herniation is seen.     CTA Head/Neck (06/28/19) CT ANGIOGRAPHY NECK:  Markedly limited examination. Tandem moderate to severe stenoses are suspected in the right carotid bulb and proximal right internal carotid artery. 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    CT ANGIOGRAPHY BRAIN: Markedly limited examination. Limited evaluation of the intracranial internal carotid arteries. Stenoses or occlusion in the distal cavernous segments and ophthalmic segments of the bilateral internal carotid arteries cannot be excluded. Apparent severe stenosis in the distal supraclinoid right internal carotid artery and right carotid terminus with possible occlusion of the A1 segment of the right anterior cerebral artery.  The right A2 segment fills across the anterior to indicating artery.  There appears to be posterior distal right ENRIQUE vessels within the medial right frontal lobe, raising the possibility of early cerebral infarction. THE PATIENT WAS SEEN AND EXAMINED BY ME WITH THE HOUSESTAFF AND STROKE TEAM DURING MORNING ROUNDS.     HPI:  Patient is a 54 year old Female w/ PMHx of HTN, HLD, DM transferred from [facility] for evaluation of stroke. Per daughter at bedside, last known normal 2pm on day of admission, patient had sudden onset left sided paralysis, slurred speech, received TPA at at [place]. NIHSS 13, MRS 1    SUBJECTIVE: No events overnight.  No new neurologic complaints.      ROS: All negative except documented above.    acetaminophen    Suspension .. 650 milliGRAM(s) Oral every 6 hours PRN  acetaminophen    Suspension .. 650 milliGRAM(s) Enteral Tube every 6 hours PRN  aspirin  chewable 81 milliGRAM(s) Oral daily  atorvastatin 80 milliGRAM(s) Oral at bedtime  dextrose 40% Gel 15 Gram(s) Oral once PRN  dextrose 5%. 1000 milliLiter(s) IV Continuous <Continuous>  dextrose 50% Injectable 12.5 Gram(s) IV Push once  dextrose 50% Injectable 25 Gram(s) IV Push once  dextrose 50% Injectable 25 Gram(s) IV Push once  enoxaparin Injectable 40 milliGRAM(s) SubCutaneous daily  FLUoxetine Solution 20 milliGRAM(s) Oral daily  glucagon  Injectable 1 milliGRAM(s) IntraMuscular once PRN  insulin lispro (HumaLOG) corrective regimen sliding scale   SubCutaneous every 6 hours  insulin NPH human recombinant 18 Unit(s) SubCutaneous every 6 hours  levETIRAcetam  Solution 500 milliGRAM(s) Oral two times a day  nystatin Powder 1 Application(s) Topical two times a day PRN  polyethylene glycol 3350 17 Gram(s) Oral daily      PHYSICAL EXAM:   Vital Signs Last 24 Hrs  T(C): 36.6 (07 Jul 2019 05:00), Max: 37.6 (06 Jul 2019 12:00)  T(F): 97.9 (07 Jul 2019 05:00), Max: 99.6 (06 Jul 2019 12:00)  HR: 86 (07 Jul 2019 05:00) (75 - 87)  BP: 118/63 (07 Jul 2019 05:00) (118/63 - 151/74)  BP(mean): 93 (06 Jul 2019 20:00) (74 - 93)  RR: 18 (07 Jul 2019 05:00) (18 - 20)  SpO2: 96% (07 Jul 2019 05:00) (95% - 100%)    General: No acute distress  HEENT: EOM intact, left eye legally blind  Abdomen: Soft, nontender, nondistended , PEG site C/D/ dressing intact, no drainage   Extremities: No edema    NEUROLOGICAL EXAM:  Mental status: Eyes open, awake, alert, oriented to name, place and year, fluent speech, repetition intact, No asomatognosia + anosognosia, follows simple commands   Cranial Nerves: UMN type left facial droop, no nystagmus, moderate to severe dysarthria, LHHA to threat   Motor exam: RUE /RLE spontaneous movement against gravity without significant noticeable weakness, Left hypotonic hemiplegia   Sensation: Left side appears intact to noxious stimuli, left side neglect to double simultaneous stimulation   Coordination/ Gait: No dysmetria on right side       LABS:                        13.0   7.3   )-----------( 302      ( 06 Jul 2019 04:15 )             37.9    07-06    142  |  103  |  24<H>  ----------------------------<  205<H>  4.2   |  26  |  0.87    Ca    9.0      06 Jul 2019 04:15      Hemoglobin A1C, Whole Blood: 10.4 % (06-29 @ 09:33)      	  IMAGING: Reviewed by me.     CT Head No Cont (07.07.19)   Redemonstration  unchanged acute to subacute infarcts with hemorrhagic   conversion in the right cerebral hemisphere from 7/4/2019.    VA Duplex Carotid, Bilat (07.05.19)   Diffuse intimal thickening without duplex evidence of   hemodynamically significant stenosis.     CT Head No Cont (07.04.19)  Increasing lucency with new mass effect and mild midline   shift to the left involving acute right anterior cerebral artery infarct   with involvement of the right corona radiata and centrum semiovale ovale   compared with 6/29/2019. Petechial hemorrhage along the high convexity.    MRA BRAIN/NECK (07/02/19): In comparison with CTA,  atherosclerotic calcification with stenosis of the right greater than left cavernous and supraclinoid ICA segments.   Poorly delineated distal right ICA bifurcation segment,  right A1   segment, with proximal right M1 segment stenosis and poststenotic   dilatation. Stenosis with narrowing of the distal left M1 MCA segment.   There is a dominant left A1 supplying the anterior communicating artery,   there is multifocal stenosis of the distal MCA branch vasculature.    Dominant intradural right vertebral artery supplies the basilar artery,   the left vertebral artery is hypoplastic and poorly visualized in the   cervical course and intradurally with multifocal  narrowing and stenosis.   There are multifocal stenosis of the posterior cerebral arteries.    Possible bovine left common carotid artery, proximal left subclavian   artery is not well-visualized  with possible narrowing. Bilateral common   carotid arteries are patent,  right ICA origin stenosis of approximately   50%, right ICA is decreased in caliber, left ICA is dominant and patent   without ICA origin   stenosis. Vessels are tortuous likely reflecting   hypertension.      Brain MRI (06/29/19) There is evidence of abnormal stricture diffusion seen involving the right frontal parietal region. Involvement of the right basal ganglia region is seen as well. This is compatible with an area of acute infarct.   Involvement of the right anterior cerebral and middle cerebral artery territories are identified. Scattered areas of abnormal susceptibility are identified which is likely compatible with some hemorrhagic transformation. Localized mass effect is seen consisting of sulcal effacement. No significant shift or herniation is seen.    Head CT (06/30/19) Abnormal areas of low-attenuation involving the right frontal and right parietal cortical subcortical region are again seen. These findings are compatible with areas of old infarcts. Abnormal low-attenuation involving the left alla is also again seen which could be compatible with an age-indeterminate infarct. There is no evidence of abnormal low-attenuation seen involving the high right frontal cortical subcortical region. Involvement of the right basal   ganglia and anterior corona radiata regions as well. This is predominantly seen medially and compatible with an acute right anterior cerebral artery infarct. There is localized mass effect consisting of sulcal effacement. No significant shift or herniation is seen.     CTA Head/Neck (06/28/19) CT ANGIOGRAPHY NECK:  Markedly limited examination. Tandem moderate to severe stenoses are suspected in the right carotid bulb and proximal right internal carotid artery. 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    CT ANGIOGRAPHY BRAIN: Markedly limited examination. Limited evaluation of the intracranial internal carotid arteries. Stenoses or occlusion in the distal cavernous segments and ophthalmic segments of the bilateral internal carotid arteries cannot be excluded. Apparent severe stenosis in the distal supraclinoid right internal carotid artery and right carotid terminus with possible occlusion of the A1 segment of the right anterior cerebral artery.  The right A2 segment fills across the anterior to indicating artery.  There appears to be posterior distal right ENRIQUE vessels within the medial right frontal lobe, raising the possibility of early cerebral infarction.

## 2019-07-07 NOTE — PROGRESS NOTE ADULT - ASSESSMENT
54 year old woman with multiple vascular risk factors including age, HTN, DM II and HPLD was initially evaluated at OSH for acute onset of left-sided weakness through telestroke. She was treated with IV tPA and was transferred to [facility] for further management. CTA head and neck on admission showed critical stenosis versus occlusion of right supraclinoid ICA and severe to critical stenosis of right proximal ICA. MRI brain during hospitalization showed acute infarcts in the right MCA and ENRIQUE distribution with minimal hemorrhagic transformation (HI-1). Her hospital course was complicated by episodes concerning for seizure-like activity on admission, treated with AEDs. MRA head and neck showed severe to critical stenosis of right supraclinoid ICA and moderate (about 50%) stenosis of right proximal ICA. Carotid duplex showed diffuse intimal thickening without any evidence of hemodynamically a significant carotid stenoses.     Impression:  Cerebral embolism with cerebral infarction. Right MCA and ENRIQUE distribution stroke - likely mechanism being large vessel disease i.e. symptomatic intracranial vs. extracranial atherosclerosis leading to artery to artery embolism, favoring the former; less likely to be due to an embolic stroke from undetermined source. Likely symptomatic seizures in the setting of an acute stroke    NEURO: Neurologically with improvement since admission, CTH on 7/4 showed increasing lucency with new mass effect and mild midline shift to the left involving acute right anterior cerebral artery infarct with involvement of the right corona radiata and centrum semiovale ovale. Plavix was held. Continue close monitoring for neurologic deterioration in setting of cerebral edema with mass effect and brain compression, maintain BP <160 in setting of possible hemorrhagic transformation on recent CT on 07/04 and repeat CTH in am today 7/7/19.  Continue ASA and high intensity statin for secondary stroke prevention in the setting of likely atheroembolic etiology of her stroke. Based on results of carotid duplex and MRA head and neck in conjunction with technically limited CTA head and neck; doubtful significance of undergoing carotid revascularization with carotid endarterectomy - to be further evaluated with conventional angiogram in the near future (to better quantify the degree of extracranial/intracranial atherosclerotic stenose), Neurosurgery eval appreciated. Continue with Keppra 500 mg to twice daily for seizure prophylaxis - duration of AEDs is expected to be 3-6 months based on clinical course and MRI/EEG evaluation at that time, VEEG: Structural or functional abnormality in the right hemisphere, No epileptiform pattern or seizure seen, MRI Brain w/o results as above, CTA limited by body habitus. Physical therapy/OT recommended Acute Rehab. Fluoxetine 20 mg once a day started for improved motor recovery for 3 months with a plan to taper off afterwards - risks versus benefits and adverse reactions associated with medication use were discussed with patient and available family member at bedside in detail.    ANTITHROMBOTIC THERAPY: Prior was on dual antiplatelet therapy with aspirin and clopidogrel for aggressive secondary stroke prevention in the setting of symptomatic intracranial atherosclerosis for 3 months followed by aspirin alone there after, check P2Y12 after reinitiation of plavix in 1 week to ensure optimal platelet inhibition. Plavix stopped on 07/04 for hemorrhagic transformation, will restart based clinical stability likely on 7/7 after repeating brain imaging if stable.     PULMONARY: CXR (06/29/19)  Clear lungs. Protecting airway, saturating well     CARDIOVASCULAR: Check TTE with bubble study, cardiac monitoring no events, Dr Luz (cardiology) tony appreciated, will obtain cardiac clearance for Right CEA vs carotid stent if indicated. Gradual normotension in the setting of carotid stenosis careful initiation of home meds and will monitor for heart failure signs since patient endorses hx of chronic CHF.                             SBP goal:<160    GASTROINTESTINAL: Dysphagia screen failed. S/S eval recommended NPO, d/w family re: alternate means of nutrition, risks/benefits/alternatives. PEG placed on 07/03. Tolerating feedings at goal     Diet: NPO with TF via PEG    RENAL: maintain adequate hydration, good urine output, urine toxicology: Negative      Na Goal: Greater than 135     Camarena: N    HEMATOLOGY: no active bleeding noted, LE doppler ordered to r/o DVT negative for DVT b/l.     DVT ppx:  LMWH    ID: afebrile, no si/sx of infection    OTHER: HgbA1C 10.4, continue Humalog sliding scale and DM meds as per endocrine recommendations    DISPOSITION: Acute Rehab if able to receive [facility]. She is undocumented and uninsured - plan to optimize therapy while in hospital.     CORE MEASURES:        Admission NIHSS: 13     TPA:  YES       LDL/HDL: 131/49     Depression Screen: 0     Statin Therapy: Y     Dysphagia Screen:  FAIL     Smoking  NO      Afib  NO     Stroke Education  YES    Obtain screening ultrasound in patients who meet 1 or more of the following criteria as patient is high risk for DVT/PE upon admission:   [] History of DVT/PE  []Hypercoagulable states (Factor V Leiden, Cancer, OCP, etc. )  [X]Prolonged immobility (hemiplegia/hemiparesis/post operative or any other extended immobilization)   [] Transferred from outside facility (Rehab or Long term care)  [] Age </= to 50 54 year old woman with multiple vascular risk factors including age, HTN, DM II and HPLD was initially evaluated at OSH for acute onset of left-sided weakness through telestroke. She was treated with IV tPA and was transferred to [facility] for further management. CTA head and neck on admission showed critical stenosis versus occlusion of right supraclinoid ICA and severe to critical stenosis of right proximal ICA. MRI brain during hospitalization showed acute infarcts in the right MCA and ENRIQUE distribution with minimal hemorrhagic transformation (HI-1). Her hospital course was complicated by episodes concerning for seizure-like activity on admission, treated with AEDs. MRA head and neck showed severe to critical stenosis of right supraclinoid ICA and moderate (about 50%) stenosis of right proximal ICA. Carotid duplex showed diffuse intimal thickening without any evidence of hemodynamically a significant carotid stenoses.     Impression:  Cerebral embolism with cerebral infarction. Right MCA and ENRIQUE distribution stroke - likely mechanism being large vessel disease i.e. symptomatic intracranial vs. extracranial atherosclerosis leading to artery to artery embolism, favoring the former; less likely to be due to an embolic stroke from undetermined source. Likely symptomatic seizures in the setting of an acute stroke    NEURO: Neurologically with improvement since admission,  on 7/4 noted lethargy repeat CTH showed increasing lucency with new mass effect and mild midline shift to the left involving acute right anterior cerebral artery infarct with involvement of the right corona radiata and centrum semiovale ovale. Plavix was held. Continue close monitoring for neurologic deterioration in setting of cerebral edema with mass effect and brain compression, maintain BP <160 in setting of possible hemorrhagic transformation on recent CT on 07/04 and repeat CTH in am today 7/7/19 without acute change.  Continue ASA and high intensity statin for secondary stroke prevention in the setting of likely atheroembolic etiology of her stroke. Based on results of carotid duplex and MRA head and neck in conjunction with technically limited CTA head and neck; doubtful significance of undergoing carotid revascularization with carotid endarterectomy - to be further evaluated with conventional angiogram in the near future (to better quantify the degree of extracranial/intracranial atherosclerotic stenose), Neurosurgery eval appreciated. Continue with Keppra 500 mg to twice daily for seizure prophylaxis - duration of AEDs is expected to be 3-6 months based on clinical course and MRI/EEG evaluation at that time, VEEG: Structural or functional abnormality in the right hemisphere, No epileptiform pattern or seizure seen, MRI Brain w/o results as above, CTA limited by body habitus. Physical therapy/OT recommended Acute Rehab. Fluoxetine 20 mg once a day started for improved motor recovery for 3 months with a plan to taper off afterwards - risks versus benefits and adverse reactions associated with medication use were discussed with patient and available family member at bedside in detail.    ANTITHROMBOTIC THERAPY:  was on dual antiplatelet therapy with aspirin and clopidogrel for aggressive secondary stroke prevention in the setting of symptomatic intracranial atherosclerosis for 3 months followed by aspirin alone there after, check P2Y12 after reinitiation of plavix in 1 week to ensure optimal platelet inhibition. Plavix stopped on 07/04 for hemorrhagic transformation, will restart based clinical stability likely on 7/9 (5 days) after repeating brain imaging and if stable.     PULMONARY: CXR (06/29/19)  Clear lungs. Protecting airway, saturating well     CARDIOVASCULAR: Check TTE with bubble study, cardiac monitoring no events, Dr Luz (cardiology) eval appreciated, will obtain cardiac clearance for Right CEA vs carotid stent if indicated. Gradual normotension in the setting of carotid stenosis careful initiation of home meds and will monitor for heart failure signs since patient endorses hx of chronic CHF.                             SBP goal:<160    GASTROINTESTINAL: Dysphagia screen failed. S/S eval recommended NPO, d/w family re: alternate means of nutrition, risks/benefits/alternatives. PEG placed on 07/03. Tolerating feedings at goal     Diet: NPO with TF via PEG    RENAL: maintain adequate hydration, good urine output, urine toxicology: Negative      Na Goal: Greater than 135     Camarena: N    HEMATOLOGY: no active bleeding noted, LE doppler ordered to r/o DVT negative for DVT b/l.     DVT ppx:  LMWH    ID: afebrile, no si/sx of infection    OTHER: HgbA1C 10.4, continue Humalog sliding scale and DM meds as per endocrine recommendations    DISPOSITION: Acute Rehab if able to receive [facility]. She is undocumented and uninsured - plan to optimize therapy while in hospital.     CORE MEASURES:        Admission NIHSS: 13     TPA:  YES       LDL/HDL: 131/49     Depression Screen: 0     Statin Therapy: Y     Dysphagia Screen:  FAIL     Smoking  NO      Afib  NO     Stroke Education  YES    Obtain screening ultrasound in patients who meet 1 or more of the following criteria as patient is high risk for DVT/PE upon admission:   [] History of DVT/PE  []Hypercoagulable states (Factor V Leiden, Cancer, OCP, etc. )  [X]Prolonged immobility (hemiplegia/hemiparesis/post operative or any other extended immobilization)   [] Transferred from outside facility (Rehab or Long term care)  [] Age </= to 50 54 year old woman with multiple vascular risk factors including age, HTN, DM II and HPLD was initially evaluated at OSH for acute onset of left-sided weakness through telestroke. She was treated with IV tPA and was transferred to [facility] for further management. CTA head and neck on admission showed critical stenosis versus occlusion of right supraclinoid ICA and severe to critical stenosis of right proximal ICA. MRI brain during hospitalization showed acute infarcts in the right MCA and ENRIQUE distribution with minimal hemorrhagic transformation (HI-1). Her hospital course was complicated by episodes concerning for seizure-like activity on admission, treated with AEDs. MRA head and neck showed severe to critical stenosis of right supraclinoid ICA and moderate (about 50%) stenosis of right proximal ICA. Carotid duplex showed diffuse intimal thickening without any evidence of hemodynamically a significant carotid stenoses. Repeat CT brain showed stable hemorrhagic transformation of right MCA/ENRIQUE distribution infarct.     Impression:  Cerebral embolism with cerebral infarction. Right MCA and ENRIQUE distribution stroke - likely mechanism being large vessel disease i.e. symptomatic intracranial vs. extracranial atherosclerosis leading to artery to artery embolism, favoring the former; less likely to be due to an embolic stroke from undetermined source. Likely symptomatic seizures in the setting of an acute stroke    NEURO: Neurologically with improvement since admission,  on 7/4 noted lethargy repeat CTH showed increasing lucency with new mass effect and mild midline shift to the left involving acute right anterior cerebral artery infarct with involvement of the right corona radiata and centrum semiovale ovale. Plavix was held. Continue close monitoring for neurologic deterioration in setting of cerebral edema with mass effect and brain compression, maintain BP <160 in setting of possible hemorrhagic transformation on recent CT on 07/04 and repeat CTH in am today 7/7/19 without acute change.  Continue ASA and high intensity statin for secondary stroke prevention in the setting of likely atheroembolic etiology of her stroke. Based on results of carotid duplex and MRA head and neck in conjunction with technically limited CTA head and neck; doubtful significance of undergoing carotid revascularization with carotid endarterectomy - to be further evaluated with conventional angiogram in the near future (to better quantify the degree of extracranial/intracranial atherosclerotic stenose), Neurosurgery eval appreciated. Continue with Keppra 500 mg to twice daily for seizure prophylaxis - duration of AEDs is expected to be 3-6 months based on clinical course and MRI/EEG evaluation at that time, VEEG: Structural or functional abnormality in the right hemisphere, No epileptiform pattern or seizure seen, MRI Brain w/o results as above, CTA limited by body habitus. Physical therapy/OT recommended Acute Rehab. Fluoxetine 20 mg once a day started for improved motor recovery for 3 months with a plan to taper off afterwards - risks versus benefits and adverse reactions associated with medication use were discussed with patient and available family member at bedside in detail.    ANTITHROMBOTIC THERAPY:  was on dual antiplatelet therapy with aspirin and clopidogrel for aggressive secondary stroke prevention in the setting of symptomatic intracranial atherosclerosis for 3 months followed by aspirin alone there after, check P2Y12 after reinitiation of clopidogrel in 1 week to ensure optimal platelet inhibition. Plavix stopped on 07/04 for hemorrhagic transformation, will restart based clinical stability likely on 7/9 (5 days) after repeating brain imaging and if stable.     PULMONARY: CXR (06/29/19)  Clear lungs. Protecting airway, saturating well     CARDIOVASCULAR: Check TTE with bubble study, cardiac monitoring no events, Dr Luz (cardiology) eval appreciated, will obtain cardiac clearance for Right CEA vs carotid stent if indicated. Gradual normotension in the setting of carotid stenosis careful initiation of home meds and will monitor for heart failure signs since patient endorses hx of chronic CHF.                             SBP goal:<160 mmHg     GASTROINTESTINAL: Dysphagia screen failed. S/S eval recommended NPO, d/w family re: alternate means of nutrition, risks/benefits/alternatives. PEG placed on 07/03. Tolerating feedings at goal     Diet: NPO with TF via PEG    RENAL: maintain adequate hydration, good urine output, urine toxicology: Negative      Na Goal: Greater than 135     Camarena: N    HEMATOLOGY: no active bleeding noted, LE doppler ordered to r/o DVT negative for DVT b/l.     DVT ppx:  LMWH    ID: afebrile, no si/sx of infection    OTHER: HgbA1C 10.4, continue Humalog sliding scale and DM meds as per endocrine recommendations    DISPOSITION: Acute Rehab if able to receive [facility]. She is undocumented and uninsured - plan to optimize therapy while in hospital.     CORE MEASURES:        Admission NIHSS: 13     TPA:  YES       LDL/HDL: 131/49     Depression Screen: 0     Statin Therapy: Y     Dysphagia Screen:  FAIL     Smoking  NO      Afib  NO     Stroke Education  YES    Obtain screening ultrasound in patients who meet 1 or more of the following criteria as patient is high risk for DVT/PE upon admission:   [] History of DVT/PE  []Hypercoagulable states (Factor V Leiden, Cancer, OCP, etc. )  [X]Prolonged immobility (hemiplegia/hemiparesis/post operative or any other extended immobilization)   [] Transferred from outside facility (Rehab or Long term care)  [] Age </= to 50

## 2019-07-08 LAB
ANION GAP SERPL CALC-SCNC: 13 MMOL/L — SIGNIFICANT CHANGE UP (ref 5–17)
BUN SERPL-MCNC: 24 MG/DL — HIGH (ref 7–23)
CALCIUM SERPL-MCNC: 9.1 MG/DL — SIGNIFICANT CHANGE UP (ref 8.4–10.5)
CHLORIDE SERPL-SCNC: 100 MMOL/L — SIGNIFICANT CHANGE UP (ref 96–108)
CO2 SERPL-SCNC: 27 MMOL/L — SIGNIFICANT CHANGE UP (ref 22–31)
CREAT SERPL-MCNC: 0.89 MG/DL — SIGNIFICANT CHANGE UP (ref 0.5–1.3)
GLUCOSE BLDC GLUCOMTR-MCNC: 171 MG/DL — HIGH (ref 70–99)
GLUCOSE BLDC GLUCOMTR-MCNC: 188 MG/DL — HIGH (ref 70–99)
GLUCOSE BLDC GLUCOMTR-MCNC: 203 MG/DL — HIGH (ref 70–99)
GLUCOSE SERPL-MCNC: 190 MG/DL — HIGH (ref 70–99)
HCT VFR BLD CALC: 40.5 % — SIGNIFICANT CHANGE UP (ref 34.5–45)
HGB BLD-MCNC: 12.8 G/DL — SIGNIFICANT CHANGE UP (ref 11.5–15.5)
MCHC RBC-ENTMCNC: 27.5 PG — SIGNIFICANT CHANGE UP (ref 27–34)
MCHC RBC-ENTMCNC: 31.6 GM/DL — LOW (ref 32–36)
MCV RBC AUTO: 86.9 FL — SIGNIFICANT CHANGE UP (ref 80–100)
PLATELET # BLD AUTO: 383 K/UL — SIGNIFICANT CHANGE UP (ref 150–400)
POTASSIUM SERPL-MCNC: 4.5 MMOL/L — SIGNIFICANT CHANGE UP (ref 3.5–5.3)
POTASSIUM SERPL-SCNC: 4.5 MMOL/L — SIGNIFICANT CHANGE UP (ref 3.5–5.3)
RBC # BLD: 4.66 M/UL — SIGNIFICANT CHANGE UP (ref 3.8–5.2)
RBC # FLD: 13.2 % — SIGNIFICANT CHANGE UP (ref 10.3–14.5)
SODIUM SERPL-SCNC: 140 MMOL/L — SIGNIFICANT CHANGE UP (ref 135–145)
WBC # BLD: 8.58 K/UL — SIGNIFICANT CHANGE UP (ref 3.8–10.5)
WBC # FLD AUTO: 8.58 K/UL — SIGNIFICANT CHANGE UP (ref 3.8–10.5)

## 2019-07-08 PROCEDURE — 99233 SBSQ HOSP IP/OBS HIGH 50: CPT

## 2019-07-08 PROCEDURE — 99232 SBSQ HOSP IP/OBS MODERATE 35: CPT

## 2019-07-08 RX ORDER — NYSTATIN 500MM UNIT
500000 POWDER (EA) MISCELLANEOUS
Refills: 0 | Status: DISCONTINUED | OUTPATIENT
Start: 2019-07-08 | End: 2019-08-26

## 2019-07-08 RX ORDER — HUMAN INSULIN 100 [IU]/ML
20 INJECTION, SUSPENSION SUBCUTANEOUS EVERY 6 HOURS
Refills: 0 | Status: DISCONTINUED | OUTPATIENT
Start: 2019-07-08 | End: 2019-07-09

## 2019-07-08 RX ADMIN — Medication 81 MILLIGRAM(S): at 12:32

## 2019-07-08 RX ADMIN — ATORVASTATIN CALCIUM 80 MILLIGRAM(S): 80 TABLET, FILM COATED ORAL at 21:44

## 2019-07-08 RX ADMIN — Medication 2: at 05:46

## 2019-07-08 RX ADMIN — HUMAN INSULIN 18 UNIT(S): 100 INJECTION, SUSPENSION SUBCUTANEOUS at 12:31

## 2019-07-08 RX ADMIN — Medication 500000 UNIT(S): at 17:26

## 2019-07-08 RX ADMIN — HUMAN INSULIN 20 UNIT(S): 100 INJECTION, SUSPENSION SUBCUTANEOUS at 17:26

## 2019-07-08 RX ADMIN — Medication 2: at 12:31

## 2019-07-08 RX ADMIN — Medication 2: at 00:14

## 2019-07-08 RX ADMIN — Medication 4: at 17:26

## 2019-07-08 RX ADMIN — LEVETIRACETAM 500 MILLIGRAM(S): 250 TABLET, FILM COATED ORAL at 05:46

## 2019-07-08 RX ADMIN — HUMAN INSULIN 18 UNIT(S): 100 INJECTION, SUSPENSION SUBCUTANEOUS at 05:46

## 2019-07-08 RX ADMIN — LEVETIRACETAM 500 MILLIGRAM(S): 250 TABLET, FILM COATED ORAL at 17:26

## 2019-07-08 RX ADMIN — HUMAN INSULIN 18 UNIT(S): 100 INJECTION, SUSPENSION SUBCUTANEOUS at 00:14

## 2019-07-08 RX ADMIN — ENOXAPARIN SODIUM 40 MILLIGRAM(S): 100 INJECTION SUBCUTANEOUS at 12:31

## 2019-07-08 RX ADMIN — Medication 20 MILLIGRAM(S): at 12:32

## 2019-07-08 RX ADMIN — POLYETHYLENE GLYCOL 3350 17 GRAM(S): 17 POWDER, FOR SOLUTION ORAL at 12:32

## 2019-07-08 NOTE — PROGRESS NOTE ADULT - SUBJECTIVE AND OBJECTIVE BOX
Diabetes Follow up note:  Interval Hx:  54 year old female w/uncontrolled T2DM (on insulin PTA) w/retinopathy, morbid obesity here w/CVA s/p PEG on continuous feeds (glucerna) at goal. BG values 160s-low 200s on present insulin regimen. Pt seen at bedside w/daughter present. Daughter endorses that her mother has not had a BM since prior to admission. Pt communicates by nodding yes/no to answers.     Review of Systems:  General: denies pain  GI: Tolerating POs without any N/V/D/ABD PAIN. + constipation  CV: No CP/SOB  ENDO: No S&Sx of hypoglycemia  MEDS:  atorvastatin 80 milliGRAM(s) Oral at bedtime    insulin lispro (HumaLOG) corrective regimen sliding scale   SubCutaneous every 6 hours  insulin NPH human recombinant 18 Unit(s) SubCutaneous every 6 hours    nystatin    Suspension 019389 Unit(s) Oral two times a day    Allergies    No Known Allergies          PE:  General: Female sitting in chair. NAD.   Vital Signs Last 24 Hrs  T(C): 37.1 (08 Jul 2019 08:47), Max: 37.1 (08 Jul 2019 08:47)  T(F): 98.7 (08 Jul 2019 08:47), Max: 98.7 (08 Jul 2019 08:47)  HR: 81 (08 Jul 2019 08:47) (76 - 81)  BP: 110/61 (08 Jul 2019 08:47) (110/61 - 162/87)  BP(mean): --  RR: 18 (08 Jul 2019 08:47) (18 - 18)  SpO2: 95% (08 Jul 2019 08:47) (95% - 100%)  Abd: Soft, NT,Slight Distention. Obese.    Extremities: Warm   Neuro: Awake. Follows commands. L sided hemiplegia. dysarthria.     LABS:    POCT Blood Glucose.: 188 mg/dL (07-08-19 @ 12:22)  POCT Blood Glucose.: 171 mg/dL (07-08-19 @ 05:43)  POCT Blood Glucose.: 163 mg/dL (07-07-19 @ 23:44)  POCT Blood Glucose.: 187 mg/dL (07-07-19 @ 17:25)  POCT Blood Glucose.: 202 mg/dL (07-07-19 @ 12:03)  POCT Blood Glucose.: 197 mg/dL (07-07-19 @ 05:46)  POCT Blood Glucose.: 161 mg/dL (07-06-19 @ 23:47)  POCT Blood Glucose.: 215 mg/dL (07-06-19 @ 17:31)  POCT Blood Glucose.: 186 mg/dL (07-06-19 @ 11:35)  POCT Blood Glucose.: 187 mg/dL (07-06-19 @ 06:15)  POCT Blood Glucose.: 191 mg/dL (07-05-19 @ 23:55)  POCT Blood Glucose.: 232 mg/dL (07-05-19 @ 17:52)                            12.8   8.58  )-----------( 383      ( 08 Jul 2019 07:38 )             40.5       07-08    140  |  100  |  24<H>  ----------------------------<  190<H>  4.5   |  27  |  0.89    Ca    9.1      08 Jul 2019 06:37            Hemoglobin A1C, Whole Blood: 10.4 % <H> [4.0 - 5.6] (06-29-19 @ 09:33)            Contact number: [place] [phone] or [phone]

## 2019-07-08 NOTE — PROGRESS NOTE ADULT - ASSESSMENT
54 year old woman with multiple vascular risk factors including age, HTN, DM II and HPLD was initially evaluated at OSH for acute onset of left-sided weakness through telestroke. She was treated with IV tPA and was transferred to [facility] for further management. CTA head and neck on admission showed critical stenosis versus occlusion of right supraclinoid ICA and severe to critical stenosis of right proximal ICA. MRI brain during hospitalization showed acute infarcts in the right MCA and ENRIQUE distribution with minimal hemorrhagic transformation (HI-1). Her hospital course was complicated by episodes concerning for seizure-like activity on admission, treated with AEDs. MRA head and neck showed severe to critical stenosis of right supraclinoid ICA and moderate (about 50%) stenosis of right proximal ICA. Carotid duplex showed diffuse intimal thickening without any evidence of hemodynamically a significant carotid stenoses. Repeat CT brain showed stable hemorrhagic transformation of right MCA/ENRIQUE distribution infarct.     Impression:  Cerebral embolism with cerebral infarction. Right MCA and ENRIQUE distribution stroke - likely mechanism being large vessel disease i.e. symptomatic intracranial vs. extracranial atherosclerosis leading to artery to artery embolism, favoring the former; less likely to be due to an embolic stroke from undetermined source. Likely symptomatic seizures in the setting of an acute stroke    NEURO: Neurologically with improvement since admission,  on 7/4 noted lethargy repeat CTH showed increasing lucency with new mass effect and mild midline shift to the left involving acute right anterior cerebral artery infarct with involvement of the right corona radiata and centrum semiovale ovale. Plavix was held. Continue close monitoring for neurologic deterioration in setting of cerebral edema with mass effect and brain compression, maintain BP <160 in setting of possible hemorrhagic transformation on recent CT on 07/04 and repeat CTH in am 7/7/19 without acute change.  Continue ASA and high intensity statin for secondary stroke prevention in the setting of likely atheroembolic etiology of her stroke. Based on results of carotid duplex and MRA head and neck in conjunction with technically limited CTA head and neck; doubtful significance of undergoing carotid revascularization with carotid endarterectomy - to be further evaluated with conventional angiogram in the near future (to better quantify the degree of extracranial/intracranial atherosclerotic stenose), Neurosurgery eval appreciated. Continue with Keppra 500 mg to twice daily for seizure prophylaxis - duration of AEDs is expected to be 3-6 months based on clinical course and MRI/EEG evaluation at that time, VEEG: Structural or functional abnormality in the right hemisphere, No epileptiform pattern or seizure seen, MRI Brain w/o results as above, CTA limited by body habitus. Physical therapy/OT recommended Acute Rehab. Fluoxetine 20 mg once a day started for improved motor recovery for 3 months with a plan to taper off afterwards - risks versus benefits and adverse reactions associated with medication use were discussed with patient and available family member at bedside in detail.    ANTITHROMBOTIC THERAPY:  was on dual antiplatelet therapy with aspirin and clopidogrel for aggressive secondary stroke prevention in the setting of symptomatic intracranial atherosclerosis for 3 months followed by aspirin alone there after, check P2Y12 after reinitiation of clopidogrel in 1 week to ensure optimal platelet inhibition. Plavix stopped on 07/04 for hemorrhagic transformation, will restart based clinical stability likely on 7/9 (5 days) after repeating brain imaging and if stable.     PULMONARY: CXR (06/29/19)  Clear lungs. Protecting airway, saturating well     CARDIOVASCULAR: Check TTE with bubble study, cardiac monitoring no events, Dr Luz (cardiology) eval appreciated, will obtain cardiac clearance for Right CEA vs carotid stent if indicated. Gradual normotension in the setting of carotid stenosis careful initiation of home meds and will monitor for heart failure signs since patient endorses hx of chronic CHF.                             SBP goal:<160 mmHg     GASTROINTESTINAL: Dysphagia screen failed. S/S eval recommended NPO, d/w family re: alternate means of nutrition, risks/benefits/alternatives. PEG placed on 07/03. Tolerating feedings at goal     Diet: NPO with TF via PEG    RENAL: maintain adequate hydration, good urine output, urine toxicology: Negative      Na Goal: Greater than 135     Camarena: N    HEMATOLOGY: no active bleeding noted, LE doppler ordered to r/o DVT negative for DVT b/l.     DVT ppx:  LMWH    ID: afebrile, no si/sx of infection    OTHER: HgbA1C 10.4, continue Humalog sliding scale and DM meds as per endocrine recommendations    DISPOSITION: Acute Rehab if able to receive [facility]. She is undocumented and uninsured - plan to optimize therapy while in hospital.     CORE MEASURES:        Admission NIHSS: 13     TPA:  YES       LDL/HDL: 131/49     Depression Screen: 0     Statin Therapy: Y     Dysphagia Screen:  FAIL     Smoking  NO      Afib  NO     Stroke Education  YES    Obtain screening ultrasound in patients who meet 1 or more of the following criteria as patient is high risk for DVT/PE upon admission:   [] History of DVT/PE  []Hypercoagulable states (Factor V Leiden, Cancer, OCP, etc. )  [X]Prolonged immobility (hemiplegia/hemiparesis/post operative or any other extended immobilization)   [] Transferred from outside facility (Rehab or Long term care)  [] Age </= to 50 54 year old woman with multiple vascular risk factors including age, HTN, DM II and HPLD was initially evaluated at OSH for acute onset of left-sided weakness through telestroke. She was treated with IV tPA and was transferred to [facility] for further management. CTA head and neck on admission showed critical stenosis versus occlusion of right supraclinoid ICA and severe to critical stenosis of right proximal ICA. MRI brain during hospitalization showed acute infarcts in the right MCA and ENRIQUE distribution with minimal hemorrhagic transformation (HI-1). Her hospital course was complicated by episodes concerning for seizure-like activity on admission, treated with AEDs. MRA head and neck showed severe to critical stenosis of right supraclinoid ICA and moderate (about 50%) stenosis of right proximal ICA. Carotid duplex showed diffuse intimal thickening without any evidence of hemodynamically a significant carotid stenoses. Repeat CT brain showed stable hemorrhagic transformation of right MCA/ENRIQUE distribution infarct.     Impression:  Cerebral embolism with cerebral infarction. Right MCA and ENRIQUE distribution stroke - likely mechanism being large vessel disease i.e. symptomatic intracranial vs. extracranial atherosclerosis leading to artery to artery embolism, favoring the former; less likely to be due to an embolic stroke from undetermined source. Likely symptomatic seizures in the setting of an acute stroke    NEURO: Neurologically with improvement since admission,  on 7/4 noted lethargy repeat CTH showed increasing lucency with new mass effect and mild midline shift to the left involving acute right anterior cerebral artery infarct with involvement of the right corona radiata and centrum semiovale ovale. Plavix was held. Continue close monitoring for neurologic deterioration in setting of cerebral edema with mass effect and brain compression, maintain BP <160 in setting of possible hemorrhagic transformation on recent CT on 07/04 and repeat CTH in am 7/7/19 without acute change.  Continue ASA and high intensity statin for secondary stroke prevention in the setting of likely atheroembolic etiology of her stroke. Based on results of carotid duplex and MRA head and neck in conjunction with technically limited CTA head and neck; doubtful significance of undergoing carotid revascularization with carotid endarterectomy - to be further evaluated with conventional angiogram in the near future (to better quantify the degree of extracranial/intracranial atherosclerotic stenose), Neurosurgery eval appreciated. Continue with Keppra 500 mg to twice daily for seizure prophylaxis - duration of AEDs is expected to be 3-6 months based on clinical course and MRI/EEG evaluation at that time, VEEG: Structural or functional abnormality in the right hemisphere, No epileptiform pattern or seizure seen, MRI Brain w/o results as above, CTA limited by body habitus. Physical therapy/OT recommended Acute Rehab. Fluoxetine 20 mg once a day started for improved motor recovery for 3 months with a plan to taper off afterwards - risks versus benefits and adverse reactions associated with medication use were discussed with patient and available family member at bedside in detail.    ANTITHROMBOTIC THERAPY:  was on dual antiplatelet therapy with aspirin and clopidogrel for aggressive secondary stroke prevention in the setting of symptomatic intracranial atherosclerosis for 3 months followed by aspirin alone there after, check P2Y12 after reinitiation of clopidogrel in 1 week to ensure optimal platelet inhibition. Plavix stopped on 07/04 for hemorrhagic transformation, will restart based clinical stability likely on 7/9 (5 days) after repeating brain imaging and if stable.     PULMONARY: CXR (06/29/19)  Clear lungs. Protecting airway, saturating well     CARDIOVASCULAR: Check TTE with bubble study, cardiac monitoring no events, Dr Luz (cardiology) eval appreciated, will obtain cardiac clearance for revascularization procedure if indicated. Gradual normotension in the setting of carotid stenosis careful initiation of home meds and will monitor for heart failure signs since patient endorses hx of chronic CHF.                             SBP goal:<160 mmHg     GASTROINTESTINAL: Dysphagia screen failed. S/S eval recommended NPO, d/w family re: alternate means of nutrition, risks/benefits/alternatives. PEG placed on 07/03. Tolerating feedings at goal     Diet: NPO with TF via PEG    RENAL: maintain adequate hydration, good urine output, urine toxicology: Negative      Na Goal: Greater than 135     Camarena: N    HEMATOLOGY: no active bleeding noted, LE doppler ordered to r/o DVT negative for DVT b/l.     DVT ppx:  LMWH    ID: afebrile, no si/sx of infection    OTHER: HgbA1C 10.4, continue Humalog sliding scale and DM meds as per endocrine recommendations    DISPOSITION: Acute Rehab if able to receive [facility]. She is undocumented and uninsured - plan to optimize therapy while in hospital.     CORE MEASURES:        Admission NIHSS: 13     TPA:  YES       LDL/HDL: 131/49     Depression Screen: 0     Statin Therapy: Y     Dysphagia Screen:  FAIL     Smoking  NO      Afib  NO     Stroke Education  YES    Obtain screening ultrasound in patients who meet 1 or more of the following criteria as patient is high risk for DVT/PE upon admission:   [] History of DVT/PE  []Hypercoagulable states (Factor V Leiden, Cancer, OCP, etc. )  [X]Prolonged immobility (hemiplegia/hemiparesis/post operative or any other extended immobilization)   [] Transferred from outside facility (Rehab or Long term care)  [] Age </= to 50 54 year old woman with multiple vascular risk factors including age, HTN, DM II and HPLD was initially evaluated at OSH for acute onset of left-sided weakness through telestroke. She was treated with IV tPA and was transferred to [facility] for further management. CTA head and neck on admission showed critical stenosis versus occlusion of right supraclinoid ICA and severe to critical stenosis of right proximal ICA. MRI brain during hospitalization showed acute infarcts in the right MCA and ENRIQUE distribution with minimal hemorrhagic transformation (HI-1). Her hospital course was complicated by episodes concerning for seizure-like activity on admission, treated with AEDs. MRA head and neck showed severe to critical stenosis of right supraclinoid ICA and moderate (about 50%) stenosis of right proximal ICA. Carotid duplex showed diffuse intimal thickening without any evidence of hemodynamically a significant carotid stenoses. Repeat CT brain showed stable hemorrhagic transformation of right MCA/ENRIQUE distribution infarct.     Impression:  Cerebral embolism with cerebral infarction. Right MCA and ENRIQUE distribution stroke - likely mechanism being large vessel disease i.e. symptomatic intracranial vs. extracranial atherosclerosis leading to artery to artery embolism, favoring the former; less likely to be due to an embolic stroke from undetermined source. Likely symptomatic seizures in the setting of an acute stroke    NEURO: Neurologically with improvement since admission,  on 7/4 noted lethargy repeat CTH showed increasing lucency with new mass effect and mild midline shift to the left involving acute right anterior cerebral artery infarct with involvement of the right corona radiata and centrum semiovale ovale. Plavix was held. Continue close monitoring for neurologic deterioration in setting of cerebral edema with mass effect and brain compression, maintain BP <160 in setting of possible hemorrhagic transformation on recent CT on 07/04 and repeat CTH in am 7/7/19 without acute change.  Continue ASA and high intensity statin for secondary stroke prevention in the setting of likely atheroembolic etiology of her stroke. Based on results of carotid duplex and MRA head and neck in conjunction with technically limited CTA head and neck; doubtful significance of undergoing carotid revascularization with carotid endarterectomy - to be further evaluated with conventional angiogram on 7/11 to better quantify the degree of extracranial/intracranial atherosclerotic stenose, Neurosurgery eval appreciated. Continue with Keppra 500 mg to twice daily for seizure prophylaxis - duration of AEDs is expected to be 3-6 months based on clinical course and MRI/EEG evaluation at that time, VEEG: Structural or functional abnormality in the right hemisphere, No epileptiform pattern or seizure seen, MRI Brain w/o results as above, CTA limited by body habitus. Physical therapy/OT recommended Acute Rehab. Fluoxetine 20 mg once a day started for improved motor recovery for 3 months with a plan to taper off afterwards - risks versus benefits and adverse reactions associated with medication use were discussed with patient and available family member at bedside in detail.    ANTITHROMBOTIC THERAPY:  Was on dual antiplatelet therapy with aspirin and clopidogrel for aggressive secondary stroke prevention in the setting of symptomatic intracranial atherosclerosis for 3 months followed by aspirin alone there after, check P2Y12 after reinitiation of clopidogrel in 1 week to ensure optimal platelet inhibition. Plavix stopped on 07/04 for hemorrhagic transformation, will restart based clinical stability likely on 7/9 (5 days) after repeating brain imaging and if stable.     PULMONARY: CXR (06/29/19)  Clear lungs. Protecting airway, saturating well     CARDIOVASCULAR: Check TTE with bubble study, cardiac monitoring no events, Dr Luz (cardiology) tony appreciated, will obtain cardiac clearance for revascularization procedure if indicated. Gradual normotension in the setting of carotid stenosis careful initiation of home meds and will monitor for heart failure signs since patient endorses hx of chronic CHF.                             SBP goal:<160 mmHg     GASTROINTESTINAL: Dysphagia screen failed. S/S eval recommended NPO, d/w family re: alternate means of nutrition, risks/benefits/alternatives. PEG placed on 07/03. Tolerating feedings at goal     Diet: NPO with TF via PEG    RENAL: maintain adequate hydration, good urine output, urine toxicology: Negative      Na Goal: Greater than 135     Camarena: N    HEMATOLOGY: no active bleeding noted, LE doppler ordered to r/o DVT negative for DVT b/l.     DVT ppx:  LMWH    ID: afebrile, no si/sx of infection    OTHER: HgbA1C 10.4, continue Humalog sliding scale and DM meds as per endocrine recommendations    DISPOSITION: Acute Rehab if able to receive [facility]. She is undocumented and uninsured - plan to optimize therapy while in hospital.     CORE MEASURES:        Admission NIHSS: 13     TPA:  YES       LDL/HDL: 131/49     Depression Screen: 0     Statin Therapy: Y     Dysphagia Screen:  FAIL     Smoking  NO      Afib  NO     Stroke Education  YES    Obtain screening ultrasound in patients who meet 1 or more of the following criteria as patient is high risk for DVT/PE upon admission:   [] History of DVT/PE  []Hypercoagulable states (Factor V Leiden, Cancer, OCP, etc. )  [X]Prolonged immobility (hemiplegia/hemiparesis/post operative or any other extended immobilization)   [] Transferred from outside facility (Rehab or Long term care)  [] Age </= to 50

## 2019-07-08 NOTE — PROGRESS NOTE ADULT - PROBLEM SELECTOR PLAN 1
-test BG Q6h  -Increase NPH 20 units Q6h (hold if tube feeds off).   -c/w Humalog moderate correction scale Q6h  -Discharge: If to remain on continuous feeds at rehab-would c/w current regimen.   -discussed w/pt's daughter and RN  pager: [phone]

## 2019-07-08 NOTE — PROGRESS NOTE ADULT - SUBJECTIVE AND OBJECTIVE BOX
Asked to follow up regarding disposition recommendations.  Seen by Dr. Pearson of rehab on 7/2/19 at which point acute inpatient rehabilitation had been recommended.  Therapist recommendations have continued to recommend acute rehab.  Pt was referred to [facility] Rehab Department, whose review raised concerns about pt's ability to tolerate intensive therapy.    Chart reviewed, pt seen/examined.  Notably, pt is s/p PGT on 7/3.  She had increasing lethargy on 7/4 with imaging showing hemorrhagic transformation and increased edema/mass effect.  Clopidogrel was held.  Follow up imaging on 7/7 showed stable bleed.  Current plan per neurology note 7/8 is resuming clopidogrel in near future if imaging remains stable and clinically stable.  PT this AM noted somnolence, arousing to voice.  Required 2 persons for bed mobility, did exercises in bed.  Per daughter at bedside now, pt is very tired from sitting up in chair all day.  She remains NPO and on TFs.    On exam, appeared somnolent.  Sitting in recliner chair at bedside.  Arouses easily to voice but goes back to closing her eyes very quickly.  Nods yes to answer questions.  Had to be encouraged to speak.  Spoke in a very soft voice with brief answers.  No aphasia grossly evident.  Lifted R forearm and hand off armrest, not able to lift arm up, i.e., unable to lift R elbow off armrest.  Not lifting L arm on command, or either leg on command, appears this is due to somnolence or fatigue.  Pt's daughter states she moved her L leg/foot earlier today.  There is a PGT in place with TFs running actively now.    At this point, pt does not appear that she would be able to tolerate intensive (acute) inpatient rehabilitation.  This may be a temporary situation due to the cerebral edema, that may improve once the edema improves.  She would be an appropriate candidate otherwise given her diagnosis, functional deficits and rehabilitation needs.  Our team will follow, but please reconsult if there are sustained improvements in her alertness and/or other aspects of her function.

## 2019-07-08 NOTE — PROGRESS NOTE ADULT - SUBJECTIVE AND OBJECTIVE BOX
INTERVAL HPI/OVERNIGHT EVENTS:    Pt seen and examined with daughter at bedside. Tolerating feeds, no leakage/drainage from peg site    MEDICATIONS  (STANDING):  aspirin  chewable 81 milliGRAM(s) Oral daily  atorvastatin 80 milliGRAM(s) Oral at bedtime  dextrose 5%. 1000 milliLiter(s) (50 mL/Hr) IV Continuous <Continuous>  dextrose 50% Injectable 12.5 Gram(s) IV Push once  dextrose 50% Injectable 25 Gram(s) IV Push once  dextrose 50% Injectable 25 Gram(s) IV Push once  enoxaparin Injectable 40 milliGRAM(s) SubCutaneous daily  FLUoxetine Solution 20 milliGRAM(s) Oral daily  insulin lispro (HumaLOG) corrective regimen sliding scale   SubCutaneous every 6 hours  insulin NPH human recombinant 18 Unit(s) SubCutaneous every 6 hours  levETIRAcetam  Solution 500 milliGRAM(s) Oral two times a day  nystatin    Suspension 253848 Unit(s) Oral two times a day  polyethylene glycol 3350 17 Gram(s) Oral daily    MEDICATIONS  (PRN):  acetaminophen    Suspension .. 650 milliGRAM(s) Oral every 6 hours PRN Temp greater or equal to 38C (100.4F)  acetaminophen    Suspension .. 650 milliGRAM(s) Enteral Tube every 6 hours PRN Moderate Pain (4 - 6)  dextrose 40% Gel 15 Gram(s) Oral once PRN Blood Glucose LESS THAN 70 milliGRAM(s)/deciliter  glucagon  Injectable 1 milliGRAM(s) IntraMuscular once PRN Glucose LESS THAN 70 milligrams/deciliter  nystatin Powder 1 Application(s) Topical two times a day PRN dermatitis      Allergies    No Known Allergies    Intolerances        Review of Systems:    General:  No wt loss, fevers, chills, night sweats,fatigue,   Eyes:  Good vision, no reported pain  ENT:  No sore throat, pain, runny nose, dysphagia  CV:  No pain, palpitations, hypo/hypertension  Resp:  No dyspnea, cough, tachypnea, wheezing  GI:  No pain, No nausea, No vomiting, No diarrhea, No constipation, No weight loss, No fever, No pruritis, No rectal bleeding, No melena, No dysphagia  :  No pain, bleeding, incontinence, nocturia  Muscle:  No pain, weakness  Neuro:  No weakness, tingling, memory problems  Psych:  No fatigue, insomnia, mood problems, depression  Endocrine:  No polyuria, polydypsia, cold/heat intolerance  Heme:  No petechiae, ecchymosis, easy bruisability  Skin:  No rash, tattoos, scars, edema      Vital Signs Last 24 Hrs  T(C): 37.1 (08 Jul 2019 08:47), Max: 37.2 (07 Jul 2019 13:11)  T(F): 98.7 (08 Jul 2019 08:47), Max: 99 (07 Jul 2019 13:11)  HR: 81 (08 Jul 2019 08:47) (73 - 81)  BP: 110/61 (08 Jul 2019 08:47) (110/61 - 162/87)  BP(mean): --  RR: 18 (08 Jul 2019 08:47) (18 - 18)  SpO2: 95% (08 Jul 2019 08:47) (95% - 100%)    PHYSICAL EXAM:    GENERAL:  Appears stated age, well-groomed, well-nourished, no distress  HEENT:  NC/AT,  conjunctivae clear and pink, no thyromegaly, nodules, adenopathy, no JVD, sclera -anicteric  CHEST:  Full & symmetric excursion, no increased effort, breath sounds clear  HEART:  Regular rhythm, S1, S2, no murmur/rub/S3/S4, no abdominal bruit, no edema  ABDOMEN:  Soft, non-tender, non-distended, normoactive bowel sounds,  + PEG c/d/i  EXTEREMITIES:  no cyanosis, clubbing or edema  SKIN:  No rash/erythema/ecchymoses/petechiae/wounds/abscess/warm/dry  NEURO: opens to voice and light touch, drowsy but easily arousable, eyelid opening apraxia, oriented to name, place and year, fluent speech          LABS:                        12.8   8.58  )-----------( 383      ( 08 Jul 2019 07:38 )             40.5     07-08    140  |  100  |  24<H>  ----------------------------<  190<H>  4.5   |  27  |  0.89    Ca    9.1      08 Jul 2019 06:37            RADIOLOGY & ADDITIONAL TESTS:

## 2019-07-08 NOTE — PROGRESS NOTE ADULT - ASSESSMENT
54 year old female w/uncontrolled T2DM (HbA1c 10.4%) w/ retinopathy here w/CVA. Pt w/dysphagia s/p PEG placement on continuous tube feeds of Glucerna at goal. BG values slightly above goal, will increase NPH slightly to improve glycemic control/consistency. Pt w/ongoing constipation-RN to speak w/team regarding bowel regimen. BG goal (100-180mg/dl).

## 2019-07-08 NOTE — PROGRESS NOTE ADULT - SUBJECTIVE AND OBJECTIVE BOX
THE PATIENT WAS SEEN AND EXAMINED BY ME WITH THE HOUSESTAFF AND STROKE TEAM DURING MORNING ROUNDS.   HPI:  Patient is a 54 year old Female w/ PMHx of HTN, HLD, DM transferred from [facility] for evaluation of stroke. Per daughter at bedside, last known normal 2pm on day of admission, patient had sudden onset left sided paralysis, slurred speech, received TPA at at [place]. NIHSS 13, MRS 1      SUBJECTIVE: No events overnight.  No new neurologic complaints.      acetaminophen    Suspension .. 650 milliGRAM(s) Oral every 6 hours PRN  acetaminophen    Suspension .. 650 milliGRAM(s) Enteral Tube every 6 hours PRN  aspirin  chewable 81 milliGRAM(s) Oral daily  atorvastatin 80 milliGRAM(s) Oral at bedtime  dextrose 40% Gel 15 Gram(s) Oral once PRN  dextrose 5%. 1000 milliLiter(s) IV Continuous <Continuous>  dextrose 50% Injectable 12.5 Gram(s) IV Push once  dextrose 50% Injectable 25 Gram(s) IV Push once  dextrose 50% Injectable 25 Gram(s) IV Push once  enoxaparin Injectable 40 milliGRAM(s) SubCutaneous daily  FLUoxetine Solution 20 milliGRAM(s) Oral daily  glucagon  Injectable 1 milliGRAM(s) IntraMuscular once PRN  insulin lispro (HumaLOG) corrective regimen sliding scale   SubCutaneous every 6 hours  insulin NPH human recombinant 18 Unit(s) SubCutaneous every 6 hours  levETIRAcetam  Solution 500 milliGRAM(s) Oral two times a day  nystatin Powder 1 Application(s) Topical two times a day PRN  polyethylene glycol 3350 17 Gram(s) Oral daily      PHYSICAL EXAM:   Vital Signs Last 24 Hrs  T(C): 36.8 (08 Jul 2019 04:35), Max: 37.4 (07 Jul 2019 08:30)  T(F): 98.3 (08 Jul 2019 04:35), Max: 99.3 (07 Jul 2019 08:30)  HR: 76 (08 Jul 2019 04:35) (73 - 79)  BP: 135/63 (08 Jul 2019 04:35) (127/66 - 162/87)  BP(mean): --  RR: 18 (08 Jul 2019 04:35) (18 - 18)  SpO2: 95% (08 Jul 2019 04:35) (93% - 100%)  General: No acute distress  HEENT: EOM intact, left eye legally blind  Abdomen: Soft, nontender, nondistended , PEG site C/D/ dressing intact, no drainage   Extremities: No edema    NEUROLOGICAL EXAM:  Mental status: Eyes open, awake, alert, oriented to name, place and year, fluent speech, repetition intact, No asomatognosia + anosognosia, follows simple commands   Cranial Nerves: UMN type left facial droop, no nystagmus, moderate to severe dysarthria, LHHA to threat   Motor exam: RUE /RLE spontaneous movement against gravity without significant noticeable weakness, Left hypotonic hemiplegia   Sensation: Left side appears intact to noxious stimuli, left side neglect to double simultaneous stimulation   Coordination/ Gait: No dysmetria on right side       LABS:   07-08    140  |  100  |  24<H>  ----------------------------<  190<H>  4.5   |  27  |  0.89    Ca    9.1      08 Jul 2019 06:37      Hemoglobin A1C, Whole Blood: 10.4 % (06-29 @ 09:33)      IMAGING: Reviewed by me.   CT Head No Cont (07.07.19)   Redemonstration  unchanged acute to subacute infarcts with hemorrhagic   conversion in the right cerebral hemisphere from 7/4/2019.    VA Duplex Carotid, Bilat (07.05.19)   Diffuse intimal thickening without duplex evidence of   hemodynamically significant stenosis.     CT Head No Cont (07.04.19)  Increasing lucency with new mass effect and mild midline   shift to the left involving acute right anterior cerebral artery infarct   with involvement of the right corona radiata and centrum semiovale ovale   compared with 6/29/2019. Petechial hemorrhage along the high convexity.    MRA BRAIN/NECK (07/02/19): In comparison with CTA,  atherosclerotic calcification with stenosis of the right greater than left cavernous and supraclinoid ICA segments.   Poorly delineated distal right ICA bifurcation segment,  right A1   segment, with proximal right M1 segment stenosis and poststenotic   dilatation. Stenosis with narrowing of the distal left M1 MCA segment.   There is a dominant left A1 supplying the anterior communicating artery,   there is multifocal stenosis of the distal MCA branch vasculature.    Dominant intradural right vertebral artery supplies the basilar artery,   the left vertebral artery is hypoplastic and poorly visualized in the   cervical course and intradurally with multifocal  narrowing and stenosis.   There are multifocal stenosis of the posterior cerebral arteries.    Possible bovine left common carotid artery, proximal left subclavian   artery is not well-visualized  with possible narrowing. Bilateral common   carotid arteries are patent,  right ICA origin stenosis of approximately   50%, right ICA is decreased in caliber, left ICA is dominant and patent   without ICA origin   stenosis. Vessels are tortuous likely reflecting   hypertension.      Brain MRI (06/29/19) There is evidence of abnormal stricture diffusion seen involving the right frontal parietal region. Involvement of the right basal ganglia region is seen as well. This is compatible with an area of acute infarct.   Involvement of the right anterior cerebral and middle cerebral artery territories are identified. Scattered areas of abnormal susceptibility are identified which is likely compatible with some hemorrhagic transformation. Localized mass effect is seen consisting of sulcal effacement. No significant shift or herniation is seen.    Head CT (06/30/19) Abnormal areas of low-attenuation involving the right frontal and right parietal cortical subcortical region are again seen. These findings are compatible with areas of old infarcts. Abnormal low-attenuation involving the left alla is also again seen which could be compatible with an age-indeterminate infarct. There is no evidence of abnormal low-attenuation seen involving the high right frontal cortical subcortical region. Involvement of the right basal   ganglia and anterior corona radiata regions as well. This is predominantly seen medially and compatible with an acute right anterior cerebral artery infarct. There is localized mass effect consisting of sulcal effacement. No significant shift or herniation is seen.     CTA Head/Neck (06/28/19) CT ANGIOGRAPHY NECK:  Markedly limited examination. Tandem moderate to severe stenoses are suspected in the right carotid bulb and proximal right internal carotid artery. 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    CT ANGIOGRAPHY BRAIN: Markedly limited examination. Limited evaluation of the intracranial internal carotid arteries. Stenoses or occlusion in the distal cavernous segments and ophthalmic segments of the bilateral internal carotid arteries cannot be excluded. Apparent severe stenosis in the distal supraclinoid right internal carotid artery and right carotid terminus with possible occlusion of the A1 segment of the right anterior cerebral artery.  The right A2 segment fills across the anterior to indicating artery.  There appears to be posterior distal right ENRIQUE vessels within the medial right frontal lobe, raising the possibility of early cerebral infarction. THE PATIENT WAS SEEN AND EXAMINED BY ME WITH THE HOUSESTAFF AND STROKE TEAM DURING MORNING ROUNDS.   HPI:  Patient is a 54 year old Female w/ PMHx of HTN, HLD, DM transferred from [facility] for evaluation of stroke. Per daughter at bedside, last known normal 2pm on day of admission, patient had sudden onset left sided paralysis, slurred speech, received TPA at at [place]. NIHSS 13, MRS 1      SUBJECTIVE: No events overnight.  No new neurologic complaints.      acetaminophen    Suspension .. 650 milliGRAM(s) Oral every 6 hours PRN  acetaminophen    Suspension .. 650 milliGRAM(s) Enteral Tube every 6 hours PRN  aspirin  chewable 81 milliGRAM(s) Oral daily  atorvastatin 80 milliGRAM(s) Oral at bedtime  dextrose 40% Gel 15 Gram(s) Oral once PRN  dextrose 5%. 1000 milliLiter(s) IV Continuous <Continuous>  dextrose 50% Injectable 12.5 Gram(s) IV Push once  dextrose 50% Injectable 25 Gram(s) IV Push once  dextrose 50% Injectable 25 Gram(s) IV Push once  enoxaparin Injectable 40 milliGRAM(s) SubCutaneous daily  FLUoxetine Solution 20 milliGRAM(s) Oral daily  glucagon  Injectable 1 milliGRAM(s) IntraMuscular once PRN  insulin lispro (HumaLOG) corrective regimen sliding scale   SubCutaneous every 6 hours  insulin NPH human recombinant 18 Unit(s) SubCutaneous every 6 hours  levETIRAcetam  Solution 500 milliGRAM(s) Oral two times a day  nystatin Powder 1 Application(s) Topical two times a day PRN  polyethylene glycol 3350 17 Gram(s) Oral daily      PHYSICAL EXAM:   Vital Signs Last 24 Hrs  T(C): 36.8 (08 Jul 2019 04:35), Max: 37.4 (07 Jul 2019 08:30)  T(F): 98.3 (08 Jul 2019 04:35), Max: 99.3 (07 Jul 2019 08:30)  HR: 76 (08 Jul 2019 04:35) (73 - 79)  BP: 135/63 (08 Jul 2019 04:35) (127/66 - 162/87)  BP(mean): --  RR: 18 (08 Jul 2019 04:35) (18 - 18)  SpO2: 95% (08 Jul 2019 04:35) (93% - 100%)  General: No acute distress  HEENT: EOM intact, left eye legally blind  Abdomen: Soft, nontender, nondistended   Extremities: No edema    NEUROLOGICAL EXAM:  Mental status: Eyes open, awake, alert, oriented to name, place and year, fluent speech, repetition intact, No asomatognosia + anosognosia, follows simple commands, able to calculate    Cranial Nerves: UMN type left facial droop, no nystagmus, moderate to severe dysarthria, LHHA to threat   Motor exam: RUE trace flexion /RLE spontaneous movement against gravity without significant noticeable weakness, Left hypotonic hemiplegia   Sensation: Left side appears intact to noxious stimuli, left side neglect to double simultaneous stimulation   Coordination/ Gait: No dysmetria on right side       LABS:   07-08    140  |  100  |  24<H>  ----------------------------<  190<H>  4.5   |  27  |  0.89    Ca    9.1      08 Jul 2019 06:37      Hemoglobin A1C, Whole Blood: 10.4 % (06-29 @ 09:33)      IMAGING: Reviewed by me.   CT Head No Cont (07.07.19)   Redemonstration  unchanged acute to subacute infarcts with hemorrhagic   conversion in the right cerebral hemisphere from 7/4/2019.    VA Duplex Carotid, Bilat (07.05.19)   Diffuse intimal thickening without duplex evidence of   hemodynamically significant stenosis.     CT Head No Cont (07.04.19)  Increasing lucency with new mass effect and mild midline   shift to the left involving acute right anterior cerebral artery infarct   with involvement of the right corona radiata and centrum semiovale ovale   compared with 6/29/2019. Petechial hemorrhage along the high convexity.    MRA BRAIN/NECK (07/02/19): In comparison with CTA,  atherosclerotic calcification with stenosis of the right greater than left cavernous and supraclinoid ICA segments.   Poorly delineated distal right ICA bifurcation segment,  right A1   segment, with proximal right M1 segment stenosis and poststenotic   dilatation. Stenosis with narrowing of the distal left M1 MCA segment.   There is a dominant left A1 supplying the anterior communicating artery,   there is multifocal stenosis of the distal MCA branch vasculature.    Dominant intradural right vertebral artery supplies the basilar artery,   the left vertebral artery is hypoplastic and poorly visualized in the   cervical course and intradurally with multifocal  narrowing and stenosis.   There are multifocal stenosis of the posterior cerebral arteries.    Possible bovine left common carotid artery, proximal left subclavian   artery is not well-visualized  with possible narrowing. Bilateral common   carotid arteries are patent,  right ICA origin stenosis of approximately   50%, right ICA is decreased in caliber, left ICA is dominant and patent   without ICA origin   stenosis. Vessels are tortuous likely reflecting   hypertension.      Brain MRI (06/29/19) There is evidence of abnormal stricture diffusion seen involving the right frontal parietal region. Involvement of the right basal ganglia region is seen as well. This is compatible with an area of acute infarct.   Involvement of the right anterior cerebral and middle cerebral artery territories are identified. Scattered areas of abnormal susceptibility are identified which is likely compatible with some hemorrhagic transformation. Localized mass effect is seen consisting of sulcal effacement. No significant shift or herniation is seen.    Head CT (06/30/19) Abnormal areas of low-attenuation involving the right frontal and right parietal cortical subcortical region are again seen. These findings are compatible with areas of old infarcts. Abnormal low-attenuation involving the left alla is also again seen which could be compatible with an age-indeterminate infarct. There is no evidence of abnormal low-attenuation seen involving the high right frontal cortical subcortical region. Involvement of the right basal   ganglia and anterior corona radiata regions as well. This is predominantly seen medially and compatible with an acute right anterior cerebral artery infarct. There is localized mass effect consisting of sulcal effacement. No significant shift or herniation is seen.     CTA Head/Neck (06/28/19) CT ANGIOGRAPHY NECK:  Markedly limited examination. Tandem moderate to severe stenoses are suspected in the right carotid bulb and proximal right internal carotid artery. 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    CT ANGIOGRAPHY BRAIN: Markedly limited examination. Limited evaluation of the intracranial internal carotid arteries. Stenoses or occlusion in the distal cavernous segments and ophthalmic segments of the bilateral internal carotid arteries cannot be excluded. Apparent severe stenosis in the distal supraclinoid right internal carotid artery and right carotid terminus with possible occlusion of the A1 segment of the right anterior cerebral artery.  The right A2 segment fills across the anterior to indicating artery.  There appears to be posterior distal right ENRIQUE vessels within the medial right frontal lobe, raising the possibility of early cerebral infarction. THE PATIENT WAS SEEN AND EXAMINED BY ME WITH THE HOUSESTAFF AND STROKE TEAM DURING MORNING ROUNDS.     HPI:  Patient is a 54 year old Female w/ PMHx of HTN, HLD, DM transferred from [facility] for evaluation of stroke. Per daughter at bedside, last known normal 2pm on day of admission, patient had sudden onset left sided paralysis, slurred speech, received TPA at at [place]. NIHSS 13, MRS 1    SUBJECTIVE: No events overnight.  No new neurologic complaints.      ROS: All negative except documented above.    acetaminophen    Suspension .. 650 milliGRAM(s) Oral every 6 hours PRN  acetaminophen    Suspension .. 650 milliGRAM(s) Enteral Tube every 6 hours PRN  aspirin  chewable 81 milliGRAM(s) Oral daily  atorvastatin 80 milliGRAM(s) Oral at bedtime  dextrose 40% Gel 15 Gram(s) Oral once PRN  dextrose 5%. 1000 milliLiter(s) IV Continuous <Continuous>  dextrose 50% Injectable 12.5 Gram(s) IV Push once  dextrose 50% Injectable 25 Gram(s) IV Push once  dextrose 50% Injectable 25 Gram(s) IV Push once  enoxaparin Injectable 40 milliGRAM(s) SubCutaneous daily  FLUoxetine Solution 20 milliGRAM(s) Oral daily  glucagon  Injectable 1 milliGRAM(s) IntraMuscular once PRN  insulin lispro (HumaLOG) corrective regimen sliding scale   SubCutaneous every 6 hours  insulin NPH human recombinant 18 Unit(s) SubCutaneous every 6 hours  levETIRAcetam  Solution 500 milliGRAM(s) Oral two times a day  nystatin Powder 1 Application(s) Topical two times a day PRN  polyethylene glycol 3350 17 Gram(s) Oral daily      PHYSICAL EXAM:   Vital Signs Last 24 Hrs  T(C): 36.8 (08 Jul 2019 04:35), Max: 37.4 (07 Jul 2019 08:30)  T(F): 98.3 (08 Jul 2019 04:35), Max: 99.3 (07 Jul 2019 08:30)  HR: 76 (08 Jul 2019 04:35) (73 - 79)  BP: 135/63 (08 Jul 2019 04:35) (127/66 - 162/87)  BP(mean): --  RR: 18 (08 Jul 2019 04:35) (18 - 18)  SpO2: 95% (08 Jul 2019 04:35) (93% - 100%)  General: No acute distress  HEENT: EOM intact, left eye legally blind  Abdomen: Soft, nontender, nondistended   Extremities: No edema    NEUROLOGICAL EXAM:  Mental status: Eyes open, awake, alert, oriented to name, place and year, fluent speech, repetition intact, No asomatognosia + anosognosia, follows simple commands, able to calculate    Cranial Nerves: UMN type left facial droop, no nystagmus, moderate to severe dysarthria, LHHA by blink to threat   Motor exam: RUE trace flexion /RLE spontaneous movement against gravity without significant noticeable weakness, Left hypotonic severe hemiparesis LUE 1/5proximally  Sensation: Left side appears intact to noxious stimuli, left side neglect to double simultaneous stimulation   Coordination/ Gait: No dysmetria on right side       LABS:   07-08    140  |  100  |  24<H>  ----------------------------<  190<H>  4.5   |  27  |  0.89    Ca    9.1      08 Jul 2019 06:37      Hemoglobin A1C, Whole Blood: 10.4 % (06-29 @ 09:33)      IMAGING: Reviewed by me.   CT Head No Cont (07.07.19)   Redemonstration  unchanged acute to subacute infarcts with hemorrhagic   conversion in the right cerebral hemisphere from 7/4/2019.    VA Duplex Carotid, Bilat (07.05.19)   Diffuse intimal thickening without duplex evidence of   hemodynamically significant stenosis.     CT Head No Cont (07.04.19)  Increasing lucency with new mass effect and mild midline   shift to the left involving acute right anterior cerebral artery infarct   with involvement of the right corona radiata and centrum semiovale ovale   compared with 6/29/2019. Petechial hemorrhage along the high convexity.    MRA BRAIN/NECK (07/02/19): In comparison with CTA,  atherosclerotic calcification with stenosis of the right greater than left cavernous and supraclinoid ICA segments.   Poorly delineated distal right ICA bifurcation segment,  right A1   segment, with proximal right M1 segment stenosis and poststenotic   dilatation. Stenosis with narrowing of the distal left M1 MCA segment.   There is a dominant left A1 supplying the anterior communicating artery,   there is multifocal stenosis of the distal MCA branch vasculature.    Dominant intradural right vertebral artery supplies the basilar artery,   the left vertebral artery is hypoplastic and poorly visualized in the   cervical course and intradurally with multifocal  narrowing and stenosis.   There are multifocal stenosis of the posterior cerebral arteries.    Possible bovine left common carotid artery, proximal left subclavian   artery is not well-visualized  with possible narrowing. Bilateral common   carotid arteries are patent,  right ICA origin stenosis of approximately   50%, right ICA is decreased in caliber, left ICA is dominant and patent   without ICA origin   stenosis. Vessels are tortuous likely reflecting   hypertension.      Brain MRI (06/29/19) There is evidence of abnormal stricture diffusion seen involving the right frontal parietal region. Involvement of the right basal ganglia region is seen as well. This is compatible with an area of acute infarct.   Involvement of the right anterior cerebral and middle cerebral artery territories are identified. Scattered areas of abnormal susceptibility are identified which is likely compatible with some hemorrhagic transformation. Localized mass effect is seen consisting of sulcal effacement. No significant shift or herniation is seen.    Head CT (06/30/19) Abnormal areas of low-attenuation involving the right frontal and right parietal cortical subcortical region are again seen. These findings are compatible with areas of old infarcts. Abnormal low-attenuation involving the left alla is also again seen which could be compatible with an age-indeterminate infarct. There is no evidence of abnormal low-attenuation seen involving the high right frontal cortical subcortical region. Involvement of the right basal   ganglia and anterior corona radiata regions as well. This is predominantly seen medially and compatible with an acute right anterior cerebral artery infarct. There is localized mass effect consisting of sulcal effacement. No significant shift or herniation is seen.     CTA Head/Neck (06/28/19) CT ANGIOGRAPHY NECK:  Markedly limited examination. Tandem moderate to severe stenoses are suspected in the right carotid bulb and proximal right internal carotid artery. 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    CT ANGIOGRAPHY BRAIN: Markedly limited examination. Limited evaluation of the intracranial internal carotid arteries. Stenoses or occlusion in the distal cavernous segments and ophthalmic segments of the bilateral internal carotid arteries cannot be excluded. Apparent severe stenosis in the distal supraclinoid right internal carotid artery and right carotid terminus with possible occlusion of the A1 segment of the right anterior cerebral artery.  The right A2 segment fills across the anterior to indicating artery.  There appears to be posterior distal right ENRIQUE vessels within the medial right frontal lobe, raising the possibility of early cerebral infarction.

## 2019-07-09 LAB
GLUCOSE BLDC GLUCOMTR-MCNC: 167 MG/DL — HIGH (ref 70–99)
GLUCOSE BLDC GLUCOMTR-MCNC: 179 MG/DL — HIGH (ref 70–99)
GLUCOSE BLDC GLUCOMTR-MCNC: 185 MG/DL — HIGH (ref 70–99)
GLUCOSE BLDC GLUCOMTR-MCNC: 199 MG/DL — HIGH (ref 70–99)

## 2019-07-09 PROCEDURE — 70450 CT HEAD/BRAIN W/O DYE: CPT | Mod: 26

## 2019-07-09 PROCEDURE — 99232 SBSQ HOSP IP/OBS MODERATE 35: CPT

## 2019-07-09 PROCEDURE — 99233 SBSQ HOSP IP/OBS HIGH 50: CPT

## 2019-07-09 RX ORDER — CLOPIDOGREL BISULFATE 75 MG/1
75 TABLET, FILM COATED ORAL DAILY
Refills: 0 | Status: DISCONTINUED | OUTPATIENT
Start: 2019-07-09 | End: 2019-08-26

## 2019-07-09 RX ORDER — LISINOPRIL 2.5 MG/1
2.5 TABLET ORAL DAILY
Refills: 0 | Status: DISCONTINUED | OUTPATIENT
Start: 2019-07-09 | End: 2019-07-12

## 2019-07-09 RX ORDER — HUMAN INSULIN 100 [IU]/ML
22 INJECTION, SUSPENSION SUBCUTANEOUS EVERY 6 HOURS
Refills: 0 | Status: DISCONTINUED | OUTPATIENT
Start: 2019-07-09 | End: 2019-07-10

## 2019-07-09 RX ADMIN — HUMAN INSULIN 20 UNIT(S): 100 INJECTION, SUSPENSION SUBCUTANEOUS at 00:45

## 2019-07-09 RX ADMIN — HUMAN INSULIN 22 UNIT(S): 100 INJECTION, SUSPENSION SUBCUTANEOUS at 17:42

## 2019-07-09 RX ADMIN — Medication 500000 UNIT(S): at 06:04

## 2019-07-09 RX ADMIN — POLYETHYLENE GLYCOL 3350 17 GRAM(S): 17 POWDER, FOR SOLUTION ORAL at 11:35

## 2019-07-09 RX ADMIN — ATORVASTATIN CALCIUM 80 MILLIGRAM(S): 80 TABLET, FILM COATED ORAL at 21:30

## 2019-07-09 RX ADMIN — CLOPIDOGREL BISULFATE 75 MILLIGRAM(S): 75 TABLET, FILM COATED ORAL at 17:41

## 2019-07-09 RX ADMIN — Medication 500000 UNIT(S): at 17:42

## 2019-07-09 RX ADMIN — Medication 20 MILLIGRAM(S): at 11:35

## 2019-07-09 RX ADMIN — HUMAN INSULIN 20 UNIT(S): 100 INJECTION, SUSPENSION SUBCUTANEOUS at 06:04

## 2019-07-09 RX ADMIN — LEVETIRACETAM 500 MILLIGRAM(S): 250 TABLET, FILM COATED ORAL at 17:41

## 2019-07-09 RX ADMIN — ENOXAPARIN SODIUM 40 MILLIGRAM(S): 100 INJECTION SUBCUTANEOUS at 11:35

## 2019-07-09 RX ADMIN — Medication 2: at 06:04

## 2019-07-09 RX ADMIN — Medication 2: at 17:42

## 2019-07-09 RX ADMIN — Medication 2: at 12:54

## 2019-07-09 RX ADMIN — LISINOPRIL 2.5 MILLIGRAM(S): 2.5 TABLET ORAL at 11:35

## 2019-07-09 RX ADMIN — Medication 81 MILLIGRAM(S): at 11:35

## 2019-07-09 RX ADMIN — Medication 2: at 00:45

## 2019-07-09 RX ADMIN — LEVETIRACETAM 500 MILLIGRAM(S): 250 TABLET, FILM COATED ORAL at 06:04

## 2019-07-09 RX ADMIN — HUMAN INSULIN 22 UNIT(S): 100 INJECTION, SUSPENSION SUBCUTANEOUS at 13:20

## 2019-07-09 NOTE — PROGRESS NOTE ADULT - ASSESSMENT
54 year old woman with multiple vascular risk factors including age, HTN, DM II and HPLD was initially evaluated at OSH for acute onset of left-sided weakness through telestroke. She was treated with IV tPA and was transferred to [facility] for further management. CTA head and neck on admission showed critical stenosis versus occlusion of right supraclinoid ICA and severe to critical stenosis of right proximal ICA. MRI brain during hospitalization showed acute infarcts in the right MCA and ENRIQUE distribution with minimal hemorrhagic transformation (HI-1). Her hospital course was complicated by episodes concerning for seizure-like activity on admission, treated with AEDs. MRA head and neck showed severe to critical stenosis of right supraclinoid ICA and moderate (about 50%) stenosis of right proximal ICA. Carotid duplex showed diffuse intimal thickening without any evidence of hemodynamically a significant carotid stenoses. Repeat CT brain showed stable hemorrhagic transformation of right MCA/ENRIQUE distribution infarct.     Impression:  Cerebral embolism with cerebral infarction. Right MCA and ENRIQUE distribution stroke - likely mechanism being large vessel disease i.e. symptomatic intracranial vs. extracranial atherosclerosis leading to artery to artery embolism, favoring the former; less likely to be due to an embolic stroke from undetermined source. Likely symptomatic seizures in the setting of an acute stroke    NEURO: Neurologically overall with improvement since admission,  on 7/4 noted lethargy (which has now resolved) repeat CTH showed increasing lucency with new mass effect and mild midline shift to the left involving acute right anterior cerebral artery infarct with involvement of the right corona radiata and centrum semiovale ovale. Plavix was held. Continue close monitoring for neurologic deterioration in setting of cerebral edema with mass effect and brain compression, maintain BP <160 in setting of possible hemorrhagic transformation on recent CT on 07/04, repeat CTH in am 7/7/19 without acute change.  Continue ASA and high intensity statin for secondary stroke prevention in the setting of likely atheroembolic etiology of her stroke. Based on results of carotid duplex and MRA head and neck in conjunction with technically limited CTA head and neck; doubtful significance of undergoing carotid revascularization with carotid endarterectomy - to be further evaluated with conventional angiogram on 7/11 to better quantify the degree of extracranial/intracranial atherosclerotic stenose, Neurosurgery eval appreciated. Continue with Keppra 500 mg to twice daily for seizure prophylaxis - duration of AEDs is expected to be 3-6 months based on clinical course and MRI/EEG evaluation at that time, VEEG: Structural or functional abnormality in the right hemisphere, No epileptiform pattern or seizure seen, MRI Brain w/o results as above, CTA limited by body habitus. Physical therapy/OT recommended Acute Rehab. Fluoxetine 20 mg once a day started for improved motor recovery for 3 months with a plan to taper off afterwards - risks versus benefits and adverse reactions associated with medication use were discussed with patient and available family member at bedside in detail.    ANTITHROMBOTIC THERAPY:  Was on dual antiplatelet therapy with aspirin and clopidogrel for aggressive secondary stroke prevention in the setting of symptomatic intracranial atherosclerosis for 3 months followed by aspirin alone there after, check P2Y12 after reinitiation of clopidogrel in 1 week to ensure optimal platelet inhibition. Plavix stopped on 07/04 for hemorrhagic transformation, will restart based clinical stability likely on 7/9 (5 days) after repeating brain imaging and if stable.     PULMONARY: CXR (06/29/19)  Clear lungs. Protecting airway, saturating well     CARDIOVASCULAR: Check TTE with bubble study, cardiac monitoring no events, Dr Luz (cardiology) eval appreciated, will obtain cardiac clearance for revascularization procedure if indicated. Gradual normotension in the setting of carotid stenosis careful initiation of home meds and will monitor for heart failure signs since patient endorses hx of chronic CHF.                             SBP goal:<160 mmHg     GASTROINTESTINAL: Dysphagia screen failed. S/S eval recommended NPO, d/w family re: alternate means of nutrition, risks/benefits/alternatives. PEG placed on 07/03. Tolerating feedings at goal. SLP re-eval as indicated.      Diet: NPO with TF via PEG    RENAL: maintain adequate hydration, good urine output, urine toxicology: Negative      Na Goal: Greater than 135     Camarena: N    HEMATOLOGY: no active bleeding noted, LE doppler ordered to r/o DVT negative for DVT b/l.     DVT ppx:  LMWH    ID: afebrile, no si/sx of infection    OTHER: HgbA1C 10.4, continue Humalog sliding scale and DM meds as per endocrine recommendations    DISPOSITION: Acute Rehab if able to receive [facility]. She is undocumented and uninsured - plan to optimize therapy while in hospital.     CORE MEASURES:        Admission NIHSS: 13     TPA:  YES       LDL/HDL: 131/49     Depression Screen: 0     Statin Therapy: Y     Dysphagia Screen:  FAIL     Smoking  NO      Afib  NO     Stroke Education  YES    Obtain screening ultrasound in patients who meet 1 or more of the following criteria as patient is high risk for DVT/PE upon admission:   [] History of DVT/PE  []Hypercoagulable states (Factor V Leiden, Cancer, OCP, etc. )  [X]Prolonged immobility (hemiplegia/hemiparesis/post operative or any other extended immobilization)   [] Transferred from outside facility (Rehab or Long term care)  [] Age </= to 50 54 year old woman with multiple vascular risk factors including age, HTN, DM II and HPLD was initially evaluated at OSH for acute onset of left-sided weakness through telestroke. She was treated with IV tPA and was transferred to [facility] for further management. CTA head and neck on admission showed critical stenosis versus occlusion of right supraclinoid ICA and severe to critical stenosis of right proximal ICA. MRI brain during hospitalization showed acute infarcts in the right MCA and ENRIQUE distribution with minimal hemorrhagic transformation (HI-1). Her hospital course was complicated by episodes concerning for seizure-like activity on admission, treated with AEDs. MRA head and neck showed severe to critical stenosis of right supraclinoid ICA and moderate (about 50%) stenosis of right proximal ICA. Carotid duplex showed diffuse intimal thickening without any evidence of hemodynamically a significant carotid stenoses. Repeat CT brain showed stable hemorrhagic transformation of right MCA/ENRIQUE distribution infarct.     Impression:  Cerebral embolism with cerebral infarction. Right MCA and ENRIQUE distribution stroke - likely mechanism being large vessel disease i.e. symptomatic intracranial vs. extracranial atherosclerosis leading to artery to artery embolism, favoring the former; less likely to be due to an embolic stroke from undetermined source. Likely symptomatic seizures in the setting of an acute stroke    NEURO: Neurologically overall with improvement since admission,  on 7/4 noted lethargy (which has now resolved) repeat CTH showed increasing lucency with new mass effect and mild midline shift to the left involving acute right anterior cerebral artery infarct with involvement of the right corona radiata and centrum semiovale ovale. Plavix was held. Continue close monitoring for neurologic deterioration in setting of cerebral edema with mass effect and brain compression, maintain BP <160 in setting of possible hemorrhagic transformation on recent CT on 07/04, repeat CTH in am 7/7/19 without acute change.  Continue ASA and high intensity statin for secondary stroke prevention in the setting of likely atheroembolic etiology of her stroke. Based on results of carotid duplex and MRA head and neck in conjunction with technically limited CTA head and neck; doubtful significance of undergoing carotid revascularization with carotid endarterectomy - to be further evaluated with conventional angiogram on 7/11 to better quantify the degree of extracranial/intracranial atherosclerotic stenose, Neurosurgery eval appreciated. Continue with Keppra 500 mg to twice daily for seizure prophylaxis - duration of AEDs is expected to be 3-6 months based on clinical course and MRI/EEG evaluation at that time, VEEG: Structural or functional abnormality in the right hemisphere, No epileptiform pattern or seizure seen, MRI Brain w/o results as above, CTA limited by body habitus. Physical therapy/OT recommended Acute Rehab. Fluoxetine 20 mg once a day started for improved motor recovery for 3 months with a plan to taper off afterwards - risks versus benefits and adverse reactions associated with medication use were discussed with patient and available family member at bedside in detail.    ANTITHROMBOTIC THERAPY:  Was on dual antiplatelet therapy with aspirin and clopidogrel for aggressive secondary stroke prevention in the setting of symptomatic intracranial atherosclerosis for 3 months followed by aspirin. Clopidogrel stopped on 07/04 for asymptomatic hemorrhagic transformation (HI-2). Restarted clopidogrel on 7/9 based on clinical and radiological stability/improvement. Check P2Y12 in 1 week to ensure optimal platelet inhibition.     PULMONARY: CXR (06/29/19)  Clear lungs. Protecting airway, saturating well     CARDIOVASCULAR: Check TTE with bubble study, cardiac monitoring no events, Dr Luz (cardiology) eval appreciated, will obtain cardiac clearance for revascularization procedure if indicated. Gradual normotension in the setting of carotid stenosis careful initiation of home meds and will monitor for heart failure signs since patient endorses hx of chronic CHF.                             SBP goal:<160 mmHg     GASTROINTESTINAL: Dysphagia screen failed. S/S eval recommended NPO, d/w family re: alternate means of nutrition, risks/benefits/alternatives. PEG placed on 07/03. Tolerating feedings at goal. SLP re-eval as indicated.      Diet: NPO with TF via PEG    RENAL: maintain adequate hydration, good urine output, urine toxicology: Negative      Na Goal: Greater than 135     Camarena: N    HEMATOLOGY: no active bleeding noted, LE doppler ordered to r/o DVT negative for DVT b/l.     DVT ppx:  LMWH    ID: afebrile, no si/sx of infection    OTHER: HgbA1C 10.4, continue Humalog sliding scale and DM meds as per endocrine recommendations    DISPOSITION: Acute Rehab if able to receive [facility]. She is undocumented and uninsured - plan to optimize therapy while in hospital.     CORE MEASURES:        Admission NIHSS: 13     TPA:  YES       LDL/HDL: 131/49     Depression Screen: 0     Statin Therapy: Y     Dysphagia Screen:  FAIL     Smoking  NO      Afib  NO     Stroke Education  YES    Obtain screening ultrasound in patients who meet 1 or more of the following criteria as patient is high risk for DVT/PE upon admission:   [] History of DVT/PE  []Hypercoagulable states (Factor V Leiden, Cancer, OCP, etc. )  [X]Prolonged immobility (hemiplegia/hemiparesis/post operative or any other extended immobilization)   [] Transferred from outside facility (Rehab or Long term care)  [] Age </= to 50

## 2019-07-09 NOTE — PROGRESS NOTE ADULT - SUBJECTIVE AND OBJECTIVE BOX
Chief Complaint/Follow-up on: T2DM    Subjective: Pt non-verbal. Daughter at the bedside. She notes that mom was more talkative earlier in the am. BS have been running in the high 100s despite increase in NPH to 20 units 1 day ago.     MEDICATIONS  (STANDING):  aspirin  chewable 81 milliGRAM(s) Oral daily  atorvastatin 80 milliGRAM(s) Oral at bedtime  dextrose 5%. 1000 milliLiter(s) (50 mL/Hr) IV Continuous <Continuous>  dextrose 50% Injectable 12.5 Gram(s) IV Push once  dextrose 50% Injectable 25 Gram(s) IV Push once  dextrose 50% Injectable 25 Gram(s) IV Push once  enoxaparin Injectable 40 milliGRAM(s) SubCutaneous daily  FLUoxetine Solution 20 milliGRAM(s) Oral daily  insulin lispro (HumaLOG) corrective regimen sliding scale   SubCutaneous every 6 hours  insulin NPH human recombinant 22 Unit(s) SubCutaneous every 6 hours  levETIRAcetam  Solution 500 milliGRAM(s) Oral two times a day  lisinopril 2.5 milliGRAM(s) Oral daily  nystatin    Suspension 879668 Unit(s) Oral two times a day  polyethylene glycol 3350 17 Gram(s) Oral daily    MEDICATIONS  (PRN):  acetaminophen    Suspension .. 650 milliGRAM(s) Oral every 6 hours PRN Temp greater or equal to 38C (100.4F)  acetaminophen    Suspension .. 650 milliGRAM(s) Enteral Tube every 6 hours PRN Moderate Pain (4 - 6)  bisacodyl Suppository 10 milliGRAM(s) Rectal daily PRN Constipation  dextrose 40% Gel 15 Gram(s) Oral once PRN Blood Glucose LESS THAN 70 milliGRAM(s)/deciliter  glucagon  Injectable 1 milliGRAM(s) IntraMuscular once PRN Glucose LESS THAN 70 milligrams/deciliter  nystatin Powder 1 Application(s) Topical two times a day PRN dermatitis      PHYSICAL EXAM:  VITALS: T(C): 37.1 (07-09-19 @ 12:15)  T(F): 98.7 (07-09-19 @ 12:15), Max: 99.5 (07-08-19 @ 16:00)  HR: 83 (07-09-19 @ 12:15) (76 - 92)  BP: 135/69 (07-09-19 @ 12:15) (120/81 - 179/91)  RR:  (18 - 19)  SpO2:  (94% - 98%)  Wt(kg): --  GENERAL: NAD, well-groomed, well-developed  EYES: No proptosis, no injection  RESPIRATORY: Clear to auscultation bilaterally; No rales, rhonchi, wheezing, or rubs  CARDIOVASCULAR: Regular rate and rhythm; No murmurs;  GI: Soft, nontender, non distended, normal bowel sounds, +PEG      POCT Blood Glucose.: 199 mg/dL (07-09-19 @ 12:32)  POCT Blood Glucose.: 185 mg/dL (07-09-19 @ 06:01)  POCT Blood Glucose.: 179 mg/dL (07-09-19 @ 00:39)  POCT Blood Glucose.: 203 mg/dL (07-08-19 @ 17:14)  POCT Blood Glucose.: 188 mg/dL (07-08-19 @ 12:22)  POCT Blood Glucose.: 171 mg/dL (07-08-19 @ 05:43)  POCT Blood Glucose.: 163 mg/dL (07-07-19 @ 23:44)  POCT Blood Glucose.: 187 mg/dL (07-07-19 @ 17:25)  POCT Blood Glucose.: 202 mg/dL (07-07-19 @ 12:03)  POCT Blood Glucose.: 197 mg/dL (07-07-19 @ 05:46)  POCT Blood Glucose.: 161 mg/dL (07-06-19 @ 23:47)  POCT Blood Glucose.: 215 mg/dL (07-06-19 @ 17:31)    07-08    140  |  100  |  24<H>  ----------------------------<  190<H>  4.5   |  27  |  0.89    EGFR if : 85  EGFR if non : 73    Ca    9.1      07-08          Hemoglobin A1C, Whole Blood: 10.4 % <H> [4.0 - 5.6] (06-29-19 @ 09:33)

## 2019-07-09 NOTE — PROGRESS NOTE ADULT - PROBLEM SELECTOR PLAN 1
-test BG Q6h  -Increase NPH to 22 units Q6h (hold if tube feeds off).   -Humalog moderate correction scale Q6h  -Discharge: If to remain on continuous feeds at rehab-would continue current regimen.   -discussed w/pt's daughter, RN and maday Montgomery MD  Pager: [phone]  Nights and Weekends: [phone]

## 2019-07-09 NOTE — PROGRESS NOTE ADULT - ASSESSMENT
acute CVA  fu with neurology  has carotid disease  plan for angio  obtain echo     HTN  stable    DM  fu with endo    suspect SUDEEP  outpt sleep study

## 2019-07-09 NOTE — PROGRESS NOTE ADULT - SUBJECTIVE AND OBJECTIVE BOX
INTERVAL HPI/OVERNIGHT EVENTS:    Pt seen and examined with daughter at bedside. Tolerating feeds, no leakage/drainage from peg site    MEDICATIONS  (STANDING):  aspirin  chewable 81 milliGRAM(s) Oral daily  atorvastatin 80 milliGRAM(s) Oral at bedtime  dextrose 5%. 1000 milliLiter(s) (50 mL/Hr) IV Continuous <Continuous>  dextrose 50% Injectable 12.5 Gram(s) IV Push once  dextrose 50% Injectable 25 Gram(s) IV Push once  dextrose 50% Injectable 25 Gram(s) IV Push once  enoxaparin Injectable 40 milliGRAM(s) SubCutaneous daily  FLUoxetine Solution 20 milliGRAM(s) Oral daily  insulin lispro (HumaLOG) corrective regimen sliding scale   SubCutaneous every 6 hours  insulin NPH human recombinant 20 Unit(s) SubCutaneous every 6 hours  levETIRAcetam  Solution 500 milliGRAM(s) Oral two times a day  lisinopril 2.5 milliGRAM(s) Oral daily  nystatin    Suspension 753547 Unit(s) Oral two times a day  polyethylene glycol 3350 17 Gram(s) Oral daily    MEDICATIONS  (PRN):  acetaminophen    Suspension .. 650 milliGRAM(s) Oral every 6 hours PRN Temp greater or equal to 38C (100.4F)  acetaminophen    Suspension .. 650 milliGRAM(s) Enteral Tube every 6 hours PRN Moderate Pain (4 - 6)  bisacodyl Suppository 10 milliGRAM(s) Rectal daily PRN Constipation  dextrose 40% Gel 15 Gram(s) Oral once PRN Blood Glucose LESS THAN 70 milliGRAM(s)/deciliter  glucagon  Injectable 1 milliGRAM(s) IntraMuscular once PRN Glucose LESS THAN 70 milligrams/deciliter  nystatin Powder 1 Application(s) Topical two times a day PRN dermatitis      Allergies    No Known Allergies    Intolerances        Review of Systems:    General:  No wt loss, fevers, chills, night sweats,fatigue,   Eyes:  Good vision, no reported pain  ENT:  No sore throat, pain, runny nose, dysphagia  CV:  No pain, palpitations, hypo/hypertension  Resp:  No dyspnea, cough, tachypnea, wheezing  GI:  No pain, No nausea, No vomiting, No diarrhea, No constipation, No weight loss, No fever, No pruritis, No rectal bleeding, No melena, No dysphagia  :  No pain, bleeding, incontinence, nocturia  Muscle:  No pain, weakness  Neuro:  No weakness, tingling, memory problems  Psych:  No fatigue, insomnia, mood problems, depression  Endocrine:  No polyuria, polydypsia, cold/heat intolerance  Heme:  No petechiae, ecchymosis, easy bruisability  Skin:  No rash, tattoos, scars, edema      Vital Signs Last 24 Hrs  T(C): 36.8 (09 Jul 2019 07:57), Max: 37.5 (08 Jul 2019 16:00)  T(F): 98.2 (09 Jul 2019 07:57), Max: 99.5 (08 Jul 2019 16:00)  HR: 76 (09 Jul 2019 07:57) (76 - 92)  BP: 120/81 (09 Jul 2019 07:57) (120/81 - 179/91)  BP(mean): --  RR: 18 (09 Jul 2019 07:57) (18 - 18)  SpO2: 97% (09 Jul 2019 07:57) (94% - 97%)    PHYSICAL EXAM:    GENERAL:  Appears stated age, well-groomed, well-nourished, no distress  HEENT:  NC/AT,  conjunctivae clear and pink, no thyromegaly, nodules, adenopathy, no JVD, sclera -anicteric  CHEST:  Full & symmetric excursion, no increased effort, breath sounds clear  HEART:  Regular rhythm, S1, S2, no murmur/rub/S3/S4, no abdominal bruit, no edema  ABDOMEN:  Soft, non-tender, non-distended, normoactive bowel sounds,  + PEG c/d/i  EXTEREMITIES:  no cyanosis, clubbing or edema  SKIN:  No rash/erythema/ecchymoses/petechiae/wounds/abscess/warm/dry  NEURO: opens to voice and light touch, drowsy but easily arousable, eyelid opening apraxia, oriented to name, place and year, fluent speech    LABS:                        12.8   8.58  )-----------( 383      ( 08 Jul 2019 07:38 )             40.5     07-08    140  |  100  |  24<H>  ----------------------------<  190<H>  4.5   |  27  |  0.89    Ca    9.1      08 Jul 2019 06:37            RADIOLOGY & ADDITIONAL TESTS:

## 2019-07-09 NOTE — PROGRESS NOTE ADULT - ASSESSMENT
54 year old female w/uncontrolled T2DM (HbA1c 10.4%) w/ retinopathy here w/CVA. Pt w/dysphagia s/p PEG placement on continuous tube feeds of Glucerna at goal. BG values slightly above goal, will increase NPH slightly to improve glycemic control/consistency. BG goal 100-180mg/dl.

## 2019-07-09 NOTE — PROGRESS NOTE ADULT - SUBJECTIVE AND OBJECTIVE BOX
Subjective: Patient seen and examined. No new events except as noted.     SUBJECTIVE/ROS:        MEDICATIONS:  MEDICATIONS  (STANDING):  aspirin  chewable 81 milliGRAM(s) Oral daily  atorvastatin 80 milliGRAM(s) Oral at bedtime  dextrose 5%. 1000 milliLiter(s) (50 mL/Hr) IV Continuous <Continuous>  dextrose 50% Injectable 12.5 Gram(s) IV Push once  dextrose 50% Injectable 25 Gram(s) IV Push once  dextrose 50% Injectable 25 Gram(s) IV Push once  enoxaparin Injectable 40 milliGRAM(s) SubCutaneous daily  FLUoxetine Solution 20 milliGRAM(s) Oral daily  insulin lispro (HumaLOG) corrective regimen sliding scale   SubCutaneous every 6 hours  insulin NPH human recombinant 20 Unit(s) SubCutaneous every 6 hours  levETIRAcetam  Solution 500 milliGRAM(s) Oral two times a day  nystatin    Suspension 614517 Unit(s) Oral two times a day  polyethylene glycol 3350 17 Gram(s) Oral daily      PHYSICAL EXAM:  T(C): 36.8 (07-09-19 @ 07:57), Max: 37.5 (07-08-19 @ 16:00)  HR: 76 (07-09-19 @ 07:57) (76 - 92)  BP: 120/81 (07-09-19 @ 07:57) (120/81 - 179/91)  RR: 18 (07-09-19 @ 07:57) (18 - 18)  SpO2: 97% (07-09-19 @ 07:57) (94% - 97%)  Wt(kg): --  I&O's Summary    08 Jul 2019 07:01  -  09 Jul 2019 07:00  --------------------------------------------------------  IN: 500 mL / OUT: 1100 mL / NET: -600 mL            JVP: Normal  Neck: supple  Lung: clear   CV: S1 S2 , Murmur:  Abd: soft  Ext: No edema  neuro: aphasic   Psych: flat affect  Skin: normal``    LABS/DATA:    CARDIAC MARKERS:                                12.8   8.58  )-----------( 383      ( 08 Jul 2019 07:38 )             40.5     07-08    140  |  100  |  24<H>  ----------------------------<  190<H>  4.5   |  27  |  0.89    Ca    9.1      08 Jul 2019 06:37      proBNP:   Lipid Profile:   HgA1c:   TSH:     TELE:  EKG:

## 2019-07-09 NOTE — PROGRESS NOTE ADULT - SUBJECTIVE AND OBJECTIVE BOX
THE PATIENT WAS SEEN AND EXAMINED BY ME WITH THE HOUSESTAFF AND STROKE TEAM DURING MORNING ROUNDS.   HPI:  Patient is a 54 year old Female w/ PMHx of HTN, HLD, DM transferred from [facility] for evaluation of stroke. Per daughter at bedside, last known normal 2pm on day of admission, patient had sudden onset left sided paralysis, slurred speech, received TPA at at [place]. NIHSS 13, MRS 1    SUBJECTIVE: No events overnight.  No new neurologic complaints.      acetaminophen    Suspension .. 650 milliGRAM(s) Oral every 6 hours PRN  acetaminophen    Suspension .. 650 milliGRAM(s) Enteral Tube every 6 hours PRN  aspirin  chewable 81 milliGRAM(s) Oral daily  atorvastatin 80 milliGRAM(s) Oral at bedtime  bisacodyl Suppository 10 milliGRAM(s) Rectal daily PRN  dextrose 40% Gel 15 Gram(s) Oral once PRN  dextrose 5%. 1000 milliLiter(s) IV Continuous <Continuous>  dextrose 50% Injectable 12.5 Gram(s) IV Push once  dextrose 50% Injectable 25 Gram(s) IV Push once  dextrose 50% Injectable 25 Gram(s) IV Push once  enoxaparin Injectable 40 milliGRAM(s) SubCutaneous daily  FLUoxetine Solution 20 milliGRAM(s) Oral daily  glucagon  Injectable 1 milliGRAM(s) IntraMuscular once PRN  insulin lispro (HumaLOG) corrective regimen sliding scale   SubCutaneous every 6 hours  insulin NPH human recombinant 20 Unit(s) SubCutaneous every 6 hours  levETIRAcetam  Solution 500 milliGRAM(s) Oral two times a day  nystatin    Suspension 598405 Unit(s) Oral two times a day  nystatin Powder 1 Application(s) Topical two times a day PRN  polyethylene glycol 3350 17 Gram(s) Oral daily      PHYSICAL EXAM:   Vital Signs Last 24 Hrs  T(C): 36.9 (09 Jul 2019 04:35), Max: 37.5 (08 Jul 2019 16:00)  T(F): 98.4 (09 Jul 2019 04:35), Max: 99.5 (08 Jul 2019 16:00)  HR: 85 (09 Jul 2019 04:35) (81 - 92)  BP: 179/91 (09 Jul 2019 04:35) (110/61 - 179/91)  BP(mean): --  RR: 18 (09 Jul 2019 04:35) (18 - 18)  SpO2: 97% (09 Jul 2019 04:35) (94% - 97%)    General: No acute distress  HEENT: EOM intact, left eye legally blind  Abdomen: Soft, nontender, nondistended   Extremities: No edema    NEUROLOGICAL EXAM:  Mental status: Eyes open, awake, alert, oriented to name, place and year, fluent speech, repetition intact, No asomatognosia + anosognosia, follows simple commands, able to calculate    Cranial Nerves: UMN type left facial droop, no nystagmus, moderate to severe dysarthria, LHHA by blink to threat   Motor exam: RUE trace flexion /RLE spontaneous movement against gravity without significant noticeable weakness, Left hypotonic severe hemiparesis LUE 1/5proximally  Sensation: Left side appears intact to noxious stimuli, left side neglect to double simultaneous stimulation   Coordination/ Gait: No dysmetria on right side     LABS:                        12.8   8.58  )-----------( 383      ( 08 Jul 2019 07:38 )             40.5    07-08    140  |  100  |  24<H>  ----------------------------<  190<H>  4.5   |  27  |  0.89    Ca    9.1      08 Jul 2019 06:37      Hemoglobin A1C, Whole Blood: 10.4 % (06-29 @ 09:33)      IMAGING: Reviewed by me.   CT Head No Cont (07.07.19)   Redemonstration  unchanged acute to subacute infarcts with hemorrhagic   conversion in the right cerebral hemisphere from 7/4/2019.    VA Duplex Carotid, Bilat (07.05.19)   Diffuse intimal thickening without duplex evidence of   hemodynamically significant stenosis.     CT Head No Cont (07.04.19)  Increasing lucency with new mass effect and mild midline   shift to the left involving acute right anterior cerebral artery infarct   with involvement of the right corona radiata and centrum semiovale ovale   compared with 6/29/2019. Petechial hemorrhage along the high convexity.    MRA BRAIN/NECK (07/02/19): In comparison with CTA,  atherosclerotic calcification with stenosis of the right greater than left cavernous and supraclinoid ICA segments.   Poorly delineated distal right ICA bifurcation segment,  right A1   segment, with proximal right M1 segment stenosis and poststenotic   dilatation. Stenosis with narrowing of the distal left M1 MCA segment.   There is a dominant left A1 supplying the anterior communicating artery,   there is multifocal stenosis of the distal MCA branch vasculature.    Dominant intradural right vertebral artery supplies the basilar artery,   the left vertebral artery is hypoplastic and poorly visualized in the   cervical course and intradurally with multifocal  narrowing and stenosis.   There are multifocal stenosis of the posterior cerebral arteries.    Possible bovine left common carotid artery, proximal left subclavian   artery is not well-visualized  with possible narrowing. Bilateral common   carotid arteries are patent,  right ICA origin stenosis of approximately   50%, right ICA is decreased in caliber, left ICA is dominant and patent   without ICA origin   stenosis. Vessels are tortuous likely reflecting   hypertension.      Brain MRI (06/29/19) There is evidence of abnormal stricture diffusion seen involving the right frontal parietal region. Involvement of the right basal ganglia region is seen as well. This is compatible with an area of acute infarct.   Involvement of the right anterior cerebral and middle cerebral artery territories are identified. Scattered areas of abnormal susceptibility are identified which is likely compatible with some hemorrhagic transformation. Localized mass effect is seen consisting of sulcal effacement. No significant shift or herniation is seen.    Head CT (06/30/19) Abnormal areas of low-attenuation involving the right frontal and right parietal cortical subcortical region are again seen. These findings are compatible with areas of old infarcts. Abnormal low-attenuation involving the left alla is also again seen which could be compatible with an age-indeterminate infarct. There is no evidence of abnormal low-attenuation seen involving the high right frontal cortical subcortical region. Involvement of the right basal   ganglia and anterior corona radiata regions as well. This is predominantly seen medially and compatible with an acute right anterior cerebral artery infarct. There is localized mass effect consisting of sulcal effacement. No significant shift or herniation is seen.     CTA Head/Neck (06/28/19) CT ANGIOGRAPHY NECK:  Markedly limited examination. Tandem moderate to severe stenoses are suspected in the right carotid bulb and proximal right internal carotid artery. 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    CT ANGIOGRAPHY BRAIN: Markedly limited examination. Limited evaluation of the intracranial internal carotid arteries. Stenoses or occlusion in the distal cavernous segments and ophthalmic segments of the bilateral internal carotid arteries cannot be excluded. Apparent severe stenosis in the distal supraclinoid right internal carotid artery and right carotid terminus with possible occlusion of the A1 segment of the right anterior cerebral artery.  The right A2 segment fills across the anterior to indicating artery.  There appears to be posterior distal right ENRIQUE vessels within the medial right frontal lobe, raising the possibility of early cerebral infarction. THE PATIENT WAS SEEN AND EXAMINED BY ME WITH THE HOUSESTAFF AND STROKE TEAM DURING MORNING ROUNDS.     HPI:  Patient is a 54 year old Female w/ PMHx of HTN, HLD, DM transferred from [facility] for evaluation of stroke. Per daughter at bedside, last known normal 2pm on day of admission, patient had sudden onset left sided paralysis, slurred speech, received TPA at at [place]. NIHSS 13, MRS 1    SUBJECTIVE: No events overnight.  No new neurologic complaints.      ROS: All negative except documented above.    acetaminophen    Suspension .. 650 milliGRAM(s) Oral every 6 hours PRN  acetaminophen    Suspension .. 650 milliGRAM(s) Enteral Tube every 6 hours PRN  aspirin  chewable 81 milliGRAM(s) Oral daily  atorvastatin 80 milliGRAM(s) Oral at bedtime  bisacodyl Suppository 10 milliGRAM(s) Rectal daily PRN  dextrose 40% Gel 15 Gram(s) Oral once PRN  dextrose 5%. 1000 milliLiter(s) IV Continuous <Continuous>  dextrose 50% Injectable 12.5 Gram(s) IV Push once  dextrose 50% Injectable 25 Gram(s) IV Push once  dextrose 50% Injectable 25 Gram(s) IV Push once  enoxaparin Injectable 40 milliGRAM(s) SubCutaneous daily  FLUoxetine Solution 20 milliGRAM(s) Oral daily  glucagon  Injectable 1 milliGRAM(s) IntraMuscular once PRN  insulin lispro (HumaLOG) corrective regimen sliding scale   SubCutaneous every 6 hours  insulin NPH human recombinant 20 Unit(s) SubCutaneous every 6 hours  levETIRAcetam  Solution 500 milliGRAM(s) Oral two times a day  nystatin    Suspension 052669 Unit(s) Oral two times a day  nystatin Powder 1 Application(s) Topical two times a day PRN  polyethylene glycol 3350 17 Gram(s) Oral daily      PHYSICAL EXAM:   Vital Signs Last 24 Hrs  T(C): 36.9 (09 Jul 2019 04:35), Max: 37.5 (08 Jul 2019 16:00)  T(F): 98.4 (09 Jul 2019 04:35), Max: 99.5 (08 Jul 2019 16:00)  HR: 85 (09 Jul 2019 04:35) (81 - 92)  BP: 179/91 (09 Jul 2019 04:35) (110/61 - 179/91)  BP(mean): --  RR: 18 (09 Jul 2019 04:35) (18 - 18)  SpO2: 97% (09 Jul 2019 04:35) (94% - 97%)    General: No acute distress  HEENT: EOM intact, left eye legally blind  Abdomen: Soft, nontender, nondistended   Extremities: No edema    NEUROLOGICAL EXAM:  Mental status: Eyes open, awake, alert, oriented to name, place and year, fluent speech, repetition intact, No asomatognosia + anosognosia, follows simple commands, able to calculate    Cranial Nerves: UMN type left facial droop, no nystagmus, moderate to severe dysarthria, LHHA by blink to threat   Motor exam: RUE trace flexion /RLE spontaneous movement against gravity without significant noticeable weakness, Left hypotonic severe hemiparesis LUE 1/5 proximally  Sensation: Left side appears intact to noxious stimuli, left side neglect to double simultaneous stimulation   Coordination/ Gait: No dysmetria on right side     LABS:                        12.8   8.58  )-----------( 383      ( 08 Jul 2019 07:38 )             40.5    07-08    140  |  100  |  24<H>  ----------------------------<  190<H>  4.5   |  27  |  0.89    Ca    9.1      08 Jul 2019 06:37      Hemoglobin A1C, Whole Blood: 10.4 % (06-29 @ 09:33)      IMAGING: Reviewed by me.   CT Head No Cont (07.07.19)   Redemonstration  unchanged acute to subacute infarcts with hemorrhagic   conversion in the right cerebral hemisphere from 7/4/2019.    VA Duplex Carotid, Bilat (07.05.19)   Diffuse intimal thickening without duplex evidence of   hemodynamically significant stenosis.     CT Head No Cont (07.04.19)  Increasing lucency with new mass effect and mild midline   shift to the left involving acute right anterior cerebral artery infarct   with involvement of the right corona radiata and centrum semiovale ovale   compared with 6/29/2019. Petechial hemorrhage along the high convexity.    MRA BRAIN/NECK (07/02/19): In comparison with CTA,  atherosclerotic calcification with stenosis of the right greater than left cavernous and supraclinoid ICA segments.   Poorly delineated distal right ICA bifurcation segment,  right A1   segment, with proximal right M1 segment stenosis and poststenotic   dilatation. Stenosis with narrowing of the distal left M1 MCA segment.   There is a dominant left A1 supplying the anterior communicating artery,   there is multifocal stenosis of the distal MCA branch vasculature.    Dominant intradural right vertebral artery supplies the basilar artery,   the left vertebral artery is hypoplastic and poorly visualized in the   cervical course and intradurally with multifocal  narrowing and stenosis.   There are multifocal stenosis of the posterior cerebral arteries.    Possible bovine left common carotid artery, proximal left subclavian   artery is not well-visualized  with possible narrowing. Bilateral common   carotid arteries are patent,  right ICA origin stenosis of approximately   50%, right ICA is decreased in caliber, left ICA is dominant and patent   without ICA origin   stenosis. Vessels are tortuous likely reflecting   hypertension.      Brain MRI (06/29/19) There is evidence of abnormal stricture diffusion seen involving the right frontal parietal region. Involvement of the right basal ganglia region is seen as well. This is compatible with an area of acute infarct.   Involvement of the right anterior cerebral and middle cerebral artery territories are identified. Scattered areas of abnormal susceptibility are identified which is likely compatible with some hemorrhagic transformation. Localized mass effect is seen consisting of sulcal effacement. No significant shift or herniation is seen.    Head CT (06/30/19) Abnormal areas of low-attenuation involving the right frontal and right parietal cortical subcortical region are again seen. These findings are compatible with areas of old infarcts. Abnormal low-attenuation involving the left alla is also again seen which could be compatible with an age-indeterminate infarct. There is no evidence of abnormal low-attenuation seen involving the high right frontal cortical subcortical region. Involvement of the right basal   ganglia and anterior corona radiata regions as well. This is predominantly seen medially and compatible with an acute right anterior cerebral artery infarct. There is localized mass effect consisting of sulcal effacement. No significant shift or herniation is seen.     CTA Head/Neck (06/28/19) CT ANGIOGRAPHY NECK:  Markedly limited examination. Tandem moderate to severe stenoses are suspected in the right carotid bulb and proximal right internal carotid artery. 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    CT ANGIOGRAPHY BRAIN: Markedly limited examination. Limited evaluation of the intracranial internal carotid arteries. Stenoses or occlusion in the distal cavernous segments and ophthalmic segments of the bilateral internal carotid arteries cannot be excluded. Apparent severe stenosis in the distal supraclinoid right internal carotid artery and right carotid terminus with possible occlusion of the A1 segment of the right anterior cerebral artery.  The right A2 segment fills across the anterior to indicating artery.  There appears to be posterior distal right ENRIQUE vessels within the medial right frontal lobe, raising the possibility of early cerebral infarction.

## 2019-07-10 LAB
GLUCOSE BLDC GLUCOMTR-MCNC: 131 MG/DL — HIGH (ref 70–99)
GLUCOSE BLDC GLUCOMTR-MCNC: 145 MG/DL — HIGH (ref 70–99)
GLUCOSE BLDC GLUCOMTR-MCNC: 157 MG/DL — HIGH (ref 70–99)
GLUCOSE BLDC GLUCOMTR-MCNC: 169 MG/DL — HIGH (ref 70–99)
GLUCOSE BLDC GLUCOMTR-MCNC: 176 MG/DL — HIGH (ref 70–99)

## 2019-07-10 PROCEDURE — 99233 SBSQ HOSP IP/OBS HIGH 50: CPT

## 2019-07-10 PROCEDURE — 99232 SBSQ HOSP IP/OBS MODERATE 35: CPT

## 2019-07-10 RX ORDER — INSULIN GLARGINE 100 [IU]/ML
36 INJECTION, SOLUTION SUBCUTANEOUS AT BEDTIME
Refills: 0 | Status: DISCONTINUED | OUTPATIENT
Start: 2019-07-10 | End: 2019-07-13

## 2019-07-10 RX ORDER — INSULIN LISPRO 100/ML
VIAL (ML) SUBCUTANEOUS EVERY 8 HOURS
Refills: 0 | Status: DISCONTINUED | OUTPATIENT
Start: 2019-07-10 | End: 2019-07-14

## 2019-07-10 RX ADMIN — Medication 2: at 12:48

## 2019-07-10 RX ADMIN — Medication 20 MILLIGRAM(S): at 12:47

## 2019-07-10 RX ADMIN — HUMAN INSULIN 22 UNIT(S): 100 INJECTION, SUSPENSION SUBCUTANEOUS at 12:48

## 2019-07-10 RX ADMIN — LEVETIRACETAM 500 MILLIGRAM(S): 250 TABLET, FILM COATED ORAL at 17:50

## 2019-07-10 RX ADMIN — ENOXAPARIN SODIUM 40 MILLIGRAM(S): 100 INJECTION SUBCUTANEOUS at 12:48

## 2019-07-10 RX ADMIN — Medication 2: at 01:26

## 2019-07-10 RX ADMIN — LEVETIRACETAM 500 MILLIGRAM(S): 250 TABLET, FILM COATED ORAL at 06:20

## 2019-07-10 RX ADMIN — CLOPIDOGREL BISULFATE 75 MILLIGRAM(S): 75 TABLET, FILM COATED ORAL at 12:47

## 2019-07-10 RX ADMIN — Medication 2: at 06:20

## 2019-07-10 RX ADMIN — Medication 81 MILLIGRAM(S): at 12:47

## 2019-07-10 RX ADMIN — HUMAN INSULIN 22 UNIT(S): 100 INJECTION, SUSPENSION SUBCUTANEOUS at 06:20

## 2019-07-10 RX ADMIN — Medication 500000 UNIT(S): at 06:20

## 2019-07-10 RX ADMIN — HUMAN INSULIN 22 UNIT(S): 100 INJECTION, SUSPENSION SUBCUTANEOUS at 01:25

## 2019-07-10 RX ADMIN — Medication 500000 UNIT(S): at 17:50

## 2019-07-10 RX ADMIN — ATORVASTATIN CALCIUM 80 MILLIGRAM(S): 80 TABLET, FILM COATED ORAL at 23:33

## 2019-07-10 RX ADMIN — LISINOPRIL 2.5 MILLIGRAM(S): 2.5 TABLET ORAL at 06:20

## 2019-07-10 NOTE — PROGRESS NOTE ADULT - SUBJECTIVE AND OBJECTIVE BOX
Diabetes Follow up note:  Interval Hx:  54 year old female w/uncontrolled T2DM (on insulin PTA) w/retinopathy, morbid obesity here w/CVA s/p PEG on tube feeds. BG values at goal on continuous feeds however pt now switched to q8h bolus feeds to prepare for transition to home. Pt seen at bedside. Denies pain. Able to nod yes/no to questions. Reports daughter knows how to administer insulin.       Review of Systems:   General: as above.   GI: Tolerating POs without any N/V/D/ABD PAIN. + BM   CV: No CP/SOB  ENDO: No S&Sx of hypoglycemia  MEDS:  atorvastatin 80 milliGRAM(s) Oral at bedtime    insulin lispro (HumaLOG) corrective regimen sliding scale   SubCutaneous every 6 hours  insulin NPH human recombinant 22 Unit(s) SubCutaneous every 6 hours    nystatin    Suspension 833026 Unit(s) Oral two times a day    Allergies    No Known Allergies        PE:  General: Female lying in bed. NAD.   Vital Signs Last 24 Hrs  T(C): 37 (10 Jul 2019 12:03), Max: 37.3 (09 Jul 2019 16:42)  T(F): 98.6 (10 Jul 2019 12:03), Max: 99.2 (09 Jul 2019 16:42)  HR: 80 (10 Jul 2019 12:03) (75 - 85)  BP: 160/78 (10 Jul 2019 12:03) (106/77 - 170/84)  BP(mean): --  RR: 20 (10 Jul 2019 12:03) (18 - 21)  SpO2: 95% (10 Jul 2019 12:03) (94% - 97%)  Abd: Soft, NT,ND, Obese. PEG in place.   Extremities: Warm  Neuro: Awake. L hemiparesis. Not speaking when at bedside but nodding yes/no appropriately to questions.     LABS:    POCT Blood Glucose.: 169 mg/dL (07-10-19 @ 12:35)  POCT Blood Glucose.: 176 mg/dL (07-10-19 @ 06:18)  POCT Blood Glucose.: 157 mg/dL (07-10-19 @ 01:23)  POCT Blood Glucose.: 167 mg/dL (07-09-19 @ 17:24)  POCT Blood Glucose.: 199 mg/dL (07-09-19 @ 12:32)  POCT Blood Glucose.: 185 mg/dL (07-09-19 @ 06:01)  POCT Blood Glucose.: 179 mg/dL (07-09-19 @ 00:39)  POCT Blood Glucose.: 203 mg/dL (07-08-19 @ 17:14)  POCT Blood Glucose.: 188 mg/dL (07-08-19 @ 12:22)  POCT Blood Glucose.: 171 mg/dL (07-08-19 @ 05:43)  POCT Blood Glucose.: 163 mg/dL (07-07-19 @ 23:44)  POCT Blood Glucose.: 187 mg/dL (07-07-19 @ 17:25)                    Hemoglobin A1C, Whole Blood: 10.4 % <H> [4.0 - 5.6] (06-29-19 @ 09:33)            Contact number: [place] [phone] or [phone]

## 2019-07-10 NOTE — PROGRESS NOTE ADULT - SUBJECTIVE AND OBJECTIVE BOX
INTERVAL HPI/OVERNIGHT EVENTS:    Pt seen and examined. Tolerating feeds, no leakage/drainage from peg site    MEDICATIONS  (STANDING):  aspirin  chewable 81 milliGRAM(s) Oral daily  atorvastatin 80 milliGRAM(s) Oral at bedtime  clopidogrel Tablet 75 milliGRAM(s) Oral daily  dextrose 5%. 1000 milliLiter(s) (50 mL/Hr) IV Continuous <Continuous>  dextrose 50% Injectable 12.5 Gram(s) IV Push once  dextrose 50% Injectable 25 Gram(s) IV Push once  dextrose 50% Injectable 25 Gram(s) IV Push once  enoxaparin Injectable 40 milliGRAM(s) SubCutaneous daily  FLUoxetine Solution 20 milliGRAM(s) Oral daily  insulin lispro (HumaLOG) corrective regimen sliding scale   SubCutaneous every 6 hours  insulin NPH human recombinant 22 Unit(s) SubCutaneous every 6 hours  levETIRAcetam  Solution 500 milliGRAM(s) Oral two times a day  lisinopril 2.5 milliGRAM(s) Oral daily  nystatin    Suspension 176812 Unit(s) Oral two times a day  polyethylene glycol 3350 17 Gram(s) Oral daily    MEDICATIONS  (PRN):  acetaminophen    Suspension .. 650 milliGRAM(s) Oral every 6 hours PRN Temp greater or equal to 38C (100.4F)  acetaminophen    Suspension .. 650 milliGRAM(s) Enteral Tube every 6 hours PRN Moderate Pain (4 - 6)  bisacodyl Suppository 10 milliGRAM(s) Rectal daily PRN Constipation  dextrose 40% Gel 15 Gram(s) Oral once PRN Blood Glucose LESS THAN 70 milliGRAM(s)/deciliter  glucagon  Injectable 1 milliGRAM(s) IntraMuscular once PRN Glucose LESS THAN 70 milligrams/deciliter  nystatin Powder 1 Application(s) Topical two times a day PRN dermatitis      Allergies    No Known Allergies    Intolerances        Review of Systems:    General:  No wt loss, fevers, chills, night sweats,fatigue,   Eyes:  Good vision, no reported pain  ENT:  No sore throat, pain, runny nose, dysphagia  CV:  No pain, palpitations, hypo/hypertension  Resp:  No dyspnea, cough, tachypnea, wheezing  GI:  No pain, No nausea, No vomiting, No diarrhea, No constipation, No weight loss, No fever, No pruritis, No rectal bleeding, No melena, No dysphagia  :  No pain, bleeding, incontinence, nocturia  Muscle:  No pain, weakness  Neuro:  No weakness, tingling, memory problems  Psych:  No fatigue, insomnia, mood problems, depression  Endocrine:  No polyuria, polydypsia, cold/heat intolerance  Heme:  No petechiae, ecchymosis, easy bruisability  Skin:  No rash, tattoos, scars, edema      Vital Signs Last 24 Hrs  T(C): 37.1 (10 Jul 2019 08:12), Max: 37.3 (09 Jul 2019 16:42)  T(F): 98.8 (10 Jul 2019 08:12), Max: 99.2 (09 Jul 2019 16:42)  HR: 75 (10 Jul 2019 08:12) (75 - 85)  BP: 106/77 (10 Jul 2019 08:12) (106/77 - 170/84)  BP(mean): --  RR: 21 (10 Jul 2019 08:12) (18 - 21)  SpO2: 95% (10 Jul 2019 08:12) (94% - 98%)    PHYSICAL EXAM:    Constitutional: NAD, well-developed  HEENT: EOMI, throat clear  Neck: No LAD, supple  Respiratory: CTA and P  Cardiovascular: S1 and S2, RRR, no M  Gastrointestinal: BS+, soft, NT/ND, neg HSM,  Extremities: No peripheral edema, neg clubing, cyanosis  Vascular: 2+ peripheral pulses  Neurological: A/O x 3, no focal deficits  Psychiatric: Normal mood, normal affect  Skin: No rashes      LABS:                RADIOLOGY & ADDITIONAL TESTS:

## 2019-07-10 NOTE — PROGRESS NOTE ADULT - SUBJECTIVE AND OBJECTIVE BOX
THE PATIENT WAS SEEN AND EXAMINED BY ME WITH THE HOUSESTAFF AND STROKE TEAM DURING MORNING ROUNDS.     HPI:  Patient is a 54 year old Female w/ PMHx of HTN, HLD, DM transferred from [facility] for evaluation of stroke. Per daughter at bedside, last known normal 2pm on day of admission, patient had sudden onset left sided paralysis, slurred speech, received TPA at at [place]. NIHSS 13, MRS 1    SUBJECTIVE: No events overnight.  No new neurologic complaints.      ROS: All negative except documented above.    acetaminophen    Suspension .. 650 milliGRAM(s) Oral every 6 hours PRN  acetaminophen    Suspension .. 650 milliGRAM(s) Enteral Tube every 6 hours PRN  aspirin  chewable 81 milliGRAM(s) Oral daily  atorvastatin 80 milliGRAM(s) Oral at bedtime  bisacodyl Suppository 10 milliGRAM(s) Rectal daily PRN  clopidogrel Tablet 75 milliGRAM(s) Oral daily  dextrose 40% Gel 15 Gram(s) Oral once PRN  dextrose 5%. 1000 milliLiter(s) IV Continuous <Continuous>  dextrose 50% Injectable 12.5 Gram(s) IV Push once  dextrose 50% Injectable 25 Gram(s) IV Push once  dextrose 50% Injectable 25 Gram(s) IV Push once  enoxaparin Injectable 40 milliGRAM(s) SubCutaneous daily  FLUoxetine Solution 20 milliGRAM(s) Oral daily  glucagon  Injectable 1 milliGRAM(s) IntraMuscular once PRN  insulin lispro (HumaLOG) corrective regimen sliding scale   SubCutaneous every 6 hours  insulin NPH human recombinant 22 Unit(s) SubCutaneous every 6 hours  levETIRAcetam  Solution 500 milliGRAM(s) Oral two times a day  lisinopril 2.5 milliGRAM(s) Oral daily  nystatin    Suspension 752155 Unit(s) Oral two times a day  nystatin Powder 1 Application(s) Topical two times a day PRN  polyethylene glycol 3350 17 Gram(s) Oral daily    PHYSICAL EXAM:   Vital Signs Last 24 Hrs  T(C): 36.9 (10 Jul 2019 04:23), Max: 37.3 (09 Jul 2019 16:42)  T(F): 98.5 (10 Jul 2019 04:23), Max: 99.2 (09 Jul 2019 16:42)  HR: 85 (10 Jul 2019 04:23) (78 - 85)  BP: 170/84 (10 Jul 2019 04:23) (121/74 - 170/84)  RR: 18 (10 Jul 2019 04:23) (18 - 19)  SpO2: 96% (10 Jul 2019 04:23) (94% - 98%)    General: No acute distress  HEENT: EOM intact, left eye legally blind  Abdomen: Soft, nontender, nondistended   Extremities: No edema    NEUROLOGICAL EXAM:  Mental status: Eyes open, awake, alert, oriented to name, place and year, fluent speech, repetition intact, No asomatognosia + anosognosia, follows simple commands, able to calculate    Cranial Nerves: UMN type left facial droop, no nystagmus, moderate to severe dysarthria, LHHA by blink to threat   Motor exam: RUE trace flexion /RLE spontaneous movement against gravity without significant noticeable weakness, Left hypotonic severe hemiparesis LUE 1/5 proximally  Sensation: Left side appears intact to noxious stimuli, left side neglect to double simultaneous stimulation   Coordination/ Gait: No dysmetria on right side     LABS:    Hemoglobin A1C, Whole Blood: 10.4 % (06-29 @ 09:33)    IMAGING: Reviewed by me.     CT Head No Cont (07.07.19)   Redemonstration  unchanged acute to subacute infarcts with hemorrhagic   conversion in the right cerebral hemisphere from 7/4/2019.    VA Duplex Carotid, Bilat (07.05.19)   Diffuse intimal thickening without duplex evidence of   hemodynamically significant stenosis.     CT Head No Cont (07.04.19)  Increasing lucency with new mass effect and mild midline   shift to the left involving acute right anterior cerebral artery infarct   with involvement of the right corona radiata and centrum semiovale ovale   compared with 6/29/2019. Petechial hemorrhage along the high convexity.    MRA BRAIN/NECK (07/02/19): In comparison with CTA,  atherosclerotic calcification with stenosis of the right greater than left cavernous and supraclinoid ICA segments.   Poorly delineated distal right ICA bifurcation segment,  right A1   segment, with proximal right M1 segment stenosis and poststenotic   dilatation. Stenosis with narrowing of the distal left M1 MCA segment.   There is a dominant left A1 supplying the anterior communicating artery,   there is multifocal stenosis of the distal MCA branch vasculature.    Dominant intradural right vertebral artery supplies the basilar artery,   the left vertebral artery is hypoplastic and poorly visualized in the   cervical course and intradurally with multifocal  narrowing and stenosis.   There are multifocal stenosis of the posterior cerebral arteries.    Possible bovine left common carotid artery, proximal left subclavian   artery is not well-visualized  with possible narrowing. Bilateral common   carotid arteries are patent,  right ICA origin stenosis of approximately   50%, right ICA is decreased in caliber, left ICA is dominant and patent   without ICA origin   stenosis. Vessels are tortuous likely reflecting   hypertension.      Brain MRI (06/29/19) There is evidence of abnormal stricture diffusion seen involving the right frontal parietal region. Involvement of the right basal ganglia region is seen as well. This is compatible with an area of acute infarct.   Involvement of the right anterior cerebral and middle cerebral artery territories are identified. Scattered areas of abnormal susceptibility are identified which is likely compatible with some hemorrhagic transformation. Localized mass effect is seen consisting of sulcal effacement. No significant shift or herniation is seen.    Head CT (06/30/19) Abnormal areas of low-attenuation involving the right frontal and right parietal cortical subcortical region are again seen. These findings are compatible with areas of old infarcts. Abnormal low-attenuation involving the left alla is also again seen which could be compatible with an age-indeterminate infarct. There is no evidence of abnormal low-attenuation seen involving the high right frontal cortical subcortical region. Involvement of the right basal   ganglia and anterior corona radiata regions as well. This is predominantly seen medially and compatible with an acute right anterior cerebral artery infarct. There is localized mass effect consisting of sulcal effacement. No significant shift or herniation is seen.     CTA Head/Neck (06/28/19) CT ANGIOGRAPHY NECK:  Markedly limited examination. Tandem moderate to severe stenoses are suspected in the right carotid bulb and proximal right internal carotid artery. 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    CT ANGIOGRAPHY BRAIN: Markedly limited examination. Limited evaluation of the intracranial internal carotid arteries. Stenoses or occlusion in the distal cavernous segments and ophthalmic segments of the bilateral internal carotid arteries cannot be excluded. Apparent severe stenosis in the distal supraclinoid right internal carotid artery and right carotid terminus with possible occlusion of the A1 segment of the right anterior cerebral artery.  The right A2 segment fills across the anterior to indicating artery.  There appears to be posterior distal right ENRIQUE vessels within the medial right frontal lobe, raising the possibility of early cerebral infarction. THE PATIENT WAS SEEN AND EXAMINED BY ME WITH THE HOUSESTAFF AND STROKE TEAM DURING MORNING ROUNDS.     HPI:  Patient is a 54 year old Female w/ PMHx of HTN, HLD, DM transferred from [facility] for evaluation of stroke. Per daughter at bedside, last known normal 2pm on day of admission, patient had sudden onset left sided paralysis, slurred speech, received TPA at at [place]. NIHSS 13, MRS 1    SUBJECTIVE: No events overnight.  No new neurologic complaints.      ROS: All negative except documented above.    acetaminophen    Suspension .. 650 milliGRAM(s) Oral every 6 hours PRN  acetaminophen    Suspension .. 650 milliGRAM(s) Enteral Tube every 6 hours PRN  aspirin  chewable 81 milliGRAM(s) Oral daily  atorvastatin 80 milliGRAM(s) Oral at bedtime  bisacodyl Suppository 10 milliGRAM(s) Rectal daily PRN  clopidogrel Tablet 75 milliGRAM(s) Oral daily  dextrose 40% Gel 15 Gram(s) Oral once PRN  dextrose 5%. 1000 milliLiter(s) IV Continuous <Continuous>  dextrose 50% Injectable 12.5 Gram(s) IV Push once  dextrose 50% Injectable 25 Gram(s) IV Push once  dextrose 50% Injectable 25 Gram(s) IV Push once  enoxaparin Injectable 40 milliGRAM(s) SubCutaneous daily  FLUoxetine Solution 20 milliGRAM(s) Oral daily  glucagon  Injectable 1 milliGRAM(s) IntraMuscular once PRN  insulin lispro (HumaLOG) corrective regimen sliding scale   SubCutaneous every 6 hours  insulin NPH human recombinant 22 Unit(s) SubCutaneous every 6 hours  levETIRAcetam  Solution 500 milliGRAM(s) Oral two times a day  lisinopril 2.5 milliGRAM(s) Oral daily  nystatin    Suspension 174576 Unit(s) Oral two times a day  nystatin Powder 1 Application(s) Topical two times a day PRN  polyethylene glycol 3350 17 Gram(s) Oral daily    PHYSICAL EXAM:   Vital Signs Last 24 Hrs  T(C): 36.9 (10 Jul 2019 04:23), Max: 37.3 (09 Jul 2019 16:42)  T(F): 98.5 (10 Jul 2019 04:23), Max: 99.2 (09 Jul 2019 16:42)  HR: 85 (10 Jul 2019 04:23) (78 - 85)  BP: 170/84 (10 Jul 2019 04:23) (121/74 - 170/84)  RR: 18 (10 Jul 2019 04:23) (18 - 19)  SpO2: 96% (10 Jul 2019 04:23) (94% - 98%)    General: No acute distress  HEENT: EOM intact, left eye legally blind  Abdomen: Soft, nontender, nondistended   Extremities: No edema    NEUROLOGICAL EXAM:  Mental status: Eyes open, awake, alert, oriented to name, place and year, fluent speech, repetition intact, No asomatognosia + anosognosia, follows simple commands, able to calculate    Cranial Nerves: UMN type left facial droop, no nystagmus, moderate to severe dysarthria, LHHA by blink to threat   Motor exam: RUE trace flexion /RLE spontaneous movement against gravity without significant noticeable weakness, Left hypotonic severe hemiparesis LUE 1/5 proximally  Sensation: Left side appears intact to noxious stimuli, left side neglect to double simultaneous stimulation   Coordination/ Gait: No dysmetria on right side     LABS:    Hemoglobin A1C, Whole Blood: 10.4 % (06-29 @ 09:33)    IMAGING: Reviewed by me.     CT Head No Cont (07.09.19)  No significant interval change in the right MCA evolving subacute infarct   with hemorrhagic transformation, edema, mass effect and minimal rightward   shift.    CT Head No Cont (07.07.19)   Redemonstration  unchanged acute to subacute infarcts with hemorrhagic   conversion in the right cerebral hemisphere from 7/4/2019.    VA Duplex Carotid, Bilat (07.05.19)   Diffuse intimal thickening without duplex evidence of   hemodynamically significant stenosis.     CT Head No Cont (07.04.19)  Increasing lucency with new mass effect and mild midline   shift to the left involving acute right anterior cerebral artery infarct   with involvement of the right corona radiata and centrum semiovale ovale   compared with 6/29/2019. Petechial hemorrhage along the high convexity.    MRA BRAIN/NECK (07/02/19): In comparison with CTA,  atherosclerotic calcification with stenosis of the right greater than left cavernous and supraclinoid ICA segments.   Poorly delineated distal right ICA bifurcation segment,  right A1   segment, with proximal right M1 segment stenosis and poststenotic   dilatation. Stenosis with narrowing of the distal left M1 MCA segment.   There is a dominant left A1 supplying the anterior communicating artery,   there is multifocal stenosis of the distal MCA branch vasculature.    Dominant intradural right vertebral artery supplies the basilar artery,   the left vertebral artery is hypoplastic and poorly visualized in the   cervical course and intradurally with multifocal  narrowing and stenosis.   There are multifocal stenosis of the posterior cerebral arteries.    Possible bovine left common carotid artery, proximal left subclavian   artery is not well-visualized  with possible narrowing. Bilateral common   carotid arteries are patent,  right ICA origin stenosis of approximately   50%, right ICA is decreased in caliber, left ICA is dominant and patent   without ICA origin   stenosis. Vessels are tortuous likely reflecting   hypertension.      Brain MRI (06/29/19) There is evidence of abnormal stricture diffusion seen involving the right frontal parietal region. Involvement of the right basal ganglia region is seen as well. This is compatible with an area of acute infarct.   Involvement of the right anterior cerebral and middle cerebral artery territories are identified. Scattered areas of abnormal susceptibility are identified which is likely compatible with some hemorrhagic transformation. Localized mass effect is seen consisting of sulcal effacement. No significant shift or herniation is seen.    Head CT (06/30/19) Abnormal areas of low-attenuation involving the right frontal and right parietal cortical subcortical region are again seen. These findings are compatible with areas of old infarcts. Abnormal low-attenuation involving the left alla is also again seen which could be compatible with an age-indeterminate infarct. There is no evidence of abnormal low-attenuation seen involving the high right frontal cortical subcortical region. Involvement of the right basal   ganglia and anterior corona radiata regions as well. This is predominantly seen medially and compatible with an acute right anterior cerebral artery infarct. There is localized mass effect consisting of sulcal effacement. No significant shift or herniation is seen.     CTA Head/Neck (06/28/19) CT ANGIOGRAPHY NECK:  Markedly limited examination. Tandem moderate to severe stenoses are suspected in the right carotid bulb and proximal right internal carotid artery. 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    CT ANGIOGRAPHY BRAIN: Markedly limited examination. Limited evaluation of the intracranial internal carotid arteries. Stenoses or occlusion in the distal cavernous segments and ophthalmic segments of the bilateral internal carotid arteries cannot be excluded. Apparent severe stenosis in the distal supraclinoid right internal carotid artery and right carotid terminus with possible occlusion of the A1 segment of the right anterior cerebral artery.  The right A2 segment fills across the anterior to indicating artery.  There appears to be posterior distal right ENRIQUE vessels within the medial right frontal lobe, raising the possibility of early cerebral infarction.

## 2019-07-10 NOTE — PROGRESS NOTE ADULT - ASSESSMENT
54 year old woman with multiple vascular risk factors including age, HTN, DM II and HPLD was initially evaluated at OSH for acute onset of left-sided weakness through telestroke. She was treated with IV tPA and was transferred to [facility] for further management. CTA head and neck on admission showed critical stenosis versus occlusion of right supraclinoid ICA and severe to critical stenosis of right proximal ICA. MRI brain during hospitalization showed acute infarcts in the right MCA and ENRIQUE distribution with minimal hemorrhagic transformation (HI-1). Her hospital course was complicated by episodes concerning for seizure-like activity on admission, treated with AEDs. MRA head and neck showed severe to critical stenosis of right supraclinoid ICA and moderate (about 50%) stenosis of right proximal ICA. Carotid duplex showed diffuse intimal thickening without any evidence of hemodynamically a significant carotid stenoses. Repeat CT brain showed stable hemorrhagic transformation of right MCA/ENRIQUE distribution infarct.     Impression:  Cerebral embolism with cerebral infarction. Right MCA and ENRIQUE distribution stroke - likely mechanism being large vessel disease i.e. symptomatic intracranial vs. extracranial atherosclerosis leading to artery to artery embolism, favoring the former; less likely to be due to an embolic stroke from undetermined source. Likely symptomatic seizures in the setting of an acute stroke    NEURO: Neurologically overall with improvement since admission, Continue monitoring for neurologic deterioration in setting of cerebral edema with mass effect and brain compression, maintain BP <160 in setting of possible hemorrhagic transformation on recent CT on 07/04, repeat CTH in am 7/7/19 without acute change. Now restarted on Plavix and continued ASA and high intensity statin for secondary stroke prevention in the setting of likely atheroembolic etiology of her stroke. Based on results of carotid duplex and MRA head and neck in conjunction with technically limited CTA head and neck; doubtful significance of undergoing carotid revascularization with carotid endarterectomy - to be further evaluated with conventional angiogram on 7/11 to better quantify the degree of extracranial/intracranial atherosclerotic stenose, Neurosurgery eval appreciated. Continue with Keppra 500 mg to twice daily for seizure prophylaxis - duration of AEDs is expected to be 3-6 months based on clinical course and MRI/EEG evaluation at that time, VEEG: Structural or functional abnormality in the right hemisphere, No epileptiform pattern or seizure seen, MRI Brain w/o results as above, CTA limited by body habitus. Physical therapy/OT recommended Acute Rehab. Fluoxetine 20 mg once a day started for improved motor recovery for 3 months with a plan to taper off afterwards - risks versus benefits and adverse reactions associated with medication use were discussed with patient and available family member at bedside in detail.    ANTITHROMBOTIC THERAPY:  Was on dual antiplatelet therapy with aspirin and clopidogrel for aggressive secondary stroke prevention in the setting of symptomatic intracranial atherosclerosis for 3 months followed by aspirin. Clopidogrel stopped on 07/04 for asymptomatic hemorrhagic transformation (HI-2). Restarted clopidogrel on 7/9 based on clinical and radiological stability/improvement. Check P2Y12 in 1 week (7/16/19) to ensure optimal platelet inhibition.     PULMONARY: CXR (06/29/19)  Clear lungs. Protecting airway, saturating well     CARDIOVASCULAR: Check TTE with bubble study, cardiac monitoring no events, Dr Luz (cardiology) eval appreciated, will obtain cardiac clearance for revascularization procedure if indicated. Gradual normotension in the setting of carotid stenosis careful initiation of home meds and will monitor for heart failure signs since patient endorses hx of chronic CHF.                             SBP goal:<160 mmHg     GASTROINTESTINAL: Dysphagia screen failed. S/S eval recommended NPO, d/w family re: alternate means of nutrition, risks/benefits/alternatives. PEG placed on 07/03. Tolerating feedings at goal. SLP re-eval as indicated.      Diet: NPO with TF via PEG    RENAL: maintain adequate hydration, good urine output, urine toxicology: Negative      Na Goal: Greater than 135     Camarena: N    HEMATOLOGY: no active bleeding noted, LE doppler ordered to r/o DVT negative for DVT.     DVT ppx:  LMWH    ID: afebrile, no si/sx of infection    OTHER: HgbA1C 10.4, continue Humalog sliding scale and DM meds as per endocrine recommendations    DISPOSITION: Acute Rehab. She is undocumented and uninsured and was denied [facility]. Plan to optimize therapy while in hospital.     CORE MEASURES:        Admission NIHSS: 13     TPA:  YES       LDL/HDL: 131/49     Depression Screen: 0     Statin Therapy: Y     Dysphagia Screen:  FAIL     Smoking  NO      Afib  NO     Stroke Education  YES    Obtain screening ultrasound in patients who meet 1 or more of the following criteria as patient is high risk for DVT/PE upon admission:   [] History of DVT/PE  []Hypercoagulable states (Factor V Leiden, Cancer, OCP, etc. )  [X]Prolonged immobility (hemiplegia/hemiparesis/post operative or any other extended immobilization)   [] Transferred from outside facility (Rehab or Long term care)  [] Age </= to 50 54 year old woman with multiple vascular risk factors including age, HTN, DM II and HPLD was initially evaluated at OSH for acute onset of left-sided weakness through telestroke. She was treated with IV tPA and was transferred to [facility] for further management. CTA head and neck on admission showed critical stenosis versus occlusion of right supraclinoid ICA and severe to critical stenosis of right proximal ICA. MRI brain during hospitalization showed acute infarcts in the right MCA and ENRIQUE distribution with minimal hemorrhagic transformation (HI-1). Her hospital course was complicated by episodes concerning for seizure-like activity on admission, treated with AEDs. MRA head and neck showed severe to critical stenosis of right supraclinoid ICA and moderate (about 50%) stenosis of right proximal ICA. Carotid duplex showed diffuse intimal thickening without any evidence of hemodynamically a significant carotid stenoses. Repeat CT brain showed stable hemorrhagic transformation of right MCA/ENRIQUE distribution infarct.     Impression:  Cerebral embolism with cerebral infarction. Right MCA and ENRIQUE distribution stroke - likely mechanism being large vessel disease i.e. symptomatic intracranial vs. extracranial atherosclerosis leading to artery to artery embolism, favoring the former; less likely to be due to an embolic stroke from undetermined source. Likely symptomatic seizures in the setting of an acute stroke    NEURO: Neurologically without acute change, Continue monitoring for neurologic deterioration in setting of cerebral edema with mass effect and brain compression, maintain BP <160 in setting of possible hemorrhagic transformation on recent CT on 07/04, repeat CTH in am 7/7/19 without acute change. Now restarted on Plavix and continued ASA and high intensity statin for secondary stroke prevention in the setting of likely atheroembolic etiology of her stroke. Based on results of carotid duplex and MRA head and neck in conjunction with technically limited CTA head and neck; doubtful significance of undergoing carotid revascularization with carotid endarterectomy - to be further evaluated with conventional angiogram on 7/11 to better quantify the degree of extracranial/intracranial atherosclerotic stenose, Neurosurgery eval appreciated. Continue with Keppra 500 mg to twice daily for seizure prophylaxis - duration of AEDs is expected to be 3-6 months based on clinical course and MRI/EEG evaluation at that time, VEEG: Structural or functional abnormality in the right hemisphere, No epileptiform pattern or seizure seen, MRI Brain w/o results as above, CTA limited by body habitus. Physical therapy/OT recommended Acute Rehab. Fluoxetine 20 mg once a day started for improved motor recovery for 3 months with a plan to taper off afterwards - risks versus benefits and adverse reactions associated with medication use were discussed with patient and available family member at bedside in detail.    ANTITHROMBOTIC THERAPY:  Was on dual antiplatelet therapy with aspirin and clopidogrel for aggressive secondary stroke prevention in the setting of symptomatic intracranial atherosclerosis for 3 months followed by aspirin. Clopidogrel stopped on 07/04 for asymptomatic hemorrhagic transformation (HI-2). Restarted clopidogrel on 7/9 based on clinical and radiological stability/improvement. Check P2Y12 in 1 week (7/16/19) to ensure optimal platelet inhibition.     PULMONARY: CXR (06/29/19)  Clear lungs. Protecting airway, saturating well     CARDIOVASCULAR: Check TTE with bubble study, cardiac monitoring no events, Dr Luz (cardiology) eval appreciated, will obtain cardiac clearance for revascularization procedure if indicated. Gradual normotension in the setting of carotid stenosis careful initiation of home meds and will monitor for heart failure signs since patient endorses hx of chronic CHF.                             SBP goal:<160 mmHg     GASTROINTESTINAL: Dysphagia screen failed. S/S eval recommended NPO, d/w family re: alternate means of nutrition, risks/benefits/alternatives. PEG placed on 07/03. Tolerating feedings at goal. SLP re-eval as indicated.      Diet: NPO with TF via PEG    RENAL: maintain adequate hydration, good urine output, urine toxicology: Negative      Na Goal: Greater than 135     Camarena: N    HEMATOLOGY: no active bleeding noted, LE doppler ordered to r/o DVT negative for DVT.     DVT ppx:  LMWH    ID: afebrile, no si/sx of infection    OTHER: HgbA1C 10.4, continue Humalog sliding scale and DM meds as per endocrine recommendations    DISPOSITION: Acute Rehab. She is undocumented and uninsured and was denied [facility]. Plan to optimize therapy while in hospital.     CORE MEASURES:        Admission NIHSS: 13     TPA:  YES       LDL/HDL: 131/49     Depression Screen: 0     Statin Therapy: Y     Dysphagia Screen:  FAIL     Smoking  NO      Afib  NO     Stroke Education  YES    Obtain screening ultrasound in patients who meet 1 or more of the following criteria as patient is high risk for DVT/PE upon admission:   [] History of DVT/PE  []Hypercoagulable states (Factor V Leiden, Cancer, OCP, etc. )  [X]Prolonged immobility (hemiplegia/hemiparesis/post operative or any other extended immobilization)   [] Transferred from outside facility (Rehab or Long term care)  [] Age </= to 50 54 year old woman with multiple vascular risk factors including age, HTN, DM II and HPLD was initially evaluated at OSH for acute onset of left-sided weakness through telestroke. She was treated with IV tPA and was transferred to [facility] for further management. CTA head and neck on admission showed critical stenosis versus occlusion of right supraclinoid ICA and severe to critical stenosis of right proximal ICA. MRI brain during hospitalization showed acute infarcts in the right MCA and ENRIQUE distribution with minimal hemorrhagic transformation (HI-1). Her hospital course was complicated by episodes concerning for seizure-like activity on admission, treated with AEDs. MRA head and neck showed severe to critical stenosis of right supraclinoid ICA and moderate (about 50%) stenosis of right proximal ICA. Carotid duplex showed diffuse intimal thickening without any evidence of hemodynamically a significant carotid stenoses. Repeat CT brain showed stable hemorrhagic transformation of right MCA/ENRIQUE distribution infarct.     Impression:  Cerebral embolism with cerebral infarction. Right MCA and ENRIQUE distribution stroke - likely mechanism being large vessel disease i.e. symptomatic intracranial vs. extracranial atherosclerosis leading to artery to artery embolism, favoring the former; less likely to be due to an embolic stroke from undetermined source. Likely symptomatic seizures in the setting of an acute stroke    NEURO: Neurologically without acute change, Continue monitoring for neurologic deterioration in setting of cerebral edema with mass effect and brain compression, maintain BP <160 in setting of possible hemorrhagic transformation on recent CT on 07/04, repeat CTH in am 7/7/19 without acute change. Now restarted on Plavix and continued ASA and high intensity statin for secondary stroke prevention in the setting of likely atheroembolic etiology of her stroke. Based on results of carotid duplex and MRA head and neck in conjunction with technically limited CTA head and neck; doubtful significance of undergoing carotid revascularization with carotid endarterectomy - to be further evaluated with conventional angiogram on 7/11 to better quantify the degree of extracranial/intracranial atherosclerotic stenose, Neurosurgery eval appreciated. Continue with Keppra 500 mg to twice daily for seizure prophylaxis - duration of AEDs is expected to be 3-6 months based on clinical course and MRI/EEG evaluation at that time, VEEG: Structural or functional abnormality in the right hemisphere, No epileptiform pattern or seizure seen, MRI Brain w/o results as above, CTA limited by body habitus. Physical therapy/OT recommended Acute Rehab. Fluoxetine 20 mg once a day started for improved motor recovery for 3 months with a plan to taper off afterwards - risks versus benefits and adverse reactions associated with medication use were discussed with patient and available family member at bedside in detail.    ANTITHROMBOTIC THERAPY:  Was on dual antiplatelet therapy with aspirin and clopidogrel for aggressive secondary stroke prevention in the setting of symptomatic intracranial atherosclerosis for 3 months followed by aspirin. Clopidogrel stopped on 07/04 for asymptomatic hemorrhagic transformation (HI-2). Restarted clopidogrel on 7/9 based on clinical and radiological stability/improvement. Check P2Y12 in 1 week (7/16/19) to ensure optimal platelet inhibition.     PULMONARY: CXR (06/29/19)  Clear lungs. Protecting airway, saturating well     CARDIOVASCULAR: Check TTE with bubble study, cardiac monitoring no events, Dr Luz (cardiology) eval appreciated, will obtain cardiac clearance for revascularization procedure if indicated. Gradual normotension in the setting of carotid stenosis careful initiation of home meds and will monitor for heart failure signs since patient endorses hx of chronic CHF.                             SBP goal:<160 mmHg     GASTROINTESTINAL: Dysphagia screen failed. S/S eval recommended NPO, d/w family re: alternate means of nutrition, risks/benefits/alternatives. PEG placed on 07/03. Tolerating feedings at goal. SLP re-eval as indicated.      Diet: NPO with TF via PEG, NPO past MN for conventional angiogram tomorrow     RENAL: maintain adequate hydration, good urine output, urine toxicology: Negative      Na Goal: Greater than 135     Camarena: N    HEMATOLOGY: no active bleeding noted, LE doppler ordered to r/o DVT negative for DVT.     DVT ppx:  LMWH    ID: afebrile, no si/sx of infection    OTHER: HgbA1C 10.4, continue Humalog sliding scale and DM meds as per endocrine recommendations    DISPOSITION: Acute Rehab. She is undocumented and uninsured and was denied [facility]. Plan to optimize therapy while in hospital.     CORE MEASURES:        Admission NIHSS: 13     TPA:  YES       LDL/HDL: 131/49     Depression Screen: 0     Statin Therapy: Y     Dysphagia Screen:  FAIL     Smoking  NO      Afib  NO     Stroke Education  YES    Obtain screening ultrasound in patients who meet 1 or more of the following criteria as patient is high risk for DVT/PE upon admission:   [] History of DVT/PE  []Hypercoagulable states (Factor V Leiden, Cancer, OCP, etc. )  [X]Prolonged immobility (hemiplegia/hemiparesis/post operative or any other extended immobilization)   [] Transferred from outside facility (Rehab or Long term care)  [] Age </= to 50

## 2019-07-10 NOTE — CHART NOTE - NSCHARTNOTEFT_GEN_A_CORE
Nutrition Follow Up Note  Patient seen for: follow up    · Reason for Admission	CVA      Awaiting response from [facility]: [facility]. pt is uninsured, undocumented.     Source: chart, nurse, rounds  pt with 2 large BM's yesterday. pt receiving Miralax, nurse will hold Miralax for now.    Diet : ENTERAL,  NPO/ENTERAL    Patient reports: speech is hoarse and garbled       Enteral /Parenteral Nutrition: Glucerna 1.2 @ 60ml/hr x 24hr      Daily   % Weight Change: no current weight    Pertinent Medications: MEDICATIONS  (STANDING):  aspirin  chewable 81 milliGRAM(s) Oral daily  atorvastatin 80 milliGRAM(s) Oral at bedtime  clopidogrel Tablet 75 milliGRAM(s) Oral daily  dextrose 5%. 1000 milliLiter(s) (50 mL/Hr) IV Continuous <Continuous>  dextrose 50% Injectable 12.5 Gram(s) IV Push once  dextrose 50% Injectable 25 Gram(s) IV Push once  dextrose 50% Injectable 25 Gram(s) IV Push once  enoxaparin Injectable 40 milliGRAM(s) SubCutaneous daily  FLUoxetine Solution 20 milliGRAM(s) Oral daily  insulin lispro (HumaLOG) corrective regimen sliding scale   SubCutaneous every 6 hours  insulin NPH human recombinant 22 Unit(s) SubCutaneous every 6 hours  levETIRAcetam  Solution 500 milliGRAM(s) Oral two times a day  lisinopril 2.5 milliGRAM(s) Oral daily  nystatin    Suspension 302956 Unit(s) Oral two times a day  polyethylene glycol 3350 17 Gram(s) Oral daily    MEDICATIONS  (PRN):  acetaminophen    Suspension .. 650 milliGRAM(s) Oral every 6 hours PRN Temp greater or equal to 38C (100.4F)  acetaminophen    Suspension .. 650 milliGRAM(s) Enteral Tube every 6 hours PRN Moderate Pain (4 - 6)  bisacodyl Suppository 10 milliGRAM(s) Rectal daily PRN Constipation  dextrose 40% Gel 15 Gram(s) Oral once PRN Blood Glucose LESS THAN 70 milliGRAM(s)/deciliter  glucagon  Injectable 1 milliGRAM(s) IntraMuscular once PRN Glucose LESS THAN 70 milligrams/deciliter  nystatin Powder 1 Application(s) Topical two times a day PRN dermatitis    Pertinent Labs:   Finger Sticks:  POCT Blood Glucose.: 176 mg/dL (07-10 @ 06:18)  POCT Blood Glucose.: 157 mg/dL (07-10 @ 01:23)  POCT Blood Glucose.: 167 mg/dL (07-09 @ 17:24)  POCT Blood Glucose.: 199 mg/dL (07-09 @ 12:32)      Skin per nursing documentation: no pressure injuries  Edema: + 1 generalized    Estimated Needs:   [x ] no change since previous assessment  [ ] recalculated:     Previous Nutrition Diagnosis: swallowing difficulty  Nutrition Diagnosis is: ongoing, being addressed with tube feeds         Interventions: discussed tube feed tolerance with nurse    Recommend  1)continue Glucerna goal 1.2 60cc/hr x 24 hrs provides 1728 kcals, 86 gm protein, 1159cc free water  meets  29 Kcal/Kg, 1.4Gm/kg IBW 59 kg.  2)monitor BM's and pt need for Miralax or not.     Monitoring and Evaluation:     Continue to monitor Nutritional intake, Tolerance to diet prescription, weights, labs, skin integrity    RD remains available upon request and will follow up per protocol  Rozina Florian MA, RD, CDN #[phone]

## 2019-07-10 NOTE — PROGRESS NOTE ADULT - ASSESSMENT
54 year old female w/uncontrolled T2DM (HbA1c 10.4%) w/ retinopathy here w/CVA. Pt being transitioned from continuous to bolus feeds today. Received NPH dose at noon so won't start bolus insulin until this evening. BG goal (100-180mg/dL). Will need to ensure daughter can manage insulin regimen prior to discharge. Will transition pt to basal/bolus regimen and stop NPH.

## 2019-07-10 NOTE — PROGRESS NOTE ADULT - PROBLEM SELECTOR PLAN 1
-test BG Q8h (6am, 2pm, 10pm)  -Stop NPH  -Start Lantus 36 units QHS tonight  -Start Humlog 10 units TID w/bolus feeds tonight w/10pm feed (6am, 2pm, 10pm)  -c/w Humalog moderate correction scale Q8h (6am, 2pm, 10pm)  -Please review insulin teaching w/pts daughter  -discussed w/pt and RN  pager: [phone]

## 2019-07-11 ENCOUNTER — APPOINTMENT (OUTPATIENT)
Dept: NEUROLOGY | Facility: CLINIC | Age: 54
End: 2019-07-11
Payer: MEDICAID

## 2019-07-11 LAB
ANION GAP SERPL CALC-SCNC: 10 MMOL/L — SIGNIFICANT CHANGE UP (ref 5–17)
ANION GAP SERPL CALC-SCNC: 11 MMOL/L — SIGNIFICANT CHANGE UP (ref 5–17)
BASOPHILS # BLD AUTO: 0 K/UL — SIGNIFICANT CHANGE UP (ref 0–0.2)
BASOPHILS NFR BLD AUTO: 0.2 % — SIGNIFICANT CHANGE UP (ref 0–2)
BLD GP AB SCN SERPL QL: NEGATIVE — SIGNIFICANT CHANGE UP
BUN SERPL-MCNC: 18 MG/DL — SIGNIFICANT CHANGE UP (ref 7–23)
BUN SERPL-MCNC: 21 MG/DL — SIGNIFICANT CHANGE UP (ref 7–23)
CALCIUM SERPL-MCNC: 9 MG/DL — SIGNIFICANT CHANGE UP (ref 8.4–10.5)
CALCIUM SERPL-MCNC: 9.2 MG/DL — SIGNIFICANT CHANGE UP (ref 8.4–10.5)
CHLORIDE SERPL-SCNC: 97 MMOL/L — SIGNIFICANT CHANGE UP (ref 96–108)
CHLORIDE SERPL-SCNC: 98 MMOL/L — SIGNIFICANT CHANGE UP (ref 96–108)
CO2 SERPL-SCNC: 26 MMOL/L — SIGNIFICANT CHANGE UP (ref 22–31)
CO2 SERPL-SCNC: 28 MMOL/L — SIGNIFICANT CHANGE UP (ref 22–31)
CREAT SERPL-MCNC: 0.85 MG/DL — SIGNIFICANT CHANGE UP (ref 0.5–1.3)
CREAT SERPL-MCNC: 0.87 MG/DL — SIGNIFICANT CHANGE UP (ref 0.5–1.3)
EOSINOPHIL # BLD AUTO: 0.1 K/UL — SIGNIFICANT CHANGE UP (ref 0–0.5)
EOSINOPHIL NFR BLD AUTO: 1.7 % — SIGNIFICANT CHANGE UP (ref 0–6)
GLUCOSE BLDC GLUCOMTR-MCNC: 127 MG/DL — HIGH (ref 70–99)
GLUCOSE BLDC GLUCOMTR-MCNC: 129 MG/DL — HIGH (ref 70–99)
GLUCOSE BLDC GLUCOMTR-MCNC: 143 MG/DL — HIGH (ref 70–99)
GLUCOSE BLDC GLUCOMTR-MCNC: 144 MG/DL — HIGH (ref 70–99)
GLUCOSE BLDC GLUCOMTR-MCNC: 190 MG/DL — HIGH (ref 70–99)
GLUCOSE SERPL-MCNC: 150 MG/DL — HIGH (ref 70–99)
GLUCOSE SERPL-MCNC: 156 MG/DL — HIGH (ref 70–99)
HCT VFR BLD CALC: 38.2 % — SIGNIFICANT CHANGE UP (ref 34.5–45)
HCT VFR BLD CALC: 41.7 % — SIGNIFICANT CHANGE UP (ref 34.5–45)
HGB BLD-MCNC: 13 G/DL — SIGNIFICANT CHANGE UP (ref 11.5–15.5)
HGB BLD-MCNC: 14.1 G/DL — SIGNIFICANT CHANGE UP (ref 11.5–15.5)
LYMPHOCYTES # BLD AUTO: 1.9 K/UL — SIGNIFICANT CHANGE UP (ref 1–3.3)
LYMPHOCYTES # BLD AUTO: 22.9 % — SIGNIFICANT CHANGE UP (ref 13–44)
MCHC RBC-ENTMCNC: 28.3 PG — SIGNIFICANT CHANGE UP (ref 27–34)
MCHC RBC-ENTMCNC: 28.7 PG — SIGNIFICANT CHANGE UP (ref 27–34)
MCHC RBC-ENTMCNC: 33.8 GM/DL — SIGNIFICANT CHANGE UP (ref 32–36)
MCHC RBC-ENTMCNC: 34.1 GM/DL — SIGNIFICANT CHANGE UP (ref 32–36)
MCV RBC AUTO: 83.7 FL — SIGNIFICANT CHANGE UP (ref 80–100)
MCV RBC AUTO: 84.2 FL — SIGNIFICANT CHANGE UP (ref 80–100)
MONOCYTES # BLD AUTO: 0.7 K/UL — SIGNIFICANT CHANGE UP (ref 0–0.9)
MONOCYTES NFR BLD AUTO: 8.8 % — SIGNIFICANT CHANGE UP (ref 2–14)
NEUTROPHILS # BLD AUTO: 5.6 K/UL — SIGNIFICANT CHANGE UP (ref 1.8–7.4)
NEUTROPHILS NFR BLD AUTO: 66.5 % — SIGNIFICANT CHANGE UP (ref 43–77)
PLATELET # BLD AUTO: 351 K/UL — SIGNIFICANT CHANGE UP (ref 150–400)
PLATELET # BLD AUTO: 390 K/UL — SIGNIFICANT CHANGE UP (ref 150–400)
POTASSIUM SERPL-MCNC: 4.1 MMOL/L — SIGNIFICANT CHANGE UP (ref 3.5–5.3)
POTASSIUM SERPL-MCNC: 4.5 MMOL/L — SIGNIFICANT CHANGE UP (ref 3.5–5.3)
POTASSIUM SERPL-SCNC: 4.1 MMOL/L — SIGNIFICANT CHANGE UP (ref 3.5–5.3)
POTASSIUM SERPL-SCNC: 4.5 MMOL/L — SIGNIFICANT CHANGE UP (ref 3.5–5.3)
RBC # BLD: 4.53 M/UL — SIGNIFICANT CHANGE UP (ref 3.8–5.2)
RBC # BLD: 4.98 M/UL — SIGNIFICANT CHANGE UP (ref 3.8–5.2)
RBC # FLD: 12.4 % — SIGNIFICANT CHANGE UP (ref 10.3–14.5)
RBC # FLD: 12.5 % — SIGNIFICANT CHANGE UP (ref 10.3–14.5)
RH IG SCN BLD-IMP: NEGATIVE — SIGNIFICANT CHANGE UP
RH IG SCN BLD-IMP: NEGATIVE — SIGNIFICANT CHANGE UP
SODIUM SERPL-SCNC: 134 MMOL/L — LOW (ref 135–145)
SODIUM SERPL-SCNC: 136 MMOL/L — SIGNIFICANT CHANGE UP (ref 135–145)
WBC # BLD: 8.4 K/UL — SIGNIFICANT CHANGE UP (ref 3.8–10.5)
WBC # BLD: 9.5 K/UL — SIGNIFICANT CHANGE UP (ref 3.8–10.5)
WBC # FLD AUTO: 8.4 K/UL — SIGNIFICANT CHANGE UP (ref 3.8–10.5)
WBC # FLD AUTO: 9.5 K/UL — SIGNIFICANT CHANGE UP (ref 3.8–10.5)

## 2019-07-11 PROCEDURE — 99232 SBSQ HOSP IP/OBS MODERATE 35: CPT

## 2019-07-11 PROCEDURE — 36223 PLACE CATH CAROTID/INOM ART: CPT | Mod: RT

## 2019-07-11 PROCEDURE — 99233 SBSQ HOSP IP/OBS HIGH 50: CPT | Mod: 25

## 2019-07-11 RX ORDER — LISINOPRIL 2.5 MG/1
10 TABLET ORAL DAILY
Refills: 0 | Status: DISCONTINUED | OUTPATIENT
Start: 2019-07-12 | End: 2019-07-15

## 2019-07-11 RX ORDER — LISINOPRIL 2.5 MG/1
5 TABLET ORAL ONCE
Refills: 0 | Status: COMPLETED | OUTPATIENT
Start: 2019-07-11 | End: 2019-07-11

## 2019-07-11 RX ADMIN — Medication 20 MILLIGRAM(S): at 14:16

## 2019-07-11 RX ADMIN — INSULIN GLARGINE 36 UNIT(S): 100 INJECTION, SOLUTION SUBCUTANEOUS at 00:04

## 2019-07-11 RX ADMIN — LEVETIRACETAM 500 MILLIGRAM(S): 250 TABLET, FILM COATED ORAL at 18:00

## 2019-07-11 RX ADMIN — LEVETIRACETAM 500 MILLIGRAM(S): 250 TABLET, FILM COATED ORAL at 05:55

## 2019-07-11 RX ADMIN — Medication 81 MILLIGRAM(S): at 15:57

## 2019-07-11 RX ADMIN — Medication 500000 UNIT(S): at 05:55

## 2019-07-11 RX ADMIN — INSULIN GLARGINE 36 UNIT(S): 100 INJECTION, SOLUTION SUBCUTANEOUS at 22:13

## 2019-07-11 RX ADMIN — LISINOPRIL 5 MILLIGRAM(S): 2.5 TABLET ORAL at 12:43

## 2019-07-11 RX ADMIN — CLOPIDOGREL BISULFATE 75 MILLIGRAM(S): 75 TABLET, FILM COATED ORAL at 15:57

## 2019-07-11 RX ADMIN — ATORVASTATIN CALCIUM 80 MILLIGRAM(S): 80 TABLET, FILM COATED ORAL at 21:31

## 2019-07-11 RX ADMIN — LISINOPRIL 2.5 MILLIGRAM(S): 2.5 TABLET ORAL at 05:55

## 2019-07-11 RX ADMIN — Medication 500000 UNIT(S): at 17:59

## 2019-07-11 RX ADMIN — NYSTATIN CREAM 1 APPLICATION(S): 100000 CREAM TOPICAL at 05:55

## 2019-07-11 RX ADMIN — ENOXAPARIN SODIUM 40 MILLIGRAM(S): 100 INJECTION SUBCUTANEOUS at 15:57

## 2019-07-11 NOTE — PROGRESS NOTE ADULT - ASSESSMENT
54 year old woman with multiple vascular risk factors including age, HTN, DM II and HPLD was initially evaluated at OSH for acute onset of left-sided weakness through telestroke. She was treated with IV tPA and was transferred to [facility] for further management. CTA head and neck on admission showed critical stenosis versus occlusion of right supraclinoid ICA and severe to critical stenosis of right proximal ICA. MRI brain during hospitalization showed acute infarcts in the right MCA and ENRIQUE distribution with minimal hemorrhagic transformation (HI-1). Her hospital course was complicated by episodes concerning for seizure-like activity on admission, treated with AEDs. MRA head and neck showed severe to critical stenosis of right supraclinoid ICA and moderate (about 50%) stenosis of right proximal ICA. Carotid duplex showed diffuse intimal thickening without any evidence of hemodynamically a significant carotid stenoses. Repeat CT brain showed stable hemorrhagic transformation of right MCA/ENRIQUE distribution infarct.     Impression:  Cerebral embolism with cerebral infarction. Right MCA and ENRIQUE distribution stroke - likely mechanism being large vessel disease i.e. symptomatic intracranial vs. extracranial atherosclerosis leading to artery to artery embolism, favoring the former; less likely to be due to an embolic stroke from undetermined source. Likely symptomatic seizures in the setting of an acute stroke    NEURO: Neurologically without acute change, Continue monitoring for neurologic deterioration in setting of cerebral edema with mass effect and brain compression, maintain BP <160 in setting of possible hemorrhagic transformation on recent CT on 07/04, repeat CTH in am 7/7/19 without acute change. Now restarted on Plavix and continued ASA and high intensity statin for secondary stroke prevention in the setting of likely atheroembolic etiology of her stroke. Based on results of carotid duplex and MRA head and neck in conjunction with technically limited CTA head and neck; doubtful significance of undergoing carotid revascularization with carotid endarterectomy - to be further evaluated with conventional angiogram on 7/11 to better quantify the degree of extracranial/intracranial atherosclerotic stenose, Neurosurgery eval appreciated. Continue with Keppra 500 mg to twice daily for seizure prophylaxis - duration of AEDs is expected to be 3-6 months based on clinical course and MRI/EEG evaluation at that time, VEEG: Structural or functional abnormality in the right hemisphere, No epileptiform pattern or seizure seen, MRI Brain w/o results as above, CTA limited by body habitus. Physical therapy/OT recommended Acute Rehab. Fluoxetine 20 mg once a day started for improved motor recovery for 3 months with a plan to taper off afterwards - risks versus benefits and adverse reactions associated with medication use were discussed with patient and available family member at bedside in detail.    ANTITHROMBOTIC THERAPY:  Was on dual antiplatelet therapy with aspirin and clopidogrel for aggressive secondary stroke prevention in the setting of symptomatic intracranial atherosclerosis for 3 months followed by aspirin. Clopidogrel stopped on 07/04 for asymptomatic hemorrhagic transformation (HI-2). Restarted clopidogrel on 7/9 based on clinical and radiological stability/improvement. Check P2Y12 in 1 week (7/16/19) to ensure optimal platelet inhibition.     PULMONARY: CXR (06/29/19)  Clear lungs. Protecting airway, saturating well     CARDIOVASCULAR: Check TTE with bubble study, cardiac monitoring no events, Dr Luz (cardiology) eval appreciated, will obtain cardiac clearance for revascularization procedure if indicated. Gradual normotension in the setting of carotid stenosis careful initiation of home meds and will monitor for heart failure signs since patient endorses hx of chronic CHF.                             SBP goal:<160 mmHg     GASTROINTESTINAL: Dysphagia screen failed. S/S eval recommended NPO, d/w family re: alternate means of nutrition, risks/benefits/alternatives. PEG placed on 07/03. Tolerating feedings at goal. SLP re-eval as indicated.      Diet: NPO with TF via PEG, NPO past MN for conventional angiogram     RENAL: BUN/Cr without acute change; monitor mild hyponatremia, maintain adequate hydration, good urine output, urine toxicology: Negative      Na Goal: Greater than 135     [place]: N    HEMATOLOGY: H/H without acute change, LE doppler ordered to r/o DVT negative for DVT.     DVT ppx:  LMWH    ID: afebrile, no leucocytosis     OTHER: HgbA1C 10.4, continue Humalog sliding scale and DM meds as per endocrine recommendations    DISPOSITION: Acute Rehab. She is undocumented and uninsured and was denied [facility]. Plan to optimize therapy while in hospital.     CORE MEASURES:        Admission NIHSS: 13     TPA:  YES       LDL/HDL: 131/49     Depression Screen: 0     Statin Therapy: Y     Dysphagia Screen:  FAIL     Smoking  NO      Afib  NO     Stroke Education  YES    Obtain screening ultrasound in patients who meet 1 or more of the following criteria as patient is high risk for DVT/PE upon admission:   [] History of DVT/PE  []Hypercoagulable states (Factor V Leiden, Cancer, OCP, etc. )  [X]Prolonged immobility (hemiplegia/hemiparesis/post operative or any other extended immobilization)   [] Transferred from outside facility (Rehab or Long term care)  [] Age </= to 50 54 year old woman with multiple vascular risk factors including age, HTN, DM II and HPLD was initially evaluated at OSH for acute onset of left-sided weakness through telestroke. She was treated with IV tPA and was transferred to [facility] for further management. CTA head and neck on admission showed critical stenosis versus occlusion of right supraclinoid ICA and severe to critical stenosis of right proximal ICA. MRI brain during hospitalization showed acute infarcts in the right MCA and ENRIQUE distribution with minimal hemorrhagic transformation (HI-1). Her hospital course was complicated by episodes concerning for seizure-like activity on admission, treated with AEDs. MRA head and neck showed severe to critical stenosis of right supraclinoid ICA and moderate (about 50%) stenosis of right proximal ICA. Carotid duplex showed diffuse intimal thickening without any evidence of hemodynamically a significant carotid stenoses. Repeat CT brain showed stable hemorrhagic transformation of right MCA/ENRIQUE distribution infarct.     Impression:  Cerebral embolism with cerebral infarction. Right MCA and ENRIQUE distribution stroke - likely mechanism being large vessel disease i.e. symptomatic intracranial vs. extracranial atherosclerosis leading to artery to artery embolism, favoring the former; less likely to be due to an embolic stroke from undetermined source. Likely symptomatic seizures in the setting of an acute stroke    NEURO: Neurologically without acute change, Continue monitoring for neurologic deterioration in setting of cerebral edema with mass effect and brain compression, maintain BP <160 in setting of possible hemorrhagic transformation on recent CT on 07/04, repeat CTH in am 7/7/19 without acute change. Now restarted on Plavix and continued ASA and high intensity statin for secondary stroke prevention in the setting of likely atheroembolic etiology of her stroke. She underwent cerebral angiogram with Dr. Mueller on 7/11 which showed mild right carotid stenosis and severe right MCA M1 segment stenosis.   Continued on Keppra 500 mg to twice daily for seizure prophylaxis - duration of AEDs is expected to be 3-6 months based on clinical course and MRI/EEG evaluation at that time, VEEG: Structural or functional abnormality in the right hemisphere, No epileptiform pattern or seizure seen, MRI Brain w/o results as above, CTA limited by body habitus. Physical therapy/OT recommended Acute Rehab. Fluoxetine 20 mg once a day started for improved motor recovery for 3 months with a plan to taper off afterwards - risks versus benefits and adverse reactions associated with medication use were discussed with patient and available family member at bedside in detail.    ANTITHROMBOTIC THERAPY:  Was on dual antiplatelet therapy with aspirin and clopidogrel for aggressive secondary stroke prevention in the setting of symptomatic intracranial atherosclerosis for 3 months followed by aspirin. Clopidogrel stopped on 07/04 for asymptomatic hemorrhagic transformation (HI-2). Restarted clopidogrel on 7/9 based on clinical and radiological stability/improvement. Check P2Y12 in 1 week (7/16/19) to ensure optimal platelet inhibition.     PULMONARY: CXR (06/29/19)  Clear lungs. Protecting airway, saturating well     CARDIOVASCULAR: Check TTE with bubble study, cardiac monitoring no events, Dr Luz (cardiology) eval appreciated, will obtain cardiac clearance for revascularization procedure if indicated. Gradual normotension in the setting of carotid stenosis careful initiation of home meds and will monitor for heart failure signs since patient endorses hx of chronic CHF.                             SBP goal:<160 mmHg     GASTROINTESTINAL: Dysphagia screen failed. S/S eval recommended NPO, d/w family re: alternate means of nutrition, risks/benefits/alternatives. PEG placed on 07/03. Tolerating feedings at goal. SLP re-eval as indicated.      Diet: NPO with TF via PEG     RENAL: BUN/Cr without acute change; monitor mild hyponatremia, maintain adequate hydration, good urine output, urine toxicology: Negative      Na Goal: Greater than 135     [place]: N    HEMATOLOGY: H/H without acute change, LE doppler ordered to r/o DVT negative for DVT.     DVT ppx:  LMWH    ID: afebrile, no leucocytosis     OTHER: HgbA1C 10.4, continue Humalog sliding scale and DM meds as per endocrine recommendations    DISPOSITION: Acute Rehab. She is undocumented and uninsured and was denied [facility]. Plan to optimize therapy while in hospital.     CORE MEASURES:        Admission NIHSS: 13     TPA:  YES       LDL/HDL: 131/49     Depression Screen: 0     Statin Therapy: Y     Dysphagia Screen:  FAIL     Smoking  NO      Afib  NO     Stroke Education  YES    Obtain screening ultrasound in patients who meet 1 or more of the following criteria as patient is high risk for DVT/PE upon admission:   [] History of DVT/PE  []Hypercoagulable states (Factor V Leiden, Cancer, OCP, etc. )  [X]Prolonged immobility (hemiplegia/hemiparesis/post operative or any other extended immobilization)   [] Transferred from outside facility (Rehab or Long term care)  [] Age </= to 50 54 year old woman with multiple vascular risk factors including age, HTN, DM II and HPLD was initially evaluated at OSH for acute onset of left-sided weakness through telestroke. She was treated with IV tPA and was transferred to [facility] for further management. CTA head and neck on admission showed critical stenosis versus occlusion of right supraclinoid ICA and severe to critical stenosis of right proximal ICA. MRI brain during hospitalization showed acute infarcts in the right MCA and ENRIQUE distribution with minimal hemorrhagic transformation (HI-1). Her hospital course was complicated by episodes concerning for seizure-like activity on admission, treated with AEDs. MRA head and neck showed severe to critical stenosis of right supraclinoid ICA and moderate (about 50%) stenosis of right proximal ICA. Carotid duplex showed diffuse intimal thickening without any evidence of hemodynamically a significant carotid stenoses. Repeat CT brain showed stable hemorrhagic transformation of right MCA/ENRIQUE distribution infarct. Selective conventional cerebral angiogram (7/11) showed severe to critical right MCA M1 segment and moderate to severe supraclinoid ICA stenoses with mild extracranial proximal right ICA stenosis.     Impression:  Cerebral embolism with cerebral infarction. Right MCA and ENRIQUE distribution stroke - likely mechanism being large vessel disease i.e. symptomatic intracranial atherosclerosis leading to artery to artery embolism and less likely to be due to an embolic stroke from undetermined source.   Likely symptomatic seizures in the setting of an acute stroke    NEURO: Neurologically without acute change, Continue monitoring for neurologic deterioration in setting of cerebral edema with mass effect and brain compression, maintain BP <160 in setting of possible hemorrhagic transformation on recent CT on 07/04, repeat CTH in am 7/7/19 without acute change. Now restarted on Plavix and continued ASA and high intensity statin for secondary stroke prevention in the setting of likely atheroembolic etiology of her stroke. She underwent cerebral angiogram with Dr. Mueller on 7/11 which showed mild right carotid stenosis and severe right MCA M1 segment stenosis.   Continued on Keppra 500 mg to twice daily for seizure prophylaxis - duration of AEDs is expected to be 3-6 months based on clinical course and MRI/EEG evaluation at that time, VEEG: Structural or functional abnormality in the right hemisphere, No epileptiform pattern or seizure seen, MRI Brain w/o results as above, CTA limited by body habitus. Physical therapy/OT recommended Acute Rehab. Fluoxetine 20 mg once a day started for improved motor recovery for 3 months with a plan to taper off afterwards - risks versus benefits and adverse reactions associated with medication use were discussed with patient and available family member at bedside in detail.    ANTITHROMBOTIC THERAPY:  Was on dual antiplatelet therapy with aspirin and clopidogrel for aggressive secondary stroke prevention in the setting of symptomatic intracranial atherosclerosis for 3 months followed by aspirin. Clopidogrel stopped on 07/04 for asymptomatic hemorrhagic transformation (HI-2). Restarted clopidogrel on 7/9 based on clinical and radiological stability/improvement. Check P2Y12 in 1 week (7/16/19) to ensure optimal platelet inhibition. No indications for carotid revascularization with carotid endarterectomy based on results of selective conventional cerebral angiogram    PULMONARY: CXR (06/29/19)  Clear lungs. Protecting airway, saturating well     CARDIOVASCULAR: Check TTE with bubble study, cardiac monitoring no events, Dr Luz (cardiology) eval appreciated, will obtain cardiac clearance for revascularization procedure if indicated. Gradual normotension in the setting of MCA stenosis careful initiation of home meds and will monitor for heart failure signs since patient endorses hx of chronic CHF.                             SBP goal:<160 mmHg     GASTROINTESTINAL: Dysphagia screen failed. S/S eval recommended NPO, d/w family re: alternate means of nutrition, risks/benefits/alternatives. PEG placed on 07/03. Tolerating feedings at goal. SLP re-eval as indicated.      Diet: NPO with TF via PEG     RENAL: BUN/Cr without acute change; monitor mild hyponatremia, maintain adequate hydration, good urine output, urine toxicology: Negative      Na Goal: Greater than 135     [place]: N    HEMATOLOGY: H/H without acute change, LE doppler - negative for DVT.     DVT ppx:  LMWH    ID: afebrile, no leucocytosis     OTHER: HgbA1C 10.4, continue Humalog sliding scale and DM meds as per endocrine recommendations    DISPOSITION: Acute Rehab. She is undocumented and uninsured and was denied [facility]. Plan to optimize therapy while in hospital.     CORE MEASURES:        Admission NIHSS: 13     TPA:  YES       LDL/HDL: 131/49     Depression Screen: 0     Statin Therapy: Y     Dysphagia Screen:  FAIL     Smoking  NO      Afib  NO     Stroke Education  YES    Obtain screening ultrasound in patients who meet 1 or more of the following criteria as patient is high risk for DVT/PE upon admission:   [] History of DVT/PE  []Hypercoagulable states (Factor V Leiden, Cancer, OCP, etc. )  [X]Prolonged immobility (hemiplegia/hemiparesis/post operative or any other extended immobilization)   [] Transferred from outside facility (Rehab or Long term care)  [] Age </= to 50

## 2019-07-11 NOTE — PROGRESS NOTE ADULT - SUBJECTIVE AND OBJECTIVE BOX
INTERVAL HPI/OVERNIGHT EVENTS:    peg feeds on hold for angiogram today  tolerating feeds as per RN  + having bms    MEDICATIONS  (STANDING):  aspirin  chewable 81 milliGRAM(s) Oral daily  atorvastatin 80 milliGRAM(s) Oral at bedtime  clopidogrel Tablet 75 milliGRAM(s) Oral daily  dextrose 5%. 1000 milliLiter(s) (50 mL/Hr) IV Continuous <Continuous>  dextrose 50% Injectable 12.5 Gram(s) IV Push once  dextrose 50% Injectable 25 Gram(s) IV Push once  dextrose 50% Injectable 25 Gram(s) IV Push once  enoxaparin Injectable 40 milliGRAM(s) SubCutaneous daily  FLUoxetine Solution 20 milliGRAM(s) Oral daily  insulin glargine Injectable (LANTUS) 36 Unit(s) SubCutaneous at bedtime  insulin lispro (HumaLOG) corrective regimen sliding scale   SubCutaneous every 8 hours  levETIRAcetam  Solution 500 milliGRAM(s) Oral two times a day  lisinopril 2.5 milliGRAM(s) Oral daily  nystatin    Suspension 063438 Unit(s) Oral two times a day  polyethylene glycol 3350 17 Gram(s) Oral daily    MEDICATIONS  (PRN):  acetaminophen    Suspension .. 650 milliGRAM(s) Oral every 6 hours PRN Temp greater or equal to 38C (100.4F)  acetaminophen    Suspension .. 650 milliGRAM(s) Enteral Tube every 6 hours PRN Moderate Pain (4 - 6)  bisacodyl Suppository 10 milliGRAM(s) Rectal daily PRN Constipation  dextrose 40% Gel 15 Gram(s) Oral once PRN Blood Glucose LESS THAN 70 milliGRAM(s)/deciliter  glucagon  Injectable 1 milliGRAM(s) IntraMuscular once PRN Glucose LESS THAN 70 milligrams/deciliter  nystatin Powder 1 Application(s) Topical two times a day PRN dermatitis      Allergies    No Known Allergies    Intolerances        Review of Systems:    General:  No wt loss, fevers, chills, night sweats,fatigue,   Eyes:  Good vision, no reported pain  ENT:  No sore throat, pain, runny nose, dysphagia  CV:  No pain, palpitations, hypo/hypertension  Resp:  No dyspnea, cough, tachypnea, wheezing  GI:  No pain, No nausea, No vomiting, No diarrhea, No constipation, No weight loss, No fever, No pruritis, No rectal bleeding, No melena, No dysphagia  :  No pain, bleeding, incontinence, nocturia  Muscle:  No pain, weakness  Neuro:  No weakness, tingling, memory problems  Psych:  No fatigue, insomnia, mood problems, depression  Endocrine:  No polyuria, polydypsia, cold/heat intolerance  Heme:  No petechiae, ecchymosis, easy bruisability  Skin:  No rash, tattoos, scars, edema      Vital Signs Last 24 Hrs  T(C): 36.7 (11 Jul 2019 07:37), Max: 37.4 (10 Jul 2019 20:00)  T(F): 98 (11 Jul 2019 07:37), Max: 99.4 (10 Jul 2019 20:00)  HR: 81 (11 Jul 2019 07:37) (78 - 82)  BP: 156/79 (11 Jul 2019 07:37) (134/83 - 179/98)  BP(mean): --  RR: 18 (11 Jul 2019 07:37) (18 - 20)  SpO2: 97% (11 Jul 2019 07:37) (95% - 98%)    PHYSICAL EXAM:    GENERAL:  Appears stated age, well-groomed, well-nourished, no distress  HEENT:  NC/AT,  conjunctivae clear and pink, no thyromegaly, nodules, adenopathy, no JVD, sclera -anicteric  CHEST:  Full & symmetric excursion, no increased effort, breath sounds clear  HEART:  Regular rhythm, S1, S2, no murmur/rub/S3/S4, no abdominal bruit, no edema  ABDOMEN:  Soft, non-tender, non-distended, normoactive bowel sounds,  + PEG c/d/i  EXTEREMITIES:  no cyanosis, clubbing or edema  SKIN:  No rash/erythema/ecchymoses/petechiae/wounds/abscess/warm/dry  NEURO: opens to voice and light touch, drowsy but easily arousable, eyelid opening apraxia, oriented to name, place and year, fluent speech      LABS:                        14.1   9.5   )-----------( 390      ( 11 Jul 2019 05:51 )             41.7     07-11    134<L>  |  97  |  21  ----------------------------<  156<H>  4.1   |  26  |  0.87    Ca    9.2      11 Jul 2019 05:51            RADIOLOGY & ADDITIONAL TESTS:

## 2019-07-11 NOTE — PROGRESS NOTE ADULT - ASSESSMENT
54 year old female w/uncontrolled T2DM (HbA1c 10.4%) w/ retinopathy here w/CVA. Pt now transitioned to bolus feeds q8h. Was NPO this AM but received feeds last night and this afternoon. Tolerating well w/BG values at goal on present insulin regimen. Pt may require short acting insulin but will continue to observe on present insulin regimen for now as BG values have remained stable. BG goal (100-180mg/dl).

## 2019-07-11 NOTE — CHART NOTE - NSCHARTNOTEFT_GEN_A_CORE
Interventional Neuro- Radiology   Procedure Note PA-C    Procedure: Selective Cerebral Angiography   Pre- Procedure Diagnosis:  Post- Procedure Diagnosis:    : Dr. Imtiaz Mueller  Physician Assistant: Brandy Varela PA-C    Nurse:                    Keyonna Castaneda RN  Radiologic Tech:    Yasmany Rushing LRT  Anesthesiologist:    Dr Dandy Forde   Sheath:                  4 Urdu short sheath     I/Os: EBL less than 10cc  IV fluids:     cc     Urine output     cc        Contrast Omnipaque 240      cc             Vitals: BP         HR      Spo2 100%    Preliminary Report:  Using a 4 Urdu sheath to the right groin under MAC sedation via left vertebral artery,  left common carotid artery, left external carotid artery, right vertebral artery,  right common carotid artery, right external carotid artery  a selective cerebral angiography was performed and demonstrated   Official note to follow   Patient tolerated procedure well, hemodynamically stable, no change in neurological status compared to baseline. Results discussed with neurosurgery, patient and patient's  family. Right groin sheath was removed, manual compression held to hemostasis for 20 minutes, no active bleeding, no hematoma, quick clot and safeguard balloon dressing applied at Interventional Neuro- Radiology   Procedure Note PA-C    Procedure: Selective Cerebral Angiography   Pre- Procedure Diagnosis: right ENRIQUE and MCA infarct   Post- Procedure Diagnosis:    : Dr. Imtiaz Mueller  Physician Assistant: Brandy Varela PA-C    Nurse:                    Keyonna Castaneda RN  Radiologic Tech:    Yasmany Rushing LRT  Anesthesiologist:    Dr Dandy Forde   Sheath:                  4 Russian short sheath     I/Os: EBL less than 10cc  IV fluids:     cc     Urine output     cc   Contrast Omnipaque 240      cc             Vitals: BP         HR      Spo2 100%    Preliminary Report:  Using a 4 Russian sheath to the right groin under MAC sedation via left vertebral artery, left common carotid artery, left external carotid artery, right vertebral artery, right common carotid artery, right external carotid artery a selective cerebral angiography was performed and demonstrated   Official note to follow   Patient tolerated procedure well, hemodynamically stable, no change in neurological status compared to baseline. Results discussed with neurosurgery, patient and patient's  family. Right groin sheath was removed, manual compression held to hemostasis for 20 minutes, no active bleeding, no hematoma, quick clot and safeguard balloon dressing applied at Interventional Neuro- Radiology   Procedure Note PA-C    Procedure: Selective Cerebral Angiography   Pre- Procedure Diagnosis: right ENRIQUE and MCA infarct   Post- Procedure Diagnosis: severe right MCA M1 segment stenosis     : Dr. Imtiaz Mueller  Physician Assistant: Brandy Varela PA-C    Nurse:                    Keyonna Castaneda RN  Radiologic Tech:    Yasmany Rushing LRT  Anesthesiologist:    Dr Dandy Forde   Sheath:                   4 Portuguese short sheath     I/Os: EBL less than 10cc  IV fluids:100cc  Urine output due to void Contrast Omnipaque 240 64cc         Vitals: /90         HR 88     Spo2 100%    Preliminary Report:  Using a 4 Portuguese sheath to the right groin under MAC sedation via right common carotid artery, a selective cerebral angiography was performed and demonstrated a mild right carotid stenosis and severe right MCA M1 segment stenosis. Official note to follow.   Patient tolerated procedure well, hemodynamically stable, no change in neurological status compared to baseline. Results discussed with patient and patient's daughter. Right groin sheath was removed, manual compression held to hemostasis for 20 minutes, no active bleeding, no hematoma, quick clot and safeguard balloon dressing applied at 10am. Interventional Neuro- Radiology   Procedure Note PA-C    Procedure: Selective Cerebral Angiography   Pre- Procedure Diagnosis: right ENRIQUE and MCA infarct   Post- Procedure Diagnosis: severe right MCA M1 segment stenosis     : Dr. Imtiaz Mueller  Physician Assistant: Brandy Varela PA-C    Nurse:                    Keyonna Castaneda RN  Radiologic Tech:    Yasmany Rushing LRT  Anesthesiologist:    Dr Dandy Forde   Sheath:                   4 Swedish short sheath     I/Os: EBL less than 10cc  IV fluids:100cc  Urine output due to void Contrast Omnipaque 240 64cc       Vitals: /90         HR 88     Spo2 100%    Preliminary Report:  Using a 4 Swedish sheath to the right groin under MAC sedation via right common carotid artery, a selective cerebral angiography was performed and demonstrated a mild right carotid stenosis and severe right MCA M1 segment stenosis. Official note to follow.   Patient tolerated procedure well, hemodynamically stable, no change in neurological status compared to baseline. Results discussed with patient and patient's daughter. Right groin sheath was removed, manual compression held to hemostasis for 20 minutes, no active bleeding, no hematoma, quick clot and safeguard balloon dressing applied at 10am.Disposition recovery room and then 4 [facility].

## 2019-07-11 NOTE — CHART NOTE - NSCHARTNOTEFT_GEN_A_CORE
Interventional Neuro Radiology  Pre-Procedure Note PACRISTINA      This is a  54 year old right hand dominant female w/ PMHx of HTN, HLD, DM transferred from [facility] for evaluation of stroke. Per daughter at bedside, last known normal 2pm, patient had sudden onset left sided paralysis, slurred speech, received TPA at at [place] at 5;30PM, completed at 6PM. NIHSS 13, MRS 1 (28 Jun 2019 19:57)      Allergies: No Known Allergies  PMHX: diabetes mellitus type 2, hyperlipidemia, hypertension  PSHX:   Social History:   FAMILY HISTORY:      Current Medications: acetaminophen    Suspension .. 650 milliGRAM(s) Oral every 6 hours PRN  acetaminophen    Suspension .. 650 milliGRAM(s) Enteral Tube every 6 hours PRN  aspirin  chewable 81 milliGRAM(s) Oral daily  atorvastatin 80 milliGRAM(s) Oral at bedtime  bisacodyl Suppository 10 milliGRAM(s) Rectal daily PRN  clopidogrel Tablet 75 milliGRAM(s) Oral daily  dextrose 40% Gel 15 Gram(s) Oral once PRN  dextrose 5%. 1000 milliLiter(s) IV Continuous <Continuous>  dextrose 50% Injectable 12.5 Gram(s) IV Push once  dextrose 50% Injectable 25 Gram(s) IV Push once  dextrose 50% Injectable 25 Gram(s) IV Push once  enoxaparin Injectable 40 milliGRAM(s) SubCutaneous daily  FLUoxetine Solution 20 milliGRAM(s) Oral daily  glucagon  Injectable 1 milliGRAM(s) IntraMuscular once PRN  insulin glargine Injectable (LANTUS) 36 Unit(s) SubCutaneous at bedtime  insulin lispro (HumaLOG) corrective regimen sliding scale   SubCutaneous every 8 hours  levETIRAcetam  Solution 500 milliGRAM(s) Oral two times a day  lisinopril 2.5 milliGRAM(s) Oral daily  nystatin    Suspension 443859 Unit(s) Oral two times a day  nystatin Powder 1 Application(s) Topical two times a day PRN  polyethylene glycol 3350 17 Gram(s) Oral daily    CBC                        14.1   9.5   )-----------( 390      ( 11 Jul 2019 05:51 )             41.7       BMP    134<L>  |  97  |  21  ----------------------------<  156  4.1   |  26  |  0.87      Blood Bank:       Assessment/Plan:   This is a 54 year old right hand dominant female presents with   Procedure, goals, risks, benefits and alternatives were discussed with patient and patient's family.  All questions were answered. Risks include but are not limited to stroke, vessel injury, hemorrhage, and or right  groin hematoma.  Patient demonstrates understanding of all risks involved with this procedure and wishes to continue. Appropriate content was obtained from patient and consent is in the patient's chart. Interventional Neuro Radiology  Pre-Procedure Note PA-C    This is a  54 year old right hand dominant female w/ PMHx of HTN, HLD, DM transferred from [facility] for evaluation of stroke. Last known normal 2pm, patient had sudden onset left sided paralysis, slurred speech, received TPA at at [place] at 5;30PM, completed at 6PM. NIHSS 13, MRS. Patient is transported to Neuro IR for a selective cerebral angiography.    Upon exam patient is awake, eyes open, awake and alert, oriented to name, place and year, follows simple commands, no nystagmus, moderate to severe dysarthria     Allergies: No Known Allergies  PMHX: diabetes mellitus type 2, hyperlipidemia, hypertension  PSHX:   Social History:   FAMILY HISTORY:      Current Medications: acetaminophen    Suspension .. 650 milliGRAM(s) Oral every 6 hours PRN  acetaminophen    Suspension .. 650 milliGRAM(s) Enteral Tube every 6 hours PRN  aspirin  chewable 81 milliGRAM(s) Oral daily  atorvastatin 80 milliGRAM(s) Oral at bedtime  bisacodyl Suppository 10 milliGRAM(s) Rectal daily PRN  clopidogrel Tablet 75 milliGRAM(s) Oral daily  dextrose 40% Gel 15 Gram(s) Oral once PRN  dextrose 5%. 1000 milliLiter(s) IV Continuous <Continuous>  dextrose 50% Injectable 12.5 Gram(s) IV Push once  dextrose 50% Injectable 25 Gram(s) IV Push once  dextrose 50% Injectable 25 Gram(s) IV Push once  enoxaparin Injectable 40 milliGRAM(s) SubCutaneous daily  FLUoxetine Solution 20 milliGRAM(s) Oral daily  glucagon  Injectable 1 milliGRAM(s) IntraMuscular once PRN  insulin glargine Injectable (LANTUS) 36 Unit(s) SubCutaneous at bedtime  insulin lispro (HumaLOG) corrective regimen sliding scale   SubCutaneous every 8 hours  levETIRAcetam  Solution 500 milliGRAM(s) Oral two times a day  lisinopril 2.5 milliGRAM(s) Oral daily  nystatin    Suspension 414653 Unit(s) Oral two times a day  nystatin Powder 1 Application(s) Topical two times a day PRN  polyethylene glycol 3350 17 Gram(s) Oral daily    CBC                        14.1   9.5   )-----------( 390      ( 11 Jul 2019 05:51 )             41.7       BMP    134<L>  |  97  |  21  ----------------------------<  156  4.1   |  26  |  0.87      Blood Bank:       Assessment/Plan:   This is a 54 year old right hand dominant female presents with   Procedure, goals, risks, benefits and alternatives were discussed with patient and patient's family.  All questions were answered. Risks include but are not limited to stroke, vessel injury, hemorrhage, and or right  groin hematoma. Patient demonstrates understanding of all risks involved with this procedure and wishes to continue. Appropriate content was obtained from patient and consent is in the patient's chart. Interventional Neuro Radiology  Pre-Procedure Note PA-C    This is a  54 year old right hand dominant female w/ PMHx of HTN, HLD, DM transferred from [facility] for evaluation of stroke. Last known normal 2pm, patient had sudden onset left sided paralysis, slurred speech, received TPA at at [place] at 5;30PM, completed at 6PM. NIHSS 13, MRS. Patient is transported to Neuro IR for a selective cerebral angiography.    Upon exam patient is awake, eyes open, awake and aphasic, follows simple commands     Allergies: No Known Allergies  PMHX: diabetes mellitus type 2, hyperlipidemia, hypertension  PSHX:   Social History: non-smoker   FAMILY HISTORY: non-contributory       Current Medications: acetaminophen    Suspension .. 650 milliGRAM(s) Oral every 6 hours PRN  acetaminophen    Suspension .. 650 milliGRAM(s) Enteral Tube every 6 hours PRN  aspirin  chewable 81 milliGRAM(s) Oral daily  atorvastatin 80 milliGRAM(s) Oral at bedtime  bisacodyl Suppository 10 milliGRAM(s) Rectal daily PRN  clopidogrel Tablet 75 milliGRAM(s) Oral daily  dextrose 40% Gel 15 Gram(s) Oral once PRN  dextrose 5%. 1000 milliLiter(s) IV Continuous   dextrose 50% Injectable 12.5 Gram(s) IV Push once  dextrose 50% Injectable 25 Gram(s) IV Push once  dextrose 50% Injectable 25 Gram(s) IV Push once  enoxaparin Injectable 40 milliGRAM(s) SubCutaneous daily  FLUoxetine Solution 20 milliGRAM(s) Oral daily  glucagon  Injectable 1 milliGRAM(s) IntraMuscular once PRN  insulin glargine Injectable (LANTUS) 36 Unit(s) SubCutaneous at bedtime  insulin lispro (HumaLOG) corrective regimen sliding scale   SubCutaneous every 8 hours  levETIRAcetam  Solution 500 milliGRAM(s) Oral two times a day  lisinopril 2.5 milliGRAM(s) Oral daily  nystatin    Suspension 062661 Unit(s) Oral two times a day  nystatin Powder 1 Application(s) Topical two times a day PRN  polyethylene glycol 3350 17 Gram(s) Oral daily    CBC                        14.1   9.5   )-----------( 390      ( 11 Jul 2019 05:51 )             41.7       BMP    134<L>  |  97  |  21  ----------------------------<  156  4.1   |  26  |  0.87      Blood Bank: pending     Assessment/Plan:   This is a 54 year old right hand dominant female presents with right ENRIQUE and MCA infarct, patient is transported to Neuro IR for a selective cerebral angiography. Procedure, goals, risks, benefits and alternatives were discussed with patient's daughter and health care proxy Mrs Lagunas. All questions were answered. Risks include but are not limited to stroke, vessel injury, hemorrhage, and or right groin hematoma. Patient demonstrates understanding of all risks involved with this procedure and wishes to continue. Appropriate content was obtained from patient and consent is in the patient's chart. Interventional Neuro Radiology  Pre-Procedure Note PA-C    This is a  54 year old right hand dominant female w/ PMHx of HTN, HLD, DM transferred from [facility] for evaluation of stroke. Last known normal 2pm, patient had sudden onset left sided paralysis, slurred speech, received TPA at at [place] at 5;30PM, completed at 6PM. NIHSS 13, MRS. Patient is transported to Neuro IR for a selective cerebral angiography.    Upon exam patient is awake, eyes open, awake and aphasic, left eye chronic vision loss, right eye reactive to light, mild left facial droop, follows simple commands.     Allergies: No Known Allergies  PMHX: diabetes mellitus type 2, hyperlipidemia, hypertension  PSHX: family denies   Social History: non-smoker   FAMILY HISTORY: non-contributory     Current Medications: acetaminophen    Suspension .. 650 milliGRAM(s) Oral every 6 hours PRN  acetaminophen    Suspension .. 650 milliGRAM(s) Enteral Tube every 6 hours PRN  aspirin  chewable 81 milliGRAM(s) Oral daily  atorvastatin 80 milliGRAM(s) Oral at bedtime  bisacodyl Suppository 10 milliGRAM(s) Rectal daily PRN  clopidogrel Tablet 75 milliGRAM(s) Oral daily  dextrose 40% Gel 15 Gram(s) Oral once PRN  dextrose 5%. 1000 milliLiter(s) IV Continuous   dextrose 50% Injectable 12.5 Gram(s) IV Push once  dextrose 50% Injectable 25 Gram(s) IV Push once  dextrose 50% Injectable 25 Gram(s) IV Push once  enoxaparin Injectable 40 milliGRAM(s) SubCutaneous daily  FLUoxetine Solution 20 milliGRAM(s) Oral daily  glucagon  Injectable 1 milliGRAM(s) IntraMuscular once PRN  insulin glargine Injectable (LANTUS) 36 Unit(s) SubCutaneous at bedtime  insulin lispro (HumaLOG) corrective regimen sliding scale   SubCutaneous every 8 hours  levETIRAcetam  Solution 500 milliGRAM(s) Oral two times a day  lisinopril 2.5 milliGRAM(s) Oral daily  nystatin    Suspension 573535 Unit(s) Oral two times a day  nystatin Powder 1 Application(s) Topical two times a day PRN  polyethylene glycol 3350 17 Gram(s) Oral daily    CBC                        14.1   9.5   )-----------( 390      ( 11 Jul 2019 05:51 )             41.7       BMP    134<L>  |  97  |  21  ----------------------------<  156  4.1   |  26  |  0.87      Blood Bank: O positive available     Assessment/Plan:   This is a 54 year old right hand dominant female presents with right ENRIQUE and MCA infarct, patient is transported to Neuro IR for a selective cerebral angiography. Procedure, goals, risks, benefits and alternatives were discussed with patient's daughter and health care proxy Mrs Lagunas. All questions were answered. Risks include but are not limited to stroke, vessel injury, hemorrhage, and or right groin hematoma. Patient demonstrates understanding of all risks involved with this procedure and wishes to continue. Appropriate content was obtained from patient and consent is in the patient's chart.

## 2019-07-11 NOTE — PROGRESS NOTE ADULT - SUBJECTIVE AND OBJECTIVE BOX
Diabetes Follow up note:  Interval Hx:  54 year old female w/uncontrolled T2DM (on insulin PTA) w/retinopathy, morbid obesity here w/CVA s/p PEG on tube feeds. Pt s/p angiography this AM. BG values 100s on present insulin regimen. Pt denies pain. Tolerating bolus feeds w/out complaints.     Review of Systems:  General: no complaints  GI: Tolerating bolus feeds without any N/V/D/ABD PAIN.  CV: No CP/SOB  ENDO: No S&Sx of hypoglycemia  MEDS:  atorvastatin 80 milliGRAM(s) Oral at bedtime    insulin glargine Injectable (LANTUS) 36 Unit(s) SubCutaneous at bedtime  insulin lispro (HumaLOG) corrective regimen sliding scale   SubCutaneous every 8 hours    nystatin    Suspension 771369 Unit(s) Oral two times a day    Allergies    No Known Allergies      PE:  General: Female lying in bed. NAD.   Vital Signs Last 24 Hrs  T(C): 37.2 (11 Jul 2019 15:03), Max: 37.4 (10 Jul 2019 20:00)  T(F): 99 (11 Jul 2019 15:03), Max: 99.4 (10 Jul 2019 20:00)  HR: 82 (11 Jul 2019 15:03) (78 - 82)  BP: 139/87 (11 Jul 2019 15:03) (134/83 - 179/98)  BP(mean): --  RR: 18 (11 Jul 2019 15:03) (18 - 18)  SpO2: 96% (11 Jul 2019 15:03) (96% - 98%)  Abd: Soft, NT,ND, Obese.   Extremities: Warm. + LE edema.   Neuro: Awake. Nods appropriately to questions. L sided hemiplegia.     LABS:    POCT Blood Glucose.: 127 mg/dL (07-11-19 @ 14:13)  POCT Blood Glucose.: 129 mg/dL (07-11-19 @ 08:43)  POCT Blood Glucose.: 144 mg/dL (07-11-19 @ 05:18)  POCT Blood Glucose.: 190 mg/dL (07-11-19 @ 01:39)  POCT Blood Glucose.: 131 mg/dL (07-10-19 @ 23:22)  POCT Blood Glucose.: 145 mg/dL (07-10-19 @ 21:43)  POCT Blood Glucose.: 169 mg/dL (07-10-19 @ 12:35)  POCT Blood Glucose.: 176 mg/dL (07-10-19 @ 06:18)  POCT Blood Glucose.: 157 mg/dL (07-10-19 @ 01:23)  POCT Blood Glucose.: 167 mg/dL (07-09-19 @ 17:24)  POCT Blood Glucose.: 199 mg/dL (07-09-19 @ 12:32)  POCT Blood Glucose.: 185 mg/dL (07-09-19 @ 06:01)  POCT Blood Glucose.: 179 mg/dL (07-09-19 @ 00:39)  POCT Blood Glucose.: 203 mg/dL (07-08-19 @ 17:14)                            13.0   8.4   )-----------( 351      ( 11 Jul 2019 14:04 )             38.2       07-11    136  |  98  |  18  ----------------------------<  150<H>  4.5   |  28  |  0.85    Ca    9.0      11 Jul 2019 14:04          Hemoglobin A1C, Whole Blood: 10.4 % <H> [4.0 - 5.6] (06-29-19 @ 09:33)            Contact number: [place] [phone] or [phone]

## 2019-07-11 NOTE — PROGRESS NOTE ADULT - SUBJECTIVE AND OBJECTIVE BOX
THE PATIENT WAS SEEN AND EXAMINED BY ME WITH THE HOUSESTAFF AND STROKE TEAM DURING MORNING ROUNDS.     HPI:  Patient is a 54 year old Female w/ PMHx of HTN, HLD, DM transferred from [facility] for evaluation of stroke. Per daughter at bedside, last known normal 2pm on day of admission, patient had sudden onset left sided paralysis, slurred speech, received TPA at at [place]. NIHSS 13, MRS 1    SUBJECTIVE: No events overnight.  No new neurologic complaints.      ROS: All negative except documented above.    acetaminophen    Suspension .. 650 milliGRAM(s) Oral every 6 hours PRN  acetaminophen    Suspension .. 650 milliGRAM(s) Enteral Tube every 6 hours PRN  aspirin  chewable 81 milliGRAM(s) Oral daily  atorvastatin 80 milliGRAM(s) Oral at bedtime  bisacodyl Suppository 10 milliGRAM(s) Rectal daily PRN  clopidogrel Tablet 75 milliGRAM(s) Oral daily  dextrose 40% Gel 15 Gram(s) Oral once PRN  dextrose 5%. 1000 milliLiter(s) IV Continuous <Continuous>  dextrose 50% Injectable 12.5 Gram(s) IV Push once  dextrose 50% Injectable 25 Gram(s) IV Push once  dextrose 50% Injectable 25 Gram(s) IV Push once  enoxaparin Injectable 40 milliGRAM(s) SubCutaneous daily  FLUoxetine Solution 20 milliGRAM(s) Oral daily  glucagon  Injectable 1 milliGRAM(s) IntraMuscular once PRN  insulin glargine Injectable (LANTUS) 36 Unit(s) SubCutaneous at bedtime  insulin lispro (HumaLOG) corrective regimen sliding scale   SubCutaneous every 8 hours  levETIRAcetam  Solution 500 milliGRAM(s) Oral two times a day  lisinopril 2.5 milliGRAM(s) Oral daily  nystatin    Suspension 066305 Unit(s) Oral two times a day  nystatin Powder 1 Application(s) Topical two times a day PRN  polyethylene glycol 3350 17 Gram(s) Oral daily    PHYSICAL EXAM:   Vital Signs Last 24 Hrs  T(C): 37.1 (11 Jul 2019 04:40), Max: 37.4 (10 Jul 2019 20:00)  T(F): 98.8 (11 Jul 2019 04:40), Max: 99.4 (10 Jul 2019 20:00)  HR: 78 (11 Jul 2019 04:40) (75 - 82)  BP: 179/98 (11 Jul 2019 04:40) (106/77 - 179/98)  RR: 18 (11 Jul 2019 04:40) (18 - 21)  SpO2: 98% (11 Jul 2019 04:40) (95% - 98%)    General: No acute distress  HEENT: EOM intact, left eye legally blind  Abdomen: Soft, nontender, nondistended   Extremities: No edema    NEUROLOGICAL EXAM:  Mental status: Eyes open, awake, alert, oriented to name, place and year, fluent speech, repetition intact, No asomatognosia + anosognosia, follows simple commands, able to calculate    Cranial Nerves: UMN type left facial droop, no nystagmus, moderate to severe dysarthria, LHHA by blink to threat   Motor exam: RUE trace flexion /RLE spontaneous movement against gravity without significant noticeable weakness, Left hypotonic severe hemiparesis LUE 1/5 proximally  Sensation: Left side appears intact to noxious stimuli, left side neglect to double simultaneous stimulation   Coordination/ Gait: No dysmetria on right side     LABS:                        14.1   9.5   )-----------( 390      ( 11 Jul 2019 05:51 )             41.7    07-11    134<L>  |  97  |  21  ----------------------------<  156<H>  4.1   |  26  |  0.87    Ca    9.2      11 Jul 2019 05:51    Hemoglobin A1C, Whole Blood: 10.4 % (06-29 @ 09:33)    IMAGING: Reviewed by me.     CT Head No Cont (07.09.19)  No significant interval change in the right MCA evolving subacute infarct   with hemorrhagic transformation, edema, mass effect and minimal rightward   shift.    CT Head No Cont (07.07.19)   Redemonstration  unchanged acute to subacute infarcts with hemorrhagic   conversion in the right cerebral hemisphere from 7/4/2019.    VA Duplex Carotid, Bilat (07.05.19)   Diffuse intimal thickening without duplex evidence of   hemodynamically significant stenosis.     CT Head No Cont (07.04.19)  Increasing lucency with new mass effect and mild midline   shift to the left involving acute right anterior cerebral artery infarct   with involvement of the right corona radiata and centrum semiovale ovale   compared with 6/29/2019. Petechial hemorrhage along the high convexity.    MRA BRAIN/NECK (07/02/19): In comparison with CTA,  atherosclerotic calcification with stenosis of the right greater than left cavernous and supraclinoid ICA segments.   Poorly delineated distal right ICA bifurcation segment,  right A1   segment, with proximal right M1 segment stenosis and poststenotic   dilatation. Stenosis with narrowing of the distal left M1 MCA segment.   There is a dominant left A1 supplying the anterior communicating artery,   there is multifocal stenosis of the distal MCA branch vasculature.    Dominant intradural right vertebral artery supplies the basilar artery,   the left vertebral artery is hypoplastic and poorly visualized in the   cervical course and intradurally with multifocal  narrowing and stenosis.   There are multifocal stenosis of the posterior cerebral arteries.    Possible bovine left common carotid artery, proximal left subclavian   artery is not well-visualized  with possible narrowing. Bilateral common   carotid arteries are patent,  right ICA origin stenosis of approximately   50%, right ICA is decreased in caliber, left ICA is dominant and patent   without ICA origin   stenosis. Vessels are tortuous likely reflecting   hypertension.      Brain MRI (06/29/19) There is evidence of abnormal stricture diffusion seen involving the right frontal parietal region. Involvement of the right basal ganglia region is seen as well. This is compatible with an area of acute infarct.   Involvement of the right anterior cerebral and middle cerebral artery territories are identified. Scattered areas of abnormal susceptibility are identified which is likely compatible with some hemorrhagic transformation. Localized mass effect is seen consisting of sulcal effacement. No significant shift or herniation is seen.    Head CT (06/30/19) Abnormal areas of low-attenuation involving the right frontal and right parietal cortical subcortical region are again seen. These findings are compatible with areas of old infarcts. Abnormal low-attenuation involving the left alla is also again seen which could be compatible with an age-indeterminate infarct. There is no evidence of abnormal low-attenuation seen involving the high right frontal cortical subcortical region. Involvement of the right basal   ganglia and anterior corona radiata regions as well. This is predominantly seen medially and compatible with an acute right anterior cerebral artery infarct. There is localized mass effect consisting of sulcal effacement. No significant shift or herniation is seen.     CTA Head/Neck (06/28/19) CT ANGIOGRAPHY NECK:  Markedly limited examination. Tandem moderate to severe stenoses are suspected in the right carotid bulb and proximal right internal carotid artery. 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    CT ANGIOGRAPHY BRAIN: Markedly limited examination. Limited evaluation of the intracranial internal carotid arteries. Stenoses or occlusion in the distal cavernous segments and ophthalmic segments of the bilateral internal carotid arteries cannot be excluded. Apparent severe stenosis in the distal supraclinoid right internal carotid artery and right carotid terminus with possible occlusion of the A1 segment of the right anterior cerebral artery.  The right A2 segment fills across the anterior to indicating artery.  There appears to be posterior distal right ENRIQUE vessels within the medial right frontal lobe, raising the possibility of early cerebral infarction.

## 2019-07-12 LAB
ANION GAP SERPL CALC-SCNC: 15 MMOL/L — SIGNIFICANT CHANGE UP (ref 5–17)
BUN SERPL-MCNC: 24 MG/DL — HIGH (ref 7–23)
CALCIUM SERPL-MCNC: 9 MG/DL — SIGNIFICANT CHANGE UP (ref 8.4–10.5)
CHLORIDE SERPL-SCNC: 99 MMOL/L — SIGNIFICANT CHANGE UP (ref 96–108)
CO2 SERPL-SCNC: 26 MMOL/L — SIGNIFICANT CHANGE UP (ref 22–31)
CREAT SERPL-MCNC: 0.92 MG/DL — SIGNIFICANT CHANGE UP (ref 0.5–1.3)
GLUCOSE BLDC GLUCOMTR-MCNC: 215 MG/DL — HIGH (ref 70–99)
GLUCOSE BLDC GLUCOMTR-MCNC: 221 MG/DL — HIGH (ref 70–99)
GLUCOSE BLDC GLUCOMTR-MCNC: 228 MG/DL — HIGH (ref 70–99)
GLUCOSE BLDC GLUCOMTR-MCNC: 228 MG/DL — HIGH (ref 70–99)
GLUCOSE BLDC GLUCOMTR-MCNC: 280 MG/DL — HIGH (ref 70–99)
GLUCOSE SERPL-MCNC: 223 MG/DL — HIGH (ref 70–99)
POTASSIUM SERPL-MCNC: 4.4 MMOL/L — SIGNIFICANT CHANGE UP (ref 3.5–5.3)
POTASSIUM SERPL-SCNC: 4.4 MMOL/L — SIGNIFICANT CHANGE UP (ref 3.5–5.3)
SODIUM SERPL-SCNC: 140 MMOL/L — SIGNIFICANT CHANGE UP (ref 135–145)

## 2019-07-12 PROCEDURE — 70450 CT HEAD/BRAIN W/O DYE: CPT | Mod: 26

## 2019-07-12 PROCEDURE — 99232 SBSQ HOSP IP/OBS MODERATE 35: CPT

## 2019-07-12 PROCEDURE — 99233 SBSQ HOSP IP/OBS HIGH 50: CPT

## 2019-07-12 PROCEDURE — 71045 X-RAY EXAM CHEST 1 VIEW: CPT | Mod: 26

## 2019-07-12 RX ORDER — CARVEDILOL PHOSPHATE 80 MG/1
3.12 CAPSULE, EXTENDED RELEASE ORAL EVERY 12 HOURS
Refills: 0 | Status: DISCONTINUED | OUTPATIENT
Start: 2019-07-12 | End: 2019-07-12

## 2019-07-12 RX ORDER — CARVEDILOL PHOSPHATE 80 MG/1
3.12 CAPSULE, EXTENDED RELEASE ORAL
Refills: 0 | Status: DISCONTINUED | OUTPATIENT
Start: 2019-07-12 | End: 2019-07-15

## 2019-07-12 RX ORDER — INSULIN LISPRO 100/ML
5 VIAL (ML) SUBCUTANEOUS EVERY 8 HOURS
Refills: 0 | Status: DISCONTINUED | OUTPATIENT
Start: 2019-07-12 | End: 2019-07-13

## 2019-07-12 RX ADMIN — Medication 500000 UNIT(S): at 06:01

## 2019-07-12 RX ADMIN — LISINOPRIL 10 MILLIGRAM(S): 2.5 TABLET ORAL at 06:01

## 2019-07-12 RX ADMIN — Medication 4: at 21:24

## 2019-07-12 RX ADMIN — LEVETIRACETAM 500 MILLIGRAM(S): 250 TABLET, FILM COATED ORAL at 21:24

## 2019-07-12 RX ADMIN — Medication 500000 UNIT(S): at 21:24

## 2019-07-12 RX ADMIN — Medication 81 MILLIGRAM(S): at 12:17

## 2019-07-12 RX ADMIN — Medication 5 UNIT(S): at 21:24

## 2019-07-12 RX ADMIN — INSULIN GLARGINE 36 UNIT(S): 100 INJECTION, SOLUTION SUBCUTANEOUS at 21:24

## 2019-07-12 RX ADMIN — ATORVASTATIN CALCIUM 80 MILLIGRAM(S): 80 TABLET, FILM COATED ORAL at 21:24

## 2019-07-12 RX ADMIN — CLOPIDOGREL BISULFATE 75 MILLIGRAM(S): 75 TABLET, FILM COATED ORAL at 12:18

## 2019-07-12 RX ADMIN — LISINOPRIL 2.5 MILLIGRAM(S): 2.5 TABLET ORAL at 06:01

## 2019-07-12 RX ADMIN — CARVEDILOL PHOSPHATE 3.12 MILLIGRAM(S): 80 CAPSULE, EXTENDED RELEASE ORAL at 12:40

## 2019-07-12 RX ADMIN — ENOXAPARIN SODIUM 40 MILLIGRAM(S): 100 INJECTION SUBCUTANEOUS at 12:18

## 2019-07-12 RX ADMIN — CARVEDILOL PHOSPHATE 3.12 MILLIGRAM(S): 80 CAPSULE, EXTENDED RELEASE ORAL at 21:24

## 2019-07-12 RX ADMIN — LEVETIRACETAM 500 MILLIGRAM(S): 250 TABLET, FILM COATED ORAL at 06:01

## 2019-07-12 RX ADMIN — Medication 4: at 06:01

## 2019-07-12 RX ADMIN — Medication 4: at 14:40

## 2019-07-12 RX ADMIN — Medication 20 MILLIGRAM(S): at 12:18

## 2019-07-12 NOTE — CONSULT NOTE ADULT - ASSESSMENT
· Assessment	  54 year old female admitted with acute CVA.      Problem/Plan - 1:  ·  Problem: Dysphagia.  Plan: s/p  upper gastrointestinal endoscopy 7/3 with no gross lesions in esophagus.  - Gastritis.  - No gross lesions in duodenum.  - An endoscopically removable PEG placement was successfully completed  - Please follow the post-PEG recommendations including: antibiotic ointment   - feeds started, monitor residuals  - aspiration precautions.      Problem/Plan - 2:  ·  Problem: CVA (cerebral vascular accident).  Plan: - Right MCA and ENRIQUE distribution stroke   - DAPT   -Neuro f/up noted.    Morbid obesity:  PT

## 2019-07-12 NOTE — PROGRESS NOTE ADULT - SUBJECTIVE AND OBJECTIVE BOX
THE PATIENT WAS SEEN AND EXAMINED BY ME WITH THE HOUSESTAFF AND STROKE TEAM DURING MORNING ROUNDS.   HPI:  Patient is a 54 year old Female w/ PMHx of HTN, HLD, DM transferred from [facility] for evaluation of stroke. Per daughter at bedside, last known normal 2pm on day of admission, patient had sudden onset left sided paralysis, slurred speech, received TPA at at [place]. NIHSS 13, MRS 1    SUBJECTIVE: No events overnight.  No new neurologic complaints.      acetaminophen    Suspension .. 650 milliGRAM(s) Oral every 6 hours PRN  acetaminophen    Suspension .. 650 milliGRAM(s) Enteral Tube every 6 hours PRN  aspirin  chewable 81 milliGRAM(s) Oral daily  atorvastatin 80 milliGRAM(s) Oral at bedtime  bisacodyl Suppository 10 milliGRAM(s) Rectal daily PRN  clopidogrel Tablet 75 milliGRAM(s) Oral daily  dextrose 40% Gel 15 Gram(s) Oral once PRN  dextrose 5%. 1000 milliLiter(s) IV Continuous <Continuous>  dextrose 50% Injectable 12.5 Gram(s) IV Push once  dextrose 50% Injectable 25 Gram(s) IV Push once  dextrose 50% Injectable 25 Gram(s) IV Push once  enoxaparin Injectable 40 milliGRAM(s) SubCutaneous daily  FLUoxetine Solution 20 milliGRAM(s) Oral daily  glucagon  Injectable 1 milliGRAM(s) IntraMuscular once PRN  insulin glargine Injectable (LANTUS) 36 Unit(s) SubCutaneous at bedtime  insulin lispro (HumaLOG) corrective regimen sliding scale   SubCutaneous every 8 hours  levETIRAcetam  Solution 500 milliGRAM(s) Oral two times a day  lisinopril 2.5 milliGRAM(s) Oral daily  lisinopril 10 milliGRAM(s) Oral daily  nystatin    Suspension 494169 Unit(s) Oral two times a day  nystatin Powder 1 Application(s) Topical two times a day PRN  polyethylene glycol 3350 17 Gram(s) Oral daily      PHYSICAL EXAM:   Vital Signs Last 24 Hrs  T(C): 36.7 (12 Jul 2019 05:10), Max: 37.3 (11 Jul 2019 19:47)  T(F): 98.1 (12 Jul 2019 05:10), Max: 99.1 (11 Jul 2019 19:47)  HR: 90 (12 Jul 2019 05:10) (79 - 90)  BP: 141/84 (12 Jul 2019 05:10) (139/87 - 160/82)  RR: 18 (12 Jul 2019 05:10) (18 - 18)  SpO2: 95% (12 Jul 2019 05:10) (95% - 96%)    General: No acute distress  HEENT: EOM intact, left eye legally blind  Abdomen: Soft, nontender, nondistended   Extremities: No edema    NEUROLOGICAL EXAM:  Mental status: Eyes open, awake, alert, oriented to name, place and year, fluent speech, repetition intact, No asomatognosia + anosognosia, follows simple commands, able to calculate    Cranial Nerves: UMN type left facial droop, no nystagmus, moderate to severe dysarthria, LHHA by blink to threat   Motor exam: RUE trace flexion /RLE spontaneous movement against gravity without significant noticeable weakness, Left hypotonic severe hemiparesis LUE 1/5 proximally  Sensation: Left side appears intact to noxious stimuli, left side neglect to double simultaneous stimulation   Coordination/ Gait: No dysmetria on right side       LABS:                        13.0   8.4   )-----------( 351      ( 11 Jul 2019 14:04 )             38.2    07-11    136  |  98  |  18  ----------------------------<  150<H>  4.5   |  28  |  0.85    Ca    9.0      11 Jul 2019 14:04      Hemoglobin A1C, Whole Blood: 10.4 % (06-29 @ 09:33)      IMAGING: Reviewed by me.     CT Head No Cont (07.09.19)  No significant interval change in the right MCA evolving subacute infarct   with hemorrhagic transformation, edema, mass effect and minimal rightward   shift.    CT Head No Cont (07.07.19)   Redemonstration  unchanged acute to subacute infarcts with hemorrhagic   conversion in the right cerebral hemisphere from 7/4/2019.    VA Duplex Carotid, Bilat (07.05.19)   Diffuse intimal thickening without duplex evidence of   hemodynamically significant stenosis.     CT Head No Cont (07.04.19)  Increasing lucency with new mass effect and mild midline   shift to the left involving acute right anterior cerebral artery infarct   with involvement of the right corona radiata and centrum semiovale ovale   compared with 6/29/2019. Petechial hemorrhage along the high convexity.    MRA BRAIN/NECK (07/02/19): In comparison with CTA,  atherosclerotic calcification with stenosis of the right greater than left cavernous and supraclinoid ICA segments.   Poorly delineated distal right ICA bifurcation segment,  right A1   segment, with proximal right M1 segment stenosis and poststenotic   dilatation. Stenosis with narrowing of the distal left M1 MCA segment.   There is a dominant left A1 supplying the anterior communicating artery,   there is multifocal stenosis of the distal MCA branch vasculature.    Dominant intradural right vertebral artery supplies the basilar artery,   the left vertebral artery is hypoplastic and poorly visualized in the   cervical course and intradurally with multifocal  narrowing and stenosis.   There are multifocal stenosis of the posterior cerebral arteries.    Possible bovine left common carotid artery, proximal left subclavian   artery is not well-visualized  with possible narrowing. Bilateral common   carotid arteries are patent,  right ICA origin stenosis of approximately   50%, right ICA is decreased in caliber, left ICA is dominant and patent   without ICA origin   stenosis. Vessels are tortuous likely reflecting   hypertension.      Brain MRI (06/29/19) There is evidence of abnormal stricture diffusion seen involving the right frontal parietal region. Involvement of the right basal ganglia region is seen as well. This is compatible with an area of acute infarct.   Involvement of the right anterior cerebral and middle cerebral artery territories are identified. Scattered areas of abnormal susceptibility are identified which is likely compatible with some hemorrhagic transformation. Localized mass effect is seen consisting of sulcal effacement. No significant shift or herniation is seen.    Head CT (06/30/19) Abnormal areas of low-attenuation involving the right frontal and right parietal cortical subcortical region are again seen. These findings are compatible with areas of old infarcts. Abnormal low-attenuation involving the left alla is also again seen which could be compatible with an age-indeterminate infarct. There is no evidence of abnormal low-attenuation seen involving the high right frontal cortical subcortical region. Involvement of the right basal   ganglia and anterior corona radiata regions as well. This is predominantly seen medially and compatible with an acute right anterior cerebral artery infarct. There is localized mass effect consisting of sulcal effacement. No significant shift or herniation is seen.     CTA Head/Neck (06/28/19) CT ANGIOGRAPHY NECK:  Markedly limited examination. Tandem moderate to severe stenoses are suspected in the right carotid bulb and proximal right internal carotid artery. 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    CT ANGIOGRAPHY BRAIN: Markedly limited examination. Limited evaluation of the intracranial internal carotid arteries. Stenoses or occlusion in the distal cavernous segments and ophthalmic segments of the bilateral internal carotid arteries cannot be excluded. Apparent severe stenosis in the distal supraclinoid right internal carotid artery and right carotid terminus with possible occlusion of the A1 segment of the right anterior cerebral artery.  The right A2 segment fills across the anterior to indicating artery.  There appears to be posterior distal right ENRIQUE vessels within the medial right frontal lobe, raising the possibility of early cerebral infarction. THE PATIENT WAS SEEN AND EXAMINED BY ME WITH THE HOUSESTAFF AND STROKE TEAM DURING MORNING ROUNDS.     HPI:  Patient is a 54 year old Female w/ PMHx of HTN, HLD, DM transferred from [facility] for evaluation of stroke. Per daughter at bedside, last known normal 2pm on day of admission, patient had sudden onset left sided paralysis, slurred speech, received TPA at at [place]. NIHSS 13, MRS 1    SUBJECTIVE: No events overnight.  No new neurologic complaints.      ROS: All negative except documented above.    acetaminophen    Suspension .. 650 milliGRAM(s) Oral every 6 hours PRN  acetaminophen    Suspension .. 650 milliGRAM(s) Enteral Tube every 6 hours PRN  aspirin  chewable 81 milliGRAM(s) Oral daily  atorvastatin 80 milliGRAM(s) Oral at bedtime  bisacodyl Suppository 10 milliGRAM(s) Rectal daily PRN  clopidogrel Tablet 75 milliGRAM(s) Oral daily  dextrose 40% Gel 15 Gram(s) Oral once PRN  dextrose 5%. 1000 milliLiter(s) IV Continuous <Continuous>  dextrose 50% Injectable 12.5 Gram(s) IV Push once  dextrose 50% Injectable 25 Gram(s) IV Push once  dextrose 50% Injectable 25 Gram(s) IV Push once  enoxaparin Injectable 40 milliGRAM(s) SubCutaneous daily  FLUoxetine Solution 20 milliGRAM(s) Oral daily  glucagon  Injectable 1 milliGRAM(s) IntraMuscular once PRN  insulin glargine Injectable (LANTUS) 36 Unit(s) SubCutaneous at bedtime  insulin lispro (HumaLOG) corrective regimen sliding scale   SubCutaneous every 8 hours  levETIRAcetam  Solution 500 milliGRAM(s) Oral two times a day  lisinopril 2.5 milliGRAM(s) Oral daily  lisinopril 10 milliGRAM(s) Oral daily  nystatin    Suspension 949411 Unit(s) Oral two times a day  nystatin Powder 1 Application(s) Topical two times a day PRN  polyethylene glycol 3350 17 Gram(s) Oral daily      PHYSICAL EXAM:   Vital Signs Last 24 Hrs  T(C): 36.7 (12 Jul 2019 05:10), Max: 37.3 (11 Jul 2019 19:47)  T(F): 98.1 (12 Jul 2019 05:10), Max: 99.1 (11 Jul 2019 19:47)  HR: 90 (12 Jul 2019 05:10) (79 - 90)  BP: 141/84 (12 Jul 2019 05:10) (139/87 - 160/82)  RR: 18 (12 Jul 2019 05:10) (18 - 18)  SpO2: 95% (12 Jul 2019 05:10) (95% - 96%)    General: No acute distress  HEENT: EOM intact, left eye legally blind  Abdomen: Soft, nontender, nondistended   Extremities: No edema    NEUROLOGICAL EXAM:  Mental status: Eyes open, awake, alert, oriented to name, place and year, fluent speech, repetition intact, No asomatognosia + anosognosia, follows simple commands, able to calculate    Cranial Nerves: UMN type left facial droop, no nystagmus, moderate to severe dysarthria, LHHA by blink to threat   Motor exam: RUE and RLE spontaneous movement against gravity, Left hypotonic severe hemiparesis LUE 1/5 proximally and LLE 0/5 with trace movements of the toes   Sensation: Left side appears intact to noxious stimuli   Coordination/ Gait: No dysmetria on right side, gait not checked       LABS:                        13.0   8.4   )-----------( 351      ( 11 Jul 2019 14:04 )             38.2    07-11    136  |  98  |  18  ----------------------------<  150<H>  4.5   |  28  |  0.85    Ca    9.0      11 Jul 2019 14:04      Hemoglobin A1C, Whole Blood: 10.4 % (06-29 @ 09:33)      IMAGING: Reviewed by me.     CT Head No Cont (07.09.19)  No significant interval change in the right MCA evolving subacute infarct   with hemorrhagic transformation, edema, mass effect and minimal rightward   shift.    CT Head No Cont (07.07.19)   Redemonstration  unchanged acute to subacute infarcts with hemorrhagic   conversion in the right cerebral hemisphere from 7/4/2019.    VA Duplex Carotid, Bilat (07.05.19)   Diffuse intimal thickening without duplex evidence of   hemodynamically significant stenosis.     CT Head No Cont (07.04.19)  Increasing lucency with new mass effect and mild midline   shift to the left involving acute right anterior cerebral artery infarct   with involvement of the right corona radiata and centrum semiovale ovale   compared with 6/29/2019. Petechial hemorrhage along the high convexity.    MRA BRAIN/NECK (07/02/19): In comparison with CTA,  atherosclerotic calcification with stenosis of the right greater than left cavernous and supraclinoid ICA segments.   Poorly delineated distal right ICA bifurcation segment,  right A1   segment, with proximal right M1 segment stenosis and poststenotic   dilatation. Stenosis with narrowing of the distal left M1 MCA segment.   There is a dominant left A1 supplying the anterior communicating artery,   there is multifocal stenosis of the distal MCA branch vasculature.    Dominant intradural right vertebral artery supplies the basilar artery,   the left vertebral artery is hypoplastic and poorly visualized in the   cervical course and intradurally with multifocal  narrowing and stenosis.   There are multifocal stenosis of the posterior cerebral arteries.    Possible bovine left common carotid artery, proximal left subclavian   artery is not well-visualized  with possible narrowing. Bilateral common   carotid arteries are patent,  right ICA origin stenosis of approximately   50%, right ICA is decreased in caliber, left ICA is dominant and patent   without ICA origin   stenosis. Vessels are tortuous likely reflecting   hypertension.      Brain MRI (06/29/19) There is evidence of abnormal stricture diffusion seen involving the right frontal parietal region. Involvement of the right basal ganglia region is seen as well. This is compatible with an area of acute infarct.   Involvement of the right anterior cerebral and middle cerebral artery territories are identified. Scattered areas of abnormal susceptibility are identified which is likely compatible with some hemorrhagic transformation. Localized mass effect is seen consisting of sulcal effacement. No significant shift or herniation is seen.    Head CT (06/30/19) Abnormal areas of low-attenuation involving the right frontal and right parietal cortical subcortical region are again seen. These findings are compatible with areas of old infarcts. Abnormal low-attenuation involving the left alla is also again seen which could be compatible with an age-indeterminate infarct. There is no evidence of abnormal low-attenuation seen involving the high right frontal cortical subcortical region. Involvement of the right basal   ganglia and anterior corona radiata regions as well. This is predominantly seen medially and compatible with an acute right anterior cerebral artery infarct. There is localized mass effect consisting of sulcal effacement. No significant shift or herniation is seen.     CTA Head/Neck (06/28/19) CT ANGIOGRAPHY NECK:  Markedly limited examination. Tandem moderate to severe stenoses are suspected in the right carotid bulb and proximal right internal carotid artery. 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    CT ANGIOGRAPHY BRAIN: Markedly limited examination. Limited evaluation of the intracranial internal carotid arteries. Stenoses or occlusion in the distal cavernous segments and ophthalmic segments of the bilateral internal carotid arteries cannot be excluded. Apparent severe stenosis in the distal supraclinoid right internal carotid artery and right carotid terminus with possible occlusion of the A1 segment of the right anterior cerebral artery.  The right A2 segment fills across the anterior to indicating artery.  There appears to be posterior distal right ENRIQUE vessels within the medial right frontal lobe, raising the possibility of early cerebral infarction.

## 2019-07-12 NOTE — PROGRESS NOTE ADULT - PROBLEM SELECTOR PLAN 1
-test BG Q8h  -c/w Lantus 36 units QHS  -Add Humalog 5 units q8h with bolus feeds/ HOLD IF TF HELD!  -c/w Humalog moderate correction scale Q8h (6am, 2pm, 10pm to correspond w/feeds) for now  Please review insulin teaching w/pts daughter  -Plan discussed with pt/team.  Contact info: [phone] (24/7). pager [phone]

## 2019-07-12 NOTE — CONSULT NOTE ADULT - SUBJECTIVE AND OBJECTIVE BOX
Patient is a 54y old  Female who presents with a chief complaint of CVA (09 Jul 2019 13:28)      HPI:  Patient is a 54 year old Female w/ PMHx of HTN, HLD, DM transferred from [facility] for evaluation of stroke. Per daughter at bedside, last known normal 2pm, patient had sudden onset left sided paralysis, slurred speech, received TPA at [facility] at 5;30PM, completed at 6PM. NIHSS 13, MRS 1 (28 Jun 2019 19:57)      PAST MEDICAL & SURGICAL HISTORY:  Diabetes mellitus type 2 in obese  Hyperlipidemia, unspecified hyperlipidemia type  Hypertension      Review of Systems:   CONSTITUTIONAL: No fever,  or fatigue  RESPIRATORY: No cough, wheezing, chills or hemoptysis; No shortness of breath  CARDIOVASCULAR: No chest pain, palpitations, dizziness, or leg swelling  GASTROINTESTINAL: No abdominal or epigastric pain.   NEUROLOGICAL: No headaches,           Allergies    No Known Allergies    Intolerances        Social History:     FAMILY HISTORY:      MEDICATIONS  (STANDING):  aspirin  chewable 81 milliGRAM(s) Oral daily  atorvastatin 80 milliGRAM(s) Oral at bedtime  carvedilol 3.125 milliGRAM(s) Oral two times a day  clopidogrel Tablet 75 milliGRAM(s) Oral daily  dextrose 5%. 1000 milliLiter(s) (50 mL/Hr) IV Continuous <Continuous>  dextrose 50% Injectable 12.5 Gram(s) IV Push once  dextrose 50% Injectable 25 Gram(s) IV Push once  dextrose 50% Injectable 25 Gram(s) IV Push once  enoxaparin Injectable 40 milliGRAM(s) SubCutaneous daily  FLUoxetine Solution 20 milliGRAM(s) Oral daily  insulin glargine Injectable (LANTUS) 36 Unit(s) SubCutaneous at bedtime  insulin lispro (HumaLOG) corrective regimen sliding scale   SubCutaneous every 8 hours  insulin lispro Injectable (HumaLOG) 5 Unit(s) SubCutaneous every 8 hours  levETIRAcetam  Solution 500 milliGRAM(s) Oral two times a day  lisinopril 10 milliGRAM(s) Oral daily  nystatin    Suspension 499060 Unit(s) Oral two times a day  polyethylene glycol 3350 17 Gram(s) Oral daily    MEDICATIONS  (PRN):  acetaminophen    Suspension .. 650 milliGRAM(s) Oral every 6 hours PRN Temp greater or equal to 38C (100.4F)  acetaminophen    Suspension .. 650 milliGRAM(s) Enteral Tube every 6 hours PRN Moderate Pain (4 - 6)  bisacodyl Suppository 10 milliGRAM(s) Rectal daily PRN Constipation  dextrose 40% Gel 15 Gram(s) Oral once PRN Blood Glucose LESS THAN 70 milliGRAM(s)/deciliter  glucagon  Injectable 1 milliGRAM(s) IntraMuscular once PRN Glucose LESS THAN 70 milligrams/deciliter  nystatin Powder 1 Application(s) Topical two times a day PRN dermatitis      CAPILLARY BLOOD GLUCOSE      POCT Blood Glucose.: 228 mg/dL (12 Jul 2019 21:11)  POCT Blood Glucose.: 280 mg/dL (12 Jul 2019 18:07)  POCT Blood Glucose.: 228 mg/dL (12 Jul 2019 14:33)  POCT Blood Glucose.: 215 mg/dL (12 Jul 2019 05:41)    I&O's Summary    11 Jul 2019 07:01  -  12 Jul 2019 07:00  --------------------------------------------------------  IN: 680 mL / OUT: 700 mL / NET: -20 mL    12 Jul 2019 07:01  -  12 Jul 2019 23:34  --------------------------------------------------------  IN: 200 mL / OUT: 0 mL / NET: 200 mL        PHYSICAL EXAM:    GENERAL: NAD  NECK: Supple, No JVD  CHEST/LUNG: Clear to auscultation bilaterally; No wheezing.  HEART: Regular rate and rhythm; No murmurs, rubs, or gallops  ABDOMEN: Soft, Nontender, Nondistended; Bowel sounds present  EXTREMITIES:  2+ Peripheral Pulses, No edema  NEUROLOGY: Awake      LABS:                        13.0   8.4   )-----------( 351      ( 11 Jul 2019 14:04 )             38.2     07-12    140  |  99  |  24<H>  ----------------------------<  223<H>  4.4   |  26  |  0.92    Ca    9.0      12 Jul 2019 22:55              CAPILLARY BLOOD GLUCOSE      POCT Blood Glucose.: 228 mg/dL (12 Jul 2019 21:11)  POCT Blood Glucose.: 280 mg/dL (12 Jul 2019 18:07)  POCT Blood Glucose.: 228 mg/dL (12 Jul 2019 14:33)  POCT Blood Glucose.: 215 mg/dL (12 Jul 2019 05:41)                RADIOLOGY & ADDITIONAL TESTS:    Imaging Personally Reviewed:    Consultant(s) Notes Reviewed:      Care Discussed with Consultants/Other Providers:    Thanks for consult. Will follow.

## 2019-07-12 NOTE — PROGRESS NOTE ADULT - SUBJECTIVE AND OBJECTIVE BOX
INTERVAL HPI/OVERNIGHT EVENTS:    Pt seen and examined. tolerating PEG feeds well as per RN, no GI events/complaints    MEDICATIONS  (STANDING):  aspirin  chewable 81 milliGRAM(s) Oral daily  atorvastatin 80 milliGRAM(s) Oral at bedtime  carvedilol 3.125 milliGRAM(s) Oral every 12 hours  clopidogrel Tablet 75 milliGRAM(s) Oral daily  dextrose 5%. 1000 milliLiter(s) (50 mL/Hr) IV Continuous <Continuous>  dextrose 50% Injectable 12.5 Gram(s) IV Push once  dextrose 50% Injectable 25 Gram(s) IV Push once  dextrose 50% Injectable 25 Gram(s) IV Push once  enoxaparin Injectable 40 milliGRAM(s) SubCutaneous daily  FLUoxetine Solution 20 milliGRAM(s) Oral daily  insulin glargine Injectable (LANTUS) 36 Unit(s) SubCutaneous at bedtime  insulin lispro (HumaLOG) corrective regimen sliding scale   SubCutaneous every 8 hours  levETIRAcetam  Solution 500 milliGRAM(s) Oral two times a day  lisinopril 10 milliGRAM(s) Oral daily  nystatin    Suspension 922179 Unit(s) Oral two times a day  polyethylene glycol 3350 17 Gram(s) Oral daily    MEDICATIONS  (PRN):  acetaminophen    Suspension .. 650 milliGRAM(s) Oral every 6 hours PRN Temp greater or equal to 38C (100.4F)  acetaminophen    Suspension .. 650 milliGRAM(s) Enteral Tube every 6 hours PRN Moderate Pain (4 - 6)  bisacodyl Suppository 10 milliGRAM(s) Rectal daily PRN Constipation  dextrose 40% Gel 15 Gram(s) Oral once PRN Blood Glucose LESS THAN 70 milliGRAM(s)/deciliter  glucagon  Injectable 1 milliGRAM(s) IntraMuscular once PRN Glucose LESS THAN 70 milligrams/deciliter  nystatin Powder 1 Application(s) Topical two times a day PRN dermatitis      Allergies    No Known Allergies    Intolerances        Review of Systems:    General:  No wt loss, fevers, chills, night sweats,fatigue,   Eyes:  Good vision, no reported pain  ENT:  No sore throat, pain, runny nose, dysphagia  CV:  No pain, palpitations, hypo/hypertension  Resp:  No dyspnea, cough, tachypnea, wheezing  GI:  No pain, No nausea, No vomiting, No diarrhea, No constipation, No weight loss, No fever, No pruritis, No rectal bleeding, No melena, No dysphagia  :  No pain, bleeding, incontinence, nocturia  Muscle:  No pain, weakness  Neuro:  No weakness, tingling, memory problems  Psych:  No fatigue, insomnia, mood problems, depression  Endocrine:  No polyuria, polydypsia, cold/heat intolerance  Heme:  No petechiae, ecchymosis, easy bruisability  Skin:  No rash, tattoos, scars, edema      Vital Signs Last 24 Hrs  T(C): 36.8 (12 Jul 2019 11:49), Max: 37.3 (11 Jul 2019 19:47)  T(F): 98.2 (12 Jul 2019 11:49), Max: 99.1 (11 Jul 2019 19:47)  HR: 89 (12 Jul 2019 11:49) (79 - 90)  BP: 165/75 (12 Jul 2019 11:49) (139/87 - 165/75)  BP(mean): --  RR: 20 (12 Jul 2019 11:49) (18 - 20)  SpO2: 96% (12 Jul 2019 11:49) (95% - 96%)    PHYSICAL EXAM:    GENERAL:  Appears stated age, well-groomed, well-nourished, no distress  HEENT:  NC/AT,  conjunctivae clear and pink, no thyromegaly, nodules, adenopathy, no JVD, sclera -anicteric  CHEST:  Full & symmetric excursion, no increased effort, breath sounds clear  HEART:  Regular rhythm, S1, S2, no murmur/rub/S3/S4, no abdominal bruit, no edema  ABDOMEN:  Soft, non-tender, non-distended, normoactive bowel sounds,  + PEG c/d/i  EXTEREMITIES:  no cyanosis, clubbing or edema  SKIN:  No rash/erythema/ecchymoses/petechiae/wounds/abscess/warm/dry  NEURO: opens to voice and light touch, drowsy but easily arousable, eyelid opening apraxia, oriented to name, place and year, fluent speech      LABS:                        13.0   8.4   )-----------( 351      ( 11 Jul 2019 14:04 )             38.2     07-11    136  |  98  |  18  ----------------------------<  150<H>  4.5   |  28  |  0.85    Ca    9.0      11 Jul 2019 14:04            RADIOLOGY & ADDITIONAL TESTS:

## 2019-07-12 NOTE — PROGRESS NOTE ADULT - ASSESSMENT
54 year old woman with multiple vascular risk factors including age, HTN, DM II and HPLD was initially evaluated at OSH for acute onset of left-sided weakness through telestroke. She was treated with IV tPA and was transferred to [facility] for further management. CTA head and neck on admission showed critical stenosis versus occlusion of right supraclinoid ICA and severe to critical stenosis of right proximal ICA. MRI brain during hospitalization showed acute infarcts in the right MCA and ENRIQUE distribution with minimal hemorrhagic transformation (HI-1). Her hospital course was complicated by episodes concerning for seizure-like activity on admission, treated with AEDs. MRA head and neck showed severe to critical stenosis of right supraclinoid ICA and moderate (about 50%) stenosis of right proximal ICA. Carotid duplex showed diffuse intimal thickening without any evidence of hemodynamically a significant carotid stenoses. Repeat CT brain showed stable hemorrhagic transformation of right MCA/ENRIQUE distribution infarct. Selective conventional cerebral angiogram (7/11) showed severe to critical right MCA M1 segment and moderate to severe supraclinoid ICA stenoses with mild extracranial proximal right ICA stenosis.     Impression:  Cerebral embolism with cerebral infarction. Right MCA and ENRIQUE distribution stroke - likely mechanism being large vessel disease i.e. symptomatic intracranial atherosclerosis leading to artery to artery embolism and less likely to be due to an embolic stroke from undetermined source.   Likely symptomatic seizures in the setting of an acute stroke    NEURO: Neurologically without acute change, Continue monitoring for neurologic deterioration in setting of cerebral edema with mass effect and brain compression, maintain BP <160 in setting of possible hemorrhagic transformation on recent CT on 07/04, repeat CTH in am 7/7/19 without acute change. Now restarted on Plavix and continued ASA and high intensity statin for secondary stroke prevention in the setting of likely atheroembolic etiology of her stroke. She underwent cerebral angiogram with Dr. Mueller on 7/11 which showed mild right carotid stenosis and severe right MCA M1 segment stenosis.   Continued on Keppra 500 mg to twice daily for seizure prophylaxis - duration of AEDs is expected to be 3-6 months based on clinical course and MRI/EEG evaluation at that time, VEEG: Structural or functional abnormality in the right hemisphere, No epileptiform pattern or seizure seen, MRI Brain w/o results as above, CTA limited by body habitus. Physical therapy/OT recommended Acute Rehab. Fluoxetine 20 mg once a day started for improved motor recovery for 3 months with a plan to taper off afterwards - risks versus benefits and adverse reactions associated with medication use were discussed with patient and available family member at bedside in detail.    ANTITHROMBOTIC THERAPY:  Was on dual antiplatelet therapy with aspirin and clopidogrel for aggressive secondary stroke prevention in the setting of symptomatic intracranial atherosclerosis for 3 months followed by aspirin. Clopidogrel stopped on 07/04 for asymptomatic hemorrhagic transformation (HI-2). Restarted clopidogrel on 7/9 based on clinical and radiological stability/improvement. Check P2Y12 in 1 week (7/16/19) to ensure optimal platelet inhibition. No indications for carotid revascularization with carotid endarterectomy based on results of selective conventional cerebral angiogram    PULMONARY: CXR (06/29/19)  Clear lungs. Protecting airway, saturating well     CARDIOVASCULAR: Check TTE with bubble study, cardiac monitoring no events, Dr Luz (cardiology) eval appreciated, will obtain cardiac clearance for revascularization procedure if indicated. Gradual normotension in the setting of MCA stenosis careful initiation of home meds and will monitor for heart failure signs since patient endorses hx of chronic CHF.                             SBP goal:<160 mmHg     GASTROINTESTINAL: Dysphagia screen failed. S/S eval recommended NPO, d/w family re: alternate means of nutrition, risks/benefits/alternatives. PEG placed on 07/03. Tolerating feedings at goal. SLP re-eval as indicated.      Diet: NPO with TF via PEG     RENAL: BUN/Cr without acute change; monitor mild hyponatremia, maintain adequate hydration, good urine output, urine toxicology: Negative      Na Goal: Greater than 135     [place]: N    HEMATOLOGY: H/H without acute change, LE doppler - negative for DVT.     DVT ppx:  LMWH    ID: afebrile, no leucocytosis     OTHER: HgbA1C 10.4, continue Humalog sliding scale and DM meds as per endocrine recommendations    DISPOSITION: Acute Rehab. She is undocumented and uninsured and was denied [facility]. Plan to optimize therapy while in hospital.     CORE MEASURES:        Admission NIHSS: 13     TPA:  YES       LDL/HDL: 131/49     Depression Screen: 0     Statin Therapy: Y     Dysphagia Screen:  FAIL     Smoking  NO      Afib  NO     Stroke Education  YES    Obtain screening ultrasound in patients who meet 1 or more of the following criteria as patient is high risk for DVT/PE upon admission:   [] History of DVT/PE  []Hypercoagulable states (Factor V Leiden, Cancer, OCP, etc. )  [X]Prolonged immobility (hemiplegia/hemiparesis/post operative or any other extended immobilization)   [] Transferred from outside facility (Rehab or Long term care)  [] Age </= to 50

## 2019-07-12 NOTE — PROGRESS NOTE ADULT - SUBJECTIVE AND OBJECTIVE BOX
Diabetes Follow up note: Saw pt earlier today  Interval Hx: 54 year old female w/uncontrolled T2DM (on insulin PTA) w/retinopathy, morbid obesity here w/CVA s/p PEG tolerating bolus tube feeds of Glucerna 480cc q8h. Pt sleepy but nods yes/no to questions >doesn't talk. Pt nods no to pain/HA. Glycemic control  above goal today while on present insulin regimen. No hypoglycemia.      Review of Systems:  General: no complaints  GI: Tolerating bolus feeds without any N/V/D/ABD PAIN.  CV: No CP/SOB  ENDO: No S&Sx of hypoglycemia      MEDS:  atorvastatin 80 milliGRAM(s) Oral at bedtime  insulin glargine Injectable (LANTUS) 36 Unit(s) SubCutaneous at bedtime  insulin lispro (HumaLOG) corrective regimen sliding scale   SubCutaneous every 8 hours  nystatin    Suspension 415000 Unit(s) Oral two times a day      Allergies    No Known Allergies      PE:  General: Female lying in bed in NAD.   Vital Signs Last 24 Hrs  T(C): 36.8 (07-12-19 @ 11:49), Max: 37.3 (07-11-19 @ 19:47)  T(F): 98.2 (07-12-19 @ 11:49), Max: 99.1 (07-11-19 @ 19:47)  HR: 89 (07-12-19 @ 11:49) (79 - 90)  BP: 165/75 (07-12-19 @ 11:49) (141/84 - 165/75)  BP(mean): --  RR: 20 (07-12-19 @ 11:49) (18 - 20)  SpO2: 96% (07-12-19 @ 11:49) (95% - 96%)  Abd: Soft, NT, ND, Obese.   Extremities: Warm. Trace LE edema. Has brace in place in  L arm  Neuro: Awake. Nods yes/no to questions. L sided hemiplegia. Able to follow directions with RUE with strength 4/5      LABS:  POCT Blood Glucose.: 228 mg/dL (07-12-19 @ 14:33)  POCT Blood Glucose.: 215 mg/dL (07-12-19 @ 05:41)  POCT Blood Glucose.: 143 mg/dL (07-11-19 @ 21:08)  POCT Blood Glucose.: 127 mg/dL (07-11-19 @ 14:13)  POCT Blood Glucose.: 129 mg/dL (07-11-19 @ 08:43)  POCT Blood Glucose.: 144 mg/dL (07-11-19 @ 05:18)  POCT Blood Glucose.: 190 mg/dL (07-11-19 @ 01:39)  POCT Blood Glucose.: 131 mg/dL (07-10-19 @ 23:22)  POCT Blood Glucose.: 145 mg/dL (07-10-19 @ 21:43)                                   13.0   8.4   )-----------( 351      ( 11 Jul 2019 14:04 )             38.2     07-11    136  |  98  |  18  ----------------------------<  150<H>  4.5   |  28  |  0.85    Ca    9.0      11 Jul 2019 14:04      Hemoglobin A1C, Whole Blood: 10.4 % <H> [4.0 - 5.6] (06-29-19 @ 09:33)

## 2019-07-13 LAB
APPEARANCE UR: ABNORMAL
BACTERIA # UR AUTO: ABNORMAL
BASOPHILS # BLD AUTO: 0.04 K/UL — SIGNIFICANT CHANGE UP (ref 0–0.2)
BASOPHILS NFR BLD AUTO: 0.5 % — SIGNIFICANT CHANGE UP (ref 0–2)
BILIRUB UR-MCNC: NEGATIVE — SIGNIFICANT CHANGE UP
COLOR SPEC: YELLOW — SIGNIFICANT CHANGE UP
DIFF PNL FLD: NEGATIVE — SIGNIFICANT CHANGE UP
EOSINOPHIL # BLD AUTO: 0.34 K/UL — SIGNIFICANT CHANGE UP (ref 0–0.5)
EOSINOPHIL NFR BLD AUTO: 4.1 % — SIGNIFICANT CHANGE UP (ref 0–6)
EPI CELLS # UR: 7 /HPF — HIGH (ref 0–5)
GLUCOSE BLDC GLUCOMTR-MCNC: 221 MG/DL — HIGH (ref 70–99)
GLUCOSE BLDC GLUCOMTR-MCNC: 231 MG/DL — HIGH (ref 70–99)
GLUCOSE BLDC GLUCOMTR-MCNC: 233 MG/DL — HIGH (ref 70–99)
GLUCOSE BLDC GLUCOMTR-MCNC: 234 MG/DL — HIGH (ref 70–99)
GLUCOSE UR QL: NEGATIVE — SIGNIFICANT CHANGE UP
HCT VFR BLD CALC: 39.5 % — SIGNIFICANT CHANGE UP (ref 34.5–45)
HGB BLD-MCNC: 12.7 G/DL — SIGNIFICANT CHANGE UP (ref 11.5–15.5)
HYALINE CASTS # UR AUTO: 0 /LPF — SIGNIFICANT CHANGE UP (ref 0–7)
IMM GRANULOCYTES NFR BLD AUTO: 0.4 % — SIGNIFICANT CHANGE UP (ref 0–1.5)
KETONES UR-MCNC: NEGATIVE — SIGNIFICANT CHANGE UP
LEUKOCYTE ESTERASE UR-ACNC: ABNORMAL
LYMPHOCYTES # BLD AUTO: 1.57 K/UL — SIGNIFICANT CHANGE UP (ref 1–3.3)
LYMPHOCYTES # BLD AUTO: 19.1 % — SIGNIFICANT CHANGE UP (ref 13–44)
MCHC RBC-ENTMCNC: 27.9 PG — SIGNIFICANT CHANGE UP (ref 27–34)
MCHC RBC-ENTMCNC: 32.2 GM/DL — SIGNIFICANT CHANGE UP (ref 32–36)
MCV RBC AUTO: 86.8 FL — SIGNIFICANT CHANGE UP (ref 80–100)
MONOCYTES # BLD AUTO: 0.7 K/UL — SIGNIFICANT CHANGE UP (ref 0–0.9)
MONOCYTES NFR BLD AUTO: 8.5 % — SIGNIFICANT CHANGE UP (ref 2–14)
NEUTROPHILS # BLD AUTO: 5.53 K/UL — SIGNIFICANT CHANGE UP (ref 1.8–7.4)
NEUTROPHILS NFR BLD AUTO: 67.4 % — SIGNIFICANT CHANGE UP (ref 43–77)
NITRITE UR-MCNC: NEGATIVE — SIGNIFICANT CHANGE UP
PH UR: 8 — SIGNIFICANT CHANGE UP (ref 5–8)
PLATELET # BLD AUTO: 376 K/UL — SIGNIFICANT CHANGE UP (ref 150–400)
PROT UR-MCNC: ABNORMAL
RBC # BLD: 4.55 M/UL — SIGNIFICANT CHANGE UP (ref 3.8–5.2)
RBC # FLD: 13.1 % — SIGNIFICANT CHANGE UP (ref 10.3–14.5)
RBC CASTS # UR COMP ASSIST: 3 /HPF — SIGNIFICANT CHANGE UP (ref 0–4)
SP GR SPEC: 1.02 — SIGNIFICANT CHANGE UP (ref 1.01–1.02)
UROBILINOGEN FLD QL: SIGNIFICANT CHANGE UP
WBC # BLD: 8.21 K/UL — SIGNIFICANT CHANGE UP (ref 3.8–10.5)
WBC # FLD AUTO: 8.21 K/UL — SIGNIFICANT CHANGE UP (ref 3.8–10.5)
WBC UR QL: 190 /HPF — HIGH (ref 0–5)

## 2019-07-13 PROCEDURE — 95816 EEG AWAKE AND DROWSY: CPT | Mod: 26

## 2019-07-13 PROCEDURE — 99232 SBSQ HOSP IP/OBS MODERATE 35: CPT

## 2019-07-13 PROCEDURE — 99233 SBSQ HOSP IP/OBS HIGH 50: CPT

## 2019-07-13 RX ORDER — INSULIN LISPRO 100/ML
8 VIAL (ML) SUBCUTANEOUS EVERY 8 HOURS
Refills: 0 | Status: DISCONTINUED | OUTPATIENT
Start: 2019-07-13 | End: 2019-07-14

## 2019-07-13 RX ORDER — CEFTRIAXONE 500 MG/1
1000 INJECTION, POWDER, FOR SOLUTION INTRAMUSCULAR; INTRAVENOUS EVERY 24 HOURS
Refills: 0 | Status: DISCONTINUED | OUTPATIENT
Start: 2019-07-13 | End: 2019-07-16

## 2019-07-13 RX ORDER — INSULIN GLARGINE 100 [IU]/ML
45 INJECTION, SOLUTION SUBCUTANEOUS AT BEDTIME
Refills: 0 | Status: DISCONTINUED | OUTPATIENT
Start: 2019-07-13 | End: 2019-07-16

## 2019-07-13 RX ADMIN — CARVEDILOL PHOSPHATE 3.12 MILLIGRAM(S): 80 CAPSULE, EXTENDED RELEASE ORAL at 17:05

## 2019-07-13 RX ADMIN — Medication 4: at 22:48

## 2019-07-13 RX ADMIN — CARVEDILOL PHOSPHATE 3.12 MILLIGRAM(S): 80 CAPSULE, EXTENDED RELEASE ORAL at 05:47

## 2019-07-13 RX ADMIN — Medication 81 MILLIGRAM(S): at 11:04

## 2019-07-13 RX ADMIN — Medication 20 MILLIGRAM(S): at 11:04

## 2019-07-13 RX ADMIN — Medication 8 UNIT(S): at 14:23

## 2019-07-13 RX ADMIN — LISINOPRIL 10 MILLIGRAM(S): 2.5 TABLET ORAL at 05:47

## 2019-07-13 RX ADMIN — Medication 5 UNIT(S): at 06:01

## 2019-07-13 RX ADMIN — Medication 500000 UNIT(S): at 05:46

## 2019-07-13 RX ADMIN — CEFTRIAXONE 100 MILLIGRAM(S): 500 INJECTION, POWDER, FOR SOLUTION INTRAMUSCULAR; INTRAVENOUS at 10:53

## 2019-07-13 RX ADMIN — Medication 500000 UNIT(S): at 17:05

## 2019-07-13 RX ADMIN — Medication 4: at 06:00

## 2019-07-13 RX ADMIN — ENOXAPARIN SODIUM 40 MILLIGRAM(S): 100 INJECTION SUBCUTANEOUS at 11:05

## 2019-07-13 RX ADMIN — Medication 8 UNIT(S): at 22:48

## 2019-07-13 RX ADMIN — ATORVASTATIN CALCIUM 80 MILLIGRAM(S): 80 TABLET, FILM COATED ORAL at 22:47

## 2019-07-13 RX ADMIN — LEVETIRACETAM 500 MILLIGRAM(S): 250 TABLET, FILM COATED ORAL at 17:05

## 2019-07-13 RX ADMIN — Medication 4: at 14:23

## 2019-07-13 RX ADMIN — CLOPIDOGREL BISULFATE 75 MILLIGRAM(S): 75 TABLET, FILM COATED ORAL at 11:04

## 2019-07-13 RX ADMIN — LEVETIRACETAM 500 MILLIGRAM(S): 250 TABLET, FILM COATED ORAL at 05:47

## 2019-07-13 NOTE — PROGRESS NOTE ADULT - SUBJECTIVE AND OBJECTIVE BOX
THE PATIENT WAS SEEN AND EXAMINED BY ME WITH THE HOUSESTAFF AND STROKE TEAM DURING MORNING ROUNDS.   HPI:  Patient is a 54 year old Female w/ PMHx of HTN, HLD, DM transferred from [facility] for evaluation of stroke. Per daughter at bedside, last known normal 2pm on day of admission, patient had sudden onset left sided paralysis, slurred speech, received TPA at at [place]. NIHSS 13, MRS 1    SUBJECTIVE: Patient noted to be less responsive overnight, CT scan done - stable exam.  No new neurologic complaints.      ROS: All negative except documented above.    acetaminophen    Suspension .. 650 milliGRAM(s) Oral every 6 hours PRN  acetaminophen    Suspension .. 650 milliGRAM(s) Enteral Tube every 6 hours PRN  aspirin  chewable 81 milliGRAM(s) Oral daily  atorvastatin 80 milliGRAM(s) Oral at bedtime  bisacodyl Suppository 10 milliGRAM(s) Rectal daily PRN  carvedilol 3.125 milliGRAM(s) Oral two times a day  clopidogrel Tablet 75 milliGRAM(s) Oral daily  dextrose 40% Gel 15 Gram(s) Oral once PRN  dextrose 5%. 1000 milliLiter(s) IV Continuous <Continuous>  dextrose 50% Injectable 12.5 Gram(s) IV Push once  dextrose 50% Injectable 25 Gram(s) IV Push once  dextrose 50% Injectable 25 Gram(s) IV Push once  enoxaparin Injectable 40 milliGRAM(s) SubCutaneous daily  FLUoxetine Solution 20 milliGRAM(s) Oral daily  glucagon  Injectable 1 milliGRAM(s) IntraMuscular once PRN  insulin glargine Injectable (LANTUS) 36 Unit(s) SubCutaneous at bedtime  insulin lispro (HumaLOG) corrective regimen sliding scale   SubCutaneous every 8 hours  insulin lispro Injectable (HumaLOG) 5 Unit(s) SubCutaneous every 8 hours  levETIRAcetam  Solution 500 milliGRAM(s) Oral two times a day  lisinopril 10 milliGRAM(s) Oral daily  nystatin    Suspension 131818 Unit(s) Oral two times a day  nystatin Powder 1 Application(s) Topical two times a day PRN  polyethylene glycol 3350 17 Gram(s) Oral daily      PHYSICAL EXAM:   Vital Signs Last 24 Hrs  T(C): 37.1 (13 Jul 2019 05:08), Max: 37.4 (12 Jul 2019 19:45)  T(F): 98.7 (13 Jul 2019 05:08), Max: 99.3 (12 Jul 2019 19:45)  HR: 88 (13 Jul 2019 05:08) (85 - 95)  BP: 161/83 (13 Jul 2019 05:08) (130/69 - 165/75)  RR: 18 (13 Jul 2019 05:08) (18 - 20)  SpO2: 95% (13 Jul 2019 05:08) (93% - 98%)    General: No acute distress  HEENT: EOM intact, left eye legally blind  Abdomen: Soft, nontender, nondistended   Extremities: No edema    NEUROLOGICAL EXAM:  Mental status: Eyes open, awake, alert, oriented to name, place and year, fluent speech, repetition intact, No asomatognosia + anosognosia, follows simple commands, able to calculate    Cranial Nerves: UMN type left facial droop, no nystagmus, moderate to severe dysarthria, LHHA by blink to threat   Motor exam: RUE and RLE spontaneous movement against gravity, Left hypotonic severe hemiparesis LUE 1/5 proximally and LLE 0/5 with trace movements of the toes   Sensation: Left side appears intact to noxious stimuli   Coordination/ Gait: No dysmetria on right side, gait not checked     LABS:                        12.7   8.21  )-----------( 376      ( 13 Jul 2019 02:59 )             39.5    07-12    140  |  99  |  24<H>  ----------------------------<  223<H>  4.4   |  26  |  0.92    Ca    9.0      12 Jul 2019 22:55      Hemoglobin A1C, Whole Blood: 10.4 % (06-29 @ 09:33)      IMAGING: Reviewed by me.     CT Head No Cont (07.12.19):  Evolving right frontal parietal subacute infarction with hemorrhagic transformation is again noted, is stable compared to 07/09/2019 exam.    CT Head No Cont (07.09.19)  No significant interval change in the right MCA evolving subacute infarct   with hemorrhagic transformation, edema, mass effect and minimal rightward   shift.    CT Head No Cont (07.07.19)   Redemonstration  unchanged acute to subacute infarcts with hemorrhagic   conversion in the right cerebral hemisphere from 7/4/2019.    VA Duplex Carotid, Bilat (07.05.19)   Diffuse intimal thickening without duplex evidence of   hemodynamically significant stenosis.     CT Head No Cont (07.04.19)  Increasing lucency with new mass effect and mild midline   shift to the left involving acute right anterior cerebral artery infarct   with involvement of the right corona radiata and centrum semiovale ovale   compared with 6/29/2019. Petechial hemorrhage along the high convexity.    MRA BRAIN/NECK (07/02/19): In comparison with CTA,  atherosclerotic calcification with stenosis of the right greater than left cavernous and supraclinoid ICA segments.   Poorly delineated distal right ICA bifurcation segment,  right A1   segment, with proximal right M1 segment stenosis and poststenotic   dilatation. Stenosis with narrowing of the distal left M1 MCA segment.   There is a dominant left A1 supplying the anterior communicating artery,   there is multifocal stenosis of the distal MCA branch vasculature.    Dominant intradural right vertebral artery supplies the basilar artery,   the left vertebral artery is hypoplastic and poorly visualized in the   cervical course and intradurally with multifocal  narrowing and stenosis.   There are multifocal stenosis of the posterior cerebral arteries.    Possible bovine left common carotid artery, proximal left subclavian   artery is not well-visualized  with possible narrowing. Bilateral common   carotid arteries are patent,  right ICA origin stenosis of approximately   50%, right ICA is decreased in caliber, left ICA is dominant and patent   without ICA origin   stenosis. Vessels are tortuous likely reflecting   hypertension.      Brain MRI (06/29/19) There is evidence of abnormal stricture diffusion seen involving the right frontal parietal region. Involvement of the right basal ganglia region is seen as well. This is compatible with an area of acute infarct.   Involvement of the right anterior cerebral and middle cerebral artery territories are identified. Scattered areas of abnormal susceptibility are identified which is likely compatible with some hemorrhagic transformation. Localized mass effect is seen consisting of sulcal effacement. No significant shift or herniation is seen.    Head CT (06/30/19) Abnormal areas of low-attenuation involving the right frontal and right parietal cortical subcortical region are again seen. These findings are compatible with areas of old infarcts. Abnormal low-attenuation involving the left alla is also again seen which could be compatible with an age-indeterminate infarct. There is no evidence of abnormal low-attenuation seen involving the high right frontal cortical subcortical region. Involvement of the right basal   ganglia and anterior corona radiata regions as well. This is predominantly seen medially and compatible with an acute right anterior cerebral artery infarct. There is localized mass effect consisting of sulcal effacement. No significant shift or herniation is seen.     CTA Head/Neck (06/28/19) CT ANGIOGRAPHY NECK:  Markedly limited examination. Tandem moderate to severe stenoses are suspected in the right carotid bulb and proximal right internal carotid artery. 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    CT ANGIOGRAPHY BRAIN: Markedly limited examination. Limited evaluation of the intracranial internal carotid arteries. Stenoses or occlusion in the distal cavernous segments and ophthalmic segments of the bilateral internal carotid arteries cannot be excluded. Apparent severe stenosis in the distal supraclinoid right internal carotid artery and right carotid terminus with possible occlusion of the A1 segment of the right anterior cerebral artery.  The right A2 segment fills across the anterior to indicating artery.  There appears to be posterior distal right ENRIQUE vessels within the medial right frontal lobe, raising the possibility of early cerebral infarction. THE PATIENT WAS SEEN AND EXAMINED BY ME WITH THE HOUSESTAFF AND STROKE TEAM DURING MORNING ROUNDS.     HPI:  Patient is a 54 year old Female w/ PMHx of HTN, HLD, DM transferred from [facility] for evaluation of stroke. Per daughter at bedside, last known normal 2pm on day of admission, patient had sudden onset left sided paralysis, slurred speech, received TPA at at [place]. NIHSS 13, MRS 1    SUBJECTIVE: Patient noted to to have fluctuating mental status overnight, CT scan done - stable exam.  No new neurologic complaints.      ROS: All negative except documented above.    acetaminophen    Suspension .. 650 milliGRAM(s) Oral every 6 hours PRN  acetaminophen    Suspension .. 650 milliGRAM(s) Enteral Tube every 6 hours PRN  aspirin  chewable 81 milliGRAM(s) Oral daily  atorvastatin 80 milliGRAM(s) Oral at bedtime  bisacodyl Suppository 10 milliGRAM(s) Rectal daily PRN  carvedilol 3.125 milliGRAM(s) Oral two times a day  clopidogrel Tablet 75 milliGRAM(s) Oral daily  dextrose 40% Gel 15 Gram(s) Oral once PRN  dextrose 5%. 1000 milliLiter(s) IV Continuous <Continuous>  dextrose 50% Injectable 12.5 Gram(s) IV Push once  dextrose 50% Injectable 25 Gram(s) IV Push once  dextrose 50% Injectable 25 Gram(s) IV Push once  enoxaparin Injectable 40 milliGRAM(s) SubCutaneous daily  FLUoxetine Solution 20 milliGRAM(s) Oral daily  glucagon  Injectable 1 milliGRAM(s) IntraMuscular once PRN  insulin glargine Injectable (LANTUS) 36 Unit(s) SubCutaneous at bedtime  insulin lispro (HumaLOG) corrective regimen sliding scale   SubCutaneous every 8 hours  insulin lispro Injectable (HumaLOG) 5 Unit(s) SubCutaneous every 8 hours  levETIRAcetam  Solution 500 milliGRAM(s) Oral two times a day  lisinopril 10 milliGRAM(s) Oral daily  nystatin    Suspension 446060 Unit(s) Oral two times a day  nystatin Powder 1 Application(s) Topical two times a day PRN  polyethylene glycol 3350 17 Gram(s) Oral daily      PHYSICAL EXAM:   Vital Signs Last 24 Hrs  T(C): 37.1 (13 Jul 2019 05:08), Max: 37.4 (12 Jul 2019 19:45)  T(F): 98.7 (13 Jul 2019 05:08), Max: 99.3 (12 Jul 2019 19:45)  HR: 88 (13 Jul 2019 05:08) (85 - 95)  BP: 161/83 (13 Jul 2019 05:08) (130/69 - 165/75)  RR: 18 (13 Jul 2019 05:08) (18 - 20)  SpO2: 95% (13 Jul 2019 05:08) (93% - 98%)    General: No acute distress  HEENT: EOM intact, left eye legally blind  Abdomen: Soft, nontender, nondistended   Extremities: No edema    NEUROLOGICAL EXAM:  Mental status: Eyes open, awake, alert, oriented to name, place and year, fluent speech, repetition intact, follows simple commands, abulia+   Cranial Nerves: UMN type left facial droop, no nystagmus, moderate to severe dysarthria, LHHA by blink to threat   Motor exam: RUE and RLE spontaneous movement against gravity, Left hypotonic hemiplegia   Sensation: Left side appears intact to noxious stimuli   Coordination/ Gait: No dysmetria on right side, gait not checked     LABS:                        12.7   8.21  )-----------( 376      ( 13 Jul 2019 02:59 )             39.5    07-12    140  |  99  |  24<H>  ----------------------------<  223<H>  4.4   |  26  |  0.92    Ca    9.0      12 Jul 2019 22:55      Hemoglobin A1C, Whole Blood: 10.4 % (06-29 @ 09:33)      IMAGING: Reviewed by me.     CT Head No Cont (07.12.19):  Evolving right frontal parietal subacute infarction with hemorrhagic transformation is again noted, is stable compared to 07/09/2019 exam.    CT Head No Cont (07.09.19)  No significant interval change in the right MCA evolving subacute infarct   with hemorrhagic transformation, edema, mass effect and minimal rightward   shift.    CT Head No Cont (07.07.19)   Redemonstration  unchanged acute to subacute infarcts with hemorrhagic   conversion in the right cerebral hemisphere from 7/4/2019.    VA Duplex Carotid, Bilat (07.05.19)   Diffuse intimal thickening without duplex evidence of   hemodynamically significant stenosis.     CT Head No Cont (07.04.19)  Increasing lucency with new mass effect and mild midline   shift to the left involving acute right anterior cerebral artery infarct   with involvement of the right corona radiata and centrum semiovale ovale   compared with 6/29/2019. Petechial hemorrhage along the high convexity.    MRA BRAIN/NECK (07/02/19): In comparison with CTA,  atherosclerotic calcification with stenosis of the right greater than left cavernous and supraclinoid ICA segments.   Poorly delineated distal right ICA bifurcation segment,  right A1   segment, with proximal right M1 segment stenosis and poststenotic   dilatation. Stenosis with narrowing of the distal left M1 MCA segment.   There is a dominant left A1 supplying the anterior communicating artery,   there is multifocal stenosis of the distal MCA branch vasculature.    Dominant intradural right vertebral artery supplies the basilar artery,   the left vertebral artery is hypoplastic and poorly visualized in the   cervical course and intradurally with multifocal  narrowing and stenosis.   There are multifocal stenosis of the posterior cerebral arteries.    Possible bovine left common carotid artery, proximal left subclavian   artery is not well-visualized  with possible narrowing. Bilateral common   carotid arteries are patent,  right ICA origin stenosis of approximately   50%, right ICA is decreased in caliber, left ICA is dominant and patent   without ICA origin   stenosis. Vessels are tortuous likely reflecting   hypertension.      Brain MRI (06/29/19) There is evidence of abnormal stricture diffusion seen involving the right frontal parietal region. Involvement of the right basal ganglia region is seen as well. This is compatible with an area of acute infarct.   Involvement of the right anterior cerebral and middle cerebral artery territories are identified. Scattered areas of abnormal susceptibility are identified which is likely compatible with some hemorrhagic transformation. Localized mass effect is seen consisting of sulcal effacement. No significant shift or herniation is seen.    Head CT (06/30/19) Abnormal areas of low-attenuation involving the right frontal and right parietal cortical subcortical region are again seen. These findings are compatible with areas of old infarcts. Abnormal low-attenuation involving the left alla is also again seen which could be compatible with an age-indeterminate infarct. There is no evidence of abnormal low-attenuation seen involving the high right frontal cortical subcortical region. Involvement of the right basal   ganglia and anterior corona radiata regions as well. This is predominantly seen medially and compatible with an acute right anterior cerebral artery infarct. There is localized mass effect consisting of sulcal effacement. No significant shift or herniation is seen.     CTA Head/Neck (06/28/19) CT ANGIOGRAPHY NECK:  Markedly limited examination. Tandem moderate to severe stenoses are suspected in the right carotid bulb and proximal right internal carotid artery. 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    CT ANGIOGRAPHY BRAIN: Markedly limited examination. Limited evaluation of the intracranial internal carotid arteries. Stenoses or occlusion in the distal cavernous segments and ophthalmic segments of the bilateral internal carotid arteries cannot be excluded. Apparent severe stenosis in the distal supraclinoid right internal carotid artery and right carotid terminus with possible occlusion of the A1 segment of the right anterior cerebral artery.  The right A2 segment fills across the anterior to indicating artery.  There appears to be posterior distal right ENRIQUE vessels within the medial right frontal lobe, raising the possibility of early cerebral infarction.

## 2019-07-13 NOTE — PROGRESS NOTE ADULT - ASSESSMENT
54 year old female w/uncontrolled T2DM (HbA1c 10.4%) c/b retinopathy here w/CVA L sided hemiplegia. Bolus feeds q8h now hyperglycemic in setting of UTI. Also w/noted loose stools. Discussed w/stroke team who will possibly change regimen for now to q6h. BG goal (100-180mg/dl).

## 2019-07-13 NOTE — PROGRESS NOTE ADULT - SUBJECTIVE AND OBJECTIVE BOX
INTERVAL HPI/OVERNIGHT EVENTS:    Pt seen and examined. tolerating PEG feeds well as per RN, no GI events/complaints  + 2 loose stool O/N, not watery    MEDICATIONS  (STANDING):  aspirin  chewable 81 milliGRAM(s) Oral daily  atorvastatin 80 milliGRAM(s) Oral at bedtime  carvedilol 3.125 milliGRAM(s) Oral two times a day  cefTRIAXone   IVPB 1000 milliGRAM(s) IV Intermittent every 24 hours  clopidogrel Tablet 75 milliGRAM(s) Oral daily  dextrose 5%. 1000 milliLiter(s) (50 mL/Hr) IV Continuous <Continuous>  dextrose 50% Injectable 12.5 Gram(s) IV Push once  dextrose 50% Injectable 25 Gram(s) IV Push once  dextrose 50% Injectable 25 Gram(s) IV Push once  enoxaparin Injectable 40 milliGRAM(s) SubCutaneous daily  FLUoxetine Solution 20 milliGRAM(s) Oral daily  insulin glargine Injectable (LANTUS) 36 Unit(s) SubCutaneous at bedtime  insulin lispro (HumaLOG) corrective regimen sliding scale   SubCutaneous every 8 hours  insulin lispro Injectable (HumaLOG) 5 Unit(s) SubCutaneous every 8 hours  levETIRAcetam  Solution 500 milliGRAM(s) Oral two times a day  lisinopril 10 milliGRAM(s) Oral daily  nystatin    Suspension 958723 Unit(s) Oral two times a day  polyethylene glycol 3350 17 Gram(s) Oral daily    MEDICATIONS  (PRN):  acetaminophen    Suspension .. 650 milliGRAM(s) Oral every 6 hours PRN Temp greater or equal to 38C (100.4F)  acetaminophen    Suspension .. 650 milliGRAM(s) Enteral Tube every 6 hours PRN Moderate Pain (4 - 6)  bisacodyl Suppository 10 milliGRAM(s) Rectal daily PRN Constipation  dextrose 40% Gel 15 Gram(s) Oral once PRN Blood Glucose LESS THAN 70 milliGRAM(s)/deciliter  glucagon  Injectable 1 milliGRAM(s) IntraMuscular once PRN Glucose LESS THAN 70 milligrams/deciliter  nystatin Powder 1 Application(s) Topical two times a day PRN dermatitis      Allergies    No Known Allergies    Intolerances        Review of Systems:    General:  No wt loss, fevers, chills, night sweats,fatigue,   Eyes:  Good vision, no reported pain  ENT:  No sore throat, pain, runny nose, dysphagia  CV:  No pain, palpitations, hypo/hypertension  Resp:  No dyspnea, cough, tachypnea, wheezing  GI:  No pain, No nausea, No vomiting, No diarrhea, No constipation, No weight loss, No fever, No pruritis, No rectal bleeding, No melena, No dysphagia  :  No pain, bleeding, incontinence, nocturia  Muscle:  No pain, weakness  Neuro:  No weakness, tingling, memory problems  Psych:  No fatigue, insomnia, mood problems, depression  Endocrine:  No polyuria, polydypsia, cold/heat intolerance  Heme:  No petechiae, ecchymosis, easy bruisability  Skin:  No rash, tattoos, scars, edema      Vital Signs Last 24 Hrs  T(C): 37.1 (2019 07:45), Max: 37.4 (2019 19:45)  T(F): 98.7 (2019 07:45), Max: 99.3 (2019 19:45)  HR: 87 (2019 07:45) (85 - 95)  BP: 141/78 (2019 07:45) (130/69 - 165/75)  BP(mean): --  RR: 18 (2019 07:45) (18 - 20)  SpO2: 97% (2019 07:45) (93% - 98%)    PHYSICAL EXAM:    GENERAL:  Appears stated age, well-groomed, well-nourished, no distress  HEENT:  NC/AT,  conjunctivae clear and pink, no thyromegaly, nodules, adenopathy, no JVD, sclera -anicteric  CHEST:  Full & symmetric excursion, no increased effort, breath sounds clear  HEART:  Regular rhythm, S1, S2, no murmur/rub/S3/S4, no abdominal bruit, no edema  ABDOMEN:  Soft, non-tender, non-distended, normoactive bowel sounds,  + PEG c/d/i  EXTEREMITIES:  no cyanosis, clubbing or edema  SKIN:  No rash/erythema/ecchymoses/petechiae/wounds/abscess/warm/dry  NEURO: opens to voice and light touch, drowsy but easily arousable, eyelid opening apraxia, oriented to name, place and year, fluent speech          LABS:                        12.7   8.21  )-----------( 376      ( 2019 02:59 )             39.5     07-12    140  |  99  |  24<H>  ----------------------------<  223<H>  4.4   |  26  |  0.92    Ca    9.0      2019 22:55        Urinalysis Basic - ( 2019 02:47 )    Color: Yellow / Appearance: Slightly Turbid / S.018 / pH: x  Gluc: x / Ketone: Negative  / Bili: Negative / Urobili: <2 mg/dL   Blood: x / Protein: 30 mg/dL / Nitrite: Negative   Leuk Esterase: Large / RBC: 3 /HPF /  /HPF   Sq Epi: x / Non Sq Epi: 7 /HPF / Bacteria: Few        RADIOLOGY & ADDITIONAL TESTS:

## 2019-07-13 NOTE — PROGRESS NOTE ADULT - SUBJECTIVE AND OBJECTIVE BOX
Diabetes Follow up note:  Interval Hx:  54 year old female w/uncontrolled T2DM (on insulin PTA) w/retinopathy, morbid obesity here w/CVA s/p PEG on bolus feeds q8h. Pt now w/UTI starting on IV abx. Glucose values increased to 200s over past 24 hours. Per RN, pt having frequent diarrhea now as well. EEG being set up at bedside while present.       Review of Systems:  General: Denies pain  GI: + diarrhea  CV: No CP/SOB  ENDO: No S&Sx of hypoglycemia  MEDS:  atorvastatin 80 milliGRAM(s) Oral at bedtime    insulin glargine Injectable (LANTUS) 36 Unit(s) SubCutaneous at bedtime  insulin lispro (HumaLOG) corrective regimen sliding scale   SubCutaneous every 8 hours  insulin lispro Injectable (HumaLOG) 5 Unit(s) SubCutaneous every 8 hours    cefTRIAXone   IVPB 1000 milliGRAM(s) IV Intermittent every 24 hours  nystatin    Suspension 853092 Unit(s) Oral two times a day    Allergies    No Known Allergies        PE:  General: Female lying in bed. NAD.   Vital Signs Last 24 Hrs  T(C): 37.1 (13 Jul 2019 07:45), Max: 37.4 (12 Jul 2019 19:45)  T(F): 98.7 (13 Jul 2019 07:45), Max: 99.3 (12 Jul 2019 19:45)  HR: 87 (13 Jul 2019 07:45) (85 - 95)  BP: 141/78 (13 Jul 2019 07:45) (130/69 - 165/75)  BP(mean): --  RR: 18 (13 Jul 2019 07:45) (18 - 20)  SpO2: 97% (13 Jul 2019 07:45) (93% - 98%)  Abd: Soft, NT,ND, Obese. PEG in place.   Extremities: Warm  Neuro: Awake. L hemiparesis.     LABS:    POCT Blood Glucose.: 234 mg/dL (07-13-19 @ 05:38)  POCT Blood Glucose.: 221 mg/dL (07-12-19 @ 23:39)  POCT Blood Glucose.: 228 mg/dL (07-12-19 @ 21:11)  POCT Blood Glucose.: 280 mg/dL (07-12-19 @ 18:07)  POCT Blood Glucose.: 228 mg/dL (07-12-19 @ 14:33)  POCT Blood Glucose.: 215 mg/dL (07-12-19 @ 05:41)  POCT Blood Glucose.: 143 mg/dL (07-11-19 @ 21:08)  POCT Blood Glucose.: 127 mg/dL (07-11-19 @ 14:13)  POCT Blood Glucose.: 129 mg/dL (07-11-19 @ 08:43)  POCT Blood Glucose.: 144 mg/dL (07-11-19 @ 05:18)  POCT Blood Glucose.: 190 mg/dL (07-11-19 @ 01:39)  POCT Blood Glucose.: 131 mg/dL (07-10-19 @ 23:22)  POCT Blood Glucose.: 145 mg/dL (07-10-19 @ 21:43)  POCT Blood Glucose.: 169 mg/dL (07-10-19 @ 12:35)                            12.7   8.21  )-----------( 376      ( 13 Jul 2019 02:59 )             39.5       07-12    140  |  99  |  24<H>  ----------------------------<  223<H>  4.4   |  26  |  0.92    Ca    9.0      12 Jul 2019 22:55          Hemoglobin A1C, Whole Blood: 10.4 % <H> [4.0 - 5.6] (06-29-19 @ 09:33)            Contact number: [place] [phone] or [phone]

## 2019-07-13 NOTE — PROGRESS NOTE ADULT - ASSESSMENT
54 year old woman with multiple vascular risk factors including age, HTN, DM II and HPLD was initially evaluated at OSH for acute onset of left-sided weakness through telestroke. She was treated with IV tPA and was transferred to [facility] for further management. CTA head and neck on admission showed critical stenosis versus occlusion of right supraclinoid ICA and severe to critical stenosis of right proximal ICA. MRI brain during hospitalization showed acute infarcts in the right MCA and ENRIQUE distribution with minimal hemorrhagic transformation (HI-1). Her hospital course was complicated by episodes concerning for seizure-like activity on admission, treated with AEDs. MRA head and neck showed severe to critical stenosis of right supraclinoid ICA and moderate (about 50%) stenosis of right proximal ICA. Carotid duplex showed diffuse intimal thickening without any evidence of hemodynamically a significant carotid stenoses. Repeat CT brain showed stable hemorrhagic transformation of right MCA/ENRIQUE distribution infarct. Selective conventional cerebral angiogram (7/11) showed severe to critical right MCA M1 segment and moderate to severe supraclinoid ICA stenoses with mild extracranial proximal right ICA stenosis.     Impression:  Cerebral embolism with cerebral infarction. Right MCA and ENRIQUE distribution stroke - likely mechanism being large vessel disease i.e. symptomatic intracranial atherosclerosis leading to artery to artery embolism and less likely to be due to an embolic stroke from undetermined source.   Likely symptomatic seizures in the setting of an acute stroke    NEURO: Neurologically without acute change, Continue monitoring for neurologic deterioration in setting of cerebral edema with mass effect and brain compression, maintain BP <160 in setting of possible hemorrhagic transformation on recent CT on 07/04, repeat CTH in am 7/7/19 without acute change. Now restarted on Plavix and continued ASA and high intensity statin for secondary stroke prevention in the setting of likely atheroembolic etiology of her stroke. She underwent cerebral angiogram with Dr. Mueller on 7/11 which showed mild right carotid stenosis and severe right MCA M1 segment stenosis.   Continued on Keppra 500 mg to twice daily for seizure prophylaxis - duration of AEDs is expected to be 3-6 months based on clinical course and MRI/EEG evaluation at that time, VEEG: Structural or functional abnormality in the right hemisphere, No epileptiform pattern or seizure seen, MRI Brain w/o results as above, CTA limited by body habitus. Physical therapy/OT recommended Acute Rehab. Fluoxetine 20 mg once a day started for improved motor recovery for 3 months with a plan to taper off afterwards - risks versus benefits and adverse reactions associated with medication use were discussed with patient and available family member at bedside in detail.    ANTITHROMBOTIC THERAPY:  Was on dual antiplatelet therapy with aspirin and clopidogrel for aggressive secondary stroke prevention in the setting of symptomatic intracranial atherosclerosis for 3 months followed by aspirin. Clopidogrel stopped on 07/04 for asymptomatic hemorrhagic transformation (HI-2). Restarted clopidogrel on 7/9 based on clinical and radiological stability/improvement. Check P2Y12 in 1 week (7/16/19) to ensure optimal platelet inhibition. No indications for carotid revascularization with carotid endarterectomy based on results of selective conventional cerebral angiogram    PULMONARY: CXR (07/12/19)  Clear lungs. Protecting airway, saturating well     CARDIOVASCULAR: Check TTE with bubble study, cardiac monitoring no events, Dr Luz (cardiology) eval appreciated, will obtain cardiac clearance for revascularization procedure if indicated. Gradual normotension in the setting of MCA stenosis careful initiation of home meds and will monitor for heart failure signs since patient endorses hx of chronic CHF.                             SBP goal:<160 mmHg     GASTROINTESTINAL: Dysphagia screen failed. S/S eval recommended NPO, d/w family re: alternate means of nutrition, risks/benefits/alternatives. PEG placed on 07/03. Tolerating feedings at goal. SLP re-eval as indicated.      Diet: NPO with TF via PEG     RENAL: BUN/Cr without acute change; monitor mild hyponatremia, maintain adequate hydration, good urine output, urine toxicology: Negative      Na Goal: Greater than 135     Camarena: N    HEMATOLOGY: H/H without acute change, LE doppler - negative for DVT.     DVT ppx:  LMWH    ID: afebrile, no leucocytosis     OTHER: HgbA1C 10.4, continue Humalog sliding scale and DM meds as per endocrine recommendations    DISPOSITION: Acute Rehab. She is undocumented and uninsured and was denied [facility]. Plan to optimize therapy while in hospital.     CORE MEASURES:        Admission NIHSS: 13     TPA:  YES       LDL/HDL: 131/49     Depression Screen: 0     Statin Therapy: Y     Dysphagia Screen:  FAIL     Smoking  NO      Afib  NO     Stroke Education  YES    Obtain screening ultrasound in patients who meet 1 or more of the following criteria as patient is high risk for DVT/PE upon admission:   [] History of DVT/PE  []Hypercoagulable states (Factor V Leiden, Cancer, OCP, etc. )  [X]Prolonged immobility (hemiplegia/hemiparesis/post operative or any other extended immobilization)   [] Transferred from outside facility (Rehab or Long term care)  [] Age </= to 50 54 year old woman with multiple vascular risk factors including age, HTN, DM II and HPLD was initially evaluated at OSH for acute onset of left-sided weakness through telestroke. She was treated with IV tPA and was transferred to [facility] for further management. CTA head and neck on admission showed critical stenosis versus occlusion of right supraclinoid ICA and severe to critical stenosis of right proximal ICA. MRI brain during hospitalization showed acute infarcts in the right MCA and ENRIQUE distribution with minimal hemorrhagic transformation (HI-1). Her hospital course was complicated by episodes concerning for seizure-like activity on admission, treated with AEDs. MRA head and neck showed severe to critical stenosis of right supraclinoid ICA and moderate (about 50%) stenosis of right proximal ICA. Carotid duplex showed diffuse intimal thickening without any evidence of hemodynamically a significant carotid stenoses. Repeat CT brain showed stable hemorrhagic transformation of right MCA/ENRIQUE distribution infarct. Selective conventional cerebral angiogram (7/11) showed severe to critical right MCA M1 segment and moderate to severe supraclinoid ICA stenoses with mild extracranial proximal right ICA stenosis.     Impression:  Cerebral embolism with cerebral infarction. Right MCA and ENRIQUE distribution stroke - likely mechanism being large vessel disease i.e. symptomatic intracranial atherosclerosis leading to artery to artery embolism and less likely to be due to an embolic stroke from undetermined source.   Likely symptomatic seizures in the setting of an acute stroke    NEURO: Neurologically noted to be more lethargic overnight, repeat head CT done, stable, infectious work up done and noted to have a  UTI - started on CTX and urine cultures pending, 24 hour video EEG pending also, Continue monitoring for neurologic deterioration in setting of cerebral edema with mass effect and brain compression, maintain BP <160 in setting of possible hemorrhagic transformation on recent CT on 07/04, repeat CTH in am 7/7/19 without acute change. Now restarted on Plavix and continued ASA and high intensity statin for secondary stroke prevention in the setting of likely atheroembolic etiology of her stroke. She underwent cerebral angiogram with Dr. Mueller on 7/11 which showed mild right carotid stenosis and severe right MCA M1 segment stenosis.   Continued on Keppra 500 mg to twice daily for seizure prophylaxis - duration of AEDs is expected to be 3-6 months based on clinical course and MRI/EEG evaluation at that time, VEEG: Structural or functional abnormality in the right hemisphere, No epileptiform pattern or seizure seen, MRI Brain w/o results as above, CTA limited by body habitus. Physical therapy/OT recommended Acute Rehab. Fluoxetine 20 mg once a day started for improved motor recovery for 3 months with a plan to taper off afterwards - risks versus benefits and adverse reactions associated with medication use were discussed with patient and available family member at bedside in detail.    ANTITHROMBOTIC THERAPY:  Was on dual antiplatelet therapy with aspirin and clopidogrel for aggressive secondary stroke prevention in the setting of symptomatic intracranial atherosclerosis for 3 months followed by aspirin. Clopidogrel stopped on 07/04 for asymptomatic hemorrhagic transformation (HI-2). Restarted clopidogrel on 7/9 based on clinical and radiological stability/improvement. Check P2Y12 in 1 week (7/16/19) to ensure optimal platelet inhibition. No indications for carotid revascularization with carotid endarterectomy based on results of selective conventional cerebral angiogram    PULMONARY: CXR (07/12/19)  Clear lungs. Protecting airway, saturating well     CARDIOVASCULAR: Check TTE with bubble study, cardiac monitoring no events, Dr Luz (cardiology) eval appreciated, will obtain cardiac clearance for revascularization procedure if indicated. Gradual normotension in the setting of MCA stenosis careful initiation of home meds and will monitor for heart failure signs since patient endorses hx of chronic CHF.                             SBP goal:<160 mmHg     GASTROINTESTINAL: Dysphagia screen failed. S/S eval recommended NPO, d/w family re: alternate means of nutrition, risks/benefits/alternatives. PEG placed on 07/03. Tolerating feedings at goal. SLP re-eval as indicated.      Diet: NPO with TF via PEG     RENAL: BUN/Cr without acute change; monitor mild hyponatremia, maintain adequate hydration, good urine output, urine toxicology: Negative      Na Goal: Greater than 135     Camarena: N    HEMATOLOGY: H/H without acute change, LE doppler - negative for DVT.     DVT ppx:  LMWH    ID: afebrile, no leucocytosis     OTHER: HgbA1C 10.4, continue Humalog sliding scale and DM meds as per endocrine recommendations    DISPOSITION: Acute Rehab. She is undocumented and uninsured and was denied [facility]. Plan to optimize therapy while in hospital. Answered all patients daughters questions at bedside.     CORE MEASURES:        Admission NIHSS: 13     TPA:  YES       LDL/HDL: 131/49     Depression Screen: 0     Statin Therapy: Y     Dysphagia Screen:  FAIL     Smoking  NO      Afib  NO     Stroke Education  YES    Obtain screening ultrasound in patients who meet 1 or more of the following criteria as patient is high risk for DVT/PE upon admission:   [] History of DVT/PE  []Hypercoagulable states (Factor V Leiden, Cancer, OCP, etc. )  [X]Prolonged immobility (hemiplegia/hemiparesis/post operative or any other extended immobilization)   [] Transferred from outside facility (Rehab or Long term care)  [] Age </= to 50 54 year old woman with multiple vascular risk factors including age, HTN, DM II and HPLD was initially evaluated at OSH for acute onset of left-sided weakness through telestroke. She was treated with IV tPA and was transferred to [facility] for further management. CTA head and neck on admission showed critical stenosis versus occlusion of right supraclinoid ICA and severe to critical stenosis of right proximal ICA. MRI brain during hospitalization showed acute infarcts in the right MCA and ENRIQUE distribution with minimal hemorrhagic transformation (HI-1). Her hospital course was complicated by episodes concerning for seizure-like activity on admission, treated with AEDs. MRA head and neck showed severe to critical stenosis of right supraclinoid ICA and moderate (about 50%) stenosis of right proximal ICA. Carotid duplex showed diffuse intimal thickening without any evidence of hemodynamically a significant carotid stenoses. Repeat CT brain showed stable hemorrhagic transformation of right MCA/ENRIQUE distribution infarct. Selective conventional cerebral angiogram (7/11) showed severe to critical right MCA M1 segment and moderate to severe supraclinoid ICA stenoses with mild extracranial proximal right ICA stenosis. CT brain to review 7/12) showed expected evolution of prior right ENRIQUE distribution infarct with stable hemorrhagic transformation.     Impression:  Cerebral embolism with cerebral infarction. Right MCA and ENRIQUE distribution stroke - likely mechanism being large vessel disease i.e. symptomatic intracranial atherosclerosis leading to artery to artery embolism and less likely to be due to an embolic stroke from undetermined source.   Likely symptomatic seizures in the setting of an acute stroke   Most likely etiology of her encephalopathy would be toxic-metabolic encephalopathy in the setting of probable urinary track infection    NEURO: Neurologically noted to be more lethargic overnight, repeat head CT done, stable, infectious work up done and noted to have a  UTI - started on CTX and urine cultures pending, 24 hour video EEG pending also, Continue monitoring for neurologic deterioration in setting of cerebral edema with mass effect and brain compression, maintain BP <160 in setting of hemorrhagic transformation on recent CT on 07/04, repeat CTH in am 7/7/19 without acute change. Now restarted on Plavix and continued ASA and high intensity statin for secondary stroke prevention in the setting of likely atheroembolic etiology of her stroke. She underwent cerebral angiogram with Dr. Mueller on 7/11 which showed mild right carotid stenosis and severe right MCA M1 segment stenosis.   Continued on Keppra 500 mg to twice daily for seizure prophylaxis - duration of AEDs is expected to be 3-6 months based on clinical course and MRI/EEG evaluation at that time, VEEG: Structural or functional abnormality in the right hemisphere, No epileptiform pattern or seizure seen, MRI Brain w/o results as above, CTA limited by body habitus. Physical therapy/OT recommended Acute Rehab. Fluoxetine 20 mg once a day started for improved motor recovery for 3 months with a plan to taper off afterwards - risks versus benefits and adverse reactions associated with medication use were discussed with patient and available family member at bedside in detail.    ANTITHROMBOTIC THERAPY:  Was on dual antiplatelet therapy with aspirin and clopidogrel for aggressive secondary stroke prevention in the setting of symptomatic intracranial atherosclerosis for 3 months followed by aspirin. Clopidogrel stopped on 07/04 for asymptomatic hemorrhagic transformation (HI-2). Restarted clopidogrel on 7/9 based on clinical and radiological stability/improvement. Check P2Y12 in 1 week (7/16/19) to ensure optimal platelet inhibition. No indications for carotid revascularization with carotid endarterectomy based on results of selective conventional cerebral angiogram    PULMONARY: CXR (07/12/19)  Clear lungs. Protecting airway, saturating well     CARDIOVASCULAR: Check TTE with bubble study, cardiac monitoring no events, Dr Luz (cardiology) eval appreciated, will obtain cardiac clearance for revascularization procedure if indicated. Gradual normotension in the setting of MCA stenosis careful initiation of home meds and will monitor for heart failure signs since patient endorses hx of chronic CHF.                             SBP goal:<160 mmHg     GASTROINTESTINAL: Dysphagia screen failed. S/S eval recommended NPO, d/w family re: alternate means of nutrition, risks/benefits/alternatives. PEG placed on 07/03. Tolerating feedings at goal. SLP re-eval as indicated.      Diet: NPO with TF via PEG     RENAL: BUN/Cr without acute change; monitor mild hyponatremia, maintain adequate hydration, good urine output, urine toxicology: Negative      Na Goal: Greater than 135     C[place]rena: N    HEMATOLOGY: H/H without acute change, LE doppler - negative for DVT.     DVT ppx:  LMWH    ID: afebrile, no leucocytosis     OTHER: HgbA1C 10.4, continue Humalog sliding scale and DM meds as per endocrine recommendations    DISPOSITION: Acute Rehab. She is undocumented and uninsured and was denied [facility]. Plan to optimize therapy while in hospital. Answered all patients daughters questions at bedside.     CORE MEASURES:        Admission NIHSS: 13     TPA:  YES       LDL/HDL: 131/49     Depression Screen: 0     Statin Therapy: Y     Dysphagia Screen:  FAIL     Smoking  NO      Afib  NO     Stroke Education  YES    Obtain screening ultrasound in patients who meet 1 or more of the following criteria as patient is high risk for DVT/PE upon admission:   [] History of DVT/PE  []Hypercoagulable states (Factor V Leiden, Cancer, OCP, etc. )  [X]Prolonged immobility (hemiplegia/hemiparesis/post operative or any other extended immobilization)   [] Transferred from outside facility (Rehab or Long term care)  [] Age </= to 50

## 2019-07-13 NOTE — PROGRESS NOTE ADULT - ASSESSMENT
· Assessment	  54 year old female admitted with acute CVA.      Problem/Plan - 1:  ·  Problem: Dysphagia.  Plan: s/p  upper gastrointestinal endoscopy 7/3 with no gross lesions in esophagus.  - Gastritis.  - No gross lesions in duodenum.  - aspiration precautions.      Problem/Plan - 2:  ·  Problem: CVA (cerebral vascular accident).  Plan: - Right MCA and ENRIQUE distribution stroke   - DAPT   -Neuro f/up noted. EEG    Morbid obesity:  PT    Dw family · Assessment	  54 year old female admitted with acute CVA.     UTI:  IV Abx     Problem/Plan - 1:  ·  Problem: Dysphagia.  Plan: s/p  upper gastrointestinal endoscopy 7/3 with no gross lesions in esophagus.  - Gastritis.  - No gross lesions in duodenum.  - aspiration precautions.      Problem/Plan - 2:  ·  Problem: CVA (cerebral vascular accident).  Plan: - Right MCA and ENRIQUE distribution stroke   - DAPT   -Neuro f/up noted. EEG    Morbid obesity:  PT    Dw family

## 2019-07-13 NOTE — PROGRESS NOTE ADULT - PROBLEM SELECTOR PLAN 1
-test BG Q8h for now, Can change to q6h if tube feed regimen changed  -Increase Lantus 45 units QHS. If tube feeds held, can give Lantus 30  -Increase Humalog 8 units w/each bolus feed q8h. If tube feeds changed to q6h, would give 6 units q6h w/each feed.   -c/w Humalog moderate correction scale Q8h, can change to q6h scale if feeds changed to q6h  -discussed plan w/Stroke team  pager: [phone]

## 2019-07-13 NOTE — PROGRESS NOTE ADULT - SUBJECTIVE AND OBJECTIVE BOX
Patient is a 54y old  Female who presents with a chief complaint of CVA (2019 13:28)      SUBJECTIVE / OVERNIGHT EVENTS:    Events noted.  Awake  No N/V/Abd pain    MEDICATIONS  (STANDING):  aspirin  chewable 81 milliGRAM(s) Oral daily  atorvastatin 80 milliGRAM(s) Oral at bedtime  carvedilol 3.125 milliGRAM(s) Oral two times a day  cefTRIAXone   IVPB 1000 milliGRAM(s) IV Intermittent every 24 hours  clopidogrel Tablet 75 milliGRAM(s) Oral daily  dextrose 5%. 1000 milliLiter(s) (50 mL/Hr) IV Continuous <Continuous>  dextrose 50% Injectable 12.5 Gram(s) IV Push once  dextrose 50% Injectable 25 Gram(s) IV Push once  dextrose 50% Injectable 25 Gram(s) IV Push once  enoxaparin Injectable 40 milliGRAM(s) SubCutaneous daily  FLUoxetine Solution 20 milliGRAM(s) Oral daily  insulin glargine Injectable (LANTUS) 45 Unit(s) SubCutaneous at bedtime  insulin lispro (HumaLOG) corrective regimen sliding scale   SubCutaneous every 8 hours  insulin lispro Injectable (HumaLOG) 8 Unit(s) SubCutaneous every 8 hours  levETIRAcetam  Solution 500 milliGRAM(s) Oral two times a day  lisinopril 10 milliGRAM(s) Oral daily  nystatin    Suspension 882957 Unit(s) Oral two times a day  polyethylene glycol 3350 17 Gram(s) Oral daily    MEDICATIONS  (PRN):  acetaminophen    Suspension .. 650 milliGRAM(s) Oral every 6 hours PRN Temp greater or equal to 38C (100.4F)  acetaminophen    Suspension .. 650 milliGRAM(s) Enteral Tube every 6 hours PRN Moderate Pain (4 - 6)  bisacodyl Suppository 10 milliGRAM(s) Rectal daily PRN Constipation  dextrose 40% Gel 15 Gram(s) Oral once PRN Blood Glucose LESS THAN 70 milliGRAM(s)/deciliter  glucagon  Injectable 1 milliGRAM(s) IntraMuscular once PRN Glucose LESS THAN 70 milligrams/deciliter  nystatin Powder 1 Application(s) Topical two times a day PRN dermatitis        CAPILLARY BLOOD GLUCOSE      POCT Blood Glucose.: 271 mg/dL (2019 00:20)  POCT Blood Glucose.: 233 mg/dL (2019 22:46)  POCT Blood Glucose.: 221 mg/dL (2019 21:55)  POCT Blood Glucose.: 231 mg/dL (2019 14:15)  POCT Blood Glucose.: 234 mg/dL (2019 05:38)    I&O's Summary    2019 07:01  -  2019 07:00  --------------------------------------------------------  IN: 200 mL / OUT: 700 mL / NET: -500 mL    2019 07:01  -  2019 00:45  --------------------------------------------------------  IN: 1030 mL / OUT: 750 mL / NET: 280 mL        PHYSICAL EXAM:    NECK: Supple, No JVD  CHEST/LUNG: Clear to auscultation bilaterally; No wheezing.  HEART: Regular rate and rhythm; No murmurs, rubs, or gallops  ABDOMEN: Soft, Nontender, Nondistended; Bowel sounds present  EXTREMITIES:   No edema  NEUROLOGY: AAO       LABS:                        12.7   8.21  )-----------( 376      ( 2019 02:59 )             39.5     07-12    140  |  99  |  24<H>  ----------------------------<  223<H>  4.4   |  26  |  0.92    Ca    9.0      2019 22:55            Urinalysis Basic - ( 2019 02:47 )    Color: Yellow / Appearance: Slightly Turbid / S.018 / pH: x  Gluc: x / Ketone: Negative  / Bili: Negative / Urobili: <2 mg/dL   Blood: x / Protein: 30 mg/dL / Nitrite: Negative   Leuk Esterase: Large / RBC: 3 /HPF /  /HPF   Sq Epi: x / Non Sq Epi: 7 /HPF / Bacteria: Few      CAPILLARY BLOOD GLUCOSE      POCT Blood Glucose.: 271 mg/dL (2019 00:20)  POCT Blood Glucose.: 233 mg/dL (2019 22:46)  POCT Blood Glucose.: 221 mg/dL (2019 21:55)  POCT Blood Glucose.: 231 mg/dL (2019 14:15)  POCT Blood Glucose.: 234 mg/dL (2019 05:38)                RADIOLOGY & ADDITIONAL TESTS:    Imaging Personally Reviewed:    Consultant(s) Notes Reviewed:      Care Discussed with Consultants/Other Providers:

## 2019-07-14 LAB
GLUCOSE BLDC GLUCOMTR-MCNC: 134 MG/DL — HIGH (ref 70–99)
GLUCOSE BLDC GLUCOMTR-MCNC: 173 MG/DL — HIGH (ref 70–99)
GLUCOSE BLDC GLUCOMTR-MCNC: 207 MG/DL — HIGH (ref 70–99)
GLUCOSE BLDC GLUCOMTR-MCNC: 211 MG/DL — HIGH (ref 70–99)
GLUCOSE BLDC GLUCOMTR-MCNC: 271 MG/DL — HIGH (ref 70–99)

## 2019-07-14 PROCEDURE — 99232 SBSQ HOSP IP/OBS MODERATE 35: CPT

## 2019-07-14 RX ORDER — INSULIN LISPRO 100/ML
10 VIAL (ML) SUBCUTANEOUS EVERY 8 HOURS
Refills: 0 | Status: DISCONTINUED | OUTPATIENT
Start: 2019-07-14 | End: 2019-07-15

## 2019-07-14 RX ORDER — INSULIN LISPRO 100/ML
VIAL (ML) SUBCUTANEOUS EVERY 4 HOURS
Refills: 0 | Status: DISCONTINUED | OUTPATIENT
Start: 2019-07-14 | End: 2019-07-22

## 2019-07-14 RX ADMIN — ENOXAPARIN SODIUM 40 MILLIGRAM(S): 100 INJECTION SUBCUTANEOUS at 13:43

## 2019-07-14 RX ADMIN — INSULIN GLARGINE 45 UNIT(S): 100 INJECTION, SOLUTION SUBCUTANEOUS at 00:27

## 2019-07-14 RX ADMIN — Medication 2: at 22:16

## 2019-07-14 RX ADMIN — CARVEDILOL PHOSPHATE 3.12 MILLIGRAM(S): 80 CAPSULE, EXTENDED RELEASE ORAL at 17:15

## 2019-07-14 RX ADMIN — LEVETIRACETAM 500 MILLIGRAM(S): 250 TABLET, FILM COATED ORAL at 17:15

## 2019-07-14 RX ADMIN — Medication 20 MILLIGRAM(S): at 13:43

## 2019-07-14 RX ADMIN — LEVETIRACETAM 500 MILLIGRAM(S): 250 TABLET, FILM COATED ORAL at 06:31

## 2019-07-14 RX ADMIN — Medication 500000 UNIT(S): at 17:15

## 2019-07-14 RX ADMIN — ATORVASTATIN CALCIUM 80 MILLIGRAM(S): 80 TABLET, FILM COATED ORAL at 22:15

## 2019-07-14 RX ADMIN — Medication 8 UNIT(S): at 06:32

## 2019-07-14 RX ADMIN — Medication 500000 UNIT(S): at 06:31

## 2019-07-14 RX ADMIN — Medication 4: at 06:31

## 2019-07-14 RX ADMIN — Medication 4: at 17:15

## 2019-07-14 RX ADMIN — CEFTRIAXONE 100 MILLIGRAM(S): 500 INJECTION, POWDER, FOR SOLUTION INTRAMUSCULAR; INTRAVENOUS at 09:21

## 2019-07-14 RX ADMIN — POLYETHYLENE GLYCOL 3350 17 GRAM(S): 17 POWDER, FOR SOLUTION ORAL at 13:43

## 2019-07-14 RX ADMIN — CLOPIDOGREL BISULFATE 75 MILLIGRAM(S): 75 TABLET, FILM COATED ORAL at 13:43

## 2019-07-14 RX ADMIN — LISINOPRIL 10 MILLIGRAM(S): 2.5 TABLET ORAL at 06:31

## 2019-07-14 RX ADMIN — Medication 10 UNIT(S): at 22:17

## 2019-07-14 RX ADMIN — INSULIN GLARGINE 45 UNIT(S): 100 INJECTION, SOLUTION SUBCUTANEOUS at 22:15

## 2019-07-14 RX ADMIN — CARVEDILOL PHOSPHATE 3.12 MILLIGRAM(S): 80 CAPSULE, EXTENDED RELEASE ORAL at 06:31

## 2019-07-14 RX ADMIN — Medication 81 MILLIGRAM(S): at 13:43

## 2019-07-14 RX ADMIN — Medication 10 UNIT(S): at 13:44

## 2019-07-14 NOTE — EEG REPORT - NS EEG TEXT BOX
[facility]   COMPREHENSIVE EPILEPSY CENTER   REPORT OF CONTINUOUS VIDEO EEG     [facility]: [address] , [address], [place] [address], Ph#: [phone]  [facility]: [address], [place] [address], Ph#: [phone]  Office: [address] [place] [address], [facility] [address], [place] [address] Ph#: [phone]    Patient Name: CHAPIS MINA  Age and : 54y ([birth year])  MRN #: [id number]  Location: [place] [address]  Referring Physician: Ashok Radford    Study Date: 19 - 19    _____________________________________________________________  STUDY INFORMATION    EEG Recording Technique:  The patient underwent continuous Video-EEG monitoring, using Telemetry System hardware on the XLTek Digital System. EEG and video data were stored on a computer hard drive with important events saved in digital archive files. The material was reviewed by a physician (electroencephalographer / epileptologist) on a daily basis. Nabil and seizure detection algorithms were utilized and reviewed. An EEG Technician attended to the patient, and was available throughout daytime work hours.  The epilepsy center neurologist was available in person or on call 24-hours per day.    EEG Placement and Labeling of Electrodes:  The EEG was performed utilizing 20 channel referential EEG connections (coronal over temporal over parasagittal montage) using all standard 10-20 electrode placements with EKG, with additional electrodes placed in the inferior temporal region using the modified 10-10 montage electrode placements for elective admissions, or if deemed necessary. Recording was at a sampling rate of 256 samples per second per channel. Time synchronized digital video recording was done simultaneously with EEG recording. A low light infrared camera was used for low light recording.     _____________________________________________________________  HISTORY    Patient is a 54y old  Female who presents with a chief complaint of CVA (2019 15:43)      PERTINENT MEDICATION:  levETIRAcetam  IVPB 500 milliGRAM(s) IV Intermittent daily    _____________________________________________________________  INTERPRETATION    Findings: The background was continuous, spontaneously variable and reactive. During wakefulness, the posterior dominant rhythm consisted of well-modulated 9 Hz activity, with amplitude to 30 uV, that was only seen over the left hemisphere.    Background Slowing:  No generalized background slowing was present.    Focal Slowing:   Continuous theta/delta slowing in the right hemisphere, max ehqltw-doocxg-jkvfpccjng (Fp2/F4/C4/ F8/T8).    Sleep Background:  Drowsiness was characterized by fragmentation, attenuation, and slowing of the background activity.    Stage II sleep transients were not recorded.    Other Non-Epileptiform Findings:  Attenuation of fast activity in the right hemisphere.    Interictal Epileptiform Activity:   None were present.    Events:  Clinical events: None recorded.  Seizures: None recorded.    Activation Procedures:   Hyperventilation was not performed.    Photic stimulation was not performed.     Artifacts:  Intermittent myogenic and movement artifacts were noted.    ECG:  The heart rate on single channel ECG was predominantly between 70-80 BPM.    _____________________________________________________________  EEG SUMMARY/CLASSIFICATION    Abnormal EEG in an altered patient.  - Continuous theta/delta slowing in the right hemisphere, max [id number]-mwsrfmzqmb (Fp2/F4/C4/ F8/T8).  - Attenuation of fast activity in the right hemisphere.    _____________________________________________________________  EEG IMPRESSION/CLINICAL CORRELATE    Abnormal EEG study.  1. Cortical abnormality and structural abnormality in the right hemisphere, max xdytqs-mjjzew-wgisdxzkmy.   2. No epileptiform pattern or seizure seen.    _____________________________________________________________    Rock Morocho MD  Attending Physician, [facility] [place]
[facility] Epilepsy Center  Report of Routine EEG Study with Video      [facility]: [address], [address] [place] [address], Phone: [phone]  [facility]: [address] [place] [address], Phone: [phone]  Office: [address] [facility], [place] [address], [place] [address], Phone: [phone]    Patient Name: Sergo Chambers    Age: 54 year  : 1965  Patient ID: -, MRN #: MRN# [id number], Location: [address] [place] [address]  Referring Physician: -  EEG #: [id number]    Study Date: 2019		    Technical Information:					  On Instrument: -  Placement and Labeling of Electrodes:  The EEG was performed utilizing 20 channels referential EEG connections (coronal over temporal over parasagittal montage) using all standard 10-20 electrode placements with EKG.  Recording was at a sampling rate of 256 samples per second per channel.  Time synchronized digital video recording was done simultaneously with EEG recording.  A low light infrared camera was used for low light recording.  Nabil and seizure detection algorithms were utilized.    History:  Routine baseline performed at bedside  COR: Awake, drowsy  No HV, No photic due to patients condition  55 Y/O Female  P/W: evaluation of stroke  H/O: Stroke, DMS, HLD, HTN    Medication	  Keppra (Levetiracetam)	  Ativan (Lorazepam)	    [Abbreviation Key:  PDR=alpha rhythm/posterior dominant rhythm. A-P=anterior posterior gradient.  Amplitude: ‘very low’:<20; ‘low’:20-50; ‘medium’:; ‘high’:>200uV.  Persistence for periodic/rhythmic patterns (% of epoch) ‘rare’:<1%; ‘occasional’:1-10%; ‘frequent’:10-50%; ‘abundant’:50-90%; ‘continuous’:>90%.  Persistence for sporadic discharges: ‘rare’:<1/hr; ‘occasional’:1/min-1/hr; ‘frequent’:>1/min; ‘abundant’:>1/10 sec.  GRDA=generalized rhythmic delta activity, LRDA=lateralized rhythmic delta activity, TIRDA=temporal intermittent rhythmic delta activity, FIRDA=frontal intermittent rhythmic activity. LPD=PLED=lateralized periodic discharges, GPD=generalized periodic discharges, BiPDs=BiPLEDs=bilateral independent periodic epileptiform discharges, SIRPID=stimulus induced rhythmic, periodic, or ictal appearing discharges.  Modifiers: +F=with fast component, +S=with spike component, +R=with rhythmic component.  S-B=burst suppression pattern.  Max=maximal. N1-drowsy, N2-stage II sleep, N3-slow wave sleep.  HV=hyperventilation, PS=photic stimulation]    Study Interpretation:    FINDINGS:      Background:   -During wakefulness, there was no definitive and sustained posterior dominant rhythm. The background was disorganized.  Fragments of 12 Hz activity were seen over the left posterior head regions.     -Background was asymmetric: left hemisphere predominantly consisted of alpha frequency and beta anterior ; right hemisphere predominantly consisted of delta activity.    Background Slowing:  -No generalized background slowing was present.  -Diffuse theta and polymorphic delta slowing.    Focal Slowing:   -continuous polymorphic delta slowing right hemisphere.    Sleep Background:  -Drowsiness and stage II sleep were not recorded.    Other Non-Epileptiform Findings:  -None were present.    Interictal Epileptiform Activity:   -None were present.    Events:  -Clinical events: None recorded.  -Seizures: None recorded.    Activation Procedures:   -Hyperventilation was not performed.    -Photic stimulation was not performed.    Artifacts:  -Intermittent myogenic and movement artifacts were noted.    ECG:  -The heart rate on single channel ECG was predominantly between 60-70 BPM.    EEG Summary/Classification:  - Abnormal EEG: awake   1. continuous polymorphic delta slowing, right hemisphere  2. asymmetry, loss of faster activity over the right hemisphere    EEG Impression/Clinical Correlate:  -Structural or functional abnormality in the right hemisphere  -No epileptiform pattern or seizure seen.        Ken Ziegler MD PhD  Director, Epilepsy Division, [facility]   COMPREHENSIVE EPILEPSY CENTER   REPORT OF ROUTINE VIDEO EEG     [facility]: [address] [place] [address] , [address], [place] [address], Ph#: [phone]  [facility]: [address], [place] [address], Ph#: [phone]  Office: [address] [place] [address], [facility] [address], [place] [address] Ph#: [phone]    Patient Name: CHAPIS MINA  Age and : 54y ([birth year])  MRN #: [id number]  Location: [place] [address]  Referring Physician: Ashok Radford    Study Date: 19    _____________________________________________________________  TECHNICAL INFORMATION    Placement and Labeling of Electrodes:  The EEG was performed utilizing 20 channels referential EEG connections (coronal over temporal over parasagittal montage) using all standard 10-20 electrode placements with EKG.  Recording was at a sampling rate of 256 samples per second per channel.  Time synchronized digital video recording was done simultaneously with EEG recording.  A low light infrared camera was used for low light recording.  Nabil and seizure detection algorithms were utilized.    _____________________________________________________________  HISTORY    Patient is a 54y old  Female who presents with a chief complaint of CVA (2019 13:28)      PERTINENT MEDICATION:  levETIRAcetam  Solution 500 milliGRAM(s) Oral two times a day    _____________________________________________________________  STUDY INTERPRETATION    Findings: The background was continuous, spontaneously variable and reactive. During wakefulness, the posterior dominant rhythm consisted of well-modulated 8 Hz activity, with amplitude to 30 uV, that was more persistently seen over the left hemisphere.    Background Slowing:  None were present.    Focal Slowing:   Continuous theta/delta slowing in the right hemisphere.    Sleep Background:  Drowsiness was characterized by fragmentation, attenuation, and slowing of the background activity.    Stage II sleep transients were not recorded.    Other Non-Epileptiform Findings:  None were present.    Interictal Epileptiform Activity:   None were present.    Events:  Clinical events: None recorded.  Seizures: None recorded.    Activation Procedures:   Hyperventilation was not performed.    Photic stimulation was not performed.     Artifacts:  Intermittent myogenic and movement artifacts were noted.    ECG:  The heart rate on single channel ECG was predominantly between 80-90 BPM.    _____________________________________________________________  EEG SUMMARY/CLASSIFICATION    Abnormal EEG in the awake, drowsy states.  - Continuous theta/delta slowing in the right hemisphere.  - Mild generalized slowing.    _____________________________________________________________  EEG IMPRESSION/CLINICAL CORRELATE    Abnormal EEG study.  1. Structural abnormality in the  right hemisphere.  2. Mild nonspecific diffuse or multifocal cerebral dysfunction.   3. No epileptiform pattern or seizure seen.    _____________________________________________________________    Rock Morocho MD  Attending Physician, [facility]psy [facility]   COMPREHENSIVE EPILEPSY CENTER   REPORT OF CONTINUOUS VIDEO EEG     [facility]: [address] [place] [address] , [address], [place] [address], Ph#: [phone]  [facility]: [address], [place] [address], Ph#: [phone]  Office: [address] [place] [address], [facility] [address], [place] [address] Ph#: [phone]    Patient Name: CHAPIS ENRIQUEZ  Age and : 54y ([birth year])  MRN #: [id number]  Location: [place] [address]  Referring Physician: Ashok Radford    Study Date: 19 - 19    _____________________________________________________________  HISTORY    Patient is a 54y old  Female who presents with a chief complaint of CVA (2019 13:28)      PERTINENT MEDICATION:  levETIRAcetam  Solution 500 milliGRAM(s) Oral two times a day    _____________________________________________________________  STUDY INTERPRETATION    Findings: The background was continuous, spontaneously variable and reactive. During wakefulness, the posterior dominant rhythm consisted of well-modulated 8 Hz activity, with amplitude to 30 uV, that was more persistently seen over the left hemisphere.    Background Slowing:  None were present.    Focal Slowing:   Continuous theta/delta slowing in the right hemisphere.    Sleep Background:  Drowsiness was characterized by fragmentation, attenuation, and slowing of the background activity.    Stage II sleep transients were not recorded.     Other Non-Epileptiform Findings:  None were present.    Interictal Epileptiform Activity:   None were present.    Events:  Clinical events: None recorded.  Seizures: None recorded.    Activation Procedures:   Hyperventilation was not performed.    Photic stimulation was not performed.     Artifacts:  Intermittent myogenic and movement artifacts were noted.    ECG:  The heart rate on single channel ECG was predominantly between 80-90 BPM.    _____________________________________________________________  EEG SUMMARY/CLASSIFICATION    Abnormal EEG in the awake, drowsy states.  - Continuous theta/delta slowing in the right hemisphere.  - Mild generalized slowing.    _____________________________________________________________  EEG IMPRESSION/CLINICAL CORRELATE    Abnormal EEG study.  1. Structural abnormality in the right hemisphere.  2. Mild nonspecific diffuse or multifocal cerebral dysfunction.   3. No epileptiform pattern or seizure seen.    _____________________________________________________________    Rock Morocho MD  Attending Physician, [facility] Epilepsy [place]

## 2019-07-14 NOTE — PROGRESS NOTE ADULT - SUBJECTIVE AND OBJECTIVE BOX
Diabetes Follow up note:  Interval Hx:  54 year old female w/uncontrolled T2DM (on insulin PTA) w/retinopathy, morbid obesity here w/CVA s/p PEG on bolus feeds q8h. Pt now w/UTI on IV abx. BG values persistently elevated in 200s on present insulin regimen. Pt seen at bedside w/daughter present. Daughter states she has DM and uses insulin so does not require teaching. Pt awake and nodding yes/no to my questions when at bedside.       Review of Systems:  General: denies pain/complaints.   GI: Tolerating TFs without any N/V/D/ABD PAIN.  CV: No CP/SOB  ENDO: No S&Sx of hypoglycemia  MEDS:  atorvastatin 80 milliGRAM(s) Oral at bedtime    insulin glargine Injectable (LANTUS) 45 Unit(s) SubCutaneous at bedtime  insulin lispro (HumaLOG) corrective regimen sliding scale   SubCutaneous every 8 hours  insulin lispro Injectable (HumaLOG) 8 Unit(s) SubCutaneous every 8 hours    cefTRIAXone   IVPB 1000 milliGRAM(s) IV Intermittent every 24 hours  nystatin    Suspension 322094 Unit(s) Oral two times a day    Allergies    No Known Allergies        PE:  General: Female lying in bed. NAD.   Vital Signs Last 24 Hrs  T(C): 36.8 (14 Jul 2019 11:35), Max: 37.4 (13 Jul 2019 15:30)  T(F): 98.2 (14 Jul 2019 11:35), Max: 99.4 (13 Jul 2019 20:00)  HR: 88 (14 Jul 2019 11:35) (80 - 89)  BP: 142/73 (14 Jul 2019 11:35) (130/78 - 166/89)  BP(mean): --  RR: 18 (14 Jul 2019 11:35) (18 - 20)  SpO2: 97% (14 Jul 2019 11:35) (96% - 97%)  Abd: Soft, NT,ND, PEG in place. Obese.   Extremities: Warm.   Neuro: Awake. Appropriate. L hemiparesis.     LABS:    POCT Blood Glucose.: 211 mg/dL (07-14-19 @ 06:20)  POCT Blood Glucose.: 271 mg/dL (07-14-19 @ 00:20)  POCT Blood Glucose.: 233 mg/dL (07-13-19 @ 22:46)  POCT Blood Glucose.: 221 mg/dL (07-13-19 @ 21:55)  POCT Blood Glucose.: 231 mg/dL (07-13-19 @ 14:15)  POCT Blood Glucose.: 234 mg/dL (07-13-19 @ 05:38)  POCT Blood Glucose.: 221 mg/dL (07-12-19 @ 23:39)  POCT Blood Glucose.: 228 mg/dL (07-12-19 @ 21:11)  POCT Blood Glucose.: 280 mg/dL (07-12-19 @ 18:07)  POCT Blood Glucose.: 228 mg/dL (07-12-19 @ 14:33)  POCT Blood Glucose.: 215 mg/dL (07-12-19 @ 05:41)  POCT Blood Glucose.: 143 mg/dL (07-11-19 @ 21:08)  POCT Blood Glucose.: 127 mg/dL (07-11-19 @ 14:13)                            12.7   8.21  )-----------( 376      ( 13 Jul 2019 02:59 )             39.5       07-12    140  |  99  |  24<H>  ----------------------------<  223<H>  4.4   |  26  |  0.92    Ca    9.0      12 Jul 2019 22:55        Hemoglobin A1C, Whole Blood: 10.4 % <H> [4.0 - 5.6] (06-29-19 @ 09:33)            Contact number: [place] [phone] or [phone]

## 2019-07-14 NOTE — PROGRESS NOTE ADULT - ASSESSMENT
· Assessment	  54 year old female admitted with acute CVA.     UTI:  IV Abx     Problem/Plan - 1:  ·  Problem: Dysphagia.  Plan: s/p  upper gastrointestinal endoscopy 7/3 with no gross lesions in esophagus.  - GI f/up noted.     Problem/Plan - 2:  ·  Problem: CVA (cerebral vascular accident).  Plan: - Right MCA and ENRIQUE distribution stroke   - DAPT   -Neuro f/up noted. EEG: No epileptiform activity    Morbid obesity:  PT    Dw family

## 2019-07-14 NOTE — PROGRESS NOTE ADULT - SUBJECTIVE AND OBJECTIVE BOX
Patient is a 54y old  Female who presents with a chief complaint of CVA (2019 13:28)      SUBJECTIVE / OVERNIGHT EVENTS:    Events noted.  CONSTITUTIONAL: No fever,  or fatigue  RESPIRATORY: No cough, wheezing,  No shortness of breath  CARDIOVASCULAR: No chest pain, palpitations, dizziness, or leg swelling  GASTROINTESTINAL: No abdominal or epigastric pain. No nausea, vomiting.  NEUROLOGICAL: No headaches,     MEDICATIONS  (STANDING):  aspirin  chewable 81 milliGRAM(s) Oral daily  atorvastatin 80 milliGRAM(s) Oral at bedtime  carvedilol 3.125 milliGRAM(s) Oral two times a day  cefTRIAXone   IVPB 1000 milliGRAM(s) IV Intermittent every 24 hours  clopidogrel Tablet 75 milliGRAM(s) Oral daily  dextrose 5%. 1000 milliLiter(s) (50 mL/Hr) IV Continuous <Continuous>  dextrose 50% Injectable 12.5 Gram(s) IV Push once  dextrose 50% Injectable 25 Gram(s) IV Push once  dextrose 50% Injectable 25 Gram(s) IV Push once  enoxaparin Injectable 40 milliGRAM(s) SubCutaneous daily  FLUoxetine Solution 20 milliGRAM(s) Oral daily  insulin glargine Injectable (LANTUS) 45 Unit(s) SubCutaneous at bedtime  insulin lispro (HumaLOG) corrective regimen sliding scale   SubCutaneous every 4 hours  insulin lispro Injectable (HumaLOG) 10 Unit(s) SubCutaneous every 8 hours  levETIRAcetam  Solution 500 milliGRAM(s) Oral two times a day  lisinopril 10 milliGRAM(s) Oral daily  nystatin    Suspension 556125 Unit(s) Oral two times a day  polyethylene glycol 3350 17 Gram(s) Oral daily    MEDICATIONS  (PRN):  acetaminophen    Suspension .. 650 milliGRAM(s) Oral every 6 hours PRN Temp greater or equal to 38C (100.4F)  acetaminophen    Suspension .. 650 milliGRAM(s) Enteral Tube every 6 hours PRN Moderate Pain (4 - 6)  bisacodyl Suppository 10 milliGRAM(s) Rectal daily PRN Constipation  dextrose 40% Gel 15 Gram(s) Oral once PRN Blood Glucose LESS THAN 70 milliGRAM(s)/deciliter  glucagon  Injectable 1 milliGRAM(s) IntraMuscular once PRN Glucose LESS THAN 70 milligrams/deciliter  nystatin Powder 1 Application(s) Topical two times a day PRN dermatitis        CAPILLARY BLOOD GLUCOSE      POCT Blood Glucose.: 207 mg/dL (2019 17:12)  POCT Blood Glucose.: 134 mg/dL (2019 13:40)  POCT Blood Glucose.: 211 mg/dL (2019 06:20)  POCT Blood Glucose.: 271 mg/dL (2019 00:20)  POCT Blood Glucose.: 233 mg/dL (2019 22:46)  POCT Blood Glucose.: 221 mg/dL (2019 21:55)    I&O's Summary    2019 07:01  -  2019 07:00  --------------------------------------------------------  IN: 1030 mL / OUT: 1550 mL / NET: -520 mL    2019 07:01  -  2019 20:42  --------------------------------------------------------  IN: 880 mL / OUT: 0 mL / NET: 880 mL        PHYSICAL EXAM:  GENERAL: NAD  NECK: Supple, No JVD  CHEST/LUNG: Clear to auscultation bilaterally; No wheezing.  HEART: Regular rate and rhythm; No murmurs, rubs, or gallops  ABDOMEN: Soft, Nontender, Nondistended; Bowel sounds present  EXTREMITIES:   No edema  NEUROLOGY: AAO X 3      LABS:                        12.7   8.21  )-----------( 376      ( 2019 02:59 )             39.5     07-12    140  |  99  |  24<H>  ----------------------------<  223<H>  4.4   |  26  |  0.92    Ca    9.0      2019 22:55            Urinalysis Basic - ( 2019 02:47 )    Color: Yellow / Appearance: Slightly Turbid / S.018 / pH: x  Gluc: x / Ketone: Negative  / Bili: Negative / Urobili: <2 mg/dL   Blood: x / Protein: 30 mg/dL / Nitrite: Negative   Leuk Esterase: Large / RBC: 3 /HPF /  /HPF   Sq Epi: x / Non Sq Epi: 7 /HPF / Bacteria: Few      CAPILLARY BLOOD GLUCOSE      POCT Blood Glucose.: 207 mg/dL (2019 17:12)  POCT Blood Glucose.: 134 mg/dL (2019 13:40)  POCT Blood Glucose.: 211 mg/dL (2019 06:20)  POCT Blood Glucose.: 271 mg/dL (2019 00:20)  POCT Blood Glucose.: 233 mg/dL (2019 22:46)  POCT Blood Glucose.: 221 mg/dL (2019 21:55)     @ 16:43  Culture-urine --  Culture results   >100,000 CFU/ml Gram Negative Rods  method type --  Organism --  Organism Identification --  Specimen source .Urine            @ 16:43  Culture blood --  Culture results   >100,000 CFU/ml Gram Negative Rods  Gram stain --  Gram stain blood --  Method type --  Organism --  Organism identification --  Specimen source .Urine      RADIOLOGY & ADDITIONAL TESTS:    Imaging Personally Reviewed:    Consultant(s) Notes Reviewed:      Care Discussed with Consultants/Other Providers:

## 2019-07-14 NOTE — PROGRESS NOTE ADULT - SUBJECTIVE AND OBJECTIVE BOX
INTERVAL HPI/OVERNIGHT EVENTS:    Pt seen and examined, family at bedside. tolerating PEG feeds well as per RN, no GI events/complaints  undergoing V EEG    MEDICATIONS  (STANDING):  aspirin  chewable 81 milliGRAM(s) Oral daily  atorvastatin 80 milliGRAM(s) Oral at bedtime  carvedilol 3.125 milliGRAM(s) Oral two times a day  cefTRIAXone   IVPB 1000 milliGRAM(s) IV Intermittent every 24 hours  clopidogrel Tablet 75 milliGRAM(s) Oral daily  dextrose 5%. 1000 milliLiter(s) (50 mL/Hr) IV Continuous <Continuous>  dextrose 50% Injectable 12.5 Gram(s) IV Push once  dextrose 50% Injectable 25 Gram(s) IV Push once  dextrose 50% Injectable 25 Gram(s) IV Push once  enoxaparin Injectable 40 milliGRAM(s) SubCutaneous daily  FLUoxetine Solution 20 milliGRAM(s) Oral daily  insulin glargine Injectable (LANTUS) 45 Unit(s) SubCutaneous at bedtime  insulin lispro (HumaLOG) corrective regimen sliding scale   SubCutaneous every 8 hours  insulin lispro Injectable (HumaLOG) 8 Unit(s) SubCutaneous every 8 hours  levETIRAcetam  Solution 500 milliGRAM(s) Oral two times a day  lisinopril 10 milliGRAM(s) Oral daily  nystatin    Suspension [id number] Unit(s) Oral two times a day  polyethylene glycol 3350 17 Gram(s) Oral daily    MEDICATIONS  (PRN):  acetaminophen    Suspension .. 650 milliGRAM(s) Oral every 6 hours PRN Temp greater or equal to 38C (100.4F)  acetaminophen    Suspension .. 650 milliGRAM(s) Enteral Tube every 6 hours PRN Moderate Pain (4 - 6)  bisacodyl Suppository 10 milliGRAM(s) Rectal daily PRN Constipation  dextrose 40% Gel 15 Gram(s) Oral once PRN Blood Glucose LESS THAN 70 milliGRAM(s)/deciliter  glucagon  Injectable 1 milliGRAM(s) IntraMuscular once PRN Glucose LESS THAN 70 milligrams/deciliter  nystatin Powder 1 Application(s) Topical two times a day PRN dermatitis      Allergies    No Known Allergies    Intolerances        Review of Systems:    General:  No wt loss, fevers, chills, night sweats,fatigue,   Eyes:  Good vision, no reported pain  ENT:  No sore throat, pain, runny nose, dysphagia  CV:  No pain, palpitations, hypo/hypertension  Resp:  No dyspnea, cough, tachypnea, wheezing  GI:  No pain, No nausea, No vomiting, No diarrhea, No constipation, No weight loss, No fever, No pruritis, No rectal bleeding, No melena, No dysphagia  :  No pain, bleeding, incontinence, nocturia  Muscle:  No pain, weakness  Neuro:  No weakness, tingling, memory problems  Psych:  No fatigue, insomnia, mood problems, depression  Endocrine:  No polyuria, polydypsia, cold/heat intolerance  Heme:  No petechiae, ecchymosis, easy bruisability  Skin:  No rash, tattoos, scars, edema      Vital Signs Last 24 Hrs  T(C): 36.6 (2019 07:52), Max: 37.4 (2019 15:30)  T(F): 97.9 (2019 07:52), Max: 99.4 (2019 20:00)  HR: 89 (2019 07:52) (80 - 89)  BP: 160/70 (2019 07:52) (130/78 - 166/89)  BP(mean): --  RR: 18 (2019 07:52) (18 - 20)  SpO2: 96% (2019 07:52) (96% - 97%)    PHYSICAL EXAM:    GENERAL:  Appears stated age, well-groomed, well-nourished, no distress  HEENT:  NC/AT,  conjunctivae clear and pink, no thyromegaly, nodules, adenopathy, no JVD, sclera -anicteric  CHEST:  Full & symmetric excursion, no increased effort, breath sounds clear  HEART:  Regular rhythm, S1, S2, no murmur/rub/S3/S4, no abdominal bruit, no edema  ABDOMEN:  Soft, non-tender, non-distended, normoactive bowel sounds,  + PEG c/d/i  EXTEREMITIES:  no cyanosis, clubbing or edema  SKIN:  No rash/erythema/ecchymoses/petechiae/wounds/abscess/warm/dry  NEURO: opens to voice and light touch, drowsy but easily arousable, eyelid opening apraxia, oriented to name, place and year, fluent speech          LABS:                        12.7   8.21  )-----------( 376      ( 2019 02:59 )             39.5     07-12    140  |  99  |  24<H>  ----------------------------<  223<H>  4.4   |  26  |  0.92    Ca    9.0      2019 22:55        Urinalysis Basic - ( 2019 02:47 )    Color: Yellow / Appearance: Slightly Turbid / S.018 / pH: x  Gluc: x / Ketone: Negative  / Bili: Negative / Urobili: <2 mg/dL   Blood: x / Protein: 30 mg/dL / Nitrite: Negative   Leuk Esterase: Large / RBC: 3 /HPF /  /HPF   Sq Epi: x / Non Sq Epi: 7 /HPF / Bacteria: Few        RADIOLOGY & ADDITIONAL TESTS:

## 2019-07-14 NOTE — PROGRESS NOTE ADULT - PROBLEM SELECTOR PLAN 1
-test BG Q4h (correspond times w/q8h feeds).   -c/w Lantus 45 units QHS for now until more data can be obtained  -Increase Humalog 10 units Q8h w/each bolus feed.   -Changed Humalog moderate correction scale to q4h (correspond times w/feeds-every other 4 hours)  -Once glucose levels improved w/insulin regimen better assessed will transition back to q8h FS  -Daughter will care for pt at discharge-uses insulin at home  -Discussed plan w/pt, daughter and RN  pager: [phone]

## 2019-07-14 NOTE — PROGRESS NOTE ADULT - ASSESSMENT
54 year old woman with multiple vascular risk factors including age, HTN, DM II and HPLD was initially evaluated at OSH for acute onset of left-sided weakness through telestroke. She was treated with IV tPA and was transferred to [facility] for further management. CTA head and neck on admission showed critical stenosis versus occlusion of right supraclinoid ICA and severe to critical stenosis of right proximal ICA. MRI brain during hospitalization showed acute infarcts in the right MCA and ENRIQUE distribution with minimal hemorrhagic transformation (HI-1). Her hospital course was complicated by episodes concerning for seizure-like activity on admission, treated with AEDs. MRA head and neck showed severe to critical stenosis of right supraclinoid ICA and moderate (about 50%) stenosis of right proximal ICA. Carotid duplex showed diffuse intimal thickening without any evidence of hemodynamically a significant carotid stenoses. Repeat CT brain showed stable hemorrhagic transformation of right MCA/ENRIQUE distribution infarct. Selective conventional cerebral angiogram (7/11) showed severe to critical right MCA M1 segment and moderate to severe supraclinoid ICA stenoses with mild extracranial proximal right ICA stenosis. CT brain to review 7/12) showed expected evolution of prior right ENRIQUE distribution infarct with stable hemorrhagic transformation.     Impression:  Cerebral embolism with cerebral infarction. Right MCA and ENRIQUE distribution stroke - likely mechanism being large vessel disease i.e. symptomatic intracranial atherosclerosis leading to artery to artery embolism and less likely to be due to an embolic stroke from undetermined source.   Likely symptomatic seizures in the setting of an acute stroke   Most likely etiology of her encephalopathy would be toxic-metabolic encephalopathy in the setting of probable urinary track infection    NEURO: Neurologically noted to be more lethargic overnight, repeat head CT done, stable, infectious work up done and noted to have a  UTI - started on CTX and urine cultures pending, 24 hour video EEG pending also, Continue monitoring for neurologic deterioration in setting of cerebral edema with mass effect and brain compression, maintain BP <160 in setting of hemorrhagic transformation on recent CT on 07/04, repeat CTH in am 7/7/19 without acute change. Now restarted on Plavix and continued ASA and high intensity statin for secondary stroke prevention in the setting of likely atheroembolic etiology of her stroke. She underwent cerebral angiogram with Dr. Mueller on 7/11 which showed mild right carotid stenosis and severe right MCA M1 segment stenosis.   Continued on Keppra 500 mg to twice daily for seizure prophylaxis - duration of AEDs is expected to be 3-6 months based on clinical course and MRI/EEG evaluation at that time, VEEG: Structural or functional abnormality in the right hemisphere, No epileptiform pattern or seizure seen, MRI Brain w/o results as above, CTA limited by body habitus. Physical therapy/OT recommended Acute Rehab. Fluoxetine 20 mg once a day started for improved motor recovery for 3 months with a plan to taper off afterwards - risks versus benefits and adverse reactions associated with medication use were discussed with patient and available family member at bedside in detail.    ANTITHROMBOTIC THERAPY:  Was on dual antiplatelet therapy with aspirin and clopidogrel for aggressive secondary stroke prevention in the setting of symptomatic intracranial atherosclerosis for 3 months followed by aspirin. Clopidogrel stopped on 07/04 for asymptomatic hemorrhagic transformation (HI-2). Restarted clopidogrel on 7/9 based on clinical and radiological stability/improvement. Check P2Y12 in 1 week (7/16/19) to ensure optimal platelet inhibition. No indications for carotid revascularization with carotid endarterectomy based on results of selective conventional cerebral angiogram    PULMONARY: CXR (07/12/19)  Clear lungs. Protecting airway, saturating well     CARDIOVASCULAR: Check TTE with bubble study, cardiac monitoring no events, Dr Luz (cardiology) eval appreciated, will obtain cardiac clearance for revascularization procedure if indicated. Gradual normotension in the setting of MCA stenosis careful initiation of home meds and will monitor for heart failure signs since patient endorses hx of chronic CHF.                             SBP goal:<160 mmHg     GASTROINTESTINAL: Dysphagia screen failed. S/S eval recommended NPO, d/w family re: alternate means of nutrition, risks/benefits/alternatives. PEG placed on 07/03. Tolerating feedings at goal. SLP re-eval as indicated.      Diet: NPO with TF via PEG     RENAL: BUN/Cr without acute change; monitor mild hyponatremia, maintain adequate hydration, good urine output, urine toxicology: Negative      Na Goal: Greater than 135     [place]: N    HEMATOLOGY: H/H without acute change, LE doppler - negative for DVT.     DVT ppx:  LMWH    ID: afebrile, no leucocytosis     OTHER: HgbA1C 10.4, continue Humalog sliding scale and DM meds as per endocrine recommendations    DISPOSITION: Acute Rehab. She is undocumented and uninsured and was denied [facility]. Plan to optimize therapy while in hospital. Answered all patients daughters questions at bedside.     CORE MEASURES:        Admission NIHSS: 13     TPA:  YES       LDL/HDL: 131/49     Depression Screen: 0     Statin Therapy: Y     Dysphagia Screen:  FAIL     Smoking  NO      Afib  NO     Stroke Education  YES    Obtain screening ultrasound in patients who meet 1 or more of the following criteria as patient is high risk for DVT/PE upon admission:   [] History of DVT/PE  []Hypercoagulable states (Factor V Leiden, Cancer, OCP, etc. )  [X]Prolonged immobility (hemiplegia/hemiparesis/post operative or any other extended immobilization)   [] Transferred from outside facility (Rehab or Long term care)  [] Age </= to 50 54 year old woman with multiple vascular risk factors including age, HTN, DM II and HPLD was initially evaluated at OSH for acute onset of left-sided weakness through telestroke. She was treated with IV tPA and was transferred to [facility] for further management. CTA head and neck on admission showed critical stenosis versus occlusion of right supraclinoid ICA and severe to critical stenosis of right proximal ICA. MRI brain during hospitalization showed acute infarcts in the right MCA and ENRIQUE distribution with minimal hemorrhagic transformation (HI-1). Her hospital course was complicated by episodes concerning for seizure-like activity on admission, treated with AEDs. MRA head and neck showed severe to critical stenosis of right supraclinoid ICA and moderate (about 50%) stenosis of right proximal ICA. Carotid duplex showed diffuse intimal thickening without any evidence of hemodynamically a significant carotid stenoses. Repeat CT brain showed stable hemorrhagic transformation of right MCA/ENRIQUE distribution infarct. Selective conventional cerebral angiogram (7/11) showed severe to critical right MCA M1 segment and moderate to severe supraclinoid ICA stenoses with mild extracranial proximal right ICA stenosis. CT brain to review 7/12) showed expected evolution of prior right ENRIQUE distribution infarct with stable hemorrhagic transformation.     Impression:  Cerebral embolism with cerebral infarction. Right MCA and ENRIQUE distribution stroke - likely mechanism being large vessel disease i.e. symptomatic intracranial atherosclerosis leading to artery to artery embolism and less likely to be due to an embolic stroke from undetermined source.   Likely symptomatic seizures in the setting of an acute stroke   Most likely etiology of her encephalopathy would be toxic-metabolic encephalopathy in the setting of probable urinary track infection    NEURO: Neurologically noted to be more lethargic previously, now improved, 24 hour video EEG pending also, Continue monitoring for neurologic deterioration in setting of cerebral edema with mass effect and brain compression, maintain BP <160 in setting of hemorrhagic transformation on recent CT on 07/04, repeat CTH in am 7/7/19 without acute change. Now restarted on Plavix and continued ASA and high intensity statin for secondary stroke prevention in the setting of likely atheroembolic etiology of her stroke. She underwent cerebral angiogram with Dr. Mueller on 7/11 which showed mild right carotid stenosis and severe right MCA M1 segment stenosis.   Continued on Keppra 500 mg to twice daily for seizure prophylaxis - duration of AEDs is expected to be 3-6 months based on clinical course and MRI/EEG evaluation at that time, VEEG: Structural or functional abnormality in the right hemisphere, No epileptiform pattern or seizure seen, MRI Brain w/o results as above, CTA limited by body habitus. Physical therapy/OT recommended Acute Rehab. Fluoxetine 20 mg once a day started for improved motor recovery for 3 months with a plan to taper off afterwards - risks versus benefits and adverse reactions associated with medication use were discussed with patient and available family member at bedside in detail.    ANTITHROMBOTIC THERAPY:  Was on dual antiplatelet therapy with aspirin and clopidogrel for aggressive secondary stroke prevention in the setting of symptomatic intracranial atherosclerosis for 3 months followed by aspirin. Clopidogrel stopped on 07/04 for asymptomatic hemorrhagic transformation (HI-2). Restarted clopidogrel on 7/9 based on clinical and radiological stability/improvement. Check P2Y12 in 1 week (7/16/19) to ensure optimal platelet inhibition. No indications for carotid revascularization with carotid endarterectomy based on results of selective conventional cerebral angiogram    PULMONARY: CXR (07/12/19)  Clear lungs. Protecting airway, saturating well     CARDIOVASCULAR: Check TTE with bubble study, cardiac monitoring no events, Dr Luz (cardiology) eval appreciated, will obtain cardiac clearance for revascularization procedure if indicated. Gradual normotension in the setting of MCA stenosis careful initiation of home meds and will monitor for heart failure signs since patient endorses hx of chronic CHF.                             SBP goal:<160 mmHg     GASTROINTESTINAL: Dysphagia screen failed. S/S eval recommended NPO, d/w family re: alternate means of nutrition, risks/benefits/alternatives. PEG placed on 07/03. Tolerating feedings at goal. SLP re-eval as indicated.      Diet: NPO with TF via PEG     RENAL: BUN/Cr without acute change; monitor mild hyponatremia, maintain adequate hydration, good urine output, urine toxicology: Negative      Na Goal: Greater than 135     [place]: N    HEMATOLOGY: H/H without acute change, LE doppler - negative for DVT.     DVT ppx:  LMWH    ID: afebrile, no leucocytosis     OTHER: HgbA1C 10.4, continue Humalog sliding scale and DM meds as per endocrine recommendations    DISPOSITION: Acute Rehab. She is undocumented and uninsured and was denied [facility]. Plan to optimize therapy while in hospital. Answered all patients daughters questions at bedside.     CORE MEASURES:        Admission NIHSS: 13     TPA:  YES       LDL/HDL: 131/49     Depression Screen: 0     Statin Therapy: Y     Dysphagia Screen:  FAIL     Smoking  NO      Afib  NO     Stroke Education  YES    Obtain screening ultrasound in patients who meet 1 or more of the following criteria as patient is high risk for DVT/PE upon admission:   [] History of DVT/PE  []Hypercoagulable states (Factor V Leiden, Cancer, OCP, etc. )  [X]Prolonged immobility (hemiplegia/hemiparesis/post operative or any other extended immobilization)   [] Transferred from outside facility (Rehab or Long term care)  [] Age </= to 50

## 2019-07-14 NOTE — PROGRESS NOTE ADULT - SUBJECTIVE AND OBJECTIVE BOX
THE PATIENT WAS SEEN AND EXAMINED BY ME WITH THE HOUSESTAFF AND STROKE TEAM DURING MORNING ROUNDS.     HPI:  Patient is a 54 year old Female w/ PMHx of HTN, HLD, DM transferred from [facility] for evaluation of stroke. Per daughter at bedside, last known normal 2pm on day of admission, patient had sudden onset left sided paralysis, slurred speech, received TPA at at [place]. NIHSS 13, MRS 1    SUBJECTIVE: No events overnight.  No new neurologic complaints.      ROS: All negative except documented above.    acetaminophen    Suspension .. 650 milliGRAM(s) Oral every 6 hours PRN  acetaminophen    Suspension .. 650 milliGRAM(s) Enteral Tube every 6 hours PRN  aspirin  chewable 81 milliGRAM(s) Oral daily  atorvastatin 80 milliGRAM(s) Oral at bedtime  bisacodyl Suppository 10 milliGRAM(s) Rectal daily PRN  carvedilol 3.125 milliGRAM(s) Oral two times a day  cefTRIAXone   IVPB 1000 milliGRAM(s) IV Intermittent every 24 hours  clopidogrel Tablet 75 milliGRAM(s) Oral daily  dextrose 40% Gel 15 Gram(s) Oral once PRN  dextrose 5%. 1000 milliLiter(s) IV Continuous <Continuous>  dextrose 50% Injectable 12.5 Gram(s) IV Push once  dextrose 50% Injectable 25 Gram(s) IV Push once  dextrose 50% Injectable 25 Gram(s) IV Push once  enoxaparin Injectable 40 milliGRAM(s) SubCutaneous daily  FLUoxetine Solution 20 milliGRAM(s) Oral daily  glucagon  Injectable 1 milliGRAM(s) IntraMuscular once PRN  insulin glargine Injectable (LANTUS) 45 Unit(s) SubCutaneous at bedtime  insulin lispro (HumaLOG) corrective regimen sliding scale   SubCutaneous every 8 hours  insulin lispro Injectable (HumaLOG) 8 Unit(s) SubCutaneous every 8 hours  levETIRAcetam  Solution 500 milliGRAM(s) Oral two times a day  lisinopril 10 milliGRAM(s) Oral daily  nystatin    Suspension 930793 Unit(s) Oral two times a day  nystatin Powder 1 Application(s) Topical two times a day PRN  polyethylene glycol 3350 17 Gram(s) Oral daily    PHYSICAL EXAM:   Vital Signs Last 24 Hrs  T(C): 37.1 (13 Jul 2019 23:51), Max: 37.4 (13 Jul 2019 15:30)  T(F): 98.7 (13 Jul 2019 23:51), Max: 99.4 (13 Jul 2019 20:00)  HR: 87 (13 Jul 2019 23:51) (80 - 88)  BP: 130/78 (13 Jul 2019 23:51) (130/78 - 166/89)  RR: 20 (13 Jul 2019 23:51) (18 - 20)  SpO2: 97% (13 Jul 2019 23:51) (96% - 97%)    General: No acute distress  HEENT: EOM intact, left eye legally blind  Abdomen: Soft, nontender, nondistended   Extremities: No edema    NEUROLOGICAL EXAM:  Mental status: Eyes open, awake, alert, oriented to name, place and year, fluent speech, repetition intact, follows simple commands, abulia+   Cranial Nerves: UMN type left facial droop, no nystagmus, moderate to severe dysarthria, LHHA by blink to threat   Motor exam: RUE and RLE spontaneous movement against gravity, Left hypotonic hemiplegia   Sensation: Left side appears intact to noxious stimuli   Coordination/ Gait: No dysmetria on right side, gait not checked    LABS:                        12.7   8.21  )-----------( 376      ( 13 Jul 2019 02:59 )             39.5    07-12    140  |  99  |  24<H>  ----------------------------<  223<H>  4.4   |  26  |  0.92    Ca    9.0      12 Jul 2019 22:55      Hemoglobin A1C, Whole Blood: 10.4 % (06-29 @ 09:33)      IMAGING: Reviewed by me.     CT Head No Cont (07.12.19):  Evolving right frontal parietal subacute infarction with hemorrhagic transformation is again noted, is stable compared to 07/09/2019 exam.    CT Head No Cont (07.09.19)  No significant interval change in the right MCA evolving subacute infarct   with hemorrhagic transformation, edema, mass effect and minimal rightward   shift.    CT Head No Cont (07.07.19)   Redemonstration  unchanged acute to subacute infarcts with hemorrhagic   conversion in the right cerebral hemisphere from 7/4/2019.    VA Duplex Carotid, Bilat (07.05.19)   Diffuse intimal thickening without duplex evidence of   hemodynamically significant stenosis.     CT Head No Cont (07.04.19)  Increasing lucency with new mass effect and mild midline   shift to the left involving acute right anterior cerebral artery infarct   with involvement of the right corona radiata and centrum semiovale ovale   compared with 6/29/2019. Petechial hemorrhage along the high convexity.    MRA BRAIN/NECK (07/02/19): In comparison with CTA,  atherosclerotic calcification with stenosis of the right greater than left cavernous and supraclinoid ICA segments.   Poorly delineated distal right ICA bifurcation segment,  right A1   segment, with proximal right M1 segment stenosis and poststenotic   dilatation. Stenosis with narrowing of the distal left M1 MCA segment.   There is a dominant left A1 supplying the anterior communicating artery,   there is multifocal stenosis of the distal MCA branch vasculature.    Dominant intradural right vertebral artery supplies the basilar artery,   the left vertebral artery is hypoplastic and poorly visualized in the   cervical course and intradurally with multifocal  narrowing and stenosis.   There are multifocal stenosis of the posterior cerebral arteries.    Possible bovine left common carotid artery, proximal left subclavian   artery is not well-visualized  with possible narrowing. Bilateral common   carotid arteries are patent,  right ICA origin stenosis of approximately   50%, right ICA is decreased in caliber, left ICA is dominant and patent   without ICA origin   stenosis. Vessels are tortuous likely reflecting   hypertension.      Brain MRI (06/29/19) There is evidence of abnormal stricture diffusion seen involving the right frontal parietal region. Involvement of the right basal ganglia region is seen as well. This is compatible with an area of acute infarct.   Involvement of the right anterior cerebral and middle cerebral artery territories are identified. Scattered areas of abnormal susceptibility are identified which is likely compatible with some hemorrhagic transformation. Localized mass effect is seen consisting of sulcal effacement. No significant shift or herniation is seen.    Head CT (06/30/19) Abnormal areas of low-attenuation involving the right frontal and right parietal cortical subcortical region are again seen. These findings are compatible with areas of old infarcts. Abnormal low-attenuation involving the left alla is also again seen which could be compatible with an age-indeterminate infarct. There is no evidence of abnormal low-attenuation seen involving the high right frontal cortical subcortical region. Involvement of the right basal   ganglia and anterior corona radiata regions as well. This is predominantly seen medially and compatible with an acute right anterior cerebral artery infarct. There is localized mass effect consisting of sulcal effacement. No significant shift or herniation is seen.     CTA Head/Neck (06/28/19) CT ANGIOGRAPHY NECK:  Markedly limited examination. Tandem moderate to severe stenoses are suspected in the right carotid bulb and proximal right internal carotid artery. 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    CT ANGIOGRAPHY BRAIN: Markedly limited examination. Limited evaluation of the intracranial internal carotid arteries. Stenoses or occlusion in the distal cavernous segments and ophthalmic segments of the bilateral internal carotid arteries cannot be excluded. Apparent severe stenosis in the distal supraclinoid right internal carotid artery and right carotid terminus with possible occlusion of the A1 segment of the right anterior cerebral artery.  The right A2 segment fills across the anterior to indicating artery.  There appears to be posterior distal right ENRIQUE vessels within the medial right frontal lobe, raising the possibility of early cerebral infarction.

## 2019-07-14 NOTE — PROGRESS NOTE ADULT - ASSESSMENT
54 year old female w/uncontrolled T2DM (HbA1c 10.4%) c/b retinopathy here w/CVA L sided hemiplegia. Bolus feeds q8h now hyperglycemic in setting of UTI. Will increase frequency of FS testing and correction to improve glycemic control to q4h for now. BG goal (100-180mg/dl).

## 2019-07-15 PROBLEM — I10 ESSENTIAL (PRIMARY) HYPERTENSION: Chronic | Status: ACTIVE | Noted: 2019-06-28

## 2019-07-15 PROBLEM — E11.69 TYPE 2 DIABETES MELLITUS WITH OTHER SPECIFIED COMPLICATION: Chronic | Status: ACTIVE | Noted: 2019-06-28

## 2019-07-15 PROBLEM — E78.5 HYPERLIPIDEMIA, UNSPECIFIED: Chronic | Status: ACTIVE | Noted: 2019-06-28

## 2019-07-15 LAB
-  AMIKACIN: SIGNIFICANT CHANGE UP
-  AMPICILLIN/SULBACTAM: SIGNIFICANT CHANGE UP
-  AMPICILLIN: SIGNIFICANT CHANGE UP
-  AZTREONAM: SIGNIFICANT CHANGE UP
-  CEFEPIME: SIGNIFICANT CHANGE UP
-  CEFOXITIN: SIGNIFICANT CHANGE UP
-  CEFTRIAXONE: SIGNIFICANT CHANGE UP
-  CIPROFLOXACIN: SIGNIFICANT CHANGE UP
-  ERTAPENEM: SIGNIFICANT CHANGE UP
-  GENTAMICIN: SIGNIFICANT CHANGE UP
-  LEVOFLOXACIN: SIGNIFICANT CHANGE UP
-  MEROPENEM: SIGNIFICANT CHANGE UP
-  NITROFURANTOIN: SIGNIFICANT CHANGE UP
-  PIPERACILLIN/TAZOBACTAM: SIGNIFICANT CHANGE UP
-  TOBRAMYCIN: SIGNIFICANT CHANGE UP
-  TRIMETHOPRIM/SULFAMETHOXAZOLE: SIGNIFICANT CHANGE UP
CULTURE RESULTS: SIGNIFICANT CHANGE UP
GLUCOSE BLDC GLUCOMTR-MCNC: 150 MG/DL — HIGH (ref 70–99)
GLUCOSE BLDC GLUCOMTR-MCNC: 153 MG/DL — HIGH (ref 70–99)
GLUCOSE BLDC GLUCOMTR-MCNC: 167 MG/DL — HIGH (ref 70–99)
GLUCOSE BLDC GLUCOMTR-MCNC: 202 MG/DL — HIGH (ref 70–99)
GLUCOSE BLDC GLUCOMTR-MCNC: 219 MG/DL — HIGH (ref 70–99)
GLUCOSE BLDC GLUCOMTR-MCNC: 231 MG/DL — HIGH (ref 70–99)
METHOD TYPE: SIGNIFICANT CHANGE UP
ORGANISM # SPEC MICROSCOPIC CNT: SIGNIFICANT CHANGE UP
ORGANISM # SPEC MICROSCOPIC CNT: SIGNIFICANT CHANGE UP
SPECIMEN SOURCE: SIGNIFICANT CHANGE UP

## 2019-07-15 PROCEDURE — 99232 SBSQ HOSP IP/OBS MODERATE 35: CPT

## 2019-07-15 RX ORDER — INSULIN LISPRO 100/ML
12 VIAL (ML) SUBCUTANEOUS EVERY 8 HOURS
Refills: 0 | Status: DISCONTINUED | OUTPATIENT
Start: 2019-07-15 | End: 2019-07-16

## 2019-07-15 RX ORDER — CARVEDILOL PHOSPHATE 80 MG/1
3.12 CAPSULE, EXTENDED RELEASE ORAL ONCE
Refills: 0 | Status: COMPLETED | OUTPATIENT
Start: 2019-07-15 | End: 2019-07-15

## 2019-07-15 RX ORDER — LISINOPRIL 2.5 MG/1
20 TABLET ORAL DAILY
Refills: 0 | Status: DISCONTINUED | OUTPATIENT
Start: 2019-07-15 | End: 2019-08-26

## 2019-07-15 RX ORDER — LISINOPRIL 2.5 MG/1
10 TABLET ORAL ONCE
Refills: 0 | Status: COMPLETED | OUTPATIENT
Start: 2019-07-15 | End: 2019-07-15

## 2019-07-15 RX ORDER — CARVEDILOL PHOSPHATE 80 MG/1
6.25 CAPSULE, EXTENDED RELEASE ORAL EVERY 12 HOURS
Refills: 0 | Status: DISCONTINUED | OUTPATIENT
Start: 2019-07-15 | End: 2019-08-26

## 2019-07-15 RX ORDER — HYDRALAZINE HCL 50 MG
5 TABLET ORAL ONCE
Refills: 0 | Status: COMPLETED | OUTPATIENT
Start: 2019-07-15 | End: 2019-07-15

## 2019-07-15 RX ADMIN — LEVETIRACETAM 500 MILLIGRAM(S): 250 TABLET, FILM COATED ORAL at 06:25

## 2019-07-15 RX ADMIN — Medication 4: at 17:52

## 2019-07-15 RX ADMIN — Medication 20 MILLIGRAM(S): at 12:45

## 2019-07-15 RX ADMIN — ATORVASTATIN CALCIUM 80 MILLIGRAM(S): 80 TABLET, FILM COATED ORAL at 22:29

## 2019-07-15 RX ADMIN — Medication 10 UNIT(S): at 06:26

## 2019-07-15 RX ADMIN — LISINOPRIL 10 MILLIGRAM(S): 2.5 TABLET ORAL at 06:25

## 2019-07-15 RX ADMIN — LEVETIRACETAM 500 MILLIGRAM(S): 250 TABLET, FILM COATED ORAL at 17:52

## 2019-07-15 RX ADMIN — CARVEDILOL PHOSPHATE 3.12 MILLIGRAM(S): 80 CAPSULE, EXTENDED RELEASE ORAL at 09:58

## 2019-07-15 RX ADMIN — INSULIN GLARGINE 45 UNIT(S): 100 INJECTION, SOLUTION SUBCUTANEOUS at 22:28

## 2019-07-15 RX ADMIN — CARVEDILOL PHOSPHATE 3.12 MILLIGRAM(S): 80 CAPSULE, EXTENDED RELEASE ORAL at 06:25

## 2019-07-15 RX ADMIN — CEFTRIAXONE 100 MILLIGRAM(S): 500 INJECTION, POWDER, FOR SOLUTION INTRAMUSCULAR; INTRAVENOUS at 09:39

## 2019-07-15 RX ADMIN — Medication 12 UNIT(S): at 22:29

## 2019-07-15 RX ADMIN — Medication 10 UNIT(S): at 14:02

## 2019-07-15 RX ADMIN — Medication 4: at 09:58

## 2019-07-15 RX ADMIN — CARVEDILOL PHOSPHATE 6.25 MILLIGRAM(S): 80 CAPSULE, EXTENDED RELEASE ORAL at 17:52

## 2019-07-15 RX ADMIN — Medication 500000 UNIT(S): at 17:52

## 2019-07-15 RX ADMIN — CLOPIDOGREL BISULFATE 75 MILLIGRAM(S): 75 TABLET, FILM COATED ORAL at 12:45

## 2019-07-15 RX ADMIN — Medication 500000 UNIT(S): at 06:25

## 2019-07-15 RX ADMIN — LISINOPRIL 10 MILLIGRAM(S): 2.5 TABLET ORAL at 14:02

## 2019-07-15 RX ADMIN — Medication 4: at 01:57

## 2019-07-15 RX ADMIN — Medication 2: at 06:25

## 2019-07-15 RX ADMIN — ENOXAPARIN SODIUM 40 MILLIGRAM(S): 100 INJECTION SUBCUTANEOUS at 12:45

## 2019-07-15 RX ADMIN — Medication 2: at 22:29

## 2019-07-15 RX ADMIN — Medication 81 MILLIGRAM(S): at 12:45

## 2019-07-15 NOTE — PROGRESS NOTE ADULT - SUBJECTIVE AND OBJECTIVE BOX
INTERVAL HPI/OVERNIGHT EVENTS:    pt seen and examined.  tolerating PEG feeds as per RN  + 1 loose bm overnight, no melena/brbpr    MEDICATIONS  (STANDING):  aspirin  chewable 81 milliGRAM(s) Oral daily  atorvastatin 80 milliGRAM(s) Oral at bedtime  carvedilol 3.125 milliGRAM(s) Oral two times a day  cefTRIAXone   IVPB 1000 milliGRAM(s) IV Intermittent every 24 hours  clopidogrel Tablet 75 milliGRAM(s) Oral daily  dextrose 5%. 1000 milliLiter(s) (50 mL/Hr) IV Continuous <Continuous>  dextrose 50% Injectable 12.5 Gram(s) IV Push once  dextrose 50% Injectable 25 Gram(s) IV Push once  dextrose 50% Injectable 25 Gram(s) IV Push once  enoxaparin Injectable 40 milliGRAM(s) SubCutaneous daily  FLUoxetine Solution 20 milliGRAM(s) Oral daily  insulin glargine Injectable (LANTUS) 45 Unit(s) SubCutaneous at bedtime  insulin lispro (HumaLOG) corrective regimen sliding scale   SubCutaneous every 4 hours  insulin lispro Injectable (HumaLOG) 10 Unit(s) SubCutaneous every 8 hours  levETIRAcetam  Solution 500 milliGRAM(s) Oral two times a day  lisinopril 10 milliGRAM(s) Oral daily  nystatin    Suspension 341486 Unit(s) Oral two times a day  polyethylene glycol 3350 17 Gram(s) Oral daily    MEDICATIONS  (PRN):  acetaminophen    Suspension .. 650 milliGRAM(s) Oral every 6 hours PRN Temp greater or equal to 38C (100.4F)  acetaminophen    Suspension .. 650 milliGRAM(s) Enteral Tube every 6 hours PRN Moderate Pain (4 - 6)  bisacodyl Suppository 10 milliGRAM(s) Rectal daily PRN Constipation  dextrose 40% Gel 15 Gram(s) Oral once PRN Blood Glucose LESS THAN 70 milliGRAM(s)/deciliter  glucagon  Injectable 1 milliGRAM(s) IntraMuscular once PRN Glucose LESS THAN 70 milligrams/deciliter  nystatin Powder 1 Application(s) Topical two times a day PRN dermatitis      Allergies    No Known Allergies    Intolerances        Review of Systems:    General:  No wt loss, fevers, chills, night sweats,fatigue,   Eyes:  Good vision, no reported pain  ENT:  No sore throat, pain, runny nose, dysphagia  CV:  No pain, palpitations, hypo/hypertension  Resp:  No dyspnea, cough, tachypnea, wheezing  GI:  No pain, No nausea, No vomiting, No diarrhea, No constipation, No weight loss, No fever, No pruritis, No rectal bleeding, No melena, No dysphagia  :  No pain, bleeding, incontinence, nocturia  Muscle:  No pain, weakness  Neuro:  No weakness, tingling, memory problems  Psych:  No fatigue, insomnia, mood problems, depression  Endocrine:  No polyuria, polydypsia, cold/heat intolerance  Heme:  No petechiae, ecchymosis, easy bruisability  Skin:  No rash, tattoos, scars, edema      Vital Signs Last 24 Hrs  T(C): 36.9 (15 Jul 2019 04:45), Max: 37.4 (14 Jul 2019 20:08)  T(F): 98.5 (15 Jul 2019 04:45), Max: 99.3 (14 Jul 2019 20:08)  HR: 85 (15 Jul 2019 04:45) (85 - 105)  BP: 159/80 (15 Jul 2019 04:45) (112/57 - 177/92)  BP(mean): --  RR: 18 (15 Jul 2019 04:45) (18 - 18)  SpO2: 95% (15 Jul 2019 04:45) (95% - 98%)    PHYSICAL EXAM:    GENERAL:  Appears stated age, well-groomed, well-nourished, no distress  HEENT:  NC/AT,  conjunctivae clear and pink, no thyromegaly, nodules, adenopathy, no JVD, sclera -anicteric  CHEST:  Full & symmetric excursion, no increased effort, breath sounds clear  HEART:  Regular rhythm, S1, S2, no murmur/rub/S3/S4, no abdominal bruit, no edema  ABDOMEN:  Soft, non-tender, non-distended, normoactive bowel sounds,  + PEG c/d/i  EXTEREMITIES:  no cyanosis, clubbing or edema  SKIN:  No rash/erythema/ecchymoses/petechiae/wounds/abscess/warm/dry  NEURO: opens to voice and light touch, drowsy but easily arousable, eyelid opening apraxia, oriented to name, place and year, fluent speech    LABS:                RADIOLOGY & ADDITIONAL TESTS:

## 2019-07-15 NOTE — PROGRESS NOTE ADULT - SUBJECTIVE AND OBJECTIVE BOX
Diabetes Follow up note: Saw pt earlier today  Interval Hx: 54 year old female w/uncontrolled T2DM (on insulin PTA) w/retinopathy, morbid obesity here w/CVA s/p PEG on bolus feeds of Glucerna 480cc q8h. Pt on antibiotic for UTI.  BG values improving slowly between 100s to low 200s on present insulin regimen. Pt seen at bedside nodding yes/no to questions when at bedside. Nods yes to feeling better and stronger/ no to pain/N/V/HA. No hypoglycemia.      Review of Systems:  General: denies pain/complaints.   GI: Tolerating TFs without any N/V/D/ABD PAIN.  CV: No CP/SOB  ENDO: No S&Sx of hypoglycemia      MEDS:  atorvastatin 80 milliGRAM(s) Oral at bedtime  insulin glargine Injectable (LANTUS) 45 Unit(s) SubCutaneous at bedtime  insulin lispro (HumaLOG) corrective regimen sliding scale   SubCutaneous every 8 hours  insulin lispro Injectable (HumaLOG) 10 Unit(s) SubCutaneous every 8 hours  cefTRIAXone   IVPB 1000 milliGRAM(s) IV Intermittent every 24 hours  nystatin    Suspension 226256 Unit(s) Oral two times a day    Allergies    No Known Allergies        PE:  General: Female lying in bed. NAD.   Vital Signs Last 24 Hrs  T(C): 37.2 (07-15-19 @ 15:56), Max: 37.4 (07-14-19 @ 20:08)  T(F): 98.9 (07-15-19 @ 15:56), Max: 99.3 (07-14-19 @ 20:08)  HR: 93 (07-15-19 @ 15:56) (74 - 93)  BP: 174/87 (07-15-19 @ 15:56) (112/57 - 175/97)  BP(mean): --  RR: 18 (07-15-19 @ 15:56) (18 - 18)  SpO2: 97% (07-15-19 @ 15:56) (95% - 98%)  Abd: Soft, NT, ND, PEG in place. Obese. TFs off at time of visit  Extremities: Warm. No edema in all 4 extremities  Neuro: Awake. Appropriate. L hemiparesis with brace in LLE.    LABS:  POCT Blood Glucose.: 150 mg/dL (07-15-19 @ 13:59)  POCT Blood Glucose.: 219 mg/dL (07-15-19 @ 09:50)  POCT Blood Glucose.: 167 mg/dL (07-15-19 @ 06:06)  POCT Blood Glucose.: 231 mg/dL (07-15-19 @ 01:53)  POCT Blood Glucose.: 173 mg/dL (07-14-19 @ 22:05)  POCT Blood Glucose.: 207 mg/dL (07-14-19 @ 17:12)  POCT Blood Glucose.: 134 mg/dL (07-14-19 @ 13:40)  POCT Blood Glucose.: 211 mg/dL (07-14-19 @ 06:20)  POCT Blood Glucose.: 271 mg/dL (07-14-19 @ 00:20)  POCT Blood Glucose.: 233 mg/dL (07-13-19 @ 22:46)  POCT Blood Glucose.: 221 mg/dL (07-13-19 @ 21:55)                            12.7   8.21  )-----------( 376      ( 13 Jul 2019 02:59 )             39.5           07-12    140  |  99  |  24<H>  ----------------------------<  223<H>  4.4   |  26  |  0.92    Ca    9.0      12 Jul 2019 22:55        Hemoglobin A1C, Whole Blood: 10.4 % <H> [4.0 - 5.6] (06-29-19 @ 09:33)

## 2019-07-15 NOTE — PROGRESS NOTE ADULT - PROBLEM SELECTOR PLAN 1
-test BG Q4h (correspond times w/q8h feeds).   -c/w Lantus 45 units QHS for now   -Increase Humalog to 12 units Q8h w/each bolus feed.   -Changed Humalog moderate correction scale to q4h (correspond times w/feeds-every other 4 hours)  -Once glucose levels improved w/insulin regimen better assessed will transition back to q8h FS  -Daughter will care for pt at discharge-uses insulin at home  -Plan discussed with pt/team.  Contact info: [phone] (24/7). pager [phone]

## 2019-07-15 NOTE — PROGRESS NOTE ADULT - ASSESSMENT
· Assessment	  54 year old female admitted with acute CVA.     UTI:  IV Abx     Problem/Plan - 1:  ·  Problem: Dysphagia.  Plan: s/p  PEG  - GI f/up noted.     Problem/Plan - 2:  ·  Problem: CVA (cerebral vascular accident).  Plan: - Right MCA and ENRIQUE distribution stroke   - DAPT   -Neuro f/up noted. EEG: No epileptiform activity/ Cw keppra.    Morbid obesity:  PT

## 2019-07-15 NOTE — PROGRESS NOTE ADULT - ASSESSMENT
54 year old female w/uncontrolled T2DM (HbA1c 10.4%) c/b retinopathy here w/CVA L sided hemiplegia. Bolus feeds q8h with improving hyperglycemia.  Will increase bolus Humalog at time of feeding to improve glycemic control. BG goal (100-180mg/dl).

## 2019-07-15 NOTE — PROGRESS NOTE ADULT - SUBJECTIVE AND OBJECTIVE BOX
Patient is a 54y old  Female who presents with a chief complaint of CVA (09 Jul 2019 13:28)      SUBJECTIVE / OVERNIGHT EVENTS:    Events noted.  CONSTITUTIONAL: No fever,  or fatigue  RESPIRATORY: No cough, wheezing,  No shortness of breath  CARDIOVASCULAR: No chest pain, palpitations,   GASTROINTESTINAL: No abdominal or epigastric pain.   NEUROLOGICAL: No headaches,     MEDICATIONS  (STANDING):  aspirin  chewable 81 milliGRAM(s) Oral daily  atorvastatin 80 milliGRAM(s) Oral at bedtime  carvedilol 6.25 milliGRAM(s) Oral every 12 hours  cefTRIAXone   IVPB 1000 milliGRAM(s) IV Intermittent every 24 hours  clopidogrel Tablet 75 milliGRAM(s) Oral daily  dextrose 5%. 1000 milliLiter(s) (50 mL/Hr) IV Continuous <Continuous>  dextrose 50% Injectable 12.5 Gram(s) IV Push once  dextrose 50% Injectable 25 Gram(s) IV Push once  dextrose 50% Injectable 25 Gram(s) IV Push once  enoxaparin Injectable 40 milliGRAM(s) SubCutaneous daily  FLUoxetine Solution 20 milliGRAM(s) Oral daily  insulin glargine Injectable (LANTUS) 45 Unit(s) SubCutaneous at bedtime  insulin lispro (HumaLOG) corrective regimen sliding scale   SubCutaneous every 4 hours  insulin lispro Injectable (HumaLOG) 12 Unit(s) SubCutaneous every 8 hours  levETIRAcetam  Solution 500 milliGRAM(s) Oral two times a day  lisinopril 20 milliGRAM(s) Oral daily  nystatin    Suspension 583479 Unit(s) Oral two times a day  polyethylene glycol 3350 17 Gram(s) Oral daily    MEDICATIONS  (PRN):  acetaminophen    Suspension .. 650 milliGRAM(s) Oral every 6 hours PRN Temp greater or equal to 38C (100.4F)  acetaminophen    Suspension .. 650 milliGRAM(s) Enteral Tube every 6 hours PRN Moderate Pain (4 - 6)  bisacodyl Suppository 10 milliGRAM(s) Rectal daily PRN Constipation  dextrose 40% Gel 15 Gram(s) Oral once PRN Blood Glucose LESS THAN 70 milliGRAM(s)/deciliter  glucagon  Injectable 1 milliGRAM(s) IntraMuscular once PRN Glucose LESS THAN 70 milligrams/deciliter  nystatin Powder 1 Application(s) Topical two times a day PRN dermatitis        CAPILLARY BLOOD GLUCOSE      POCT Blood Glucose.: 202 mg/dL (15 Jul 2019 17:06)  POCT Blood Glucose.: 150 mg/dL (15 Jul 2019 13:59)  POCT Blood Glucose.: 219 mg/dL (15 Jul 2019 09:50)  POCT Blood Glucose.: 167 mg/dL (15 Jul 2019 06:06)  POCT Blood Glucose.: 231 mg/dL (15 Jul 2019 01:53)  POCT Blood Glucose.: 173 mg/dL (14 Jul 2019 22:05)    I&O's Summary    14 Jul 2019 07:01  -  15 Jul 2019 07:00  --------------------------------------------------------  IN: 2240 mL / OUT: 1750 mL / NET: 490 mL    15 Jul 2019 07:01  -  15 Jul 2019 20:34  --------------------------------------------------------  IN: 400 mL / OUT: 200 mL / NET: 200 mL        PHYSICAL EXAM:    NECK: Supple, No JVD  CHEST/LUNG: Clear to auscultation bilaterally; No wheezing.  HEART: Regular rate and rhythm; No murmurs, rubs, or gallops  ABDOMEN: Soft, Nontender, Nondistended; Bowel sounds present  EXTREMITIES:   No edema  NEUROLOGY: AAO       LABS:                  CAPILLARY BLOOD GLUCOSE      POCT Blood Glucose.: 202 mg/dL (15 Jul 2019 17:06)  POCT Blood Glucose.: 150 mg/dL (15 Jul 2019 13:59)  POCT Blood Glucose.: 219 mg/dL (15 Jul 2019 09:50)  POCT Blood Glucose.: 167 mg/dL (15 Jul 2019 06:06)  POCT Blood Glucose.: 231 mg/dL (15 Jul 2019 01:53)  POCT Blood Glucose.: 173 mg/dL (14 Jul 2019 22:05)    07-13 @ 16:43  Culture-urine --  Culture results   >100,000 CFU/ml Proteus mirabilis  method type ADAN  Organism Proteus mirabilis  Organism Identification Proteus mirabilis  Specimen source .Urine           07-13 @ 16:43  Culture blood --  Culture results   >100,000 CFU/ml Proteus mirabilis  Gram stain --  Gram stain blood --  Method type ADAN  Organism Proteus mirabilis  Organism identification Proteus mirabilis  Specimen source .Urine      RADIOLOGY & ADDITIONAL TESTS:    Imaging Personally Reviewed:    Consultant(s) Notes Reviewed:      Care Discussed with Consultants/Other Providers:

## 2019-07-16 LAB
ANION GAP SERPL CALC-SCNC: 11 MMOL/L — SIGNIFICANT CHANGE UP (ref 5–17)
BUN SERPL-MCNC: 23 MG/DL — SIGNIFICANT CHANGE UP (ref 7–23)
CALCIUM SERPL-MCNC: 9.5 MG/DL — SIGNIFICANT CHANGE UP (ref 8.4–10.5)
CHLORIDE SERPL-SCNC: 100 MMOL/L — SIGNIFICANT CHANGE UP (ref 96–108)
CO2 SERPL-SCNC: 28 MMOL/L — SIGNIFICANT CHANGE UP (ref 22–31)
CREAT SERPL-MCNC: 0.87 MG/DL — SIGNIFICANT CHANGE UP (ref 0.5–1.3)
GLUCOSE BLDC GLUCOMTR-MCNC: 138 MG/DL — HIGH (ref 70–99)
GLUCOSE BLDC GLUCOMTR-MCNC: 154 MG/DL — HIGH (ref 70–99)
GLUCOSE BLDC GLUCOMTR-MCNC: 218 MG/DL — HIGH (ref 70–99)
GLUCOSE BLDC GLUCOMTR-MCNC: 223 MG/DL — HIGH (ref 70–99)
GLUCOSE BLDC GLUCOMTR-MCNC: 243 MG/DL — HIGH (ref 70–99)
GLUCOSE BLDC GLUCOMTR-MCNC: 255 MG/DL — HIGH (ref 70–99)
GLUCOSE SERPL-MCNC: 181 MG/DL — HIGH (ref 70–99)
HCT VFR BLD CALC: 37.7 % — SIGNIFICANT CHANGE UP (ref 34.5–45)
HGB BLD-MCNC: 12.2 G/DL — SIGNIFICANT CHANGE UP (ref 11.5–15.5)
MCHC RBC-ENTMCNC: 27.4 PG — SIGNIFICANT CHANGE UP (ref 27–34)
MCHC RBC-ENTMCNC: 32.4 GM/DL — SIGNIFICANT CHANGE UP (ref 32–36)
MCV RBC AUTO: 84.7 FL — SIGNIFICANT CHANGE UP (ref 80–100)
PA ADP PRP-ACNC: 218 PRU — SIGNIFICANT CHANGE UP (ref 194–417)
PLATELET # BLD AUTO: 363 K/UL — SIGNIFICANT CHANGE UP (ref 150–400)
POTASSIUM SERPL-MCNC: 4.5 MMOL/L — SIGNIFICANT CHANGE UP (ref 3.5–5.3)
POTASSIUM SERPL-SCNC: 4.5 MMOL/L — SIGNIFICANT CHANGE UP (ref 3.5–5.3)
RBC # BLD: 4.45 M/UL — SIGNIFICANT CHANGE UP (ref 3.8–5.2)
RBC # FLD: 12.9 % — SIGNIFICANT CHANGE UP (ref 10.3–14.5)
SODIUM SERPL-SCNC: 139 MMOL/L — SIGNIFICANT CHANGE UP (ref 135–145)
WBC # BLD: 7.36 K/UL — SIGNIFICANT CHANGE UP (ref 3.8–10.5)
WBC # FLD AUTO: 7.36 K/UL — SIGNIFICANT CHANGE UP (ref 3.8–10.5)

## 2019-07-16 PROCEDURE — 99222 1ST HOSP IP/OBS MODERATE 55: CPT | Mod: GC

## 2019-07-16 PROCEDURE — 99232 SBSQ HOSP IP/OBS MODERATE 35: CPT

## 2019-07-16 RX ORDER — INSULIN GLARGINE 100 [IU]/ML
48 INJECTION, SOLUTION SUBCUTANEOUS AT BEDTIME
Refills: 0 | Status: DISCONTINUED | OUTPATIENT
Start: 2019-07-16 | End: 2019-07-17

## 2019-07-16 RX ORDER — INSULIN LISPRO 100/ML
14 VIAL (ML) SUBCUTANEOUS AT BEDTIME
Refills: 0 | Status: DISCONTINUED | OUTPATIENT
Start: 2019-07-16 | End: 2019-07-17

## 2019-07-16 RX ORDER — HYDROCORTISONE 1 %
1 OINTMENT (GRAM) TOPICAL
Refills: 0 | Status: DISCONTINUED | OUTPATIENT
Start: 2019-07-16 | End: 2019-08-15

## 2019-07-16 RX ORDER — INSULIN LISPRO 100/ML
16 VIAL (ML) SUBCUTANEOUS
Refills: 0 | Status: DISCONTINUED | OUTPATIENT
Start: 2019-07-17 | End: 2019-07-17

## 2019-07-16 RX ORDER — PETROLATUM,WHITE
1 JELLY (GRAM) TOPICAL THREE TIMES A DAY
Refills: 0 | Status: DISCONTINUED | OUTPATIENT
Start: 2019-07-16 | End: 2019-08-26

## 2019-07-16 RX ORDER — DIPHENHYDRAMINE HCL 50 MG
25 CAPSULE ORAL EVERY 6 HOURS
Refills: 0 | Status: DISCONTINUED | OUTPATIENT
Start: 2019-07-16 | End: 2019-07-17

## 2019-07-16 RX ORDER — INSULIN LISPRO 100/ML
20 VIAL (ML) SUBCUTANEOUS
Refills: 0 | Status: DISCONTINUED | OUTPATIENT
Start: 2019-07-17 | End: 2019-07-17

## 2019-07-16 RX ORDER — INSULIN LISPRO 100/ML
14 VIAL (ML) SUBCUTANEOUS EVERY 8 HOURS
Refills: 0 | Status: DISCONTINUED | OUTPATIENT
Start: 2019-07-16 | End: 2019-07-16

## 2019-07-16 RX ADMIN — LEVETIRACETAM 500 MILLIGRAM(S): 250 TABLET, FILM COATED ORAL at 17:54

## 2019-07-16 RX ADMIN — INSULIN GLARGINE 48 UNIT(S): 100 INJECTION, SOLUTION SUBCUTANEOUS at 22:39

## 2019-07-16 RX ADMIN — Medication 14 UNIT(S): at 22:39

## 2019-07-16 RX ADMIN — Medication 14 UNIT(S): at 14:03

## 2019-07-16 RX ADMIN — Medication 2: at 06:28

## 2019-07-16 RX ADMIN — ENOXAPARIN SODIUM 40 MILLIGRAM(S): 100 INJECTION SUBCUTANEOUS at 14:03

## 2019-07-16 RX ADMIN — Medication 4: at 17:54

## 2019-07-16 RX ADMIN — Medication 1 APPLICATION(S): at 17:55

## 2019-07-16 RX ADMIN — Medication 1 APPLICATION(S): at 22:42

## 2019-07-16 RX ADMIN — LISINOPRIL 20 MILLIGRAM(S): 2.5 TABLET ORAL at 06:18

## 2019-07-16 RX ADMIN — Medication 500000 UNIT(S): at 17:54

## 2019-07-16 RX ADMIN — Medication 20 MILLIGRAM(S): at 14:02

## 2019-07-16 RX ADMIN — ATORVASTATIN CALCIUM 80 MILLIGRAM(S): 80 TABLET, FILM COATED ORAL at 22:42

## 2019-07-16 RX ADMIN — LEVETIRACETAM 500 MILLIGRAM(S): 250 TABLET, FILM COATED ORAL at 06:18

## 2019-07-16 RX ADMIN — Medication 500000 UNIT(S): at 06:29

## 2019-07-16 RX ADMIN — Medication 4: at 10:36

## 2019-07-16 RX ADMIN — Medication 81 MILLIGRAM(S): at 14:02

## 2019-07-16 RX ADMIN — CLOPIDOGREL BISULFATE 75 MILLIGRAM(S): 75 TABLET, FILM COATED ORAL at 14:02

## 2019-07-16 RX ADMIN — Medication 4: at 02:09

## 2019-07-16 RX ADMIN — CARVEDILOL PHOSPHATE 6.25 MILLIGRAM(S): 80 CAPSULE, EXTENDED RELEASE ORAL at 17:54

## 2019-07-16 RX ADMIN — Medication 12 UNIT(S): at 06:28

## 2019-07-16 RX ADMIN — Medication 6: at 22:38

## 2019-07-16 RX ADMIN — CARVEDILOL PHOSPHATE 6.25 MILLIGRAM(S): 80 CAPSULE, EXTENDED RELEASE ORAL at 06:19

## 2019-07-16 RX ADMIN — Medication 25 MILLIGRAM(S): at 14:02

## 2019-07-16 NOTE — CHART NOTE - NSCHARTNOTEFT_GEN_A_CORE
Called by RN earlier this afternoon Called by RN earlier this afternoon for pt having a rash. Pt seen at bedside, pt does answer to simple questions by nodding her head. Pt noted to have hives on b/l antecubital. Pt did nod her head yes that it was itching her. As per RN, pt did have have hives on some parts of her body (legs and back). Did not note any additional areas at the time of assessment, other than her b/l arms. Assessed pt lung sounds, w/o wheezing noted, and VSS. D/w Dr. Hope, called ID c/s who recommended to d/c ceftriaxone as it was not needed, and started on prednisone 20 mg x 3days , as well as added benadryl, and skin ointments for symptom management. Upon reassessing pt later in the evening, hives were not noted.   Vandana PORTILLO

## 2019-07-16 NOTE — PROGRESS NOTE ADULT - PROBLEM SELECTOR PLAN 1
-test BG Q4h (correspond times w/q8h feeds).   -Increase Lantus dose to 48 units q hs  for now   -Increase Humalog to 20 units at 6am, 16 units at 2pm and  14 units at 10 pm while on steroid therapy    -C/w Humalog moderate correction scale q4h (correspond times w/feeds-every other 4 hours)  -Daughter will care for pt at discharge-uses insulin at home. Social work following discharge plan since pt is unable to move from bed but has no health insurance.   -Plan discussed with pt/team/staff.  Contact info: [phone] (24/7). pager [phone]

## 2019-07-16 NOTE — PROGRESS NOTE ADULT - SUBJECTIVE AND OBJECTIVE BOX
INTERVAL HPI/OVERNIGHT EVENTS:    pt seen and examined. She denies abdominal pain, n/v  tolerating PEG feeds.     MEDICATIONS  (STANDING):  aspirin  chewable 81 milliGRAM(s) Oral daily  atorvastatin 80 milliGRAM(s) Oral at bedtime  carvedilol 6.25 milliGRAM(s) Oral every 12 hours  cefTRIAXone   IVPB 1000 milliGRAM(s) IV Intermittent every 24 hours  clopidogrel Tablet 75 milliGRAM(s) Oral daily  dextrose 5%. 1000 milliLiter(s) (50 mL/Hr) IV Continuous <Continuous>  dextrose 50% Injectable 12.5 Gram(s) IV Push once  dextrose 50% Injectable 25 Gram(s) IV Push once  dextrose 50% Injectable 25 Gram(s) IV Push once  enoxaparin Injectable 40 milliGRAM(s) SubCutaneous daily  FLUoxetine Solution 20 milliGRAM(s) Oral daily  insulin glargine Injectable (LANTUS) 45 Unit(s) SubCutaneous at bedtime  insulin lispro (HumaLOG) corrective regimen sliding scale   SubCutaneous every 4 hours  insulin lispro Injectable (HumaLOG) 14 Unit(s) SubCutaneous every 8 hours  levETIRAcetam  Solution 500 milliGRAM(s) Oral two times a day  lisinopril 20 milliGRAM(s) Oral daily  nystatin    Suspension 590267 Unit(s) Oral two times a day  polyethylene glycol 3350 17 Gram(s) Oral daily    MEDICATIONS  (PRN):  acetaminophen    Suspension .. 650 milliGRAM(s) Oral every 6 hours PRN Temp greater or equal to 38C (100.4F)  acetaminophen    Suspension .. 650 milliGRAM(s) Enteral Tube every 6 hours PRN Moderate Pain (4 - 6)  bisacodyl Suppository 10 milliGRAM(s) Rectal daily PRN Constipation  dextrose 40% Gel 15 Gram(s) Oral once PRN Blood Glucose LESS THAN 70 milliGRAM(s)/deciliter  glucagon  Injectable 1 milliGRAM(s) IntraMuscular once PRN Glucose LESS THAN 70 milligrams/deciliter  nystatin Powder 1 Application(s) Topical two times a day PRN dermatitis      Allergies    No Known Allergies    Intolerances        Review of Systems:    General:  No wt loss, fevers, chills, night sweats,fatigue,   Eyes:  Good vision, no reported pain  ENT:  No sore throat, pain, runny nose, dysphagia  CV:  No pain, palpitations, hypo/hypertension  Resp:  No dyspnea, cough, tachypnea, wheezing  GI:  No pain, No nausea, No vomiting, No diarrhea, No constipation, No weight loss, No fever, No pruritis, No rectal bleeding, No melena, No dysphagia  :  No pain, bleeding, incontinence, nocturia  Muscle:  No pain, weakness  Neuro:  No weakness, tingling, memory problems  Psych:  No fatigue, insomnia, mood problems, depression  Endocrine:  No polyuria, polydypsia, cold/heat intolerance  Heme:  No petechiae, ecchymosis, easy bruisability  Skin:  No rash, tattoos, scars, edema      Vital Signs Last 24 Hrs  T(C): 37 (16 Jul 2019 08:47), Max: 37.3 (15 Jul 2019 20:17)  T(F): 98.6 (16 Jul 2019 08:47), Max: 99.2 (15 Jul 2019 20:17)  HR: 86 (16 Jul 2019 08:47) (82 - 106)  BP: 124/68 (16 Jul 2019 08:47) (124/68 - 174/87)  BP(mean): --  RR: 18 (16 Jul 2019 08:47) (18 - 18)  SpO2: 100% (16 Jul 2019 08:47) (94% - 100%)    PHYSICAL EXAM:      GENERAL:  Appears stated age, well-groomed, well-nourished, no distress  HEENT:  NC/AT,  conjunctivae clear and pink, no thyromegaly, nodules, adenopathy, no JVD, sclera -anicteric  CHEST:  Full & symmetric excursion, no increased effort, breath sounds clear  HEART:  Regular rhythm, S1, S2, no murmur/rub/S3/S4, no abdominal bruit, no edema  ABDOMEN:  Soft, non-tender, non-distended, normoactive bowel sounds,  + PEG c/d/i  EXTEREMITIES:  no cyanosis, clubbing or edema  SKIN:  No rash/erythema/ecchymoses/petechiae/wounds/abscess/warm/dry  NEURO: opens to voice and light touch, drowsy but easily arousable, eyelid opening apraxia, oriented to name, place and year, fluent speech        LABS:                        12.2   7.36  )-----------( 363      ( 16 Jul 2019 08:54 )             37.7     07-16    139  |  100  |  23  ----------------------------<  181<H>  4.5   |  28  |  0.87    Ca    9.5      16 Jul 2019 05:55            RADIOLOGY & ADDITIONAL TESTS:

## 2019-07-16 NOTE — PROGRESS NOTE ADULT - SUBJECTIVE AND OBJECTIVE BOX
Patient is a 54y old  Female who presents with a chief complaint of CVA (09 Jul 2019 13:28)      SUBJECTIVE / OVERNIGHT EVENTS:    Events noted.  No N/V/Abd pain    MEDICATIONS  (STANDING):  AQUAPHOR (petrolatum Ointment) 1 Application(s) Topical three times a day  aspirin  chewable 81 milliGRAM(s) Oral daily  atorvastatin 80 milliGRAM(s) Oral at bedtime  carvedilol 6.25 milliGRAM(s) Oral every 12 hours  clopidogrel Tablet 75 milliGRAM(s) Oral daily  dextrose 5%. 1000 milliLiter(s) (50 mL/Hr) IV Continuous <Continuous>  dextrose 50% Injectable 12.5 Gram(s) IV Push once  dextrose 50% Injectable 25 Gram(s) IV Push once  dextrose 50% Injectable 25 Gram(s) IV Push once  enoxaparin Injectable 40 milliGRAM(s) SubCutaneous daily  FLUoxetine Solution 20 milliGRAM(s) Oral daily  hydrocortisone 1% Ointment 1 Application(s) Topical two times a day  insulin glargine Injectable (LANTUS) 48 Unit(s) SubCutaneous at bedtime  insulin lispro (HumaLOG) corrective regimen sliding scale   SubCutaneous every 4 hours  insulin lispro Injectable (HumaLOG) 14 Unit(s) SubCutaneous at bedtime  levETIRAcetam  Solution 500 milliGRAM(s) Oral two times a day  lisinopril 20 milliGRAM(s) Oral daily  nystatin    Suspension 279094 Unit(s) Oral two times a day  polyethylene glycol 3350 17 Gram(s) Oral daily  predniSONE   Tablet 20 milliGRAM(s) Oral daily    MEDICATIONS  (PRN):  acetaminophen    Suspension .. 650 milliGRAM(s) Oral every 6 hours PRN Temp greater or equal to 38C (100.4F)  acetaminophen    Suspension .. 650 milliGRAM(s) Enteral Tube every 6 hours PRN Moderate Pain (4 - 6)  bisacodyl Suppository 10 milliGRAM(s) Rectal daily PRN Constipation  dextrose 40% Gel 15 Gram(s) Oral once PRN Blood Glucose LESS THAN 70 milliGRAM(s)/deciliter  diphenhydrAMINE 25 milliGRAM(s) Oral every 6 hours PRN Rash and/or Itching  glucagon  Injectable 1 milliGRAM(s) IntraMuscular once PRN Glucose LESS THAN 70 milligrams/deciliter  nystatin Powder 1 Application(s) Topical two times a day PRN dermatitis        CAPILLARY BLOOD GLUCOSE      POCT Blood Glucose.: 255 mg/dL (16 Jul 2019 21:58)  POCT Blood Glucose.: 223 mg/dL (16 Jul 2019 17:29)  POCT Blood Glucose.: 138 mg/dL (16 Jul 2019 14:01)  POCT Blood Glucose.: 243 mg/dL (16 Jul 2019 10:07)  POCT Blood Glucose.: 154 mg/dL (16 Jul 2019 06:25)  POCT Blood Glucose.: 218 mg/dL (16 Jul 2019 02:01)    I&O's Summary    15 Jul 2019 07:01  -  16 Jul 2019 07:00  --------------------------------------------------------  IN: 1080 mL / OUT: 2000 mL / NET: -920 mL    16 Jul 2019 07:01  -  16 Jul 2019 22:58  --------------------------------------------------------  IN: 0 mL / OUT: 450 mL / NET: -450 mL        PHYSICAL EXAM:    NECK: Supple, No JVD  CHEST/LUNG: Clear to auscultation bilaterally; No wheezing.  HEART: Regular rate and rhythm; No murmurs, rubs, or gallops  ABDOMEN: Soft, Nontender, Nondistended; Bowel sounds present  EXTREMITIES:   No edema  NEUROLOGY: AAO       LABS:                        12.2   7.36  )-----------( 363      ( 16 Jul 2019 08:54 )             37.7     07-16    139  |  100  |  23  ----------------------------<  181<H>  4.5   |  28  |  0.87    Ca    9.5      16 Jul 2019 05:55              CAPILLARY BLOOD GLUCOSE      POCT Blood Glucose.: 255 mg/dL (16 Jul 2019 21:58)  POCT Blood Glucose.: 223 mg/dL (16 Jul 2019 17:29)  POCT Blood Glucose.: 138 mg/dL (16 Jul 2019 14:01)  POCT Blood Glucose.: 243 mg/dL (16 Jul 2019 10:07)  POCT Blood Glucose.: 154 mg/dL (16 Jul 2019 06:25)  POCT Blood Glucose.: 218 mg/dL (16 Jul 2019 02:01)    07-13 @ 16:43  Culture-urine --  Culture results   >100,000 CFU/ml Proteus mirabilis  method type ADAN  Organism Proteus mirabilis  Organism Identification Proteus mirabilis  Specimen source .Urine           07-13 @ 16:43  Culture blood --  Culture results   >100,000 CFU/ml Proteus mirabilis  Gram stain --  Gram stain blood --  Method type ADAN  Organism Proteus mirabilis  Organism identification Proteus mirabilis  Specimen source .Urine      RADIOLOGY & ADDITIONAL TESTS:    Imaging Personally Reviewed:    Consultant(s) Notes Reviewed:      Care Discussed with Consultants/Other Providers:

## 2019-07-16 NOTE — PROGRESS NOTE ADULT - SUBJECTIVE AND OBJECTIVE BOX
Diabetes Follow up note: Saw pt earlier today  Interval Hx: 54 year old female w/uncontrolled T2DM (on insulin PTA) w/retinopathy, morbid obesity here w/CVA s/p PEG on bolus feeds of Glucerna 480cc q8h. Pt on antibiotic for UTI.  BG values improving slowly between 100s to low 200s on present insulin regimen. Pt seen at bedside/ Today, pt appears lethargic. Opens eyes when called and nods to some questions but closes eyes right back. No hypoglycemia. Per staff and primary team pt having some hives in her arms. Noted order for  local and systemic steroids. In addition team reports that pt is uninsured and doesn't qualify for Medicaid.      Review of Systems:  General: denies pain  GI: Tolerating TFs without any N/V/D/ABD PAIN.  CV: No CP/SOB  ENDO: No S&Sx of hypoglycemia      MEDS:  atorvastatin 80 milliGRAM(s) Oral at bedtime  insulin glargine Injectable (LANTUS) 45 Unit(s) SubCutaneous at bedtime  insulin lispro (HumaLOG) corrective regimen sliding scale   SubCutaneous every 8 hours  insulin lispro Injectable (HumaLOG) 12 Unit(s) SubCutaneous every 8 hours  cefTRIAXone   IVPB 1000 milliGRAM(s) IV Intermittent every 24 hours  nystatin    Suspension 394769 Unit(s) Oral two times a day  hydrocortisone 1% Ointment 1 Application(s) Topical two times a day  predniSONE   Tablet 20 milliGRAM(s) Oral daily        Allergies    No Known Allergies        PE:  General: Female lying in bed. NAD.   Vital Signs Last 24 Hrs  T(C): 37 (07-16-19 @ 15:39), Max: 37.3 (07-15-19 @ 20:17)  T(F): 98.6 (07-16-19 @ 15:39), Max: 99.2 (07-15-19 @ 20:17)  HR: 93 (07-16-19 @ 15:39) (86 - 106)  BP: 137/82 (07-16-19 @ 15:39) (124/68 - 174/87)  BP(mean): --  RR: 18 (07-16-19 @ 15:39) (18 - 18)  SpO2: 95% (07-16-19 @ 15:39) (94% - 100%)  Abd: Soft, NT, ND, PEG in place. Obese. TFs off at time of visit  Extremities: Warm. No edema in all 4 extremities  Neuro: Sleepy at time of visit. L hemiparesis with brace in LLE. Able to squeeze provider's hand with RUE.  Skin: Hives noted in UEs    LABS:  POCT Blood Glucose.: 138 mg/dL (07-16-19 @ 14:01)  POCT Blood Glucose.: 243 mg/dL (07-16-19 @ 10:07)  POCT Blood Glucose.: 154 mg/dL (07-16-19 @ 06:25)  POCT Blood Glucose.: 218 mg/dL (07-16-19 @ 02:01)  POCT Blood Glucose.: 153 mg/dL (07-15-19 @ 21:50)  POCT Blood Glucose.: 202 mg/dL (07-15-19 @ 17:06)  POCT Blood Glucose.: 150 mg/dL (07-15-19 @ 13:59)  POCT Blood Glucose.: 219 mg/dL (07-15-19 @ 09:50)  POCT Blood Glucose.: 167 mg/dL (07-15-19 @ 06:06)  POCT Blood Glucose.: 231 mg/dL (07-15-19 @ 01:53)  POCT Blood Glucose.: 173 mg/dL (07-14-19 @ 22:05)  POCT Blood Glucose.: 207 mg/dL (07-14-19 @ 17:12)                          12.2   7.36  )-----------( 363      ( 16 Jul 2019 08:54 )             37.7     07-16    139  |  100  |  23  ----------------------------<  181<H>  4.5   |  28  |  0.87    Ca    9.5      16 Jul 2019 05:55      Hemoglobin A1C, Whole Blood: 10.4 % <H> [4.0 - 5.6] (06-29-19 @ 09:33)

## 2019-07-16 NOTE — CONSULT NOTE ADULT - ATTENDING COMMENTS
No
Seen and examined with resident. Agree with note.   Patient S/P CVA with hemiparesis, dysfxn, dysphagia.  Patient will need acute rehabilitation when stable.
Cynthia Shah  Pager: [phone]. If no response or past 5 pm call [phone].

## 2019-07-16 NOTE — CONSULT NOTE ADULT - ASSESSMENT
54 year old Female HTN, HLD, DM transferred from [facility] for evaluation of stroke. Had UA checked on 7/13/19 and tx for possible UTI. Already received 3 days of ceftriaxone.    - d/c ceftriaxone, no need for further abx  - per primary team, pt had hives earlier- none seen currently --> management per primary team    Will sign off. Re-consult prn.    D/w primary team. 54 year old Female HTN, HLD, DM transferred from [facility] for evaluation of stroke. Had UA checked on 7/13/19 and tx for possible UTI. Already received 3 days of ceftriaxone.    Overall UTI, positive culture finding, ? rash.     Plan:   - Pt with abnormal U/A and urine cx, s/p 3 days of abx already, unable to provide hx regarding symptoms, unclear why urine was checked.   - d/c ceftriaxone, no need for further abx  - per primary team, pt had hives earlier- none seen currently --> management per primary team    Will sign off. Please Re-consult prn.    D/w primary team.

## 2019-07-16 NOTE — CONSULT NOTE ADULT - SUBJECTIVE AND OBJECTIVE BOX
HPI: 54 year old Female HTN, HLD, DM transferred from [facility] for evaluation of stroke. Received TPA at [place] for R MCA stroke- has hemorrhagic conversion w edema. Had fever on 6/30- none since. Had a UA checked on 7/13 for an unclear reason- no fever or wbc at the time. Discussed w RN who states pt's mental status has not changed. Per RN and NP, pt developed an itchy rash w hives today- NP unclear what inciting factor was but noted ceftriaxone was one of the meds recently started. Pt w no previous known drug allergies. At the time of evaluation, no rash was seen; steroids were given.     **Pt doesn't answer questions so info from chart.    PAST MEDICAL & SURGICAL HISTORY:  Diabetes mellitus type 2 in obese  Hyperlipidemia, unspecified hyperlipidemia type  Hypertension      Allergies    No Known Allergies    Intolerances        ANTIMICROBIALS:  nystatin    Suspension 222551 two times a day      OTHER MEDS:  acetaminophen    Suspension .. 650 milliGRAM(s) Oral every 6 hours PRN  acetaminophen    Suspension .. 650 milliGRAM(s) Enteral Tube every 6 hours PRN  AQUAPHOR (petrolatum Ointment) 1 Application(s) Topical three times a day  aspirin  chewable 81 milliGRAM(s) Oral daily  atorvastatin 80 milliGRAM(s) Oral at bedtime  bisacodyl Suppository 10 milliGRAM(s) Rectal daily PRN  carvedilol 6.25 milliGRAM(s) Oral every 12 hours  clopidogrel Tablet 75 milliGRAM(s) Oral daily  dextrose 40% Gel 15 Gram(s) Oral once PRN  dextrose 5%. 1000 milliLiter(s) IV Continuous <Continuous>  dextrose 50% Injectable 12.5 Gram(s) IV Push once  dextrose 50% Injectable 25 Gram(s) IV Push once  dextrose 50% Injectable 25 Gram(s) IV Push once  diphenhydrAMINE 25 milliGRAM(s) Oral every 6 hours PRN  enoxaparin Injectable 40 milliGRAM(s) SubCutaneous daily  FLUoxetine Solution 20 milliGRAM(s) Oral daily  glucagon  Injectable 1 milliGRAM(s) IntraMuscular once PRN  hydrocortisone 1% Ointment 1 Application(s) Topical two times a day  insulin glargine Injectable (LANTUS) 48 Unit(s) SubCutaneous at bedtime  insulin lispro (HumaLOG) corrective regimen sliding scale   SubCutaneous every 4 hours  insulin lispro Injectable (HumaLOG) 14 Unit(s) SubCutaneous at bedtime  levETIRAcetam  Solution 500 milliGRAM(s) Oral two times a day  lisinopril 20 milliGRAM(s) Oral daily  nystatin Powder 1 Application(s) Topical two times a day PRN  polyethylene glycol 3350 17 Gram(s) Oral daily  predniSONE   Tablet 20 milliGRAM(s) Oral daily      SOCIAL HISTORY:  pt doesn't answer questions    FAMILY HISTORY:  pt doesn't answer questions    ROS:  pt stated had itchy arms today but won't answer other questions      Physical Exam:    General:    NAD, non toxic  Head: atraumatic, normocephalic  Eyes: normal sclera and conjunctiva  ENT: neck supple  Cardio:    regular S1,S2  Respiratory:   clear b/l, no wheezing  abd:  +PEG  :    no silva  Musculoskeletal : no joint swelling, no edema  Skin:    no rash  Neurologic: eyes closed but would open eyes if stimulated  psych: calm      Drug Dosing Weight  Height (cm): 167.64 (04 Jul 2019 08:00)  Weight (kg): 136.9 (28 Jun 2019 19:15)  BMI (kg/m2): 48.7 (04 Jul 2019 08:00)  BSA (m2): 2.38 (04 Jul 2019 08:00)    Vital Signs Last 24 Hrs  T(F): 98.6 (07-16-19 @ 15:39), Max: 99.4 (07-10-19 @ 20:00)    Vital Signs Last 24 Hrs  HR: 93 (07-16-19 @ 15:39) (86 - 106)  BP: 137/82 (07-16-19 @ 15:39) (124/68 - 152/81)  RR: 18 (07-16-19 @ 15:39)  SpO2: 95% (07-16-19 @ 15:39) (94% - 100%)  Wt(kg): --                          12.2   7.36  )-----------( 363      ( 16 Jul 2019 08:54 )             37.7       07-16    139  |  100  |  23  ----------------------------<  181<H>  4.5   |  28  |  0.87    Ca    9.5      16 Jul 2019 05:55            MICROBIOLOGY:  .Urine  07-13-19   >100,000 CFU/ml Proteus mirabilis  --  Proteus mirabilis      .Blood  06-30-19   No growth at 5 days.  --  --      RADIOLOGY:    ct head 7/12/19-   Evolving right frontal parietal subacute infarction with hemorrhagic   transformation is again noted, is stable compared to 07/09/2019 exam.    cxr 7/12/19- Clear lungs. No pleural effusion or pneumothorax.  The cardiomediastinal silhouette is normal in size and contour. HPI: 54 year old Female HTN, HLD, DM transferred from [facility] for evaluation of stroke. Received TPA at [place] for R MCA stroke- has hemorrhagic conversion w edema. Had fever on 6/30- none since. Had a UA checked on 7/13 for an unclear reason- no fever or wbc at the time. Discussed w RN who states pt's mental status has not changed. Per RN and NP, pt developed an itchy rash w hives today- NP unclear what inciting factor was but noted ceftriaxone was one of the meds recently started. Pt w no previous known drug allergies. At the time of evaluation, no rash was seen; steroids were given.     **Pt doesn't answer questions so info from chart.    PAST MEDICAL & SURGICAL HISTORY:  Diabetes mellitus type 2 in obese  Hyperlipidemia, unspecified hyperlipidemia type  Hypertension      Allergies  No Known Allergies      ANTIMICROBIALS:  nystatin    Suspension 788844 two times a day      OTHER MEDS:  acetaminophen    Suspension .. 650 milliGRAM(s) Oral every 6 hours PRN  acetaminophen    Suspension .. 650 milliGRAM(s) Enteral Tube every 6 hours PRN  AQUAPHOR (petrolatum Ointment) 1 Application(s) Topical three times a day  aspirin  chewable 81 milliGRAM(s) Oral daily  atorvastatin 80 milliGRAM(s) Oral at bedtime  bisacodyl Suppository 10 milliGRAM(s) Rectal daily PRN  carvedilol 6.25 milliGRAM(s) Oral every 12 hours  clopidogrel Tablet 75 milliGRAM(s) Oral daily  dextrose 40% Gel 15 Gram(s) Oral once PRN  dextrose 5%. 1000 milliLiter(s) IV Continuous <Continuous>  dextrose 50% Injectable 12.5 Gram(s) IV Push once  dextrose 50% Injectable 25 Gram(s) IV Push once  dextrose 50% Injectable 25 Gram(s) IV Push once  diphenhydrAMINE 25 milliGRAM(s) Oral every 6 hours PRN  enoxaparin Injectable 40 milliGRAM(s) SubCutaneous daily  FLUoxetine Solution 20 milliGRAM(s) Oral daily  glucagon  Injectable 1 milliGRAM(s) IntraMuscular once PRN  hydrocortisone 1% Ointment 1 Application(s) Topical two times a day  insulin glargine Injectable (LANTUS) 48 Unit(s) SubCutaneous at bedtime  insulin lispro (HumaLOG) corrective regimen sliding scale   SubCutaneous every 4 hours  insulin lispro Injectable (HumaLOG) 14 Unit(s) SubCutaneous at bedtime  levETIRAcetam  Solution 500 milliGRAM(s) Oral two times a day  lisinopril 20 milliGRAM(s) Oral daily  nystatin Powder 1 Application(s) Topical two times a day PRN  polyethylene glycol 3350 17 Gram(s) Oral daily  predniSONE   Tablet 20 milliGRAM(s) Oral daily      SOCIAL HISTORY:  pt doesn't answer questions    FAMILY HISTORY:  pt doesn't answer questions    ROS:  pt stated had itchy arms today but won't answer other questions      Physical Exam:  General:    NAD, non toxic  Head: atraumatic, normocephalic  Eyes: normal sclera and conjunctiva  ENT: neck supple  Cardio:    regular S1,S2  Respiratory:   clear b/l, no wheezing  abd:  +PEG  :    no silva  Musculoskeletal : no joint swelling, no edema  Skin:    no rash visible right now.   Neurologic: eyes closed but would open eyes if stimulated, weakness lt side.   psych: calm  extremities: no edema.     Drug Dosing Weight  Height (cm): 167.64 (04 Jul 2019 08:00)  Weight (kg): 136.9 (28 Jun 2019 19:15)  BMI (kg/m2): 48.7 (04 Jul 2019 08:00)  BSA (m2): 2.38 (04 Jul 2019 08:00)    Vital Signs Last 24 Hrs  T(F): 98.6 (07-16-19 @ 15:39), Max: 99.4 (07-10-19 @ 20:00)    Vital Signs Last 24 Hrs  HR: 93 (07-16-19 @ 15:39) (86 - 106)  BP: 137/82 (07-16-19 @ 15:39) (124/68 - 152/81)  RR: 18 (07-16-19 @ 15:39)  SpO2: 95% (07-16-19 @ 15:39) (94% - 100%)                            12.2   7.36  )-----------( 363      ( 16 Jul 2019 08:54 )             37.7       07-16    139  |  100  |  23  ----------------------------<  181<H>  4.5   |  28  |  0.87    Ca    9.5      16 Jul 2019 05:55          MICROBIOLOGY:  .Urine  07-13-19   >100,000 CFU/ml Proteus mirabilis  --  Proteus mirabilis      .Blood  06-30-19   No growth at 5 days.  --  --      RADIOLOGY:    ct head 7/12/19-   Evolving right frontal parietal subacute infarction with hemorrhagic   transformation is again noted, is stable compared to 07/09/2019 exam.    cxr 7/12/19- Clear lungs. No pleural effusion or pneumothorax.  The cardiomediastinal silhouette is normal in size and contour.

## 2019-07-16 NOTE — PROGRESS NOTE ADULT - ASSESSMENT
54 year old female w/uncontrolled T2DM (HbA1c 10.4%) c/b retinopathy here w/CVA L sided hemiplegia. Bolus feeds q8h with improving hyperglycemia. but still above goal between 100s to low 200s. Also adding steroid therapy to TFs so expecting BG levels to worsen while on steroids. Antibiotic stopped by primary team.  Will increase and adjust insulin doses. BG goal (100-180mg/dl). Increased Humalog to 14 units with bolus feeds this am but  will need to readjust due to steroid therapy

## 2019-07-17 DIAGNOSIS — L50.9 URTICARIA, UNSPECIFIED: ICD-10-CM

## 2019-07-17 LAB
BASOPHILS # BLD AUTO: 0.04 K/UL — SIGNIFICANT CHANGE UP (ref 0–0.2)
BASOPHILS NFR BLD AUTO: 0.5 % — SIGNIFICANT CHANGE UP (ref 0–2)
EOSINOPHIL # BLD AUTO: 0.03 K/UL — SIGNIFICANT CHANGE UP (ref 0–0.5)
EOSINOPHIL NFR BLD AUTO: 0.4 % — SIGNIFICANT CHANGE UP (ref 0–6)
GLUCOSE BLDC GLUCOMTR-MCNC: 222 MG/DL — HIGH (ref 70–99)
GLUCOSE BLDC GLUCOMTR-MCNC: 233 MG/DL — HIGH (ref 70–99)
GLUCOSE BLDC GLUCOMTR-MCNC: 246 MG/DL — HIGH (ref 70–99)
GLUCOSE BLDC GLUCOMTR-MCNC: 265 MG/DL — HIGH (ref 70–99)
GLUCOSE BLDC GLUCOMTR-MCNC: 281 MG/DL — HIGH (ref 70–99)
GLUCOSE BLDC GLUCOMTR-MCNC: 305 MG/DL — HIGH (ref 70–99)
HCT VFR BLD CALC: 44 % — SIGNIFICANT CHANGE UP (ref 34.5–45)
HGB BLD-MCNC: 13.8 G/DL — SIGNIFICANT CHANGE UP (ref 11.5–15.5)
IMM GRANULOCYTES NFR BLD AUTO: 0.5 % — SIGNIFICANT CHANGE UP (ref 0–1.5)
LYMPHOCYTES # BLD AUTO: 1.72 K/UL — SIGNIFICANT CHANGE UP (ref 1–3.3)
LYMPHOCYTES # BLD AUTO: 22.1 % — SIGNIFICANT CHANGE UP (ref 13–44)
MCHC RBC-ENTMCNC: 28 PG — SIGNIFICANT CHANGE UP (ref 27–34)
MCHC RBC-ENTMCNC: 31.4 GM/DL — LOW (ref 32–36)
MCV RBC AUTO: 89.4 FL — SIGNIFICANT CHANGE UP (ref 80–100)
MONOCYTES # BLD AUTO: 0.55 K/UL — SIGNIFICANT CHANGE UP (ref 0–0.9)
MONOCYTES NFR BLD AUTO: 7.1 % — SIGNIFICANT CHANGE UP (ref 2–14)
NEUTROPHILS # BLD AUTO: 5.39 K/UL — SIGNIFICANT CHANGE UP (ref 1.8–7.4)
NEUTROPHILS NFR BLD AUTO: 69.4 % — SIGNIFICANT CHANGE UP (ref 43–77)
PLATELET # BLD AUTO: 292 K/UL — SIGNIFICANT CHANGE UP (ref 150–400)
RBC # BLD: 4.92 M/UL — SIGNIFICANT CHANGE UP (ref 3.8–5.2)
RBC # FLD: 13.2 % — SIGNIFICANT CHANGE UP (ref 10.3–14.5)
WBC # BLD: 7.77 K/UL — SIGNIFICANT CHANGE UP (ref 3.8–10.5)
WBC # FLD AUTO: 7.77 K/UL — SIGNIFICANT CHANGE UP (ref 3.8–10.5)

## 2019-07-17 PROCEDURE — 99232 SBSQ HOSP IP/OBS MODERATE 35: CPT

## 2019-07-17 PROCEDURE — 99223 1ST HOSP IP/OBS HIGH 75: CPT

## 2019-07-17 RX ORDER — INSULIN LISPRO 100/ML
18 VIAL (ML) SUBCUTANEOUS
Refills: 0 | Status: DISCONTINUED | OUTPATIENT
Start: 2019-07-18 | End: 2019-07-19

## 2019-07-17 RX ORDER — LORATADINE 10 MG/1
10 TABLET ORAL
Refills: 0 | Status: DISCONTINUED | OUTPATIENT
Start: 2019-07-17 | End: 2019-07-17

## 2019-07-17 RX ORDER — INSULIN LISPRO 100/ML
16 VIAL (ML) SUBCUTANEOUS AT BEDTIME
Refills: 0 | Status: DISCONTINUED | OUTPATIENT
Start: 2019-07-17 | End: 2019-07-18

## 2019-07-17 RX ORDER — LORATADINE 10 MG/1
10 TABLET ORAL
Refills: 0 | Status: DISCONTINUED | OUTPATIENT
Start: 2019-07-17 | End: 2019-08-15

## 2019-07-17 RX ORDER — LORATADINE 10 MG/1
10 TABLET ORAL DAILY
Refills: 0 | Status: DISCONTINUED | OUTPATIENT
Start: 2019-07-17 | End: 2019-07-17

## 2019-07-17 RX ORDER — INSULIN LISPRO 100/ML
24 VIAL (ML) SUBCUTANEOUS
Refills: 0 | Status: DISCONTINUED | OUTPATIENT
Start: 2019-07-18 | End: 2019-07-18

## 2019-07-17 RX ORDER — INSULIN GLARGINE 100 [IU]/ML
55 INJECTION, SOLUTION SUBCUTANEOUS AT BEDTIME
Refills: 0 | Status: DISCONTINUED | OUTPATIENT
Start: 2019-07-17 | End: 2019-07-18

## 2019-07-17 RX ORDER — DIPHENHYDRAMINE HCL 50 MG
25 CAPSULE ORAL EVERY 8 HOURS
Refills: 0 | Status: DISCONTINUED | OUTPATIENT
Start: 2019-07-17 | End: 2019-08-15

## 2019-07-17 RX ADMIN — CLOPIDOGREL BISULFATE 75 MILLIGRAM(S): 75 TABLET, FILM COATED ORAL at 13:04

## 2019-07-17 RX ADMIN — Medication 1 APPLICATION(S): at 13:04

## 2019-07-17 RX ADMIN — INSULIN GLARGINE 55 UNIT(S): 100 INJECTION, SOLUTION SUBCUTANEOUS at 21:31

## 2019-07-17 RX ADMIN — CARVEDILOL PHOSPHATE 6.25 MILLIGRAM(S): 80 CAPSULE, EXTENDED RELEASE ORAL at 18:40

## 2019-07-17 RX ADMIN — ENOXAPARIN SODIUM 40 MILLIGRAM(S): 100 INJECTION SUBCUTANEOUS at 13:04

## 2019-07-17 RX ADMIN — Medication 1 APPLICATION(S): at 20:55

## 2019-07-17 RX ADMIN — ATORVASTATIN CALCIUM 80 MILLIGRAM(S): 80 TABLET, FILM COATED ORAL at 20:55

## 2019-07-17 RX ADMIN — Medication 20 MILLIGRAM(S): at 13:04

## 2019-07-17 RX ADMIN — LEVETIRACETAM 500 MILLIGRAM(S): 250 TABLET, FILM COATED ORAL at 18:40

## 2019-07-17 RX ADMIN — CARVEDILOL PHOSPHATE 6.25 MILLIGRAM(S): 80 CAPSULE, EXTENDED RELEASE ORAL at 05:12

## 2019-07-17 RX ADMIN — Medication 6: at 18:39

## 2019-07-17 RX ADMIN — Medication 20 MILLIGRAM(S): at 05:11

## 2019-07-17 RX ADMIN — Medication 4: at 06:37

## 2019-07-17 RX ADMIN — Medication 16 UNIT(S): at 21:31

## 2019-07-17 RX ADMIN — Medication 4: at 14:22

## 2019-07-17 RX ADMIN — Medication 500000 UNIT(S): at 06:40

## 2019-07-17 RX ADMIN — Medication 20 UNIT(S): at 06:40

## 2019-07-17 RX ADMIN — Medication 16 UNIT(S): at 14:22

## 2019-07-17 RX ADMIN — Medication 1 APPLICATION(S): at 18:40

## 2019-07-17 RX ADMIN — LORATADINE 10 MILLIGRAM(S): 10 TABLET ORAL at 20:55

## 2019-07-17 RX ADMIN — Medication 4: at 21:30

## 2019-07-17 RX ADMIN — Medication 1 APPLICATION(S): at 05:10

## 2019-07-17 RX ADMIN — Medication 500000 UNIT(S): at 18:40

## 2019-07-17 RX ADMIN — LISINOPRIL 20 MILLIGRAM(S): 2.5 TABLET ORAL at 05:11

## 2019-07-17 RX ADMIN — Medication 81 MILLIGRAM(S): at 13:04

## 2019-07-17 RX ADMIN — Medication 8: at 02:28

## 2019-07-17 RX ADMIN — LEVETIRACETAM 500 MILLIGRAM(S): 250 TABLET, FILM COATED ORAL at 05:11

## 2019-07-17 RX ADMIN — Medication 6: at 10:46

## 2019-07-17 NOTE — SWALLOW BEDSIDE ASSESSMENT ADULT - SPECIFY REASON(S)
to reassess swallow to reassess swallow mechanism and determine candidacy for initiating an oral diet. per d/w NP Deniz and Social work Cielo, family requesting re-evaluation.

## 2019-07-17 NOTE — PROGRESS NOTE ADULT - SUBJECTIVE AND OBJECTIVE BOX
INTERVAL HPI/OVERNIGHT EVENTS:    pt seen and examined.  tolerating PEG feeds as per RN  + 1 loose bm overnight, no melena/brbpr    MEDICATIONS  (STANDING):  AQUAPHOR (petrolatum Ointment) 1 Application(s) Topical three times a day  aspirin  chewable 81 milliGRAM(s) Oral daily  atorvastatin 80 milliGRAM(s) Oral at bedtime  carvedilol 6.25 milliGRAM(s) Oral every 12 hours  clopidogrel Tablet 75 milliGRAM(s) Oral daily  dextrose 5%. 1000 milliLiter(s) (50 mL/Hr) IV Continuous <Continuous>  dextrose 50% Injectable 12.5 Gram(s) IV Push once  dextrose 50% Injectable 25 Gram(s) IV Push once  dextrose 50% Injectable 25 Gram(s) IV Push once  enoxaparin Injectable 40 milliGRAM(s) SubCutaneous daily  FLUoxetine Solution 20 milliGRAM(s) Oral daily  hydrocortisone 1% Ointment 1 Application(s) Topical two times a day  insulin glargine Injectable (LANTUS) 55 Unit(s) SubCutaneous at bedtime  insulin lispro (HumaLOG) corrective regimen sliding scale   SubCutaneous every 4 hours  insulin lispro Injectable (HumaLOG) 20 Unit(s) SubCutaneous before breakfast  insulin lispro Injectable (HumaLOG) 16 Unit(s) SubCutaneous before lunch  insulin lispro Injectable (HumaLOG) 14 Unit(s) SubCutaneous at bedtime  levETIRAcetam  Solution 500 milliGRAM(s) Oral two times a day  lisinopril 20 milliGRAM(s) Oral daily  nystatin    Suspension 028727 Unit(s) Oral two times a day  polyethylene glycol 3350 17 Gram(s) Oral daily  predniSONE   Tablet 20 milliGRAM(s) Oral daily    MEDICATIONS  (PRN):  acetaminophen    Suspension .. 650 milliGRAM(s) Oral every 6 hours PRN Temp greater or equal to 38C (100.4F)  acetaminophen    Suspension .. 650 milliGRAM(s) Enteral Tube every 6 hours PRN Moderate Pain (4 - 6)  bisacodyl Suppository 10 milliGRAM(s) Rectal daily PRN Constipation  dextrose 40% Gel 15 Gram(s) Oral once PRN Blood Glucose LESS THAN 70 milliGRAM(s)/deciliter  diphenhydrAMINE 25 milliGRAM(s) Oral every 6 hours PRN Rash and/or Itching  glucagon  Injectable 1 milliGRAM(s) IntraMuscular once PRN Glucose LESS THAN 70 milligrams/deciliter  nystatin Powder 1 Application(s) Topical two times a day PRN dermatitis      Allergies    No Known Allergies    Intolerances        Review of Systems:    General:  No wt loss, fevers, chills, night sweats,fatigue,   Eyes:  Good vision, no reported pain  ENT:  No sore throat, pain, runny nose, dysphagia  CV:  No pain, palpitations, hypo/hypertension  Resp:  No dyspnea, cough, tachypnea, wheezing  GI:  No pain, No nausea, No vomiting, No diarrhea, No constipation, No weight loss, No fever, No pruritis, No rectal bleeding, No melena, No dysphagia  :  No pain, bleeding, incontinence, nocturia  Muscle:  No pain, weakness  Neuro:  No weakness, tingling, memory problems  Psych:  No fatigue, insomnia, mood problems, depression  Endocrine:  No polyuria, polydypsia, cold/heat intolerance  Heme:  No petechiae, ecchymosis, easy bruisability  Skin:  No rash, tattoos, scars, edema      Vital Signs Last 24 Hrs  T(C): 36.6 (17 Jul 2019 08:14), Max: 37.3 (16 Jul 2019 20:27)  T(F): 97.9 (17 Jul 2019 08:14), Max: 99.2 (16 Jul 2019 20:27)  HR: 88 (17 Jul 2019 08:14) (83 - 93)  BP: 137/75 (17 Jul 2019 08:14) (122/69 - 141/70)  BP(mean): --  RR: 18 (17 Jul 2019 08:14) (18 - 18)  SpO2: 98% (17 Jul 2019 08:14) (94% - 100%)    PHYSICAL EXAM:    GENERAL:  Appears stated age, well-groomed, well-nourished, no distress  HEENT:  NC/AT,  conjunctivae clear and pink, no thyromegaly, nodules, adenopathy, no JVD, sclera -anicteric  CHEST:  Full & symmetric excursion, no increased effort, breath sounds clear  HEART:  Regular rhythm, S1, S2, no murmur/rub/S3/S4, no abdominal bruit, no edema  ABDOMEN:  Soft, non-tender, non-distended, normoactive bowel sounds,  + PEG c/d/i  EXTEREMITIES :  no cyanosis, clubbing or edema  SKIN:  No rash/erythema/ecchymoses/petechiae/wounds/abscess/warm/dry  NEURO: opens to voice and light touch, drowsy but easily arousable, eyelid opening apraxia, oriented to name, place and year, fluent speech    LABS:                        13.8   7.77  )-----------( 292      ( 17 Jul 2019 09:19 )             44.0     07-16    139  |  100  |  23  ----------------------------<  181<H>  4.5   |  28  |  0.87    Ca    9.5      16 Jul 2019 05:55            RADIOLOGY & ADDITIONAL TESTS:

## 2019-07-17 NOTE — SWALLOW BEDSIDE ASSESSMENT ADULT - COMMENTS
Continued:   6/29: Change in status notification: Pt s/p tPA 6/28 @1730 ([facility]). RN went to bedside report in ED. RN notices pt right facial twitch and foaming at the mouth. Pt was following commands seconds prior, pt no longer speaking or following commands.  6/29: Provider contact note: Pt had 3 more seizure episodes en route to floor  Pt given ativan and keppra.   Pt seen for bedside swallow evaluations on 6/29, 7/1 and 7/2. Please see reports for details. Recommendations included NPO, with non-oral nutrition/hydration/medications on each bedside swallow evaluation.   S/P PEG on 7/3/19.   Per d/w social work, patient has been declined for [facility].   7/16: ID consult: + UTI.

## 2019-07-17 NOTE — PROGRESS NOTE ADULT - PROBLEM SELECTOR PLAN 1
-test BG Q4  -Increase Lantus 55 units QHS  -c/w Humalog 20 units w/AM dose of tube feeds, 16 units in afternoon and 14 units at bedtime. Will adjust further today based on FS values.   C/w Humalog moderate correction scale q4h (correspond times w/feeds-every other 4 hours)  -Daughter will care for pt at discharge-uses insulin at home. Social work following discharge plan since pt is unable to move from bed but has no health insurance.   Will need to verify w/pt's daughter that pt still has adequate amounts of Lantus and Novolog at home (on prior to admission)  -discussed plan w/pt and RN  pager: [phone]

## 2019-07-17 NOTE — CONSULT NOTE ADULT - SUBJECTIVE AND OBJECTIVE BOX
HPI:  Patient is a 54 year old Female w/ PMHx of HTN, HLD, DM transferred from [facility] for evaluation of stroke. Patient also with UTI on cefritraxone (6/13-6/15) x 3 days. Patient developed pruritic hives yesterday that resolved with Benadryl and prednisone 20 mg x 3 days but recurred today. Dermatology consulted.       PAST MEDICAL & SURGICAL HISTORY:  Diabetes mellitus type 2 in obese  Hyperlipidemia, unspecified hyperlipidemia type  Hypertension      REVIEW OF SYSTEMS      Unable to obtain  Patient nods to itchiness but not pain    MEDICATIONS  (STANDING):  AQUAPHOR (petrolatum Ointment) 1 Application(s) Topical three times a day  aspirin  chewable 81 milliGRAM(s) Oral daily  atorvastatin 80 milliGRAM(s) Oral at bedtime  carvedilol 6.25 milliGRAM(s) Oral every 12 hours  clopidogrel Tablet 75 milliGRAM(s) Oral daily  dextrose 5%. 1000 milliLiter(s) (50 mL/Hr) IV Continuous <Continuous>  dextrose 50% Injectable 12.5 Gram(s) IV Push once  dextrose 50% Injectable 25 Gram(s) IV Push once  dextrose 50% Injectable 25 Gram(s) IV Push once  enoxaparin Injectable 40 milliGRAM(s) SubCutaneous daily  FLUoxetine Solution 20 milliGRAM(s) Oral daily  hydrocortisone 1% Ointment 1 Application(s) Topical two times a day  insulin glargine Injectable (LANTUS) 55 Unit(s) SubCutaneous at bedtime  insulin lispro (HumaLOG) corrective regimen sliding scale   SubCutaneous every 4 hours  insulin lispro Injectable (HumaLOG) 20 Unit(s) SubCutaneous before breakfast  insulin lispro Injectable (HumaLOG) 16 Unit(s) SubCutaneous <User Schedule>  insulin lispro Injectable (HumaLOG) 14 Unit(s) SubCutaneous at bedtime  levETIRAcetam  Solution 500 milliGRAM(s) Oral two times a day  lisinopril 20 milliGRAM(s) Oral daily  loratadine Syrup 10 milliGRAM(s) Oral <User Schedule>  nystatin    Suspension 318076 Unit(s) Oral two times a day  polyethylene glycol 3350 17 Gram(s) Oral daily  predniSONE   Tablet 20 milliGRAM(s) Oral daily    MEDICATIONS  (PRN):  acetaminophen    Suspension .. 650 milliGRAM(s) Oral every 6 hours PRN Temp greater or equal to 38C (100.4F)  acetaminophen    Suspension .. 650 milliGRAM(s) Enteral Tube every 6 hours PRN Moderate Pain (4 - 6)  bisacodyl Suppository 10 milliGRAM(s) Rectal daily PRN Constipation  dextrose 40% Gel 15 Gram(s) Oral once PRN Blood Glucose LESS THAN 70 milliGRAM(s)/deciliter  diphenhydrAMINE 25 milliGRAM(s) Oral every 8 hours PRN Rash and/or Itching  glucagon  Injectable 1 milliGRAM(s) IntraMuscular once PRN Glucose LESS THAN 70 milligrams/deciliter  nystatin Powder 1 Application(s) Topical two times a day PRN dermatitis      Allergies    No Known Allergies    Intolerances        SOCIAL HISTORY:    FAMILY HISTORY:      Vital Signs Last 24 Hrs  T(C): 37.1 (17 Jul 2019 12:37), Max: 37.3 (16 Jul 2019 20:27)  T(F): 98.7 (17 Jul 2019 12:37), Max: 99.2 (16 Jul 2019 20:27)  HR: 77 (17 Jul 2019 12:37) (77 - 88)  BP: 136/72 (17 Jul 2019 12:37) (122/69 - 137/75)  BP(mean): --  RR: 18 (17 Jul 2019 12:37) (18 - 18)  SpO2: 100% (17 Jul 2019 12:37) (94% - 100%)    PHYSICAL EXAM:     The patient was alert and oriented X 3, well nourished, and in no  apparent distress.  OP showed no ulcerations  There was no visible lymphadenopathy.  Conjunctiva were non injected  There was no clubbing or edema of extremities.  The scalp, hair, face, eyebrows, lips, OP, neck, chest, back,   extremities X 4, nails were examined.  There was no hyperhidrosis or bromhidrosis.    Of note on skin exam:   faintly pink edematous circular plaques    LABS:                        13.8   7.77  )-----------( 292      ( 17 Jul 2019 09:19 )             44.0     07-16    139  |  100  |  23  ----------------------------<  181<H>  4.5   |  28  |  0.87    Ca    9.5      16 Jul 2019 05:55            RADIOLOGY & ADDITIONAL STUDIES:

## 2019-07-17 NOTE — PROGRESS NOTE ADULT - SUBJECTIVE AND OBJECTIVE BOX
Diabetes Follow up note:  Interval Hx:  54 year old female w/uncontrolled T2DM (on insulin PTA) w/retinopathy, morbid obesity here w/CVA s/p PEG on bolus feeds of Glucerna 480cc q8h. Pt now started on Prednisone 20mg daily x 3 days for rash. BG values 130s-300s over past 24 hours since steroids initiated. Pt seen at bedside. Awake and nodding yes/no to my questions. Tolerating TF q8h per RN.       Review of Systems:  General: denies pain/no complaints.   GI: Tolerating TFs without any N/V/D/ABD PAIN.  CV: No CP/SOB  ENDO: No S&Sx of hypoglycemia  MEDS:  atorvastatin 80 milliGRAM(s) Oral at bedtime    insulin glargine Injectable (LANTUS) 48 Unit(s) SubCutaneous at bedtime  insulin lispro (HumaLOG) corrective regimen sliding scale   SubCutaneous every 4 hours  insulin lispro Injectable (HumaLOG) 20 Unit(s) SubCutaneous before breakfast  insulin lispro Injectable (HumaLOG) 16 Unit(s) SubCutaneous before lunch  insulin lispro Injectable (HumaLOG) 14 Unit(s) SubCutaneous at bedtime  predniSONE   Tablet 20 milliGRAM(s) Oral daily    nystatin    Suspension 765886 Unit(s) Oral two times a day    Allergies    No Known Allergies        PE:  General: Female lying in bed. NAD.   Vital Signs Last 24 Hrs  T(C): 36.6 (17 Jul 2019 08:14), Max: 37.3 (16 Jul 2019 20:27)  T(F): 97.9 (17 Jul 2019 08:14), Max: 99.2 (16 Jul 2019 20:27)  HR: 88 (17 Jul 2019 08:14) (83 - 93)  BP: 137/75 (17 Jul 2019 08:14) (122/69 - 141/70)  BP(mean): --  RR: 18 (17 Jul 2019 08:14) (18 - 18)  SpO2: 98% (17 Jul 2019 08:14) (94% - 100%)  CV: S1, S2. No murmurs.   Abd: Soft, NT,ND, PEG in place. + obese  Extremities: Warm. no edema noted.   Neuro: Awake. Appropriate, nods yes/no to questions. L sided hemiplegia.     LABS:    POCT Blood Glucose.: 265 mg/dL (07-17-19 @ 10:29)  POCT Blood Glucose.: 222 mg/dL (07-17-19 @ 06:08)  POCT Blood Glucose.: 305 mg/dL (07-17-19 @ 02:23)  POCT Blood Glucose.: 255 mg/dL (07-16-19 @ 21:58)  POCT Blood Glucose.: 223 mg/dL (07-16-19 @ 17:29)  POCT Blood Glucose.: 138 mg/dL (07-16-19 @ 14:01)  POCT Blood Glucose.: 243 mg/dL (07-16-19 @ 10:07)  POCT Blood Glucose.: 154 mg/dL (07-16-19 @ 06:25)  POCT Blood Glucose.: 218 mg/dL (07-16-19 @ 02:01)  POCT Blood Glucose.: 153 mg/dL (07-15-19 @ 21:50)  POCT Blood Glucose.: 202 mg/dL (07-15-19 @ 17:06)  POCT Blood Glucose.: 150 mg/dL (07-15-19 @ 13:59)  POCT Blood Glucose.: 219 mg/dL (07-15-19 @ 09:50)  POCT Blood Glucose.: 167 mg/dL (07-15-19 @ 06:06)  POCT Blood Glucose.: 231 mg/dL (07-15-19 @ 01:53)  POCT Blood Glucose.: 173 mg/dL (07-14-19 @ 22:05)  POCT Blood Glucose.: 207 mg/dL (07-14-19 @ 17:12)  POCT Blood Glucose.: 134 mg/dL (07-14-19 @ 13:40)                            13.8   7.77  )-----------( 292      ( 17 Jul 2019 09:19 )             44.0       07-16    139  |  100  |  23  ----------------------------<  181<H>  4.5   |  28  |  0.87    Ca    9.5      16 Jul 2019 05:55          Hemoglobin A1C, Whole Blood: 10.4 % <H> [4.0 - 5.6] (06-29-19 @ 09:33)            Contact number: [place] [phone] or [phone]

## 2019-07-17 NOTE — PROGRESS NOTE ADULT - NSHPATTENDINGPLANDISCUSS_GEN_ALL_CORE
Neurology residents, NPs and medical students on stroke service
Neurology residents, PA and NP on stroke service
FERNANDO Nunez and patient's daughter
Medicine NP
Neurology residents, PA and NP on stroke service
Neurology residents, PA, NP and medical students on stroke service
Neurology residents, PAs and medical students on stroke service
Neurology NP on stroke service
Neurology PA on stroke service
Neurology residents, PA, NP and medical students on stroke service
Neurology PA on stroke service
Neurology resident and NP on team
Neurology residents and PAs on stroke service
Neurology residents, PA, NP and medical students on stroke service
Stroke team
Neurology PA on stroke service

## 2019-07-17 NOTE — CONSULT NOTE ADULT - PROVIDER SPECIALTY LIST ADULT
Cardiology
Dermatology
Gastroenterology
Infectious Disease
Internal Medicine
Neurosurgery
Rehab Medicine
Endocrinology

## 2019-07-17 NOTE — PROGRESS NOTE ADULT - ASSESSMENT
54 year old female w/uncontrolled T2DM (HbA1c 10.4%) c/b retinopathy here w/CVA L sided hemiplegia. Bolus feeds q8h now w/worsened hyperglycemia in setting of Prednisone course started for rash/hives. Will continue to increase insulin to improve glycemic control while on steroids. Social work assisting pt's daughter w/discharge planning as pt is w/out insurance. BG goal (100-180mg/dl)

## 2019-07-17 NOTE — PROGRESS NOTE ADULT - SUBJECTIVE AND OBJECTIVE BOX
Patient is a 54y old  Female who presents with a chief complaint of CVA (09 Jul 2019 13:28)      SUBJECTIVE / OVERNIGHT EVENTS:    Events noted.  Rash all over the body  No N/V/Abd pain    MEDICATIONS  (STANDING):  AQUAPHOR (petrolatum Ointment) 1 Application(s) Topical three times a day  aspirin  chewable 81 milliGRAM(s) Oral daily  atorvastatin 80 milliGRAM(s) Oral at bedtime  carvedilol 6.25 milliGRAM(s) Oral every 12 hours  clopidogrel Tablet 75 milliGRAM(s) Oral daily  dextrose 5%. 1000 milliLiter(s) (50 mL/Hr) IV Continuous <Continuous>  dextrose 50% Injectable 12.5 Gram(s) IV Push once  dextrose 50% Injectable 25 Gram(s) IV Push once  dextrose 50% Injectable 25 Gram(s) IV Push once  enoxaparin Injectable 40 milliGRAM(s) SubCutaneous daily  FLUoxetine Solution 20 milliGRAM(s) Oral daily  hydrocortisone 1% Ointment 1 Application(s) Topical two times a day  insulin glargine Injectable (LANTUS) 48 Unit(s) SubCutaneous at bedtime  insulin lispro (HumaLOG) corrective regimen sliding scale   SubCutaneous every 4 hours  insulin lispro Injectable (HumaLOG) 14 Unit(s) SubCutaneous at bedtime  levETIRAcetam  Solution 500 milliGRAM(s) Oral two times a day  lisinopril 20 milliGRAM(s) Oral daily  nystatin    Suspension 682052 Unit(s) Oral two times a day  polyethylene glycol 3350 17 Gram(s) Oral daily  predniSONE   Tablet 20 milliGRAM(s) Oral daily    MEDICATIONS  (PRN):  acetaminophen    Suspension .. 650 milliGRAM(s) Oral every 6 hours PRN Temp greater or equal to 38C (100.4F)  acetaminophen    Suspension .. 650 milliGRAM(s) Enteral Tube every 6 hours PRN Moderate Pain (4 - 6)  bisacodyl Suppository 10 milliGRAM(s) Rectal daily PRN Constipation  dextrose 40% Gel 15 Gram(s) Oral once PRN Blood Glucose LESS THAN 70 milliGRAM(s)/deciliter  diphenhydrAMINE 25 milliGRAM(s) Oral every 6 hours PRN Rash and/or Itching  glucagon  Injectable 1 milliGRAM(s) IntraMuscular once PRN Glucose LESS THAN 70 milligrams/deciliter  nystatin Powder 1 Application(s) Topical two times a day PRN dermatitis        CAPILLARY BLOOD GLUCOSE      POCT Blood Glucose.: 255 mg/dL (16 Jul 2019 21:58)  POCT Blood Glucose.: 223 mg/dL (16 Jul 2019 17:29)  POCT Blood Glucose.: 138 mg/dL (16 Jul 2019 14:01)  POCT Blood Glucose.: 243 mg/dL (16 Jul 2019 10:07)  POCT Blood Glucose.: 154 mg/dL (16 Jul 2019 06:25)  POCT Blood Glucose.: 218 mg/dL (16 Jul 2019 02:01)    I&O's Summary    15 Jul 2019 07:01  -  16 Jul 2019 07:00  --------------------------------------------------------  IN: 1080 mL / OUT: 2000 mL / NET: -920 mL    16 Jul 2019 07:01  -  16 Jul 2019 22:58  --------------------------------------------------------  IN: 0 mL / OUT: 450 mL / NET: -450 mL        PHYSICAL EXAM:    NECK: Supple, No JVD  CHEST/LUNG: Clear to auscultation bilaterally; No wheezing.  HEART: Regular rate and rhythm; No murmurs, rubs, or gallops  ABDOMEN: Soft, Nontender, Nondistended; Bowel sounds present  EXTREMITIES:   No edema  NEUROLOGY: AAO       LABS:                        12.2   7.36  )-----------( 363      ( 16 Jul 2019 08:54 )             37.7     07-16    139  |  100  |  23  ----------------------------<  181<H>  4.5   |  28  |  0.87    Ca    9.5      16 Jul 2019 05:55              CAPILLARY BLOOD GLUCOSE      POCT Blood Glucose.: 255 mg/dL (16 Jul 2019 21:58)  POCT Blood Glucose.: 223 mg/dL (16 Jul 2019 17:29)  POCT Blood Glucose.: 138 mg/dL (16 Jul 2019 14:01)  POCT Blood Glucose.: 243 mg/dL (16 Jul 2019 10:07)  POCT Blood Glucose.: 154 mg/dL (16 Jul 2019 06:25)  POCT Blood Glucose.: 218 mg/dL (16 Jul 2019 02:01)    07-13 @ 16:43  Culture-urine --  Culture results   >100,000 CFU/ml Proteus mirabilis  method type ADAN  Organism Proteus mirabilis  Organism Identification Proteus mirabilis  Specimen source .Urine           07-13 @ 16:43  Culture blood --  Culture results   >100,000 CFU/ml Proteus mirabilis  Gram stain --  Gram stain blood --  Method type ADAN  Organism Proteus mirabilis  Organism identification Proteus mirabilis  Specimen source .Urine      RADIOLOGY & ADDITIONAL TESTS:    Imaging Personally Reviewed:    Consultant(s) Notes Reviewed:      Care Discussed with Consultants/Other Providers:

## 2019-07-17 NOTE — CONSULT NOTE ADULT - CONSULT REQUESTED DATE/TIME
02-Jul-2019 15:47
12-Jul-2019 16:45
16-Jul-2019 18:09
17-Jul-2019 16:11
29-Jun-2019 10:17
29-Jun-2019 16:53
29-Jun-2019 15:43
02-Jul-2019 18:31

## 2019-07-17 NOTE — SWALLOW BEDSIDE ASSESSMENT ADULT - NS ASR SWALLOW FINDINGS DISCUS
Covering FERNANDO Grider, daughter Domitila. Covering RN Marni, daughter Domitila, medicine NP Deniz

## 2019-07-17 NOTE — SWALLOW BEDSIDE ASSESSMENT ADULT - SWALLOW EVAL: DIAGNOSIS
Chart reviewed. Consult received re: re-assessment of swallow to determine candidacy for PO intake. Evaluation attempted however, patient demonstrating poor participation. Occasionally nods y/n to simple questions, eyes closed, not following directions and non-verbal.  D/W medicine NP Deniz, social work Cielo and daughter Domitila ([phone]). Daughter reports she will be in hospital on Saturday 7/20/19 and has agreed to meet with clinician between 10:00-10:30 to encourage participation in re-evaluation of swallow. D/W covering FERNANDO Grider. Chart reviewed. Consult received re: re-assessment of swallow to determine candidacy for PO intake. Evaluation attempted however, patient demonstrating poor participation. Occasionally nods y/n to simple questions, eyes closed, not following directions and non-verbal.  D/W medicine NP Deniz, social work Cielo and daughter Domitila ([phone]). Daughter reports she will be in hospital on Saturday 7/20/19 and has agreed to meet with clinician between 10:00AM-10:30AM to encourage participation in re-evaluation of swallow. D/W covering FERNANDO Grider.

## 2019-07-17 NOTE — PROGRESS NOTE ADULT - ASSESSMENT
54 year old Female HTN, HLD, DM transferred from [facility] for evaluation of stroke. Had UA checked on 7/13/19 and tx for possible UTI. Already received 3 days of ceftriaxone.    Overall UTI, positive culture finding. Rash/hives now apparent.     Plan:   - Pt with abnormal U/A and urine cx, s/p 3 days of abx already, unable to provide hx regarding symptoms, unclear why urine was checked.   -  no need for further abx  -  hives seen today--> can consider derm consult    Will sign off. Please Re-consult prn. 54 year old Female HTN, HLD, DM transferred from [facility] for evaluation of stroke. Had UA checked on 7/13/19 and tx for possible UTI. Already received 3 days of ceftriaxone.    Overall UTI, positive culture finding. Rash/hives now apparent.     Plan:   - Pt with abnormal U/A and urine cx, s/p 3 days of abx already, unable to provide hx regarding symptoms, unclear why urine was checked.   -  no need for further abx  -  hives seen today--> please consider derm consult    Will sign off. Please Re-consult prn.

## 2019-07-17 NOTE — PROGRESS NOTE ADULT - ASSESSMENT
· Assessment	  54 year old female admitted with acute CVA.     Rash:  Dermatology eval noted.       Problem/Plan - 2:  ·  Problem: CVA (cerebral vascular accident).  Plan: - Right MCA and ENRIQEU distribution stroke   - DAPT   -Neuro f/up noted. EEG: No epileptiform activity/ Cw keppra.    Morbid obesity:  PT

## 2019-07-17 NOTE — PROGRESS NOTE ADULT - SUBJECTIVE AND OBJECTIVE BOX
Follow Up:  rash    Interval History: Called to follow up as pt has developed hives again. Abx were discontinued yday. Pt does c/o itching of arms and legs. No sob.         Allergies  No Known Allergies        ANTIMICROBIALS:  nystatin    Suspension 281631 two times a day      OTHER MEDS:  acetaminophen    Suspension .. 650 milliGRAM(s) Oral every 6 hours PRN  acetaminophen    Suspension .. 650 milliGRAM(s) Enteral Tube every 6 hours PRN  AQUAPHOR (petrolatum Ointment) 1 Application(s) Topical three times a day  aspirin  chewable 81 milliGRAM(s) Oral daily  atorvastatin 80 milliGRAM(s) Oral at bedtime  bisacodyl Suppository 10 milliGRAM(s) Rectal daily PRN  carvedilol 6.25 milliGRAM(s) Oral every 12 hours  clopidogrel Tablet 75 milliGRAM(s) Oral daily  dextrose 40% Gel 15 Gram(s) Oral once PRN  dextrose 5%. 1000 milliLiter(s) IV Continuous <Continuous>  dextrose 50% Injectable 12.5 Gram(s) IV Push once  dextrose 50% Injectable 25 Gram(s) IV Push once  dextrose 50% Injectable 25 Gram(s) IV Push once  diphenhydrAMINE 25 milliGRAM(s) Oral every 8 hours PRN  enoxaparin Injectable 40 milliGRAM(s) SubCutaneous daily  FLUoxetine Solution 20 milliGRAM(s) Oral daily  glucagon  Injectable 1 milliGRAM(s) IntraMuscular once PRN  hydrocortisone 1% Ointment 1 Application(s) Topical two times a day  insulin glargine Injectable (LANTUS) 55 Unit(s) SubCutaneous at bedtime  insulin lispro (HumaLOG) corrective regimen sliding scale   SubCutaneous every 4 hours  insulin lispro Injectable (HumaLOG) 16 Unit(s) SubCutaneous at bedtime  levETIRAcetam  Solution 500 milliGRAM(s) Oral two times a day  lisinopril 20 milliGRAM(s) Oral daily  loratadine Syrup 10 milliGRAM(s) Oral <User Schedule>  nystatin Powder 1 Application(s) Topical two times a day PRN  polyethylene glycol 3350 17 Gram(s) Oral daily  predniSONE   Tablet 20 milliGRAM(s) Oral daily      Vital Signs Last 24 Hrs  T(C): 37.1 (17 Jul 2019 12:37), Max: 37.3 (16 Jul 2019 20:27)  T(F): 98.7 (17 Jul 2019 12:37), Max: 99.2 (16 Jul 2019 20:27)  HR: 77 (17 Jul 2019 12:37) (77 - 88)  BP: 136/72 (17 Jul 2019 12:37) (122/69 - 137/75)  BP(mean): --  RR: 18 (17 Jul 2019 12:37) (18 - 18)  SpO2: 100% (17 Jul 2019 12:37) (94% - 100%)    Physical Exam:  General:    NAD,  non toxic  HEENT: atraumatic, normocephalic, normal sclera and conjunctiva  Cardio:     regular S1, S2  Respiratory:    clear b/l, no wheezing  abd:     soft,   BS +,   no tenderness,    no organomegaly  :   no  silva  Musculoskeletal:   no joint swelling,   no edema  Skin:   hives on b/l arms and legs  Neurologic:   awake, alert  psych: calm                          13.8   7.77  )-----------( 292      ( 17 Jul 2019 09:19 )             44.0       07-16    139  |  100  |  23  ----------------------------<  181<H>  4.5   |  28  |  0.87    Ca    9.5      16 Jul 2019 05:55            MICROBIOLOGY:  .Urine  07-13-19   >100,000 CFU/ml Proteus mirabilis  --  Proteus mirabilis      .Blood  06-30-19   No growth at 5 days.  --  --    RADIOLOGY: Follow Up:  rash    Interval History: Called to follow up as pt has developed hives again. Abx were discontinued yday. Pt does c/o itching of arms and legs. No sob.   ROS limited as pt intermittently communicates.        Allergies  No Known Allergies        ANTIMICROBIALS:  nystatin    Suspension [id number] two times a day      OTHER MEDS:  acetaminophen    Suspension .. 650 milliGRAM(s) Oral every 6 hours PRN  acetaminophen    Suspension .. 650 milliGRAM(s) Enteral Tube every 6 hours PRN  AQUAPHOR (petrolatum Ointment) 1 Application(s) Topical three times a day  aspirin  chewable 81 milliGRAM(s) Oral daily  atorvastatin 80 milliGRAM(s) Oral at bedtime  bisacodyl Suppository 10 milliGRAM(s) Rectal daily PRN  carvedilol 6.25 milliGRAM(s) Oral every 12 hours  clopidogrel Tablet 75 milliGRAM(s) Oral daily  dextrose 40% Gel 15 Gram(s) Oral once PRN  dextrose 5%. 1000 milliLiter(s) IV Continuous <Continuous>  dextrose 50% Injectable 12.5 Gram(s) IV Push once  dextrose 50% Injectable 25 Gram(s) IV Push once  dextrose 50% Injectable 25 Gram(s) IV Push once  diphenhydrAMINE 25 milliGRAM(s) Oral every 8 hours PRN  enoxaparin Injectable 40 milliGRAM(s) SubCutaneous daily  FLUoxetine Solution 20 milliGRAM(s) Oral daily  glucagon  Injectable 1 milliGRAM(s) IntraMuscular once PRN  hydrocortisone 1% Ointment 1 Application(s) Topical two times a day  insulin glargine Injectable (LANTUS) 55 Unit(s) SubCutaneous at bedtime  insulin lispro (HumaLOG) corrective regimen sliding scale   SubCutaneous every 4 hours  insulin lispro Injectable (HumaLOG) 16 Unit(s) SubCutaneous at bedtime  levETIRAcetam  Solution 500 milliGRAM(s) Oral two times a day  lisinopril 20 milliGRAM(s) Oral daily  loratadine Syrup 10 milliGRAM(s) Oral <User Schedule>  nystatin Powder 1 Application(s) Topical two times a day PRN  polyethylene glycol 3350 17 Gram(s) Oral daily  predniSONE   Tablet 20 milliGRAM(s) Oral daily      Vital Signs Last 24 Hrs  T(C): 37.1 (17 Jul 2019 12:37), Max: 37.3 (16 Jul 2019 20:27)  T(F): 98.7 (17 Jul 2019 12:37), Max: 99.2 (16 Jul 2019 20:27)  HR: 77 (17 Jul 2019 12:37) (77 - 88)  BP: 136/72 (17 Jul 2019 12:37) (122/69 - 137/75)  RR: 18 (17 Jul 2019 12:37) (18 - 18)  SpO2: 100% (17 Jul 2019 12:37) (94% - 100%)      Physical Exam:  General:    NAD,  non toxic  Cardio:     regular S1, S2  Respiratory:  + air entry  b/l,   abd:     soft,   BS +,   no tenderness,   :   no  silva  Skin:   hives on b/l arms and legs  Neurologic:   awake, alert  psych: calm                          13.8   7.77  )-----------( 292      ( 17 Jul 2019 09:19 )             44.0       07-16    139  |  100  |  23  ----------------------------<  181<H>  4.5   |  28  |  0.87    Ca    9.5      16 Jul 2019 05:55        MICROBIOLOGY:  .Urine  07-13-19   >100,000 CFU/ml Proteus mirabilis  --  Proteus mirabilis      .Blood  06-30-19   No growth at 5 days.  --  --

## 2019-07-17 NOTE — CONSULT NOTE ADULT - ASSESSMENT
Urticaria - 2/2 bacterial UTI. DDx includes urticaria 2/2 ceftriaxone vs idiopathic  -recommend Zyrtec 10 mg twice daily (Claritin okay if Zyrtec is not available) x 1-2 weeks  -recommend 25 mg benadryl prn for breakthrough hives  -will resolved with time    Patient staffed with Dr. Edmundo Lopez will sign off. Please page [phone] with questions or if patient's skin concern needs to be re-evaluated.      Anna De Jesus MD PGY-3  [facility]  Pager: [phone]  Office: [phone] Urticaria - 2/2 bacterial UTI. DDx includes urticaria 2/2 ceftriaxone vs idiopathic  -recommend Zyrtec 10 mg twice daily (Claritin okay if Zyrtec is not available) x 1-2 weeks  -recommend 25 mg benadryl prn for breakthrough hives  -will resolved with time  - f/u in Dermatology Clinic at [address] [place] [address] if persistent     Patient staffed with Dr. Edmundo Lopez will sign off. Please page [phone] with questions or if patient's skin concern needs to be re-evaluated.      Anna De Jesus MD PGY-3  [facility]  Pager: [phone]  Office: [phone]

## 2019-07-18 LAB
ANION GAP SERPL CALC-SCNC: 12 MMOL/L — SIGNIFICANT CHANGE UP (ref 5–17)
BUN SERPL-MCNC: 48 MG/DL — HIGH (ref 7–23)
CALCIUM SERPL-MCNC: 9.5 MG/DL — SIGNIFICANT CHANGE UP (ref 8.4–10.5)
CHLORIDE SERPL-SCNC: 100 MMOL/L — SIGNIFICANT CHANGE UP (ref 96–108)
CO2 SERPL-SCNC: 28 MMOL/L — SIGNIFICANT CHANGE UP (ref 22–31)
CREAT SERPL-MCNC: 1.04 MG/DL — SIGNIFICANT CHANGE UP (ref 0.5–1.3)
GLUCOSE BLDC GLUCOMTR-MCNC: 124 MG/DL — HIGH (ref 70–99)
GLUCOSE BLDC GLUCOMTR-MCNC: 183 MG/DL — HIGH (ref 70–99)
GLUCOSE BLDC GLUCOMTR-MCNC: 211 MG/DL — HIGH (ref 70–99)
GLUCOSE BLDC GLUCOMTR-MCNC: 238 MG/DL — HIGH (ref 70–99)
GLUCOSE BLDC GLUCOMTR-MCNC: 250 MG/DL — HIGH (ref 70–99)
GLUCOSE BLDC GLUCOMTR-MCNC: 300 MG/DL — HIGH (ref 70–99)
GLUCOSE SERPL-MCNC: 200 MG/DL — HIGH (ref 70–99)
HCT VFR BLD CALC: 43.1 % — SIGNIFICANT CHANGE UP (ref 34.5–45)
HGB BLD-MCNC: 13.9 G/DL — SIGNIFICANT CHANGE UP (ref 11.5–15.5)
MCHC RBC-ENTMCNC: 28.3 PG — SIGNIFICANT CHANGE UP (ref 27–34)
MCHC RBC-ENTMCNC: 32.3 GM/DL — SIGNIFICANT CHANGE UP (ref 32–36)
MCV RBC AUTO: 87.6 FL — SIGNIFICANT CHANGE UP (ref 80–100)
PLATELET # BLD AUTO: 347 K/UL — SIGNIFICANT CHANGE UP (ref 150–400)
POTASSIUM SERPL-MCNC: 4.2 MMOL/L — SIGNIFICANT CHANGE UP (ref 3.5–5.3)
POTASSIUM SERPL-SCNC: 4.2 MMOL/L — SIGNIFICANT CHANGE UP (ref 3.5–5.3)
RBC # BLD: 4.92 M/UL — SIGNIFICANT CHANGE UP (ref 3.8–5.2)
RBC # FLD: 13.1 % — SIGNIFICANT CHANGE UP (ref 10.3–14.5)
SODIUM SERPL-SCNC: 140 MMOL/L — SIGNIFICANT CHANGE UP (ref 135–145)
WBC # BLD: 8.03 K/UL — SIGNIFICANT CHANGE UP (ref 3.8–10.5)
WBC # FLD AUTO: 8.03 K/UL — SIGNIFICANT CHANGE UP (ref 3.8–10.5)

## 2019-07-18 PROCEDURE — 99232 SBSQ HOSP IP/OBS MODERATE 35: CPT

## 2019-07-18 RX ORDER — INSULIN GLARGINE 100 [IU]/ML
60 INJECTION, SOLUTION SUBCUTANEOUS AT BEDTIME
Refills: 0 | Status: DISCONTINUED | OUTPATIENT
Start: 2019-07-18 | End: 2019-07-19

## 2019-07-18 RX ORDER — INSULIN LISPRO 100/ML
18 VIAL (ML) SUBCUTANEOUS AT BEDTIME
Refills: 0 | Status: DISCONTINUED | OUTPATIENT
Start: 2019-07-18 | End: 2019-07-19

## 2019-07-18 RX ORDER — INSULIN LISPRO 100/ML
28 VIAL (ML) SUBCUTANEOUS
Refills: 0 | Status: DISCONTINUED | OUTPATIENT
Start: 2019-07-19 | End: 2019-07-19

## 2019-07-18 RX ADMIN — POLYETHYLENE GLYCOL 3350 17 GRAM(S): 17 POWDER, FOR SOLUTION ORAL at 12:10

## 2019-07-18 RX ADMIN — Medication 20 MILLIGRAM(S): at 12:10

## 2019-07-18 RX ADMIN — Medication 18 UNIT(S): at 22:26

## 2019-07-18 RX ADMIN — Medication 2: at 06:00

## 2019-07-18 RX ADMIN — LISINOPRIL 20 MILLIGRAM(S): 2.5 TABLET ORAL at 05:19

## 2019-07-18 RX ADMIN — ENOXAPARIN SODIUM 40 MILLIGRAM(S): 100 INJECTION SUBCUTANEOUS at 12:10

## 2019-07-18 RX ADMIN — Medication 18 UNIT(S): at 14:23

## 2019-07-18 RX ADMIN — Medication 1 APPLICATION(S): at 14:22

## 2019-07-18 RX ADMIN — LEVETIRACETAM 500 MILLIGRAM(S): 250 TABLET, FILM COATED ORAL at 18:02

## 2019-07-18 RX ADMIN — CLOPIDOGREL BISULFATE 75 MILLIGRAM(S): 75 TABLET, FILM COATED ORAL at 12:10

## 2019-07-18 RX ADMIN — Medication 4: at 18:02

## 2019-07-18 RX ADMIN — LEVETIRACETAM 500 MILLIGRAM(S): 250 TABLET, FILM COATED ORAL at 05:19

## 2019-07-18 RX ADMIN — Medication 500000 UNIT(S): at 05:19

## 2019-07-18 RX ADMIN — Medication 4: at 14:23

## 2019-07-18 RX ADMIN — INSULIN GLARGINE 60 UNIT(S): 100 INJECTION, SOLUTION SUBCUTANEOUS at 22:25

## 2019-07-18 RX ADMIN — Medication 1 APPLICATION(S): at 22:24

## 2019-07-18 RX ADMIN — CARVEDILOL PHOSPHATE 6.25 MILLIGRAM(S): 80 CAPSULE, EXTENDED RELEASE ORAL at 18:02

## 2019-07-18 RX ADMIN — Medication 24 UNIT(S): at 06:00

## 2019-07-18 RX ADMIN — ATORVASTATIN CALCIUM 80 MILLIGRAM(S): 80 TABLET, FILM COATED ORAL at 22:25

## 2019-07-18 RX ADMIN — Medication 20 MILLIGRAM(S): at 05:19

## 2019-07-18 RX ADMIN — Medication 1 APPLICATION(S): at 05:19

## 2019-07-18 RX ADMIN — Medication 4: at 02:23

## 2019-07-18 RX ADMIN — CARVEDILOL PHOSPHATE 6.25 MILLIGRAM(S): 80 CAPSULE, EXTENDED RELEASE ORAL at 05:19

## 2019-07-18 RX ADMIN — LORATADINE 10 MILLIGRAM(S): 10 TABLET ORAL at 10:03

## 2019-07-18 RX ADMIN — Medication 1 APPLICATION(S): at 18:02

## 2019-07-18 RX ADMIN — Medication 81 MILLIGRAM(S): at 12:10

## 2019-07-18 RX ADMIN — Medication 6: at 10:02

## 2019-07-18 RX ADMIN — Medication 500000 UNIT(S): at 18:02

## 2019-07-18 RX ADMIN — LORATADINE 10 MILLIGRAM(S): 10 TABLET ORAL at 22:25

## 2019-07-18 NOTE — CHART NOTE - NSCHARTNOTEFT_GEN_A_CORE
Upon Nutritional Assessment by the Registered Dietitian your patient was determined to meet criteria / has evidence of the following diagnosis/diagnoses:          [ ]  Mild Protein Calorie Malnutrition        [ ]  Moderate Protein Calorie Malnutrition        [ ] Severe Protein Calorie Malnutrition        [ ] Unspecified Protein Calorie Malnutrition        [ ] Underweight / BMI <19        [x ] Morbid Obesity / BMI > 40      Findings as based on:  [x ] Comprehensive nutrition assessment  (BMI 48.7)  [ ] Nutrition Focused Physical Exam  [ ] Other:       Nutrition Plan/Recommendations:    1) Continue Bolus feeds Glucerna 1.2 (6am, 2pm, 10pm) 480ml x 3 daily. Provides 1710kcal/85grams protein. 29kcal/kg and 1.4grams protein/kg. based on IBW 59kg.   800ml free water daily (200ml Q6)      PROVIDER Section:     By signing this assessment you are acknowledging and agree with the diagnosis/diagnoses assigned by the Registered Dietitian    Comments:

## 2019-07-18 NOTE — PROGRESS NOTE ADULT - SUBJECTIVE AND OBJECTIVE BOX
INTERVAL HPI/OVERNIGHT EVENTS:    pt seen and examined. She denies abdominal pain, n/v.  tolerating PEG feeds as per RN  last bm yesterday     MEDICATIONS  (STANDING):  AQUAPHOR (petrolatum Ointment) 1 Application(s) Topical three times a day  aspirin  chewable 81 milliGRAM(s) Oral daily  atorvastatin 80 milliGRAM(s) Oral at bedtime  carvedilol 6.25 milliGRAM(s) Oral every 12 hours  clopidogrel Tablet 75 milliGRAM(s) Oral daily  dextrose 5%. 1000 milliLiter(s) (50 mL/Hr) IV Continuous <Continuous>  dextrose 50% Injectable 12.5 Gram(s) IV Push once  dextrose 50% Injectable 25 Gram(s) IV Push once  dextrose 50% Injectable 25 Gram(s) IV Push once  enoxaparin Injectable 40 milliGRAM(s) SubCutaneous daily  FLUoxetine Solution 20 milliGRAM(s) Oral daily  hydrocortisone 1% Ointment 1 Application(s) Topical two times a day  insulin glargine Injectable (LANTUS) 60 Unit(s) SubCutaneous at bedtime  insulin lispro (HumaLOG) corrective regimen sliding scale   SubCutaneous every 4 hours  insulin lispro Injectable (HumaLOG) 18 Unit(s) SubCutaneous at bedtime  insulin lispro Injectable (HumaLOG) 24 Unit(s) SubCutaneous before breakfast  insulin lispro Injectable (HumaLOG) 18 Unit(s) SubCutaneous <User Schedule>  levETIRAcetam  Solution 500 milliGRAM(s) Oral two times a day  lisinopril 20 milliGRAM(s) Oral daily  loratadine Syrup 10 milliGRAM(s) Oral <User Schedule>  nystatin    Suspension 688623 Unit(s) Oral two times a day  polyethylene glycol 3350 17 Gram(s) Oral daily  predniSONE   Tablet 20 milliGRAM(s) Oral daily    MEDICATIONS  (PRN):  acetaminophen    Suspension .. 650 milliGRAM(s) Oral every 6 hours PRN Temp greater or equal to 38C (100.4F)  acetaminophen    Suspension .. 650 milliGRAM(s) Enteral Tube every 6 hours PRN Moderate Pain (4 - 6)  bisacodyl Suppository 10 milliGRAM(s) Rectal daily PRN Constipation  dextrose 40% Gel 15 Gram(s) Oral once PRN Blood Glucose LESS THAN 70 milliGRAM(s)/deciliter  diphenhydrAMINE 25 milliGRAM(s) Oral every 8 hours PRN Rash and/or Itching  glucagon  Injectable 1 milliGRAM(s) IntraMuscular once PRN Glucose LESS THAN 70 milligrams/deciliter  nystatin Powder 1 Application(s) Topical two times a day PRN dermatitis      Allergies    No Known Allergies    Intolerances        Review of Systems:    General:  No wt loss, fevers, chills, night sweats,fatigue,   Eyes:  Good vision, no reported pain  ENT:  No sore throat, pain, runny nose, dysphagia  CV:  No pain, palpitations, hypo/hypertension  Resp:  No dyspnea, cough, tachypnea, wheezing  GI:  No pain, No nausea, No vomiting, No diarrhea, No constipation, No weight loss, No fever, No pruritis, No rectal bleeding, No melena, No dysphagia  :  No pain, bleeding, incontinence, nocturia  Muscle:  No pain, weakness  Neuro:  No weakness, tingling, memory problems  Psych:  No fatigue, insomnia, mood problems, depression  Endocrine:  No polyuria, polydypsia, cold/heat intolerance  Heme:  No petechiae, ecchymosis, easy bruisability  Skin:  No rash, tattoos, scars, edema      Vital Signs Last 24 Hrs  T(C): 36.4 (18 Jul 2019 08:26), Max: 37.1 (17 Jul 2019 12:37)  T(F): 97.5 (18 Jul 2019 08:26), Max: 98.7 (17 Jul 2019 12:37)  HR: 82 (18 Jul 2019 08:26) (76 - 89)  BP: 127/63 (18 Jul 2019 08:26) (114/72 - 143/79)  BP(mean): --  RR: 18 (18 Jul 2019 08:26) (18 - 18)  SpO2: 97% (18 Jul 2019 08:26) (94% - 100%)    PHYSICAL EXAM:    GENERAL:  Appears stated age, well-groomed, well-nourished, no distress  HEENT:  NC/AT,  conjunctivae clear and pink, no thyromegaly, nodules, adenopathy, no JVD, sclera -anicteric  CHEST:  Full & symmetric excursion, no increased effort, breath sounds clear  HEART:  Regular rhythm, S1, S2, no murmur/rub/S3/S4, no abdominal bruit, no edema  ABDOMEN:  Soft, non-tender, non-distended, normoactive bowel sounds,  + PEG c/d/i  EXTEREMITIES:  no cyanosis, clubbing or edema  SKIN:  No rash/erythema/ecchymoses/petechiae/wounds/abscess/warm/dry  NEURO: opens to voice and light touch, drowsy but easily arousable, eyelid opening apraxia, oriented to name, place and year, fluent speech    LABS:                        13.9   8.03  )-----------( 347      ( 18 Jul 2019 08:31 )             43.1     07-18    140  |  100  |  48<H>  ----------------------------<  200<H>  4.2   |  28  |  1.04    Ca    9.5      18 Jul 2019 04:37            RADIOLOGY & ADDITIONAL TESTS:

## 2019-07-18 NOTE — PROGRESS NOTE ADULT - PROBLEM SELECTOR PLAN 1
- s/p upper gastrointestinal endoscopy 7/3 with no gross lesions in esophagus.  - Gastritis.  - No gross lesions in duodenum.  - An endoscopically removable PEG placement was successfully completed  - Please follow the post-PEG recommendations including: antibiotic ointment   - feeds started, monitor residuals  - aspiration precautions

## 2019-07-18 NOTE — PROGRESS NOTE ADULT - SUBJECTIVE AND OBJECTIVE BOX
Patient is a 54y old  Female who presents with a chief complaint of CVA (09 Jul 2019 13:28)      SUBJECTIVE / OVERNIGHT EVENTS:    Events noted.  No N/V/Abd pain    MEDICATIONS  (STANDING):  AQUAPHOR (petrolatum Ointment) 1 Application(s) Topical three times a day  aspirin  chewable 81 milliGRAM(s) Oral daily  atorvastatin 80 milliGRAM(s) Oral at bedtime  carvedilol 6.25 milliGRAM(s) Oral every 12 hours  clopidogrel Tablet 75 milliGRAM(s) Oral daily  dextrose 5%. 1000 milliLiter(s) (50 mL/Hr) IV Continuous <Continuous>  dextrose 50% Injectable 12.5 Gram(s) IV Push once  dextrose 50% Injectable 25 Gram(s) IV Push once  dextrose 50% Injectable 25 Gram(s) IV Push once  enoxaparin Injectable 40 milliGRAM(s) SubCutaneous daily  FLUoxetine Solution 20 milliGRAM(s) Oral daily  hydrocortisone 1% Ointment 1 Application(s) Topical two times a day  insulin glargine Injectable (LANTUS) 60 Unit(s) SubCutaneous at bedtime  insulin lispro (HumaLOG) corrective regimen sliding scale   SubCutaneous every 4 hours  insulin lispro Injectable (HumaLOG) 18 Unit(s) SubCutaneous at bedtime  insulin lispro Injectable (HumaLOG) 18 Unit(s) SubCutaneous <User Schedule>  levETIRAcetam  Solution 500 milliGRAM(s) Oral two times a day  lisinopril 20 milliGRAM(s) Oral daily  loratadine Syrup 10 milliGRAM(s) Oral <User Schedule>  nystatin    Suspension 214583 Unit(s) Oral two times a day  polyethylene glycol 3350 17 Gram(s) Oral daily  predniSONE   Tablet 20 milliGRAM(s) Oral daily    MEDICATIONS  (PRN):  acetaminophen    Suspension .. 650 milliGRAM(s) Oral every 6 hours PRN Temp greater or equal to 38C (100.4F)  acetaminophen    Suspension .. 650 milliGRAM(s) Enteral Tube every 6 hours PRN Moderate Pain (4 - 6)  bisacodyl Suppository 10 milliGRAM(s) Rectal daily PRN Constipation  dextrose 40% Gel 15 Gram(s) Oral once PRN Blood Glucose LESS THAN 70 milliGRAM(s)/deciliter  diphenhydrAMINE 25 milliGRAM(s) Oral every 8 hours PRN Rash and/or Itching  glucagon  Injectable 1 milliGRAM(s) IntraMuscular once PRN Glucose LESS THAN 70 milligrams/deciliter  nystatin Powder 1 Application(s) Topical two times a day PRN dermatitis        CAPILLARY BLOOD GLUCOSE      POCT Blood Glucose.: 124 mg/dL (18 Jul 2019 22:11)  POCT Blood Glucose.: 238 mg/dL (18 Jul 2019 17:36)  POCT Blood Glucose.: 250 mg/dL (18 Jul 2019 14:08)  POCT Blood Glucose.: 300 mg/dL (18 Jul 2019 09:59)  POCT Blood Glucose.: 183 mg/dL (18 Jul 2019 05:58)  POCT Blood Glucose.: 211 mg/dL (18 Jul 2019 02:22)    I&O's Summary    17 Jul 2019 07:01  -  18 Jul 2019 07:00  --------------------------------------------------------  IN: 1360 mL / OUT: 850 mL / NET: 510 mL    18 Jul 2019 07:01  -  18 Jul 2019 23:18  --------------------------------------------------------  IN: 880 mL / OUT: 1400 mL / NET: -520 mL        PHYSICAL EXAM:    NECK: Supple, No JVD  CHEST/LUNG: Clear to auscultation bilaterally; No wheezing.  HEART: Regular rate and rhythm; No murmurs, rubs, or gallops  ABDOMEN: Soft, Nontender, Nondistended; Bowel sounds present  EXTREMITIES:   No edema  NEUROLOGY: AAO       LABS:                        13.9   8.03  )-----------( 347      ( 18 Jul 2019 08:31 )             43.1     07-18    140  |  100  |  48<H>  ----------------------------<  200<H>  4.2   |  28  |  1.04    Ca    9.5      18 Jul 2019 04:37              CAPILLARY BLOOD GLUCOSE      POCT Blood Glucose.: 124 mg/dL (18 Jul 2019 22:11)  POCT Blood Glucose.: 238 mg/dL (18 Jul 2019 17:36)  POCT Blood Glucose.: 250 mg/dL (18 Jul 2019 14:08)  POCT Blood Glucose.: 300 mg/dL (18 Jul 2019 09:59)  POCT Blood Glucose.: 183 mg/dL (18 Jul 2019 05:58)  POCT Blood Glucose.: 211 mg/dL (18 Jul 2019 02:22)    07-13 @ 16:43  Culture-urine --  Culture results   >100,000 CFU/ml Proteus mirabilis  method type ADAN  Organism Proteus mirabilis  Organism Identification Proteus mirabilis  Specimen source .Urine           07-13 @ 16:43  Culture blood --  Culture results   >100,000 CFU/ml Proteus mirabilis  Gram stain --  Gram stain blood --  Method type ADAN  Organism Proteus mirabilis  Organism identification Proteus mirabilis  Specimen source .Urine      RADIOLOGY & ADDITIONAL TESTS:    Imaging Personally Reviewed:    Consultant(s) Notes Reviewed:      Care Discussed with Consultants/Other Providers:

## 2019-07-18 NOTE — CHART NOTE - NSCHARTNOTEFT_GEN_A_CORE
Nutrition Follow Up Note  Patient seen for: follow up    Source: nurse, chart    Diet : ENTERAL, NPO/ENTERAL    Patient reports: as per flow sheet, pt with flat affect, uncooperative        Enteral /Parenteral Nutrition: Receiving Glucerna 1.2 Bolus feeds 6am, 2pm, 10pm. 480ml x 3 daily. Provides 1710kcal/85grams protein. 29kcal/kg and 1.4grams protein/kg. based on IBW 59kg.       Daily   % Weight Change: no current weight    Pertinent Medications: MEDICATIONS  (STANDING):  AQUAPHOR (petrolatum Ointment) 1 Application(s) Topical three times a day  aspirin  chewable 81 milliGRAM(s) Oral daily  atorvastatin 80 milliGRAM(s) Oral at bedtime  carvedilol 6.25 milliGRAM(s) Oral every 12 hours  clopidogrel Tablet 75 milliGRAM(s) Oral daily  dextrose 5%. 1000 milliLiter(s) (50 mL/Hr) IV Continuous <Continuous>  dextrose 50% Injectable 12.5 Gram(s) IV Push once  dextrose 50% Injectable 25 Gram(s) IV Push once  dextrose 50% Injectable 25 Gram(s) IV Push once  enoxaparin Injectable 40 milliGRAM(s) SubCutaneous daily  FLUoxetine Solution 20 milliGRAM(s) Oral daily  hydrocortisone 1% Ointment 1 Application(s) Topical two times a day  insulin glargine Injectable (LANTUS) 60 Unit(s) SubCutaneous at bedtime  insulin lispro (HumaLOG) corrective regimen sliding scale   SubCutaneous every 4 hours  insulin lispro Injectable (HumaLOG) 18 Unit(s) SubCutaneous at bedtime  insulin lispro Injectable (HumaLOG) 24 Unit(s) SubCutaneous before breakfast  insulin lispro Injectable (HumaLOG) 18 Unit(s) SubCutaneous <User Schedule>  levETIRAcetam  Solution 500 milliGRAM(s) Oral two times a day  lisinopril 20 milliGRAM(s) Oral daily  loratadine Syrup 10 milliGRAM(s) Oral <User Schedule>  nystatin    Suspension 838162 Unit(s) Oral two times a day  polyethylene glycol 3350 17 Gram(s) Oral daily  predniSONE   Tablet 20 milliGRAM(s) Oral daily    MEDICATIONS  (PRN):  acetaminophen    Suspension .. 650 milliGRAM(s) Oral every 6 hours PRN Temp greater or equal to 38C (100.4F)  acetaminophen    Suspension .. 650 milliGRAM(s) Enteral Tube every 6 hours PRN Moderate Pain (4 - 6)  bisacodyl Suppository 10 milliGRAM(s) Rectal daily PRN Constipation  dextrose 40% Gel 15 Gram(s) Oral once PRN Blood Glucose LESS THAN 70 milliGRAM(s)/deciliter  diphenhydrAMINE 25 milliGRAM(s) Oral every 8 hours PRN Rash and/or Itching  glucagon  Injectable 1 milliGRAM(s) IntraMuscular once PRN Glucose LESS THAN 70 milligrams/deciliter  nystatin Powder 1 Application(s) Topical two times a day PRN dermatitis    Pertinent Labs: 07-18 @ 04:37: Na 140, BUN 48<H>, Cr 1.04, <H>, K+ 4.2    Finger Sticks:  POCT Blood Glucose.: 300 mg/dL (07-18 @ 09:59)  POCT Blood Glucose.: 183 mg/dL (07-18 @ 05:58)  POCT Blood Glucose.: 211 mg/dL (07-18 @ 02:22)  POCT Blood Glucose.: 233 mg/dL (07-17 @ 21:30)  POCT Blood Glucose.: 281 mg/dL (07-17 @ 18:02)  POCT Blood Glucose.: 246 mg/dL (07-17 @ 13:55)      Skin per nursing documentation: no pressure injuries  Edema: +1 generalized edema    Estimated Needs:   [x ] no change since previous assessment  [ ] recalculated:     Previous Nutrition Diagnosis:   Nutrition Diagnosis is:    New Nutrition Diagnosis:  Related to:    As evidenced by:      Interventions:     Recommend  1)    Monitoring and Evaluation:     Continue to monitor Nutritional intake, Tolerance to diet prescription, weights, labs, skin integrity    RD remains available upon request and will follow up per protocol  Rozina Florian MA, RD, CDN #[id number] Nutrition Follow Up Note  Patient seen for: follow up    · Reason for Admission	CVA    Source: nurse, chart    Diet : ENTERAL, NPO/ENTERAL  tolerating PEG feeds as per RN  last bm yesterday         Patient reports: as per flow sheet, pt with flat affect, uncooperative     Social Work continues to follow, daughter is working on obtaining equipment for home.  Patient's daughter requesting swallow eval to eliminate need for peg.     Speech  Recommendations:  · Date	17-Jul-2019  · Recommended Consistencies	Continue NPO, with non-oral nutrition/hydration/medications at this time, pending reassessment of swallow.      · Anticipated Discharge Disposition	home         Enteral /Parenteral Nutrition: Receiving Glucerna 1.2 Bolus feeds 6am, 2pm, 10pm. 480ml x 3 daily. Provides 1710kcal/85grams protein. 29kcal/kg and 1.4grams protein/kg. based on IBW 59kg.           Daily   % Weight Change: no current weight    Pertinent Medications: MEDICATIONS  (STANDING):  AQUAPHOR (petrolatum Ointment) 1 Application(s) Topical three times a day  aspirin  chewable 81 milliGRAM(s) Oral daily  atorvastatin 80 milliGRAM(s) Oral at bedtime  carvedilol 6.25 milliGRAM(s) Oral every 12 hours  clopidogrel Tablet 75 milliGRAM(s) Oral daily  dextrose 5%. 1000 milliLiter(s) (50 mL/Hr) IV Continuous <Continuous>  dextrose 50% Injectable 12.5 Gram(s) IV Push once  dextrose 50% Injectable 25 Gram(s) IV Push once  dextrose 50% Injectable 25 Gram(s) IV Push once  enoxaparin Injectable 40 milliGRAM(s) SubCutaneous daily  FLUoxetine Solution 20 milliGRAM(s) Oral daily  hydrocortisone 1% Ointment 1 Application(s) Topical two times a day  insulin glargine Injectable (LANTUS) 60 Unit(s) SubCutaneous at bedtime  insulin lispro (HumaLOG) corrective regimen sliding scale   SubCutaneous every 4 hours  insulin lispro Injectable (HumaLOG) 18 Unit(s) SubCutaneous at bedtime  insulin lispro Injectable (HumaLOG) 24 Unit(s) SubCutaneous before breakfast  insulin lispro Injectable (HumaLOG) 18 Unit(s) SubCutaneous <User Schedule>  levETIRAcetam  Solution 500 milliGRAM(s) Oral two times a day  lisinopril 20 milliGRAM(s) Oral daily  loratadine Syrup 10 milliGRAM(s) Oral <User Schedule>  nystatin    Suspension 576971 Unit(s) Oral two times a day  polyethylene glycol 3350 17 Gram(s) Oral daily  predniSONE   Tablet 20 milliGRAM(s) Oral daily    MEDICATIONS  (PRN):  acetaminophen    Suspension .. 650 milliGRAM(s) Oral every 6 hours PRN Temp greater or equal to 38C (100.4F)  acetaminophen    Suspension .. 650 milliGRAM(s) Enteral Tube every 6 hours PRN Moderate Pain (4 - 6)  bisacodyl Suppository 10 milliGRAM(s) Rectal daily PRN Constipation  dextrose 40% Gel 15 Gram(s) Oral once PRN Blood Glucose LESS THAN 70 milliGRAM(s)/deciliter  diphenhydrAMINE 25 milliGRAM(s) Oral every 8 hours PRN Rash and/or Itching  glucagon  Injectable 1 milliGRAM(s) IntraMuscular once PRN Glucose LESS THAN 70 milligrams/deciliter  nystatin Powder 1 Application(s) Topical two times a day PRN dermatitis    Pertinent Labs: 07-18 @ 04:37: Na 140, BUN 48<H>, Cr 1.04, <H>, K+ 4.2    Finger Sticks:  POCT Blood Glucose.: 300 mg/dL (07-18 @ 09:59)  POCT Blood Glucose.: 183 mg/dL (07-18 @ 05:58)  POCT Blood Glucose.: 211 mg/dL (07-18 @ 02:22)  POCT Blood Glucose.: 233 mg/dL (07-17 @ 21:30)  POCT Blood Glucose.: 281 mg/dL (07-17 @ 18:02)  POCT Blood Glucose.: 246 mg/dL (07-17 @ 13:55)      Skin per nursing documentation: no pressure injuries  Edema: +1 generalized edema    Estimated Needs:   [x ] no change since previous assessment  [ ] recalculated:     Previous Nutrition Diagnosis: swallowing difficulty  Nutrition Diagnosis is: ongoing, being addressed with tube feeds    Recommend  1) Continue Bolus feeds Glucerna 1.2 (6am, 2pm, 10pm) 480ml x 3 daily. Provides 1710kcal/85grams protein. 29kcal/kg and 1.4grams protein/kg. based on IBW 59kg.   800ml free water daily (200ml Q6)    Monitoring and Evaluation:     Continue to monitor Nutritional intake, Tolerance to diet prescription, weights, labs, skin integrity    RD remains available upon request and will follow up per protocol  Rozina Florian MA, RD, CDN #[id number] Nutrition Follow Up Note  Patient seen for: follow up    · Reason for Admission	CVA    Source: nurse, chart    Diet : ENTERAL, NPO/ENTERAL  tolerating PEG feeds as per RN  last bm yesterday         Patient reports: as per flow sheet, pt with flat affect, uncooperative     Social Work continues to follow, daughter is working on obtaining equipment for home.  Patient's daughter requesting swallow eval to eliminate need for peg.     Speech  Recommendations:  · Date	17-Jul-2019  · Recommended Consistencies	Continue NPO, with non-oral nutrition/hydration/medications at this time, pending reassessment of swallow.      · Anticipated Discharge Disposition	home         Enteral /Parenteral Nutrition: Receiving Glucerna 1.2 Bolus feeds 6am, 2pm, 10pm. 480ml x 3 daily. Provides 1710kcal/85grams protein. 29kcal/kg and 1.4grams protein/kg. based on IBW 59kg.           Daily   % Weight Change: no current weight    Pertinent Medications: MEDICATIONS  (STANDING):  AQUAPHOR (petrolatum Ointment) 1 Application(s) Topical three times a day  aspirin  chewable 81 milliGRAM(s) Oral daily  atorvastatin 80 milliGRAM(s) Oral at bedtime  carvedilol 6.25 milliGRAM(s) Oral every 12 hours  clopidogrel Tablet 75 milliGRAM(s) Oral daily  dextrose 5%. 1000 milliLiter(s) (50 mL/Hr) IV Continuous <Continuous>  dextrose 50% Injectable 12.5 Gram(s) IV Push once  dextrose 50% Injectable 25 Gram(s) IV Push once  dextrose 50% Injectable 25 Gram(s) IV Push once  enoxaparin Injectable 40 milliGRAM(s) SubCutaneous daily  FLUoxetine Solution 20 milliGRAM(s) Oral daily  hydrocortisone 1% Ointment 1 Application(s) Topical two times a day  insulin glargine Injectable (LANTUS) 60 Unit(s) SubCutaneous at bedtime  insulin lispro (HumaLOG) corrective regimen sliding scale   SubCutaneous every 4 hours  insulin lispro Injectable (HumaLOG) 18 Unit(s) SubCutaneous at bedtime  insulin lispro Injectable (HumaLOG) 24 Unit(s) SubCutaneous before breakfast  insulin lispro Injectable (HumaLOG) 18 Unit(s) SubCutaneous <User Schedule>  levETIRAcetam  Solution 500 milliGRAM(s) Oral two times a day  lisinopril 20 milliGRAM(s) Oral daily  loratadine Syrup 10 milliGRAM(s) Oral <User Schedule>  nystatin    Suspension 987677 Unit(s) Oral two times a day  polyethylene glycol 3350 17 Gram(s) Oral daily  predniSONE   Tablet 20 milliGRAM(s) Oral daily    MEDICATIONS  (PRN):  acetaminophen    Suspension .. 650 milliGRAM(s) Oral every 6 hours PRN Temp greater or equal to 38C (100.4F)  acetaminophen    Suspension .. 650 milliGRAM(s) Enteral Tube every 6 hours PRN Moderate Pain (4 - 6)  bisacodyl Suppository 10 milliGRAM(s) Rectal daily PRN Constipation  dextrose 40% Gel 15 Gram(s) Oral once PRN Blood Glucose LESS THAN 70 milliGRAM(s)/deciliter  diphenhydrAMINE 25 milliGRAM(s) Oral every 8 hours PRN Rash and/or Itching  glucagon  Injectable 1 milliGRAM(s) IntraMuscular once PRN Glucose LESS THAN 70 milligrams/deciliter  nystatin Powder 1 Application(s) Topical two times a day PRN dermatitis    Pertinent Labs: 07-18 @ 04:37: Na 140, BUN 48<H>, Cr 1.04, <H>, K+ 4.2    Finger Sticks:  POCT Blood Glucose.: 300 mg/dL (07-18 @ 09:59)  POCT Blood Glucose.: 183 mg/dL (07-18 @ 05:58)  POCT Blood Glucose.: 211 mg/dL (07-18 @ 02:22)  POCT Blood Glucose.: 233 mg/dL (07-17 @ 21:30)  POCT Blood Glucose.: 281 mg/dL (07-17 @ 18:02)  POCT Blood Glucose.: 246 mg/dL (07-17 @ 13:55)      Skin per nursing documentation: no pressure injuries  Edema: +1 generalized edema    Estimated Needs:   [x ] no change since previous assessment  [ ] recalculated:     Previous Nutrition Diagnosis: swallowing difficulty  Nutrition Diagnosis is: ongoing, being addressed with tube feeds    Interventions: RD placed sticker for BMI>40    Recommend  1) Continue Bolus feeds Glucerna 1.2 (6am, 2pm, 10pm) 480ml x 3 daily. Provides 1710kcal/85grams protein. 29kcal/kg and 1.4grams protein/kg. based on IBW 59kg.   800ml free water daily (200ml Q6)    Monitoring and Evaluation:     Continue to monitor Nutritional intake, Tolerance to diet prescription, weights, labs, skin integrity    RD remains available upon request and will follow up per protocol  Rozina Florian MA, RD, CDN #[phone]

## 2019-07-18 NOTE — PROGRESS NOTE ADULT - SUBJECTIVE AND OBJECTIVE BOX
Diabetes Follow up note:  Interval Hx:  54 year old female w/uncontrolled T2DM (on insulin PTA) w/retinopathy, morbid obesity here w/CVA s/p PEG on bolus feeds of Glucerna 480cc q8h. Pt now started on Prednisone 20mg daily x 3 days for rash. BG values 180's-200s over past 24 hours. Pt seen at bedside. Sleepy but awakens and able to nod yes/no to my questions.       Review of Systems:  General: no complaints. Denies pain  GI: Tolerating TFs without any N/V/D/ABD PAIN.  CV: No CP/SOB  ENDO: No S&Sx of hypoglycemia  MEDS:  atorvastatin 80 milliGRAM(s) Oral at bedtime    insulin glargine Injectable (LANTUS) 55 Unit(s) SubCutaneous at bedtime  insulin lispro (HumaLOG) corrective regimen sliding scale   SubCutaneous every 4 hours  insulin lispro Injectable (HumaLOG) 16 Unit(s) SubCutaneous at bedtime  insulin lispro Injectable (HumaLOG) 24 Unit(s) SubCutaneous before breakfast  insulin lispro Injectable (HumaLOG) 18 Unit(s) SubCutaneous <User Schedule>  predniSONE   Tablet 20 milliGRAM(s) Oral daily    nystatin    Suspension 649501 Unit(s) Oral two times a day    Allergies    No Known Allergies          PE:  General: Female lying in bed. NAD>   Vital Signs Last 24 Hrs  T(C): 36.4 (18 Jul 2019 08:26), Max: 37.1 (17 Jul 2019 12:37)  T(F): 97.5 (18 Jul 2019 08:26), Max: 98.7 (17 Jul 2019 12:37)  HR: 82 (18 Jul 2019 08:26) (76 - 89)  BP: 127/63 (18 Jul 2019 08:26) (114/72 - 143/79)  BP(mean): --  RR: 18 (18 Jul 2019 08:26) (18 - 18)  SpO2: 97% (18 Jul 2019 08:26) (94% - 100%)  Abd: Soft, NT,ND, Obese. PEG in place.   Extremities: Warm. no edema.   Neuro: Awake. Follows commands. L sided hemiplegia.     LABS:    POCT Blood Glucose.: 183 mg/dL (07-18-19 @ 05:58)  POCT Blood Glucose.: 211 mg/dL (07-18-19 @ 02:22)  POCT Blood Glucose.: 233 mg/dL (07-17-19 @ 21:30)  POCT Blood Glucose.: 281 mg/dL (07-17-19 @ 18:02)  POCT Blood Glucose.: 246 mg/dL (07-17-19 @ 13:55)  POCT Blood Glucose.: 265 mg/dL (07-17-19 @ 10:29)  POCT Blood Glucose.: 222 mg/dL (07-17-19 @ 06:08)  POCT Blood Glucose.: 305 mg/dL (07-17-19 @ 02:23)  POCT Blood Glucose.: 255 mg/dL (07-16-19 @ 21:58)  POCT Blood Glucose.: 223 mg/dL (07-16-19 @ 17:29)  POCT Blood Glucose.: 138 mg/dL (07-16-19 @ 14:01)  POCT Blood Glucose.: 243 mg/dL (07-16-19 @ 10:07)  POCT Blood Glucose.: 154 mg/dL (07-16-19 @ 06:25)  POCT Blood Glucose.: 218 mg/dL (07-16-19 @ 02:01)  POCT Blood Glucose.: 153 mg/dL (07-15-19 @ 21:50)  POCT Blood Glucose.: 202 mg/dL (07-15-19 @ 17:06)  POCT Blood Glucose.: 150 mg/dL (07-15-19 @ 13:59)                            13.9   8.03  )-----------( 347      ( 18 Jul 2019 08:31 )             43.1       07-18    140  |  100  |  48<H>  ----------------------------<  200<H>  4.2   |  28  |  1.04    Ca    9.5      18 Jul 2019 04:37           Hemoglobin A1C, Whole Blood: 10.4 % <H> [4.0 - 5.6] (06-29-19 @ 09:33)            Contact number: [place] [phone] or [phone]

## 2019-07-18 NOTE — PROGRESS NOTE ADULT - ASSESSMENT
54 year old female w/uncontrolled T2DM (HbA1c 10.4%) c/b retinopathy here w/CVA L sided hemiplegia. Bolus feeds q8h now w/worsened hyperglycemia in setting of Prednisone course started for rash/hives. Tolerating TF at goal. Will continue to titrate up insulin to improve glycemic control. Last dose of Prednisone scheduled for tomorrow so will start to reduce insulin doses then. BG goal (100-180mg/dl). Social work assisting pt's daughter w/discharge planning as pt is w/out insurance.

## 2019-07-18 NOTE — PROGRESS NOTE ADULT - ASSESSMENT
· Assessment	  54 year old female admitted with acute CVA.     Rash:  Improving.     Problem/Plan - 2:  ·  Problem: CVA (cerebral vascular accident).  Plan: - Right MCA and ENRIQUE distribution stroke   - DAPT   -Keppra    Morbid obesity:  PT

## 2019-07-18 NOTE — PROGRESS NOTE ADULT - PROBLEM SELECTOR PLAN 1
-test BG Q4h  -Increase Lantus 60 units QHS  -Adjust Humalog 24 units w/6am feeding, 18 units w/2pm and 10pm feeding  -c/w Humalog moderate correction scale q4h for now  pager: [phone]

## 2019-07-19 LAB
GLUCOSE BLDC GLUCOMTR-MCNC: 111 MG/DL — HIGH (ref 70–99)
GLUCOSE BLDC GLUCOMTR-MCNC: 124 MG/DL — HIGH (ref 70–99)
GLUCOSE BLDC GLUCOMTR-MCNC: 130 MG/DL — HIGH (ref 70–99)
GLUCOSE BLDC GLUCOMTR-MCNC: 142 MG/DL — HIGH (ref 70–99)
GLUCOSE BLDC GLUCOMTR-MCNC: 162 MG/DL — HIGH (ref 70–99)
GLUCOSE BLDC GLUCOMTR-MCNC: 184 MG/DL — HIGH (ref 70–99)
GLUCOSE BLDC GLUCOMTR-MCNC: 187 MG/DL — HIGH (ref 70–99)

## 2019-07-19 PROCEDURE — 99232 SBSQ HOSP IP/OBS MODERATE 35: CPT

## 2019-07-19 RX ORDER — INSULIN LISPRO 100/ML
12 VIAL (ML) SUBCUTANEOUS EVERY 8 HOURS
Refills: 0 | Status: DISCONTINUED | OUTPATIENT
Start: 2019-07-19 | End: 2019-07-22

## 2019-07-19 RX ORDER — INSULIN GLARGINE 100 [IU]/ML
45 INJECTION, SOLUTION SUBCUTANEOUS AT BEDTIME
Refills: 0 | Status: DISCONTINUED | OUTPATIENT
Start: 2019-07-19 | End: 2019-07-22

## 2019-07-19 RX ADMIN — Medication 2: at 17:18

## 2019-07-19 RX ADMIN — CARVEDILOL PHOSPHATE 6.25 MILLIGRAM(S): 80 CAPSULE, EXTENDED RELEASE ORAL at 17:18

## 2019-07-19 RX ADMIN — Medication 12 UNIT(S): at 22:19

## 2019-07-19 RX ADMIN — Medication 20 MILLIGRAM(S): at 13:52

## 2019-07-19 RX ADMIN — Medication 1 APPLICATION(S): at 05:42

## 2019-07-19 RX ADMIN — Medication 2: at 03:17

## 2019-07-19 RX ADMIN — Medication 1 APPLICATION(S): at 22:20

## 2019-07-19 RX ADMIN — POLYETHYLENE GLYCOL 3350 17 GRAM(S): 17 POWDER, FOR SOLUTION ORAL at 11:59

## 2019-07-19 RX ADMIN — Medication 2: at 09:59

## 2019-07-19 RX ADMIN — Medication 1 APPLICATION(S): at 05:43

## 2019-07-19 RX ADMIN — CARVEDILOL PHOSPHATE 6.25 MILLIGRAM(S): 80 CAPSULE, EXTENDED RELEASE ORAL at 05:42

## 2019-07-19 RX ADMIN — Medication 500000 UNIT(S): at 05:42

## 2019-07-19 RX ADMIN — LEVETIRACETAM 500 MILLIGRAM(S): 250 TABLET, FILM COATED ORAL at 17:18

## 2019-07-19 RX ADMIN — Medication 1 APPLICATION(S): at 13:52

## 2019-07-19 RX ADMIN — ENOXAPARIN SODIUM 40 MILLIGRAM(S): 100 INJECTION SUBCUTANEOUS at 11:59

## 2019-07-19 RX ADMIN — LORATADINE 10 MILLIGRAM(S): 10 TABLET ORAL at 11:59

## 2019-07-19 RX ADMIN — INSULIN GLARGINE 45 UNIT(S): 100 INJECTION, SOLUTION SUBCUTANEOUS at 22:19

## 2019-07-19 RX ADMIN — CLOPIDOGREL BISULFATE 75 MILLIGRAM(S): 75 TABLET, FILM COATED ORAL at 11:59

## 2019-07-19 RX ADMIN — Medication 81 MILLIGRAM(S): at 11:59

## 2019-07-19 RX ADMIN — LORATADINE 10 MILLIGRAM(S): 10 TABLET ORAL at 22:19

## 2019-07-19 RX ADMIN — Medication 18 UNIT(S): at 13:58

## 2019-07-19 RX ADMIN — Medication 28 UNIT(S): at 06:38

## 2019-07-19 RX ADMIN — Medication 1 APPLICATION(S): at 17:18

## 2019-07-19 RX ADMIN — LEVETIRACETAM 500 MILLIGRAM(S): 250 TABLET, FILM COATED ORAL at 05:43

## 2019-07-19 RX ADMIN — ATORVASTATIN CALCIUM 80 MILLIGRAM(S): 80 TABLET, FILM COATED ORAL at 22:19

## 2019-07-19 RX ADMIN — Medication 20 MILLIGRAM(S): at 05:43

## 2019-07-19 RX ADMIN — LISINOPRIL 20 MILLIGRAM(S): 2.5 TABLET ORAL at 05:43

## 2019-07-19 NOTE — PROGRESS NOTE ADULT - ASSESSMENT
54 year old female w/uncontrolled T2DM (HbA1c 10.4%) c/b retinopathy here w/CVA L sided hemiplegia. Bolus feeds q8h now w/worsened hyperglycemia in setting of Prednisone course started for rash/hives. Last dose today w/improvement in glycemic control on present insulin regimen. Tolerating bolus feeds q8h. Will decrease basal/bolus regimen as pt will have lower insulin requirements off steroids. BG goal (100-180mg/dl).

## 2019-07-19 NOTE — PROGRESS NOTE ADULT - SUBJECTIVE AND OBJECTIVE BOX
Patient is a 54y old  Female who presents with a chief complaint of CVA (09 Jul 2019 13:28)      SUBJECTIVE / OVERNIGHT EVENTS:    Events noted.  Awake    MEDICATIONS  (STANDING):  AQUAPHOR (petrolatum Ointment) 1 Application(s) Topical three times a day  aspirin  chewable 81 milliGRAM(s) Oral daily  atorvastatin 80 milliGRAM(s) Oral at bedtime  carvedilol 6.25 milliGRAM(s) Oral every 12 hours  clopidogrel Tablet 75 milliGRAM(s) Oral daily  dextrose 5%. 1000 milliLiter(s) (50 mL/Hr) IV Continuous <Continuous>  dextrose 50% Injectable 12.5 Gram(s) IV Push once  dextrose 50% Injectable 25 Gram(s) IV Push once  dextrose 50% Injectable 25 Gram(s) IV Push once  enoxaparin Injectable 40 milliGRAM(s) SubCutaneous daily  FLUoxetine Solution 20 milliGRAM(s) Oral daily  hydrocortisone 1% Ointment 1 Application(s) Topical two times a day  insulin glargine Injectable (LANTUS) 45 Unit(s) SubCutaneous at bedtime  insulin lispro (HumaLOG) corrective regimen sliding scale   SubCutaneous every 4 hours  insulin lispro Injectable (HumaLOG) 12 Unit(s) SubCutaneous every 8 hours  levETIRAcetam  Solution 500 milliGRAM(s) Oral two times a day  lisinopril 20 milliGRAM(s) Oral daily  loratadine Syrup 10 milliGRAM(s) Oral <User Schedule>  nystatin    Suspension 205869 Unit(s) Oral two times a day  polyethylene glycol 3350 17 Gram(s) Oral daily    MEDICATIONS  (PRN):  acetaminophen    Suspension .. 650 milliGRAM(s) Oral every 6 hours PRN Temp greater or equal to 38C (100.4F)  acetaminophen    Suspension .. 650 milliGRAM(s) Enteral Tube every 6 hours PRN Moderate Pain (4 - 6)  bisacodyl Suppository 10 milliGRAM(s) Rectal daily PRN Constipation  dextrose 40% Gel 15 Gram(s) Oral once PRN Blood Glucose LESS THAN 70 milliGRAM(s)/deciliter  diphenhydrAMINE 25 milliGRAM(s) Oral every 8 hours PRN Rash and/or Itching  glucagon  Injectable 1 milliGRAM(s) IntraMuscular once PRN Glucose LESS THAN 70 milligrams/deciliter  nystatin Powder 1 Application(s) Topical two times a day PRN dermatitis        CAPILLARY BLOOD GLUCOSE      POCT Blood Glucose.: 124 mg/dL (19 Jul 2019 21:09)  POCT Blood Glucose.: 187 mg/dL (19 Jul 2019 17:09)  POCT Blood Glucose.: 130 mg/dL (19 Jul 2019 13:53)  POCT Blood Glucose.: 184 mg/dL (19 Jul 2019 09:51)  POCT Blood Glucose.: 142 mg/dL (19 Jul 2019 06:25)  POCT Blood Glucose.: 162 mg/dL (19 Jul 2019 02:46)  POCT Blood Glucose.: 124 mg/dL (18 Jul 2019 22:11)    I&O's Summary    18 Jul 2019 07:01  -  19 Jul 2019 07:00  --------------------------------------------------------  IN: 2240 mL / OUT: 1980 mL / NET: 260 mL    19 Jul 2019 07:01  -  19 Jul 2019 21:55  --------------------------------------------------------  IN: 740 mL / OUT: 1000 mL / NET: -260 mL        PHYSICAL EXAM:  GENERAL: NAD  NECK: Supple, No JVD  CHEST/LUNG: Clear to auscultation bilaterally; No wheezing.  HEART: Regular rate and rhythm; No murmurs, rubs, or gallops  ABDOMEN: Soft, Nontender, Nondistended; Bowel sounds present  EXTREMITIES:   No edema  NEUROLOGY: Awake      LABS:                        13.9   8.03  )-----------( 347      ( 18 Jul 2019 08:31 )             43.1     07-18    140  |  100  |  48<H>  ----------------------------<  200<H>  4.2   |  28  |  1.04    Ca    9.5      18 Jul 2019 04:37              CAPILLARY BLOOD GLUCOSE      POCT Blood Glucose.: 124 mg/dL (19 Jul 2019 21:09)  POCT Blood Glucose.: 187 mg/dL (19 Jul 2019 17:09)  POCT Blood Glucose.: 130 mg/dL (19 Jul 2019 13:53)  POCT Blood Glucose.: 184 mg/dL (19 Jul 2019 09:51)  POCT Blood Glucose.: 142 mg/dL (19 Jul 2019 06:25)  POCT Blood Glucose.: 162 mg/dL (19 Jul 2019 02:46)  POCT Blood Glucose.: 124 mg/dL (18 Jul 2019 22:11)    07-13 @ 16:43  Culture-urine --  Culture results   >100,000 CFU/ml Proteus mirabilis  method type ADAN  Organism Proteus mirabilis  Organism Identification Proteus mirabilis  Specimen source .Urine           07-13 @ 16:43  Culture blood --  Culture results   >100,000 CFU/ml Proteus mirabilis  Gram stain --  Gram stain blood --  Method type ADAN  Organism Proteus mirabilis  Organism identification Proteus mirabilis  Specimen source .Urine      RADIOLOGY & ADDITIONAL TESTS:    Imaging Personally Reviewed:    Consultant(s) Notes Reviewed:      Care Discussed with Consultants/Other Providers:

## 2019-07-19 NOTE — PROGRESS NOTE ADULT - PROBLEM SELECTOR PLAN 1
-test BG Q4h for now  -Decrease Lantus 45 units tonight  -Will decrease Humalog bolus doses to 14 units Q8h w/each feed  -c/w Humalog moderate correction scale q4h for now  -If pt transitioned to PO diet-please notify endocrine as this will effect insulin regimen.   -Discharge plan: pending feeding modality. Will need insulin at discharge   -discussed w/pt, RN and Medicine NP  pager: [phone]

## 2019-07-19 NOTE — PROGRESS NOTE ADULT - SUBJECTIVE AND OBJECTIVE BOX
INTERVAL HPI/OVERNIGHT EVENTS:    pt seen and examined.   no new events     MEDICATIONS  (STANDING):  AQUAPHOR (petrolatum Ointment) 1 Application(s) Topical three times a day  aspirin  chewable 81 milliGRAM(s) Oral daily  atorvastatin 80 milliGRAM(s) Oral at bedtime  carvedilol 6.25 milliGRAM(s) Oral every 12 hours  clopidogrel Tablet 75 milliGRAM(s) Oral daily  dextrose 5%. 1000 milliLiter(s) (50 mL/Hr) IV Continuous <Continuous>  dextrose 50% Injectable 12.5 Gram(s) IV Push once  dextrose 50% Injectable 25 Gram(s) IV Push once  dextrose 50% Injectable 25 Gram(s) IV Push once  enoxaparin Injectable 40 milliGRAM(s) SubCutaneous daily  FLUoxetine Solution 20 milliGRAM(s) Oral daily  hydrocortisone 1% Ointment 1 Application(s) Topical two times a day  insulin glargine Injectable (LANTUS) 60 Unit(s) SubCutaneous at bedtime  insulin lispro (HumaLOG) corrective regimen sliding scale   SubCutaneous every 4 hours  insulin lispro Injectable (HumaLOG) 18 Unit(s) SubCutaneous at bedtime  insulin lispro Injectable (HumaLOG) 24 Unit(s) SubCutaneous before breakfast  insulin lispro Injectable (HumaLOG) 18 Unit(s) SubCutaneous <User Schedule>  levETIRAcetam  Solution 500 milliGRAM(s) Oral two times a day  lisinopril 20 milliGRAM(s) Oral daily  loratadine Syrup 10 milliGRAM(s) Oral <User Schedule>  nystatin    Suspension 764589 Unit(s) Oral two times a day  polyethylene glycol 3350 17 Gram(s) Oral daily  predniSONE   Tablet 20 milliGRAM(s) Oral daily    MEDICATIONS  (PRN):  acetaminophen    Suspension .. 650 milliGRAM(s) Oral every 6 hours PRN Temp greater or equal to 38C (100.4F)  acetaminophen    Suspension .. 650 milliGRAM(s) Enteral Tube every 6 hours PRN Moderate Pain (4 - 6)  bisacodyl Suppository 10 milliGRAM(s) Rectal daily PRN Constipation  dextrose 40% Gel 15 Gram(s) Oral once PRN Blood Glucose LESS THAN 70 milliGRAM(s)/deciliter  diphenhydrAMINE 25 milliGRAM(s) Oral every 8 hours PRN Rash and/or Itching  glucagon  Injectable 1 milliGRAM(s) IntraMuscular once PRN Glucose LESS THAN 70 milligrams/deciliter  nystatin Powder 1 Application(s) Topical two times a day PRN dermatitis      Allergies    No Known Allergies    Intolerances        Review of Systems:    General:  No wt loss, fevers, chills, night sweats,fatigue,   Eyes:  Good vision, no reported pain  ENT:  No sore throat, pain, runny nose, dysphagia  CV:  No pain, palpitations, hypo/hypertension  Resp:  No dyspnea, cough, tachypnea, wheezing  GI:  No pain, No nausea, No vomiting, No diarrhea, No constipation, No weight loss, No fever, No pruritis, No rectal bleeding, No melena, No dysphagia  :  No pain, bleeding, incontinence, nocturia  Muscle:  No pain, weakness  Neuro:  No weakness, tingling, memory problems  Psych:  No fatigue, insomnia, mood problems, depression  Endocrine:  No polyuria, polydypsia, cold/heat intolerance  Heme:  No petechiae, ecchymosis, easy bruisability  Skin:  No rash, tattoos, scars, edema      Vital Signs Last 24 Hrs  T(C): 36.4 (18 Jul 2019 08:26), Max: 37.1 (17 Jul 2019 12:37)  T(F): 97.5 (18 Jul 2019 08:26), Max: 98.7 (17 Jul 2019 12:37)  HR: 82 (18 Jul 2019 08:26) (76 - 89)  BP: 127/63 (18 Jul 2019 08:26) (114/72 - 143/79)  BP(mean): --  RR: 18 (18 Jul 2019 08:26) (18 - 18)  SpO2: 97% (18 Jul 2019 08:26) (94% - 100%)    PHYSICAL EXAM:    GENERAL:  Appears stated age, well-groomed, well-nourished, no distress  HEENT:  NC/AT,  conjunctivae clear and pink, no thyromegaly, nodules, adenopathy, no JVD, sclera -anicteric  CHEST:  Full & symmetric excursion, no increased effort, breath sounds clear  HEART:  Regular rhythm, S1, S2, no murmur/rub/S3/S4, no abdominal bruit, no edema  ABDOMEN:  Soft, non-tender, non-distended, normoactive bowel sounds,  + PEG c/d/i  EXTEREMITIES:  no cyanosis, clubbing or edema  SKIN:  No rash/erythema/ecchymoses/petechiae/wounds/abscess/warm/dry  NEURO: opens to voice and light touch, drowsy but easily arousable, eyelid opening apraxia, oriented to name, place and year, fluent speech    LABS:                        13.9   8.03  )-----------( 347      ( 18 Jul 2019 08:31 )             43.1     07-18    140  |  100  |  48<H>  ----------------------------<  200<H>  4.2   |  28  |  1.04    Ca    9.5      18 Jul 2019 04:37            RADIOLOGY & ADDITIONAL TESTS:

## 2019-07-19 NOTE — PROGRESS NOTE ADULT - SUBJECTIVE AND OBJECTIVE BOX
Diabetes Follow up note:  Interval Hx:  54 year old female w/uncontrolled T2DM (on insulin PTA) w/retinopathy, morbid obesity here w/CVA s/p PEG on bolus feeds of Glucerna 480cc q8h. Pt w/last dose of Prednisone this morning. BG values improved in 100s over last 4 values. Pt seen at bedside w/RN present. Pt more awake and increasingly verbal this morning. Awaiting re-evaluation of swallow function prior to discharge.     Review of Systems:  General: no complaints. "hello"  GI: Tolerating TFwithout any N/V/D/ABD PAIN.  CV: No CP/SOB  ENDO: denies s/s of hypoglycemia  MEDS:  atorvastatin 80 milliGRAM(s) Oral at bedtime    insulin glargine Injectable (LANTUS) 60 Unit(s) SubCutaneous at bedtime  insulin lispro (HumaLOG) corrective regimen sliding scale   SubCutaneous every 4 hours  insulin lispro Injectable (HumaLOG) 18 Unit(s) SubCutaneous at bedtime  insulin lispro Injectable (HumaLOG) 28 Unit(s) SubCutaneous before breakfast  insulin lispro Injectable (HumaLOG) 18 Unit(s) SubCutaneous <User Schedule>    nystatin    Suspension 780797 Unit(s) Oral two times a day    Allergies    No Known Allergies        PE:  General: Female lying in bed. NAD>   Vital Signs Last 24 Hrs  T(C): 36.9 (19 Jul 2019 08:23), Max: 36.9 (18 Jul 2019 23:40)  T(F): 98.4 (19 Jul 2019 08:23), Max: 98.4 (18 Jul 2019 23:40)  HR: 82 (19 Jul 2019 08:23) (72 - 86)  BP: 138/79 (19 Jul 2019 08:23) (134/72 - 158/81)  BP(mean): --  RR: 18 (19 Jul 2019 08:23) (18 - 18)  SpO2: 96% (19 Jul 2019 08:23) (96% - 98%)  Abd: Soft, NT,ND, Obese. PEG in place.   Extremities: Warm  Neuro: Awake. Appropriate. L sided hemiplegia.     LABS:    POCT Blood Glucose.: 184 mg/dL (07-19-19 @ 09:51)  POCT Blood Glucose.: 142 mg/dL (07-19-19 @ 06:25)  POCT Blood Glucose.: 162 mg/dL (07-19-19 @ 02:46)  POCT Blood Glucose.: 124 mg/dL (07-18-19 @ 22:11)  POCT Blood Glucose.: 238 mg/dL (07-18-19 @ 17:36)  POCT Blood Glucose.: 250 mg/dL (07-18-19 @ 14:08)  POCT Blood Glucose.: 300 mg/dL (07-18-19 @ 09:59)  POCT Blood Glucose.: 183 mg/dL (07-18-19 @ 05:58)  POCT Blood Glucose.: 211 mg/dL (07-18-19 @ 02:22)  POCT Blood Glucose.: 233 mg/dL (07-17-19 @ 21:30)  POCT Blood Glucose.: 281 mg/dL (07-17-19 @ 18:02)  POCT Blood Glucose.: 246 mg/dL (07-17-19 @ 13:55)  POCT Blood Glucose.: 265 mg/dL (07-17-19 @ 10:29)  POCT Blood Glucose.: 222 mg/dL (07-17-19 @ 06:08)  POCT Blood Glucose.: 305 mg/dL (07-17-19 @ 02:23)  POCT Blood Glucose.: 255 mg/dL (07-16-19 @ 21:58)  POCT Blood Glucose.: 223 mg/dL (07-16-19 @ 17:29)  POCT Blood Glucose.: 138 mg/dL (07-16-19 @ 14:01)                            13.9   8.03  )-----------( 347      ( 18 Jul 2019 08:31 )             43.1       07-18    140  |  100  |  48<H>  ----------------------------<  200<H>  4.2   |  28  |  1.04    Ca    9.5      18 Jul 2019 04:37        Hemoglobin A1C, Whole Blood: 10.4 % <H> [4.0 - 5.6] (06-29-19 @ 09:33)            Contact number: [place] [phone] or [phone]

## 2019-07-20 LAB
GLUCOSE BLDC GLUCOMTR-MCNC: 111 MG/DL — HIGH (ref 70–99)
GLUCOSE BLDC GLUCOMTR-MCNC: 136 MG/DL — HIGH (ref 70–99)
GLUCOSE BLDC GLUCOMTR-MCNC: 143 MG/DL — HIGH (ref 70–99)
GLUCOSE BLDC GLUCOMTR-MCNC: 145 MG/DL — HIGH (ref 70–99)
GLUCOSE BLDC GLUCOMTR-MCNC: 149 MG/DL — HIGH (ref 70–99)
GLUCOSE BLDC GLUCOMTR-MCNC: 168 MG/DL — HIGH (ref 70–99)
GLUCOSE BLDC GLUCOMTR-MCNC: 192 MG/DL — HIGH (ref 70–99)

## 2019-07-20 RX ADMIN — Medication 81 MILLIGRAM(S): at 13:51

## 2019-07-20 RX ADMIN — LEVETIRACETAM 500 MILLIGRAM(S): 250 TABLET, FILM COATED ORAL at 05:26

## 2019-07-20 RX ADMIN — INSULIN GLARGINE 45 UNIT(S): 100 INJECTION, SOLUTION SUBCUTANEOUS at 21:41

## 2019-07-20 RX ADMIN — Medication 1 APPLICATION(S): at 13:50

## 2019-07-20 RX ADMIN — CLOPIDOGREL BISULFATE 75 MILLIGRAM(S): 75 TABLET, FILM COATED ORAL at 13:51

## 2019-07-20 RX ADMIN — ATORVASTATIN CALCIUM 80 MILLIGRAM(S): 80 TABLET, FILM COATED ORAL at 21:41

## 2019-07-20 RX ADMIN — Medication 1 APPLICATION(S): at 18:12

## 2019-07-20 RX ADMIN — Medication 12 UNIT(S): at 13:52

## 2019-07-20 RX ADMIN — Medication 2: at 13:52

## 2019-07-20 RX ADMIN — POLYETHYLENE GLYCOL 3350 17 GRAM(S): 17 POWDER, FOR SOLUTION ORAL at 13:52

## 2019-07-20 RX ADMIN — Medication 500000 UNIT(S): at 18:12

## 2019-07-20 RX ADMIN — Medication 1 APPLICATION(S): at 05:26

## 2019-07-20 RX ADMIN — LORATADINE 10 MILLIGRAM(S): 10 TABLET ORAL at 13:53

## 2019-07-20 RX ADMIN — Medication 12 UNIT(S): at 21:41

## 2019-07-20 RX ADMIN — Medication 2: at 18:12

## 2019-07-20 RX ADMIN — ENOXAPARIN SODIUM 40 MILLIGRAM(S): 100 INJECTION SUBCUTANEOUS at 13:51

## 2019-07-20 RX ADMIN — LISINOPRIL 20 MILLIGRAM(S): 2.5 TABLET ORAL at 05:26

## 2019-07-20 RX ADMIN — LEVETIRACETAM 500 MILLIGRAM(S): 250 TABLET, FILM COATED ORAL at 18:11

## 2019-07-20 RX ADMIN — CARVEDILOL PHOSPHATE 6.25 MILLIGRAM(S): 80 CAPSULE, EXTENDED RELEASE ORAL at 05:26

## 2019-07-20 RX ADMIN — Medication 1 APPLICATION(S): at 21:41

## 2019-07-20 RX ADMIN — Medication 12 UNIT(S): at 05:26

## 2019-07-20 RX ADMIN — Medication 20 MILLIGRAM(S): at 13:50

## 2019-07-20 RX ADMIN — CARVEDILOL PHOSPHATE 6.25 MILLIGRAM(S): 80 CAPSULE, EXTENDED RELEASE ORAL at 18:12

## 2019-07-20 RX ADMIN — Medication 500000 UNIT(S): at 05:27

## 2019-07-20 RX ADMIN — Medication 1 APPLICATION(S): at 05:25

## 2019-07-20 RX ADMIN — LORATADINE 10 MILLIGRAM(S): 10 TABLET ORAL at 23:44

## 2019-07-20 NOTE — PROGRESS NOTE ADULT - ASSESSMENT
· Assessment	  54 year old female admitted with acute CVA.     Rash:  Improving.    Problem/Plan - 2:  ·  Problem: CVA (cerebral vascular accident).  Plan: - Right MCA and ENRIQUE distribution stroke   - DAPT   -Keppra    DM II:  FSSS/Lantus

## 2019-07-20 NOTE — SWALLOW BEDSIDE ASSESSMENT ADULT - SWALLOW EVAL: DIAGNOSIS
Pt presents with 1) an oropharyngeal dysphagia characterized by oral holding o food/liquid material requiring verbal/tactile cues to initiate swallow sequence, delayed or absent trigger of the pharyngeal swallow and changes in vocal quality on thin liquid indicative of possible laryngeal penetration/aspiration. 2) +Cognitive-linguistic deficits. Suspect component of oral/verbal apraxia 3) Dysphonia

## 2019-07-20 NOTE — SWALLOW BEDSIDE ASSESSMENT ADULT - PHARYNGEAL PHASE
significantly delayed or absent trigger of pharyngeal swallow + aphonia followed by hypophonia and delayed subtle throat clear post swallow/Delayed pharyngeal swallow

## 2019-07-20 NOTE — PROGRESS NOTE ADULT - SUBJECTIVE AND OBJECTIVE BOX
GASTROENTEROLOGY    Patient seen and examined at bedside. No acute events noted.  Tolerating bolus feeds per RN report      MEDICATIONS  (STANDING):  AQUAPHOR (petrolatum Ointment) 1 Application(s) Topical three times a day  aspirin  chewable 81 milliGRAM(s) Oral daily  atorvastatin 80 milliGRAM(s) Oral at bedtime  carvedilol 6.25 milliGRAM(s) Oral every 12 hours  clopidogrel Tablet 75 milliGRAM(s) Oral daily  dextrose 5%. 1000 milliLiter(s) (50 mL/Hr) IV Continuous <Continuous>  dextrose 50% Injectable 12.5 Gram(s) IV Push once  dextrose 50% Injectable 25 Gram(s) IV Push once  dextrose 50% Injectable 25 Gram(s) IV Push once  enoxaparin Injectable 40 milliGRAM(s) SubCutaneous daily  FLUoxetine Solution 20 milliGRAM(s) Oral daily  hydrocortisone 1% Ointment 1 Application(s) Topical two times a day  insulin glargine Injectable (LANTUS) 45 Unit(s) SubCutaneous at bedtime  insulin lispro (HumaLOG) corrective regimen sliding scale   SubCutaneous every 4 hours  insulin lispro Injectable (HumaLOG) 12 Unit(s) SubCutaneous every 8 hours  levETIRAcetam  Solution 500 milliGRAM(s) Oral two times a day  lisinopril 20 milliGRAM(s) Oral daily  loratadine Syrup 10 milliGRAM(s) Oral <User Schedule>  nystatin    Suspension 662619 Unit(s) Oral two times a day  polyethylene glycol 3350 17 Gram(s) Oral daily        T(F): 98.7 (07-20-19 @ 08:19), Max: 98.7 (07-20-19 @ 08:19)  HR: 69 (07-20-19 @ 08:19) (69 - 90)  BP: 163/87 (07-20-19 @ 08:19) (135/72 - 163/87)  RR: 18 (07-20-19 @ 08:19) (18 - 20)  SpO2: 98% (07-20-19 @ 08:19) (96% - 98%)  Wt(kg): --    PHYSICAL EXAM  GENERAL:   NAD  HEENT:  NC/AT, no JVD, sclera-anicteric  CHEST:  clear to ascultation bilaterally, respirations nonlabored  HEART:  +S1+S2 regular rate and rhythm   ABDOMEN:  Soft, obese, non-tender, non-distended, + bowel sounds, + peg, site c/d/i  EXTREMITIES:  no cyanosis, clubbing or edema  NEURO:  Awake, alert  SKIN:  No rash/warm/dry      LABS:              I&O's Detail    19 Jul 2019 07:01  -  20 Jul 2019 07:00  --------------------------------------------------------  IN:    Enteral Tube Flush: 60 mL    Free Water: 200 mL    Glucerna: 480 mL  Total IN: 740 mL    OUT:    Voided: 1400 mL  Total OUT: 1400 mL    Total NET: -660 mL

## 2019-07-20 NOTE — PROGRESS NOTE ADULT - SUBJECTIVE AND OBJECTIVE BOX
Patient is a 54y old  Female who presents with a chief complaint of CVA (09 Jul 2019 13:28)      SUBJECTIVE / OVERNIGHT EVENTS:    Events noted.  Awake    MEDICATIONS  (STANDING):  AQUAPHOR (petrolatum Ointment) 1 Application(s) Topical three times a day  aspirin  chewable 81 milliGRAM(s) Oral daily  atorvastatin 80 milliGRAM(s) Oral at bedtime  carvedilol 6.25 milliGRAM(s) Oral every 12 hours  clopidogrel Tablet 75 milliGRAM(s) Oral daily  dextrose 5%. 1000 milliLiter(s) (50 mL/Hr) IV Continuous <Continuous>  dextrose 50% Injectable 12.5 Gram(s) IV Push once  dextrose 50% Injectable 25 Gram(s) IV Push once  dextrose 50% Injectable 25 Gram(s) IV Push once  enoxaparin Injectable 40 milliGRAM(s) SubCutaneous daily  FLUoxetine Solution 20 milliGRAM(s) Oral daily  hydrocortisone 1% Ointment 1 Application(s) Topical two times a day  insulin glargine Injectable (LANTUS) 45 Unit(s) SubCutaneous at bedtime  insulin lispro (HumaLOG) corrective regimen sliding scale   SubCutaneous every 4 hours  insulin lispro Injectable (HumaLOG) 12 Unit(s) SubCutaneous every 8 hours  levETIRAcetam  Solution 500 milliGRAM(s) Oral two times a day  lisinopril 20 milliGRAM(s) Oral daily  loratadine Syrup 10 milliGRAM(s) Oral <User Schedule>  nystatin    Suspension 956768 Unit(s) Oral two times a day  polyethylene glycol 3350 17 Gram(s) Oral daily    MEDICATIONS  (PRN):  acetaminophen    Suspension .. 650 milliGRAM(s) Oral every 6 hours PRN Temp greater or equal to 38C (100.4F)  acetaminophen    Suspension .. 650 milliGRAM(s) Enteral Tube every 6 hours PRN Moderate Pain (4 - 6)  bisacodyl Suppository 10 milliGRAM(s) Rectal daily PRN Constipation  dextrose 40% Gel 15 Gram(s) Oral once PRN Blood Glucose LESS THAN 70 milliGRAM(s)/deciliter  diphenhydrAMINE 25 milliGRAM(s) Oral every 8 hours PRN Rash and/or Itching  glucagon  Injectable 1 milliGRAM(s) IntraMuscular once PRN Glucose LESS THAN 70 milligrams/deciliter  nystatin Powder 1 Application(s) Topical two times a day PRN dermatitis        CAPILLARY BLOOD GLUCOSE      POCT Blood Glucose.: 124 mg/dL (19 Jul 2019 21:09)  POCT Blood Glucose.: 187 mg/dL (19 Jul 2019 17:09)  POCT Blood Glucose.: 130 mg/dL (19 Jul 2019 13:53)  POCT Blood Glucose.: 184 mg/dL (19 Jul 2019 09:51)  POCT Blood Glucose.: 142 mg/dL (19 Jul 2019 06:25)  POCT Blood Glucose.: 162 mg/dL (19 Jul 2019 02:46)  POCT Blood Glucose.: 124 mg/dL (18 Jul 2019 22:11)    I&O's Summary    18 Jul 2019 07:01  -  19 Jul 2019 07:00  --------------------------------------------------------  IN: 2240 mL / OUT: 1980 mL / NET: 260 mL    19 Jul 2019 07:01  -  19 Jul 2019 21:55  --------------------------------------------------------  IN: 740 mL / OUT: 1000 mL / NET: -260 mL        PHYSICAL EXAM:  GENERAL: NAD  NECK: Supple, No JVD  CHEST/LUNG: Clear to auscultation bilaterally; No wheezing.  HEART: Regular rate and rhythm; No murmurs, rubs, or gallops  ABDOMEN: Soft, Nontender, Nondistended; Bowel sounds present  EXTREMITIES:   No edema  NEUROLOGY: Awake      LABS:                        13.9   8.03  )-----------( 347      ( 18 Jul 2019 08:31 )             43.1     07-18    140  |  100  |  48<H>  ----------------------------<  200<H>  4.2   |  28  |  1.04    Ca    9.5      18 Jul 2019 04:37              CAPILLARY BLOOD GLUCOSE      POCT Blood Glucose.: 124 mg/dL (19 Jul 2019 21:09)  POCT Blood Glucose.: 187 mg/dL (19 Jul 2019 17:09)  POCT Blood Glucose.: 130 mg/dL (19 Jul 2019 13:53)  POCT Blood Glucose.: 184 mg/dL (19 Jul 2019 09:51)  POCT Blood Glucose.: 142 mg/dL (19 Jul 2019 06:25)  POCT Blood Glucose.: 162 mg/dL (19 Jul 2019 02:46)  POCT Blood Glucose.: 124 mg/dL (18 Jul 2019 22:11)    07-13 @ 16:43  Culture-urine --  Culture results   >100,000 CFU/ml Proteus mirabilis  method type ADAN  Organism Proteus mirabilis  Organism Identification Proteus mirabilis  Specimen source .Urine           07-13 @ 16:43  Culture blood --  Culture results   >100,000 CFU/ml Proteus mirabilis  Gram stain --  Gram stain blood --  Method type ADAN  Organism Proteus mirabilis  Organism identification Proteus mirabilis  Specimen source .Urine      RADIOLOGY & ADDITIONAL TESTS:    Imaging Personally Reviewed:    Consultant(s) Notes Reviewed:      Care Discussed with Consultants/Other Providers:

## 2019-07-20 NOTE — SWALLOW BEDSIDE ASSESSMENT ADULT - COMMENTS
Continued:   6/29: Change in status notification: Pt s/p tPA 6/28 @1730 ([facility]). RN went to bedside report in ED. RN notices pt right facial twitch and foaming at the mouth. Pt was following commands seconds prior, pt no longer speaking or following commands.  6/29: Provider contact note: Pt had 3 more seizure episodes en route to floor  Pt given ativan and keppra   Bedside swallow evaluations completed on 6/29, 7/1, 7/2. Recommendations on each exam: NPO, with non-oral nutrition/hydration/medications. Please see reports for details.    S/P PEG on 7/3.  An attempt was made to complete a re-evaluation on 7/17 however, patient not following directions, not opening eyes or participating in exam. D/W daughter, Domitila via phone, who agreed to be present today 7/20/19 for re-evaluation to encourage participation as daughter endorses + alertness when family at bedside.   7/19: Care coordination: patient was declined from [facility] acute rehab. Family to take patient home at discharge   See results tab re: imaging

## 2019-07-20 NOTE — SWALLOW BEDSIDE ASSESSMENT ADULT - CONSISTENCIES ADMINISTERED
1/2 teaspoon/thin liquid crushed ice chips x3, 1/4 to 1/2 teaspoon amounts at a time/puree thick/puree thin

## 2019-07-20 NOTE — SWALLOW BEDSIDE ASSESSMENT ADULT - ORAL PHASE
initially no spontaneous manipulation of material, minimal improvement given verbal cues from clinician and family, given jerson-oral stimulation and tactile stimulation midline on tongue and palate-> + lingual pumping suspect premature spillover

## 2019-07-21 LAB
GLUCOSE BLDC GLUCOMTR-MCNC: 109 MG/DL — HIGH (ref 70–99)
GLUCOSE BLDC GLUCOMTR-MCNC: 111 MG/DL — HIGH (ref 70–99)
GLUCOSE BLDC GLUCOMTR-MCNC: 140 MG/DL — HIGH (ref 70–99)
GLUCOSE BLDC GLUCOMTR-MCNC: 142 MG/DL — HIGH (ref 70–99)
GLUCOSE BLDC GLUCOMTR-MCNC: 154 MG/DL — HIGH (ref 70–99)
GLUCOSE BLDC GLUCOMTR-MCNC: 156 MG/DL — HIGH (ref 70–99)

## 2019-07-21 PROCEDURE — 70450 CT HEAD/BRAIN W/O DYE: CPT | Mod: 26

## 2019-07-21 RX ADMIN — Medication 20 MILLIGRAM(S): at 11:45

## 2019-07-21 RX ADMIN — Medication 2: at 05:08

## 2019-07-21 RX ADMIN — LORATADINE 10 MILLIGRAM(S): 10 TABLET ORAL at 22:26

## 2019-07-21 RX ADMIN — CLOPIDOGREL BISULFATE 75 MILLIGRAM(S): 75 TABLET, FILM COATED ORAL at 11:45

## 2019-07-21 RX ADMIN — Medication 12 UNIT(S): at 05:09

## 2019-07-21 RX ADMIN — CARVEDILOL PHOSPHATE 6.25 MILLIGRAM(S): 80 CAPSULE, EXTENDED RELEASE ORAL at 05:03

## 2019-07-21 RX ADMIN — LEVETIRACETAM 500 MILLIGRAM(S): 250 TABLET, FILM COATED ORAL at 05:03

## 2019-07-21 RX ADMIN — Medication 500000 UNIT(S): at 18:53

## 2019-07-21 RX ADMIN — Medication 1 APPLICATION(S): at 11:44

## 2019-07-21 RX ADMIN — Medication 81 MILLIGRAM(S): at 11:45

## 2019-07-21 RX ADMIN — Medication 500000 UNIT(S): at 05:03

## 2019-07-21 RX ADMIN — Medication 12 UNIT(S): at 14:28

## 2019-07-21 RX ADMIN — LISINOPRIL 20 MILLIGRAM(S): 2.5 TABLET ORAL at 05:03

## 2019-07-21 RX ADMIN — Medication 1 APPLICATION(S): at 22:25

## 2019-07-21 RX ADMIN — Medication 2: at 18:49

## 2019-07-21 RX ADMIN — ATORVASTATIN CALCIUM 80 MILLIGRAM(S): 80 TABLET, FILM COATED ORAL at 22:26

## 2019-07-21 RX ADMIN — Medication 12 UNIT(S): at 22:28

## 2019-07-21 RX ADMIN — Medication 1 APPLICATION(S): at 05:02

## 2019-07-21 RX ADMIN — INSULIN GLARGINE 45 UNIT(S): 100 INJECTION, SOLUTION SUBCUTANEOUS at 22:26

## 2019-07-21 RX ADMIN — CARVEDILOL PHOSPHATE 6.25 MILLIGRAM(S): 80 CAPSULE, EXTENDED RELEASE ORAL at 18:53

## 2019-07-21 RX ADMIN — LORATADINE 10 MILLIGRAM(S): 10 TABLET ORAL at 11:45

## 2019-07-21 RX ADMIN — ENOXAPARIN SODIUM 40 MILLIGRAM(S): 100 INJECTION SUBCUTANEOUS at 11:45

## 2019-07-21 RX ADMIN — LEVETIRACETAM 500 MILLIGRAM(S): 250 TABLET, FILM COATED ORAL at 18:53

## 2019-07-21 NOTE — CHART NOTE - NSCHARTNOTEFT_GEN_A_CORE
Notified by RN that pt left pupil reaction to light has changed from prior exam. Pt examined at bedside and pupil reaction is sluggish, formerly described as brisk. Urgent non-contrast CT Head ordered. Remainder of neuro exam without acute changes. Attending to be notified, awaiting call back.    Cynthia Jack PA-C

## 2019-07-21 NOTE — PROGRESS NOTE ADULT - SUBJECTIVE AND OBJECTIVE BOX
Patient is a 54y old  Female who presents with a chief complaint of CVA (09 Jul 2019 13:28)      SUBJECTIVE / OVERNIGHT EVENTS:    Events noted.  CONSTITUTIONAL: No fever,  or fatigue  RESPIRATORY: No cough, wheezing,  No shortness of breath  CARDIOVASCULAR: No chest pain, palpitations,   GASTROINTESTINAL: No abdominal or epigastric pain.   NEUROLOGICAL: No headaches,     MEDICATIONS  (STANDING):  AQUAPHOR (petrolatum Ointment) 1 Application(s) Topical three times a day  aspirin  chewable 81 milliGRAM(s) Oral daily  atorvastatin 80 milliGRAM(s) Oral at bedtime  carvedilol 6.25 milliGRAM(s) Oral every 12 hours  clopidogrel Tablet 75 milliGRAM(s) Oral daily  dextrose 5%. 1000 milliLiter(s) (50 mL/Hr) IV Continuous <Continuous>  dextrose 50% Injectable 12.5 Gram(s) IV Push once  dextrose 50% Injectable 25 Gram(s) IV Push once  dextrose 50% Injectable 25 Gram(s) IV Push once  enoxaparin Injectable 40 milliGRAM(s) SubCutaneous daily  FLUoxetine Solution 20 milliGRAM(s) Oral daily  hydrocortisone 1% Ointment 1 Application(s) Topical two times a day  insulin glargine Injectable (LANTUS) 45 Unit(s) SubCutaneous at bedtime  insulin lispro (HumaLOG) corrective regimen sliding scale   SubCutaneous every 4 hours  insulin lispro Injectable (HumaLOG) 12 Unit(s) SubCutaneous every 8 hours  levETIRAcetam  Solution 500 milliGRAM(s) Oral two times a day  lisinopril 20 milliGRAM(s) Oral daily  loratadine Syrup 10 milliGRAM(s) Oral <User Schedule>  nystatin    Suspension 487312 Unit(s) Oral two times a day  polyethylene glycol 3350 17 Gram(s) Oral daily    MEDICATIONS  (PRN):  acetaminophen    Suspension .. 650 milliGRAM(s) Oral every 6 hours PRN Temp greater or equal to 38C (100.4F)  acetaminophen    Suspension .. 650 milliGRAM(s) Enteral Tube every 6 hours PRN Moderate Pain (4 - 6)  bisacodyl Suppository 10 milliGRAM(s) Rectal daily PRN Constipation  dextrose 40% Gel 15 Gram(s) Oral once PRN Blood Glucose LESS THAN 70 milliGRAM(s)/deciliter  diphenhydrAMINE 25 milliGRAM(s) Oral every 8 hours PRN Rash and/or Itching  glucagon  Injectable 1 milliGRAM(s) IntraMuscular once PRN Glucose LESS THAN 70 milligrams/deciliter  nystatin Powder 1 Application(s) Topical two times a day PRN dermatitis        CAPILLARY BLOOD GLUCOSE      POCT Blood Glucose.: 156 mg/dL (21 Jul 2019 17:47)  POCT Blood Glucose.: 142 mg/dL (21 Jul 2019 14:04)  POCT Blood Glucose.: 111 mg/dL (21 Jul 2019 10:49)  POCT Blood Glucose.: 154 mg/dL (21 Jul 2019 05:06)  POCT Blood Glucose.: 140 mg/dL (21 Jul 2019 03:22)  POCT Blood Glucose.: 136 mg/dL (20 Jul 2019 22:12)  POCT Blood Glucose.: 145 mg/dL (20 Jul 2019 21:37)    I&O's Summary    20 Jul 2019 07:01  -  21 Jul 2019 07:00  --------------------------------------------------------  IN: 880 mL / OUT: 1800 mL / NET: -920 mL        PHYSICAL EXAM:  GENERAL: NAD  NECK: Supple, No JVD  CHEST/LUNG: Clear to auscultation bilaterally; No wheezing.  HEART: Regular rate and rhythm; No murmurs, rubs, or gallops  ABDOMEN: Soft, Nontender, Nondistended; Bowel sounds present  EXTREMITIES:   No edema  NEUROLOGY: AAO       LABS:                  CAPILLARY BLOOD GLUCOSE      POCT Blood Glucose.: 156 mg/dL (21 Jul 2019 17:47)  POCT Blood Glucose.: 142 mg/dL (21 Jul 2019 14:04)  POCT Blood Glucose.: 111 mg/dL (21 Jul 2019 10:49)  POCT Blood Glucose.: 154 mg/dL (21 Jul 2019 05:06)  POCT Blood Glucose.: 140 mg/dL (21 Jul 2019 03:22)  POCT Blood Glucose.: 136 mg/dL (20 Jul 2019 22:12)  POCT Blood Glucose.: 145 mg/dL (20 Jul 2019 21:37)                RADIOLOGY & ADDITIONAL TESTS:    Imaging Personally Reviewed:    Consultant(s) Notes Reviewed:      Care Discussed with Consultants/Other Providers:

## 2019-07-21 NOTE — PROGRESS NOTE ADULT - SUBJECTIVE AND OBJECTIVE BOX
GASTROENTEROLOGY    Patient seen and examined at bedside  No acute events  Tolerating tube feeds      MEDICATIONS  (STANDING):  AQUAPHOR (petrolatum Ointment) 1 Application(s) Topical three times a day  aspirin  chewable 81 milliGRAM(s) Oral daily  atorvastatin 80 milliGRAM(s) Oral at bedtime  carvedilol 6.25 milliGRAM(s) Oral every 12 hours  clopidogrel Tablet 75 milliGRAM(s) Oral daily  dextrose 5%. 1000 milliLiter(s) (50 mL/Hr) IV Continuous <Continuous>  dextrose 50% Injectable 12.5 Gram(s) IV Push once  dextrose 50% Injectable 25 Gram(s) IV Push once  dextrose 50% Injectable 25 Gram(s) IV Push once  enoxaparin Injectable 40 milliGRAM(s) SubCutaneous daily  FLUoxetine Solution 20 milliGRAM(s) Oral daily  hydrocortisone 1% Ointment 1 Application(s) Topical two times a day  insulin glargine Injectable (LANTUS) 45 Unit(s) SubCutaneous at bedtime  insulin lispro (HumaLOG) corrective regimen sliding scale   SubCutaneous every 4 hours  insulin lispro Injectable (HumaLOG) 12 Unit(s) SubCutaneous every 8 hours  levETIRAcetam  Solution 500 milliGRAM(s) Oral two times a day  lisinopril 20 milliGRAM(s) Oral daily  loratadine Syrup 10 milliGRAM(s) Oral <User Schedule>  nystatin    Suspension 330273 Unit(s) Oral two times a day  polyethylene glycol 3350 17 Gram(s) Oral daily        T(F): 99.2 (07-21-19 @ 09:00), Max: 99.2 (07-21-19 @ 09:00)  HR: 77 (07-21-19 @ 09:00) (71 - 78)  BP: 136/78 (07-21-19 @ 09:00) (120/81 - 154/87)  RR: 18 (07-21-19 @ 09:00) (18 - 18)  SpO2: 97% (07-21-19 @ 09:00) (94% - 100%)  Wt(kg): --    PHYSICAL EXAM  GENERAL:   NAD  HEENT:  NC/AT, no JVD, sclera-anicteric  CHEST:  clear to ascultation bilaterally, respirations nonlabored  HEART:  +S1+S2 r  ABDOMEN:  Soft, non-tender, non-distended, + bowel sounds, + peg tube; site clean/dry/intact  EXTREMITIES:  no cyanosis, clubbing or edema  NEURO:  Alert responsive  SKIN:  No rash/warm/dry      LABS:              I&O's Detail    20 Jul 2019 07:01  -  21 Jul 2019 07:00  --------------------------------------------------------  IN:    Enteral Tube Flush: 400 mL    Glucerna: 480 mL  Total IN: 880 mL    OUT:    Voided: 1800 mL  Total OUT: 1800 mL    Total NET: -920 mL

## 2019-07-22 LAB
GLUCOSE BLDC GLUCOMTR-MCNC: 133 MG/DL — HIGH (ref 70–99)
GLUCOSE BLDC GLUCOMTR-MCNC: 139 MG/DL — HIGH (ref 70–99)
GLUCOSE BLDC GLUCOMTR-MCNC: 145 MG/DL — HIGH (ref 70–99)
GLUCOSE BLDC GLUCOMTR-MCNC: 166 MG/DL — HIGH (ref 70–99)
GLUCOSE BLDC GLUCOMTR-MCNC: 207 MG/DL — HIGH (ref 70–99)
GLUCOSE BLDC GLUCOMTR-MCNC: 45 MG/DL — CRITICAL LOW (ref 70–99)
GLUCOSE BLDC GLUCOMTR-MCNC: 49 MG/DL — LOW (ref 70–99)
GLUCOSE BLDC GLUCOMTR-MCNC: 75 MG/DL — SIGNIFICANT CHANGE UP (ref 70–99)

## 2019-07-22 PROCEDURE — 99232 SBSQ HOSP IP/OBS MODERATE 35: CPT

## 2019-07-22 RX ORDER — INSULIN LISPRO 100/ML
VIAL (ML) SUBCUTANEOUS EVERY 8 HOURS
Refills: 0 | Status: DISCONTINUED | OUTPATIENT
Start: 2019-07-22 | End: 2019-07-28

## 2019-07-22 RX ORDER — INSULIN LISPRO 100/ML
10 VIAL (ML) SUBCUTANEOUS EVERY 8 HOURS
Refills: 0 | Status: DISCONTINUED | OUTPATIENT
Start: 2019-07-22 | End: 2019-07-28

## 2019-07-22 RX ORDER — INSULIN GLARGINE 100 [IU]/ML
40 INJECTION, SOLUTION SUBCUTANEOUS AT BEDTIME
Refills: 0 | Status: DISCONTINUED | OUTPATIENT
Start: 2019-07-22 | End: 2019-07-29

## 2019-07-22 RX ORDER — ASPIRIN/CALCIUM CARB/MAGNESIUM 324 MG
81 TABLET ORAL DAILY
Refills: 0 | Status: DISCONTINUED | OUTPATIENT
Start: 2019-07-22 | End: 2019-08-26

## 2019-07-22 RX ORDER — DEXTROSE 50 % IN WATER 50 %
25 SYRINGE (ML) INTRAVENOUS ONCE
Refills: 0 | Status: COMPLETED | OUTPATIENT
Start: 2019-07-22 | End: 2019-07-22

## 2019-07-22 RX ADMIN — ENOXAPARIN SODIUM 40 MILLIGRAM(S): 100 INJECTION SUBCUTANEOUS at 11:54

## 2019-07-22 RX ADMIN — NYSTATIN CREAM 1 APPLICATION(S): 100000 CREAM TOPICAL at 06:32

## 2019-07-22 RX ADMIN — LEVETIRACETAM 500 MILLIGRAM(S): 250 TABLET, FILM COATED ORAL at 06:32

## 2019-07-22 RX ADMIN — Medication 10 UNIT(S): at 13:51

## 2019-07-22 RX ADMIN — Medication 81 MILLIGRAM(S): at 11:54

## 2019-07-22 RX ADMIN — LEVETIRACETAM 500 MILLIGRAM(S): 250 TABLET, FILM COATED ORAL at 17:21

## 2019-07-22 RX ADMIN — Medication 25 GRAM(S): at 06:57

## 2019-07-22 RX ADMIN — CLOPIDOGREL BISULFATE 75 MILLIGRAM(S): 75 TABLET, FILM COATED ORAL at 11:54

## 2019-07-22 RX ADMIN — Medication 1 APPLICATION(S): at 12:21

## 2019-07-22 RX ADMIN — ATORVASTATIN CALCIUM 80 MILLIGRAM(S): 80 TABLET, FILM COATED ORAL at 22:46

## 2019-07-22 RX ADMIN — Medication 650 MILLIGRAM(S): at 14:22

## 2019-07-22 RX ADMIN — CARVEDILOL PHOSPHATE 6.25 MILLIGRAM(S): 80 CAPSULE, EXTENDED RELEASE ORAL at 06:32

## 2019-07-22 RX ADMIN — Medication 650 MILLIGRAM(S): at 13:52

## 2019-07-22 RX ADMIN — Medication 81 MILLIGRAM(S): at 22:46

## 2019-07-22 RX ADMIN — LORATADINE 10 MILLIGRAM(S): 10 TABLET ORAL at 23:44

## 2019-07-22 RX ADMIN — Medication 500000 UNIT(S): at 17:21

## 2019-07-22 RX ADMIN — INSULIN GLARGINE 40 UNIT(S): 100 INJECTION, SOLUTION SUBCUTANEOUS at 22:48

## 2019-07-22 RX ADMIN — Medication 20 MILLIGRAM(S): at 11:54

## 2019-07-22 RX ADMIN — Medication 1 APPLICATION(S): at 22:47

## 2019-07-22 RX ADMIN — LISINOPRIL 20 MILLIGRAM(S): 2.5 TABLET ORAL at 06:32

## 2019-07-22 RX ADMIN — Medication 1 APPLICATION(S): at 06:33

## 2019-07-22 RX ADMIN — Medication 500000 UNIT(S): at 06:32

## 2019-07-22 RX ADMIN — Medication 10 UNIT(S): at 22:47

## 2019-07-22 RX ADMIN — LORATADINE 10 MILLIGRAM(S): 10 TABLET ORAL at 12:20

## 2019-07-22 NOTE — PROGRESS NOTE ADULT - PROBLEM SELECTOR PLAN 1
-test BG Q8h now. Pt no longer hyperglycemia so doesn't need Humalog correction scale q4h  -Decrease Lantus dose to 40 units qhs  -Will decrease Humalog bolus doses to 10 units Q8h w/each feed  -change Humalog moderate correction scale to q8h for now  -Discharge plan: pending feeding modality. Will need insulin at discharge   -Plan discussed with pt/team.  Contact info: [phone] (24/7). pager [phone]

## 2019-07-22 NOTE — PROGRESS NOTE ADULT - ASSESSMENT
· Assessment	  54 year old female admitted with acute CVA.     Rash:  Improving.        Problem/Plan - 2:  ·  Problem: CVA (cerebral vascular accident).  Plan: - Right MCA and ENRIQUE distribution stroke   - DAPT /On PEG feeding  -Keppra    DM II:  FSSS/Lantus

## 2019-07-22 NOTE — PROGRESS NOTE ADULT - ASSESSMENT
54 year old female w/uncontrolled T2DM (HbA1c 10.4%) c/b retinopathy here w/CVA L sided hemiplegia. Bolus feeds q8h with BG tight while on present insulin doses with one episode of hypoglycemia this am. Will decrease basa/bolus insulin doses to BG goal 100s to 180s.

## 2019-07-22 NOTE — CHART NOTE - NSCHARTNOTEFT_GEN_A_CORE
Patient is a 54y old  Female who presents with a chief complaint of CVA (09 Jul 2019 13:28)  CT Head non contrast, completed last night for change in pupillary sangeeta. reported to daytime team by daytime primary RN  Preliminary reading of CT revealed no acute findings compared to prior examination from   7/12/19, evolving large right ENRIQUE and MCA territory infarct    Pt seen this AM with primary RN at bedside.   Appears in NAD, sleeping, but arousable, & opened her eyes briefly  Pupillary sangeeta. noted on Rt, fixed on the Lt. (RN has documented this finding throughout the shift)    Spoke with Neurology resident (spectra [phone]), and discussed above info. with her  Pt will be evaluated today, and will follow up final results of CT      Vital Signs Last 24 Hrs  T(C): 37.1 (22 Jul 2019 05:00), Max: 37.3 (21 Jul 2019 09:00)  T(F): 98.7 (22 Jul 2019 05:00), Max: 99.2 (21 Jul 2019 09:00)  HR: 80 (22 Jul 2019 05:00) (69 - 91)  BP: 139/79 (22 Jul 2019 05:00) (126/68 - 142/83)  BP(mean): --  RR: 18 (22 Jul 2019 05:00) (18 - 18)  SpO2: 98% (22 Jul 2019 05:00) (95% - 98%)      Radiology:  < from: CT Head No Cont (07.21.19 @ 18:22) >    INTERPRETATION:  no acute findings compared to prior examination from   7/12/19, evolving large right ENRIQUE and MCA territory infarct    ******PRELIMINARY REPORT******    ******PRELIMINARY REPORT******          RACHAEL FALL M.D., RADIOLOGY RESIDENT          Assessment & Plan:  HPI:  Patient is a 54 year old Female w/ PMHx of HTN, HLD, DM transferred from [facility] for evaluation of stroke. Per daughter at bedside, last known normal 2pm, patient had sudden onset left sided paralysis, slurred speech, received TPA at [facility] at 5;30PM, completed at 6PM. NIHSS 13, MRS 1 (28 Jun 2019 19:57)        >  >  >  > Patient is a 54y old  Female who presents with a chief complaint of CVA (09 Jul 2019 13:28)  CT Head non contrast, completed last night for change in pupillary sangeeta. reported to daytime team by daytime primary RN  Preliminary reading of CT revealed no acute findings compared to prior examination from   7/12/19, evolving large right ENRIQUE and MCA territory infarct    Pt seen this AM with primary RN at bedside.   Appears in NAD, sleeping, but arousable, & opened her eyes briefly  Pupillary sangeeta. noted on Rt, fixed on the Lt. (RN has documented this finding throughout the shift)    Spoke with Neurology resident (spectra [phone]), and discussed above info. with her  Pt will be evaluated today, and will follow up final results of CT      Vital Signs Last 24 Hrs  T(C): 37.1 (22 Jul 2019 05:00), Max: 37.3 (21 Jul 2019 09:00)  T(F): 98.7 (22 Jul 2019 05:00), Max: 99.2 (21 Jul 2019 09:00)  HR: 80 (22 Jul 2019 05:00) (69 - 91)  BP: 139/79 (22 Jul 2019 05:00) (126/68 - 142/83)  BP(mean): --  RR: 18 (22 Jul 2019 05:00) (18 - 18)  SpO2: 98% (22 Jul 2019 05:00) (95% - 98%)      Radiology:  < from: CT Head No Cont (07.21.19 @ 18:22) >    INTERPRETATION:  no acute findings compared to prior examination from   7/12/19, evolving large right ENRIQUE and MCA territory infarct    ******PRELIMINARY REPORT******    ******PRELIMINARY REPORT******          RACHAEL FALL M.D., RADIOLOGY RESIDENT          Assessment & Plan:  HPI:  Patient is a 54 year old Female w/ PMHx of HTN, HLD, DM transferred from [facility] for evaluation of stroke. Per daughter at bedside, last known normal 2pm, patient had sudden onset left sided paralysis, slurred speech, received TPA at at [place] at 5;30PM, completed at 6PM. NIHSS 13, MRS 1 (28 Jun 2019 19:57)    Pt seen at bedside, was lethargic, but arousable, briefly opened her eyes  D/W RN findings of preliminary CT report  RN reported FS 45, for which she received 1 AMP D50 per hypoglycemia protocol, and given PEG bolus feed, as scheduled at this time  Rpt FS 15 min post treatment was 207.    PLAN:  >Continue to monitor  >F/U results of CT scan and Neurology recs.   >Monitor FS q 4 hrs.  to monitor for hypo & hyperglycemia  >Endorsed to day team; follow up per Attending

## 2019-07-22 NOTE — PROGRESS NOTE ADULT - SUBJECTIVE AND OBJECTIVE BOX
GASTROENTEROLOGY    Patient seen and examined at bedside  No acute events  Tolerating tube feeds      MEDICATIONS  (STANDING):  AQUAPHOR (petrolatum Ointment) 1 Application(s) Topical three times a day  aspirin  chewable 81 milliGRAM(s) Oral daily  atorvastatin 80 milliGRAM(s) Oral at bedtime  carvedilol 6.25 milliGRAM(s) Oral every 12 hours  clopidogrel Tablet 75 milliGRAM(s) Oral daily  dextrose 5%. 1000 milliLiter(s) (50 mL/Hr) IV Continuous <Continuous>  dextrose 50% Injectable 12.5 Gram(s) IV Push once  dextrose 50% Injectable 25 Gram(s) IV Push once  dextrose 50% Injectable 25 Gram(s) IV Push once  enoxaparin Injectable 40 milliGRAM(s) SubCutaneous daily  FLUoxetine Solution 20 milliGRAM(s) Oral daily  hydrocortisone 1% Ointment 1 Application(s) Topical two times a day  insulin glargine Injectable (LANTUS) 45 Unit(s) SubCutaneous at bedtime  insulin lispro (HumaLOG) corrective regimen sliding scale   SubCutaneous every 4 hours  insulin lispro Injectable (HumaLOG) 12 Unit(s) SubCutaneous every 8 hours  levETIRAcetam  Solution 500 milliGRAM(s) Oral two times a day  lisinopril 20 milliGRAM(s) Oral daily  loratadine Syrup 10 milliGRAM(s) Oral <User Schedule>  nystatin    Suspension 093985 Unit(s) Oral two times a day  polyethylene glycol 3350 17 Gram(s) Oral daily        T(F): 99.2 (07-21-19 @ 09:00), Max: 99.2 (07-21-19 @ 09:00)  HR: 77 (07-21-19 @ 09:00) (71 - 78)  BP: 136/78 (07-21-19 @ 09:00) (120/81 - 154/87)  RR: 18 (07-21-19 @ 09:00) (18 - 18)  SpO2: 97% (07-21-19 @ 09:00) (94% - 100%)  Wt(kg): --    PHYSICAL EXAM  GENERAL:   NAD  HEENT:  NC/AT, no JVD, sclera-anicteric  CHEST:  clear to ascultation bilaterally, respirations nonlabored  HEART:  +S1+S2 r  ABDOMEN:  Soft, non-tender, non-distended, + bowel sounds, + peg tube; site clean/dry/intact  EXTREMITIES:  no cyanosis, clubbing or edema  NEURO:  Alert responsive  SKIN:  No rash/warm/dry      LABS:              I&O's Detail    20 Jul 2019 07:01  -  21 Jul 2019 07:00  --------------------------------------------------------  IN:    Enteral Tube Flush: 400 mL    Glucerna: 480 mL  Total IN: 880 mL    OUT:    Voided: 1800 mL  Total OUT: 1800 mL    Total NET: -920 mL

## 2019-07-22 NOTE — PROGRESS NOTE ADULT - SUBJECTIVE AND OBJECTIVE BOX
Diabetes Follow up note: Saw pt earlier today  Interval Hx: 54 year old female w/uncontrolled T2DM (on insulin PTA) w/retinopathy, morbid obesity here w/CVA s/p PEG on bolus feeds of Glucerna 480cc q8h. Pt now off steroids. Pt more awake nodding to simple questions but not talking. Per occupational therapist pt was talking to her earlier today. Noted glycemic control too tight with an episode of hypoglycemia this am> BG in 40s. Also BG in low 100s and 70s overnight. Per staff pt received TF bolus at 6am and Humalog dose was held for bolus> F/u BG after bolus>200.    Review of Systems:  General: no verbal at time of visit  GI: Tolerating TF without any N/V/D/ABD PAIN.  CV: No CP/SOB  ENDO: denies s/s of hypoglycemia      MEDS:  atorvastatin 80 milliGRAM(s) Oral at bedtime  insulin glargine Injectable (LANTUS) 40 Unit(s) SubCutaneous at bedtime  insulin lispro (HumaLOG) corrective regimen sliding scale   SubCutaneous every 4 hours  insulin lispro Injectable (HumaLOG) 12 Unit(s) SubCutaneous every 8 hours  hydrocortisone 1% Ointment 1 Application(s) Topical two times a day      Allergies    No Known Allergies      PE:  General: Female sitting in chair in NAD>   Vital Signs Last 24 Hrs  T(C): 36.4 (07-22-19 @ 13:42), Max: 37.3 (07-21-19 @ 15:55)  T(F): 97.6 (07-22-19 @ 13:42), Max: 99.1 (07-21-19 @ 15:55)  HR: 68 (07-22-19 @ 13:42) (68 - 91)  BP: 150/76 (07-22-19 @ 13:42) (126/68 - 150/76)  BP(mean): --  RR: 18 (07-22-19 @ 13:42) (18 - 18)  SpO2: 97% (07-22-19 @ 13:42) (95% - 98%)  Abd: Soft, NT, ND, Obese. PEG in place. TFs off at time of visit   Extremities: Warm. No edema in LEs  Neuro: Awake, nods yes/no to simple questions. L sided hemiplegia.     LABS:  POCT Blood Glucose.: 133 mg/dL (07-22-19 @ 13:48)  POCT Blood Glucose.: 166 mg/dL (07-22-19 @ 10:36)  POCT Blood Glucose.: 207 mg/dL (07-22-19 @ 07:06)  POCT Blood Glucose.: 49 mg/dL (07-22-19 @ 06:50)  POCT Blood Glucose.: 45 mg/dL (07-22-19 @ 06:48)  POCT Blood Glucose.: 75 mg/dL (07-22-19 @ 02:13)  POCT Blood Glucose.: 109 mg/dL (07-21-19 @ 21:44)  POCT Blood Glucose.: 156 mg/dL (07-21-19 @ 17:47)  POCT Blood Glucose.: 142 mg/dL (07-21-19 @ 14:04)  POCT Blood Glucose.: 111 mg/dL (07-21-19 @ 10:49)  POCT Blood Glucose.: 154 mg/dL (07-21-19 @ 05:06)  POCT Blood Glucose.: 140 mg/dL (07-21-19 @ 03:22)  POCT Blood Glucose.: 136 mg/dL (07-20-19 @ 22:12)  POCT Blood Glucose.: 145 mg/dL (07-20-19 @ 21:37)  POCT Blood Glucose.: 192 mg/dL (07-20-19 @ 17:32)                          13.9   8.03  )-----------( 347      ( 18 Jul 2019 08:31 )             43.1         07-18    140  |  100  |  48<H>  ----------------------------<  200<H>  4.2   |  28  |  1.04    Ca    9.5      18 Jul 2019 04:37        Hemoglobin A1C, Whole Blood: 10.4 % <H> [4.0 - 5.6] (06-29-19 @ 09:33)

## 2019-07-22 NOTE — CHART NOTE - NSCHARTNOTEFT_GEN_A_CORE
Pt had repeat CT head for sluggish left pupil response which showed expected evolution of her right frontal and parietal infarction without intraparenchymal hemorrhage and resolution of the prior seen midline shift. Likely the sluggishness of the pupil was related to the ambient light conditions, as a change in pupillary response speed does not quite correlated with any focal neurovascular deficit. Regardless CT scan shows an improvement in imaging.

## 2019-07-22 NOTE — PROGRESS NOTE ADULT - SUBJECTIVE AND OBJECTIVE BOX
Patient is a 54y old  Female who presents with a chief complaint of CVA (09 Jul 2019 13:28)      SUBJECTIVE / OVERNIGHT EVENTS:    Events noted.  CONSTITUTIONAL: No fever,  or fatigue  RESPIRATORY: No cough, wheezing,    CARDIOVASCULAR: No chest pain, palpitations,   GASTROINTESTINAL: No abdominal  NEUROLOGICAL: No headaches,     MEDICATIONS  (STANDING):  AQUAPHOR (petrolatum Ointment) 1 Application(s) Topical three times a day  aspirin  chewable 81 milliGRAM(s) Oral daily  atorvastatin 80 milliGRAM(s) Oral at bedtime  carvedilol 6.25 milliGRAM(s) Oral every 12 hours  clopidogrel Tablet 75 milliGRAM(s) Oral daily  dextrose 5%. 1000 milliLiter(s) (50 mL/Hr) IV Continuous <Continuous>  dextrose 50% Injectable 12.5 Gram(s) IV Push once  dextrose 50% Injectable 25 Gram(s) IV Push once  dextrose 50% Injectable 25 Gram(s) IV Push once  enoxaparin Injectable 40 milliGRAM(s) SubCutaneous daily  FLUoxetine Solution 20 milliGRAM(s) Oral daily  hydrocortisone 1% Ointment 1 Application(s) Topical two times a day  insulin glargine Injectable (LANTUS) 45 Unit(s) SubCutaneous at bedtime  insulin lispro (HumaLOG) corrective regimen sliding scale   SubCutaneous every 4 hours  insulin lispro Injectable (HumaLOG) 12 Unit(s) SubCutaneous every 8 hours  levETIRAcetam  Solution 500 milliGRAM(s) Oral two times a day  lisinopril 20 milliGRAM(s) Oral daily  loratadine Syrup 10 milliGRAM(s) Oral <User Schedule>  nystatin    Suspension 643183 Unit(s) Oral two times a day  polyethylene glycol 3350 17 Gram(s) Oral daily    MEDICATIONS  (PRN):  acetaminophen    Suspension .. 650 milliGRAM(s) Oral every 6 hours PRN Temp greater or equal to 38C (100.4F)  acetaminophen    Suspension .. 650 milliGRAM(s) Enteral Tube every 6 hours PRN Moderate Pain (4 - 6)  bisacodyl Suppository 10 milliGRAM(s) Rectal daily PRN Constipation  dextrose 40% Gel 15 Gram(s) Oral once PRN Blood Glucose LESS THAN 70 milliGRAM(s)/deciliter  diphenhydrAMINE 25 milliGRAM(s) Oral every 8 hours PRN Rash and/or Itching  glucagon  Injectable 1 milliGRAM(s) IntraMuscular once PRN Glucose LESS THAN 70 milligrams/deciliter  nystatin Powder 1 Application(s) Topical two times a day PRN dermatitis        CAPILLARY BLOOD GLUCOSE      POCT Blood Glucose.: 156 mg/dL (21 Jul 2019 17:47)  POCT Blood Glucose.: 142 mg/dL (21 Jul 2019 14:04)  POCT Blood Glucose.: 111 mg/dL (21 Jul 2019 10:49)  POCT Blood Glucose.: 154 mg/dL (21 Jul 2019 05:06)  POCT Blood Glucose.: 140 mg/dL (21 Jul 2019 03:22)  POCT Blood Glucose.: 136 mg/dL (20 Jul 2019 22:12)  POCT Blood Glucose.: 145 mg/dL (20 Jul 2019 21:37)    I&O's Summary    20 Jul 2019 07:01  -  21 Jul 2019 07:00  --------------------------------------------------------  IN: 880 mL / OUT: 1800 mL / NET: -920 mL        PHYSICAL EXAM:  GENERAL: NAD  NECK: Supple, No JVD  CHEST/LUNG: Clear to auscultation bilaterally; No wheezing.  HEART: Regular rate and rhythm; No murmurs, rubs, or gallops  ABDOMEN: Soft, Nontender, Nondistended; Bowel sounds present  EXTREMITIES:   No edema  NEUROLOGY: AAO       LABS:                  CAPILLARY BLOOD GLUCOSE      POCT Blood Glucose.: 156 mg/dL (21 Jul 2019 17:47)  POCT Blood Glucose.: 142 mg/dL (21 Jul 2019 14:04)  POCT Blood Glucose.: 111 mg/dL (21 Jul 2019 10:49)  POCT Blood Glucose.: 154 mg/dL (21 Jul 2019 05:06)  POCT Blood Glucose.: 140 mg/dL (21 Jul 2019 03:22)  POCT Blood Glucose.: 136 mg/dL (20 Jul 2019 22:12)  POCT Blood Glucose.: 145 mg/dL (20 Jul 2019 21:37)                RADIOLOGY & ADDITIONAL TESTS:    Imaging Personally Reviewed:    Consultant(s) Notes Reviewed:      Care Discussed with Consultants/Other Providers:

## 2019-07-23 LAB
GLUCOSE BLDC GLUCOMTR-MCNC: 119 MG/DL — HIGH (ref 70–99)
GLUCOSE BLDC GLUCOMTR-MCNC: 133 MG/DL — HIGH (ref 70–99)
GLUCOSE BLDC GLUCOMTR-MCNC: 140 MG/DL — HIGH (ref 70–99)
GLUCOSE BLDC GLUCOMTR-MCNC: 174 MG/DL — HIGH (ref 70–99)
GLUCOSE BLDC GLUCOMTR-MCNC: 90 MG/DL — SIGNIFICANT CHANGE UP (ref 70–99)

## 2019-07-23 PROCEDURE — 99232 SBSQ HOSP IP/OBS MODERATE 35: CPT

## 2019-07-23 RX ADMIN — Medication 2: at 05:35

## 2019-07-23 RX ADMIN — Medication 81 MILLIGRAM(S): at 13:43

## 2019-07-23 RX ADMIN — Medication 10 UNIT(S): at 14:10

## 2019-07-23 RX ADMIN — Medication 500000 UNIT(S): at 05:36

## 2019-07-23 RX ADMIN — Medication 500000 UNIT(S): at 17:20

## 2019-07-23 RX ADMIN — Medication 1 APPLICATION(S): at 22:24

## 2019-07-23 RX ADMIN — Medication 10 UNIT(S): at 05:36

## 2019-07-23 RX ADMIN — Medication 20 MILLIGRAM(S): at 13:40

## 2019-07-23 RX ADMIN — CARVEDILOL PHOSPHATE 6.25 MILLIGRAM(S): 80 CAPSULE, EXTENDED RELEASE ORAL at 17:20

## 2019-07-23 RX ADMIN — ATORVASTATIN CALCIUM 80 MILLIGRAM(S): 80 TABLET, FILM COATED ORAL at 22:14

## 2019-07-23 RX ADMIN — Medication 1 APPLICATION(S): at 05:39

## 2019-07-23 RX ADMIN — LEVETIRACETAM 500 MILLIGRAM(S): 250 TABLET, FILM COATED ORAL at 05:36

## 2019-07-23 RX ADMIN — LEVETIRACETAM 500 MILLIGRAM(S): 250 TABLET, FILM COATED ORAL at 17:20

## 2019-07-23 RX ADMIN — Medication 10 UNIT(S): at 23:26

## 2019-07-23 RX ADMIN — ENOXAPARIN SODIUM 40 MILLIGRAM(S): 100 INJECTION SUBCUTANEOUS at 13:40

## 2019-07-23 RX ADMIN — LORATADINE 10 MILLIGRAM(S): 10 TABLET ORAL at 22:14

## 2019-07-23 RX ADMIN — CLOPIDOGREL BISULFATE 75 MILLIGRAM(S): 75 TABLET, FILM COATED ORAL at 13:43

## 2019-07-23 RX ADMIN — INSULIN GLARGINE 40 UNIT(S): 100 INJECTION, SOLUTION SUBCUTANEOUS at 23:27

## 2019-07-23 RX ADMIN — Medication 1 APPLICATION(S): at 13:44

## 2019-07-23 RX ADMIN — CARVEDILOL PHOSPHATE 6.25 MILLIGRAM(S): 80 CAPSULE, EXTENDED RELEASE ORAL at 05:38

## 2019-07-23 RX ADMIN — Medication 1 APPLICATION(S): at 17:20

## 2019-07-23 RX ADMIN — LISINOPRIL 20 MILLIGRAM(S): 2.5 TABLET ORAL at 05:38

## 2019-07-23 NOTE — PROGRESS NOTE ADULT - SUBJECTIVE AND OBJECTIVE BOX
GASTROENTEROLOGY    Patient seen and examined at bedside  No acute events  Tolerating tube feeds      MEDICATIONS  (STANDING):  AQUAPHOR (petrolatum Ointment) 1 Application(s) Topical three times a day  aspirin  chewable 81 milliGRAM(s) Oral daily  atorvastatin 80 milliGRAM(s) Oral at bedtime  carvedilol 6.25 milliGRAM(s) Oral every 12 hours  clopidogrel Tablet 75 milliGRAM(s) Oral daily  dextrose 5%. 1000 milliLiter(s) (50 mL/Hr) IV Continuous <Continuous>  dextrose 50% Injectable 12.5 Gram(s) IV Push once  dextrose 50% Injectable 25 Gram(s) IV Push once  dextrose 50% Injectable 25 Gram(s) IV Push once  enoxaparin Injectable 40 milliGRAM(s) SubCutaneous daily  FLUoxetine Solution 20 milliGRAM(s) Oral daily  hydrocortisone 1% Ointment 1 Application(s) Topical two times a day  insulin glargine Injectable (LANTUS) 45 Unit(s) SubCutaneous at bedtime  insulin lispro (HumaLOG) corrective regimen sliding scale   SubCutaneous every 4 hours  insulin lispro Injectable (HumaLOG) 12 Unit(s) SubCutaneous every 8 hours  levETIRAcetam  Solution 500 milliGRAM(s) Oral two times a day  lisinopril 20 milliGRAM(s) Oral daily  loratadine Syrup 10 milliGRAM(s) Oral <User Schedule>  nystatin    Suspension 719985 Unit(s) Oral two times a day  polyethylene glycol 3350 17 Gram(s) Oral daily        T(F): 99.2 (07-21-19 @ 09:00), Max: 99.2 (07-21-19 @ 09:00)  HR: 77 (07-21-19 @ 09:00) (71 - 78)  BP: 136/78 (07-21-19 @ 09:00) (120/81 - 154/87)  RR: 18 (07-21-19 @ 09:00) (18 - 18)  SpO2: 97% (07-21-19 @ 09:00) (94% - 100%)  Wt(kg): --    PHYSICAL EXAM  GENERAL:   NAD  HEENT:  NC/AT, no JVD, sclera-anicteric  CHEST:  clear to ascultation bilaterally, respirations nonlabored  HEART:  +S1+S2 r  ABDOMEN:  Soft, non-tender, non-distended, + bowel sounds, + peg tube; site clean/dry/intact  EXTREMITIES:  no cyanosis, clubbing or edema  NEURO:  Alert responsive  SKIN:  No rash/warm/dry      LABS:              I&O's Detail    20 Jul 2019 07:01  -  21 Jul 2019 07:00  --------------------------------------------------------  IN:    Enteral Tube Flush: 400 mL    Glucerna: 480 mL  Total IN: 880 mL    OUT:    Voided: 1800 mL  Total OUT: 1800 mL    Total NET: -920 mL

## 2019-07-23 NOTE — PROGRESS NOTE ADULT - SUBJECTIVE AND OBJECTIVE BOX
Patient is a 54y old  Female who presents with a chief complaint of CVA (22 Jul 2019 13:41)      SUBJECTIVE / OVERNIGHT EVENTS:    Events noted.  CONSTITUTIONAL: No fever,  or fatigue  RESPIRATORY: No cough, wheezing,    CARDIOVASCULAR: No chest pain, palpitations,   GASTROINTESTINAL: No abdominal or epigastric pain.   NEUROLOGICAL: No headaches,     MEDICATIONS  (STANDING):  AQUAPHOR (petrolatum Ointment) 1 Application(s) Topical three times a day  aspirin  chewable 81 milliGRAM(s) Oral daily  atorvastatin 80 milliGRAM(s) Oral at bedtime  carvedilol 6.25 milliGRAM(s) Oral every 12 hours  clopidogrel Tablet 75 milliGRAM(s) Oral daily  dextrose 5%. 1000 milliLiter(s) (50 mL/Hr) IV Continuous <Continuous>  dextrose 50% Injectable 12.5 Gram(s) IV Push once  dextrose 50% Injectable 25 Gram(s) IV Push once  dextrose 50% Injectable 25 Gram(s) IV Push once  enoxaparin Injectable 40 milliGRAM(s) SubCutaneous daily  FLUoxetine Solution 20 milliGRAM(s) Oral daily  hydrocortisone 1% Ointment 1 Application(s) Topical two times a day  insulin glargine Injectable (LANTUS) 40 Unit(s) SubCutaneous at bedtime  insulin lispro (HumaLOG) corrective regimen sliding scale   SubCutaneous every 8 hours  insulin lispro Injectable (HumaLOG) 10 Unit(s) SubCutaneous every 8 hours  levETIRAcetam  Solution 500 milliGRAM(s) Oral two times a day  lisinopril 20 milliGRAM(s) Oral daily  loratadine Syrup 10 milliGRAM(s) Oral <User Schedule>  nystatin    Suspension 680140 Unit(s) Oral two times a day  polyethylene glycol 3350 17 Gram(s) Oral daily    MEDICATIONS  (PRN):  acetaminophen    Suspension .. 650 milliGRAM(s) Oral every 6 hours PRN Temp greater or equal to 38C (100.4F)  acetaminophen    Suspension .. 650 milliGRAM(s) Enteral Tube every 6 hours PRN Moderate Pain (4 - 6)  bisacodyl Suppository 10 milliGRAM(s) Rectal daily PRN Constipation  dextrose 40% Gel 15 Gram(s) Oral once PRN Blood Glucose LESS THAN 70 milliGRAM(s)/deciliter  diphenhydrAMINE 25 milliGRAM(s) Oral every 8 hours PRN Rash and/or Itching  glucagon  Injectable 1 milliGRAM(s) IntraMuscular once PRN Glucose LESS THAN 70 milligrams/deciliter  nystatin Powder 1 Application(s) Topical two times a day PRN dermatitis        CAPILLARY BLOOD GLUCOSE      POCT Blood Glucose.: 133 mg/dL (23 Jul 2019 14:07)  POCT Blood Glucose.: 119 mg/dL (23 Jul 2019 12:41)  POCT Blood Glucose.: 174 mg/dL (23 Jul 2019 05:20)  POCT Blood Glucose.: 139 mg/dL (22 Jul 2019 22:45)  POCT Blood Glucose.: 145 mg/dL (22 Jul 2019 21:33)    I&O's Summary    22 Jul 2019 07:01  -  23 Jul 2019 07:00  --------------------------------------------------------  IN: 2140 mL / OUT: 1050 mL / NET: 1090 mL        PHYSICAL EXAM:    NECK: Supple, No JVD  CHEST/LUNG: Clear to auscultation bilaterally; No wheezing.  HEART: Regular rate and rhythm; No murmurs, rubs, or gallops  ABDOMEN: Soft, Nontender, Nondistended; Bowel sounds present  EXTREMITIES:   No edema  NEUROLOGY: AAO       LABS:                  CAPILLARY BLOOD GLUCOSE      POCT Blood Glucose.: 133 mg/dL (23 Jul 2019 14:07)  POCT Blood Glucose.: 119 mg/dL (23 Jul 2019 12:41)  POCT Blood Glucose.: 174 mg/dL (23 Jul 2019 05:20)  POCT Blood Glucose.: 139 mg/dL (22 Jul 2019 22:45)  POCT Blood Glucose.: 145 mg/dL (22 Jul 2019 21:33)                RADIOLOGY & ADDITIONAL TESTS:    Imaging Personally Reviewed:    Consultant(s) Notes Reviewed:      Care Discussed with Consultants/Other Providers:

## 2019-07-23 NOTE — PROGRESS NOTE ADULT - SUBJECTIVE AND OBJECTIVE BOX
Patient is a 54y old  Female who presents with a chief complaint of CVA (29 Jun 2019 15:43)    HPI:  Patient max A bed mob  Denies pain    MEDICATIONS  (STANDING):  AQUAPHOR (petrolatum Ointment) 1 Application(s) Topical three times a day  aspirin  chewable 81 milliGRAM(s) Oral daily  atorvastatin 80 milliGRAM(s) Oral at bedtime  carvedilol 6.25 milliGRAM(s) Oral every 12 hours  clopidogrel Tablet 75 milliGRAM(s) Oral daily  dextrose 5%. 1000 milliLiter(s) (50 mL/Hr) IV Continuous <Continuous>  dextrose 50% Injectable 12.5 Gram(s) IV Push once  dextrose 50% Injectable 25 Gram(s) IV Push once  dextrose 50% Injectable 25 Gram(s) IV Push once  enoxaparin Injectable 40 milliGRAM(s) SubCutaneous daily  FLUoxetine Solution 20 milliGRAM(s) Oral daily  hydrocortisone 1% Ointment 1 Application(s) Topical two times a day  insulin glargine Injectable (LANTUS) 40 Unit(s) SubCutaneous at bedtime  insulin lispro (HumaLOG) corrective regimen sliding scale   SubCutaneous every 8 hours  insulin lispro Injectable (HumaLOG) 10 Unit(s) SubCutaneous every 8 hours  levETIRAcetam  Solution 500 milliGRAM(s) Oral two times a day  lisinopril 20 milliGRAM(s) Oral daily  loratadine Syrup 10 milliGRAM(s) Oral <User Schedule>  nystatin    Suspension 555931 Unit(s) Oral two times a day  polyethylene glycol 3350 17 Gram(s) Oral daily    MEDICATIONS  (PRN):  acetaminophen    Suspension .. 650 milliGRAM(s) Oral every 6 hours PRN Temp greater or equal to 38C (100.4F)  acetaminophen    Suspension .. 650 milliGRAM(s) Enteral Tube every 6 hours PRN Moderate Pain (4 - 6)  bisacodyl Suppository 10 milliGRAM(s) Rectal daily PRN Constipation  dextrose 40% Gel 15 Gram(s) Oral once PRN Blood Glucose LESS THAN 70 milliGRAM(s)/deciliter  diphenhydrAMINE 25 milliGRAM(s) Oral every 8 hours PRN Rash and/or Itching  glucagon  Injectable 1 milliGRAM(s) IntraMuscular once PRN Glucose LESS THAN 70 milligrams/deciliter  nystatin Powder 1 Application(s) Topical two times a day PRN dermatitis    Vital Signs Last 24 Hrs  T(C): 36.9 (23 Jul 2019 08:07), Max: 37.2 (22 Jul 2019 19:43)  T(F): 98.5 (23 Jul 2019 08:07), Max: 99 (22 Jul 2019 19:43)  HR: 85 (23 Jul 2019 08:07) (68 - 89)  BP: 137/78 (23 Jul 2019 08:07) (108/52 - 150/76)  BP(mean): --  RR: 17 (23 Jul 2019 08:07) (17 - 18)  SpO2: 100% (23 Jul 2019 08:07) (96% - 100%)    PHYSICAL EXAM  Constitutional - NAD, Comfortable  Cardio:  no edema  Pulm: no respiratory distress  GI: Non-distended   Neuro Exam:                    Cognitive - AAOx3   Communication - non-Fluent expressive aphasia     Motor - left sided UE/LE flaccid hemiparesis     Sensory - Intact to LT  Extremities - No calf tenderness    ASSESSMENT/PLAN  54 year-old RHD Woman with PMH of HTN/DM, Morbid obesity, who is now with flaccid left side hemiplegia, dysarthria, left sided neglect, dysphagia  gait, ADLs, and functional deficits.     Continue PT/OT/SLP  Recommend Acute Rehab  ELOS 5 wks  LTG- Mod A transfers/gait

## 2019-07-24 LAB
GLUCOSE BLDC GLUCOMTR-MCNC: 117 MG/DL — HIGH (ref 70–99)
GLUCOSE BLDC GLUCOMTR-MCNC: 139 MG/DL — HIGH (ref 70–99)
GLUCOSE BLDC GLUCOMTR-MCNC: 215 MG/DL — HIGH (ref 70–99)
GLUCOSE BLDC GLUCOMTR-MCNC: 216 MG/DL — HIGH (ref 70–99)

## 2019-07-24 PROCEDURE — 99232 SBSQ HOSP IP/OBS MODERATE 35: CPT

## 2019-07-24 RX ADMIN — Medication 10 UNIT(S): at 14:04

## 2019-07-24 RX ADMIN — Medication 500000 UNIT(S): at 17:15

## 2019-07-24 RX ADMIN — Medication 1 APPLICATION(S): at 17:15

## 2019-07-24 RX ADMIN — LEVETIRACETAM 500 MILLIGRAM(S): 250 TABLET, FILM COATED ORAL at 05:16

## 2019-07-24 RX ADMIN — Medication 81 MILLIGRAM(S): at 13:04

## 2019-07-24 RX ADMIN — LISINOPRIL 20 MILLIGRAM(S): 2.5 TABLET ORAL at 05:16

## 2019-07-24 RX ADMIN — INSULIN GLARGINE 40 UNIT(S): 100 INJECTION, SOLUTION SUBCUTANEOUS at 21:49

## 2019-07-24 RX ADMIN — Medication 1 APPLICATION(S): at 13:05

## 2019-07-24 RX ADMIN — ATORVASTATIN CALCIUM 80 MILLIGRAM(S): 80 TABLET, FILM COATED ORAL at 21:49

## 2019-07-24 RX ADMIN — CARVEDILOL PHOSPHATE 6.25 MILLIGRAM(S): 80 CAPSULE, EXTENDED RELEASE ORAL at 17:15

## 2019-07-24 RX ADMIN — Medication 1 APPLICATION(S): at 21:49

## 2019-07-24 RX ADMIN — Medication 4: at 14:03

## 2019-07-24 RX ADMIN — CARVEDILOL PHOSPHATE 6.25 MILLIGRAM(S): 80 CAPSULE, EXTENDED RELEASE ORAL at 05:16

## 2019-07-24 RX ADMIN — Medication 1 APPLICATION(S): at 05:16

## 2019-07-24 RX ADMIN — Medication 10 UNIT(S): at 05:16

## 2019-07-24 RX ADMIN — LEVETIRACETAM 500 MILLIGRAM(S): 250 TABLET, FILM COATED ORAL at 17:15

## 2019-07-24 RX ADMIN — Medication 20 MILLIGRAM(S): at 13:04

## 2019-07-24 RX ADMIN — CLOPIDOGREL BISULFATE 75 MILLIGRAM(S): 75 TABLET, FILM COATED ORAL at 13:04

## 2019-07-24 RX ADMIN — Medication 500000 UNIT(S): at 05:16

## 2019-07-24 RX ADMIN — Medication 10 UNIT(S): at 21:50

## 2019-07-24 RX ADMIN — ENOXAPARIN SODIUM 40 MILLIGRAM(S): 100 INJECTION SUBCUTANEOUS at 13:04

## 2019-07-24 NOTE — PROGRESS NOTE ADULT - SUBJECTIVE AND OBJECTIVE BOX
Patient is a 54y old  Female who presents with a chief complaint of CVA (22 Jul 2019 13:41)      SUBJECTIVE / OVERNIGHT EVENTS:    Events noted.  CONSTITUTIONAL: No fever,  or fatigue  No N/V/Abd pain    MEDICATIONS  (STANDING):  AQUAPHOR (petrolatum Ointment) 1 Application(s) Topical three times a day  aspirin  chewable 81 milliGRAM(s) Oral daily  atorvastatin 80 milliGRAM(s) Oral at bedtime  carvedilol 6.25 milliGRAM(s) Oral every 12 hours  clopidogrel Tablet 75 milliGRAM(s) Oral daily  dextrose 5%. 1000 milliLiter(s) (50 mL/Hr) IV Continuous <Continuous>  dextrose 50% Injectable 12.5 Gram(s) IV Push once  dextrose 50% Injectable 25 Gram(s) IV Push once  dextrose 50% Injectable 25 Gram(s) IV Push once  enoxaparin Injectable 40 milliGRAM(s) SubCutaneous daily  FLUoxetine Solution 20 milliGRAM(s) Oral daily  hydrocortisone 1% Ointment 1 Application(s) Topical two times a day  insulin glargine Injectable (LANTUS) 40 Unit(s) SubCutaneous at bedtime  insulin lispro (HumaLOG) corrective regimen sliding scale   SubCutaneous every 8 hours  insulin lispro Injectable (HumaLOG) 10 Unit(s) SubCutaneous every 8 hours  levETIRAcetam  Solution 500 milliGRAM(s) Oral two times a day  lisinopril 20 milliGRAM(s) Oral daily  loratadine Syrup 10 milliGRAM(s) Oral <User Schedule>  nystatin    Suspension 688494 Unit(s) Oral two times a day  polyethylene glycol 3350 17 Gram(s) Oral daily    MEDICATIONS  (PRN):  acetaminophen    Suspension .. 650 milliGRAM(s) Oral every 6 hours PRN Temp greater or equal to 38C (100.4F)  acetaminophen    Suspension .. 650 milliGRAM(s) Enteral Tube every 6 hours PRN Moderate Pain (4 - 6)  bisacodyl Suppository 10 milliGRAM(s) Rectal daily PRN Constipation  dextrose 40% Gel 15 Gram(s) Oral once PRN Blood Glucose LESS THAN 70 milliGRAM(s)/deciliter  diphenhydrAMINE 25 milliGRAM(s) Oral every 8 hours PRN Rash and/or Itching  glucagon  Injectable 1 milliGRAM(s) IntraMuscular once PRN Glucose LESS THAN 70 milligrams/deciliter  nystatin Powder 1 Application(s) Topical two times a day PRN dermatitis        CAPILLARY BLOOD GLUCOSE      POCT Blood Glucose.: 133 mg/dL (23 Jul 2019 14:07)  POCT Blood Glucose.: 119 mg/dL (23 Jul 2019 12:41)  POCT Blood Glucose.: 174 mg/dL (23 Jul 2019 05:20)  POCT Blood Glucose.: 139 mg/dL (22 Jul 2019 22:45)  POCT Blood Glucose.: 145 mg/dL (22 Jul 2019 21:33)    I&O's Summary    22 Jul 2019 07:01  -  23 Jul 2019 07:00  --------------------------------------------------------  IN: 2140 mL / OUT: 1050 mL / NET: 1090 mL        PHYSICAL EXAM:    NECK: Supple, No JVD  CHEST/LUNG: Clear to auscultation bilaterally; No wheezing.  HEART: Regular rate and rhythm; No murmurs, rubs, or gallops  ABDOMEN: Soft, Nontender, Nondistended; Bowel sounds present  EXTREMITIES:   No edema  NEUROLOGY: AAO       LABS:                  CAPILLARY BLOOD GLUCOSE      POCT Blood Glucose.: 133 mg/dL (23 Jul 2019 14:07)  POCT Blood Glucose.: 119 mg/dL (23 Jul 2019 12:41)  POCT Blood Glucose.: 174 mg/dL (23 Jul 2019 05:20)  POCT Blood Glucose.: 139 mg/dL (22 Jul 2019 22:45)  POCT Blood Glucose.: 145 mg/dL (22 Jul 2019 21:33)                RADIOLOGY & ADDITIONAL TESTS:    Imaging Personally Reviewed:    Consultant(s) Notes Reviewed:      Care Discussed with Consultants/Other Providers:

## 2019-07-24 NOTE — PROGRESS NOTE ADULT - ASSESSMENT
54 year old female w/uncontrolled T2DM (HbA1c 10.4%) c/b retinopathy here w/CVA L sided hemiplegia. Bolus feeds q8h with BG mostly at goal while on present insulin doses. Noted single hyperglycemia value around 2pm bolus feed but won't make any insulin adjustments at this time since there is no glucose pattern at this time. Pt's BG was 119/133 at the same time yesterday getting same insulin doses. BG goal 100s to 180s. Awaiting discharge plan since pt has no insurance.

## 2019-07-24 NOTE — PROGRESS NOTE ADULT - PROBLEM SELECTOR PLAN 1
-test BG Q8h  -C/w Lantus 40 units qhs  -C/w Humalog bolus 10 units Q8h w/each feed  -c.w Humalog moderate correction scale to q8h for now  -Discharge plan: pending feeding modality. Will need insulin at discharge   -Plan discussed with pt/team.  Contact info: [phone] (24/7). pager [phone]

## 2019-07-24 NOTE — PROGRESS NOTE ADULT - ASSESSMENT
· Assessment	  54 year old female admitted with acute CVA.     Rash:  Improving.    Problem/Plan - 2:  ·  Problem: CVA (cerebral vascular accident).  Plan: - Right MCA and ENRIQUE distribution stroke   - DAPT /On PEG feeding  -Keppra    DM II:  FSSS/Lantus    Dc planning

## 2019-07-24 NOTE — PROGRESS NOTE ADULT - SUBJECTIVE AND OBJECTIVE BOX
Diabetes Follow up note: Saw pt earlier today  Interval Hx: 54 year old female w/uncontrolled T2DM (on insulin PTA) w/retinopathy, morbid obesity here w/CVA s/p PEG on bolus feeds of Glucerna 480cc q8h. Pt now off steroids. Pt awake nodding to simple questions but still not talking. Per staff pt speaks single words on and off. Glycemic control mostly at goal while on present insulin regimen. Had BG levels in 200s today at time of bolus TFs at 2pm.    Review of Systems:  General: no verbal at time of visit  GI: Tolerating TF without any N/V/D/ABD PAIN.  CV: No CP/SOB  ENDO: denies s/s of hypoglycemia      MEDS:  atorvastatin 80 milliGRAM(s) Oral at bedtime  insulin glargine Injectable (LANTUS) 40 Unit(s) SubCutaneous at bedtime  insulin lispro (HumaLOG) corrective regimen sliding scale   SubCutaneous every 4 hours  insulin lispro Injectable (HumaLOG) 10 Unit(s) SubCutaneous every 8 hours  hydrocortisone 1% Ointment 1 Application(s) Topical two times a day      Allergies    No Known Allergies      PE:  General: Female sitting in chair in NAD>   Vital Signs Last 24 Hrs  T(C): 37.1 (07-24-19 @ 15:38), Max: 37.2 (07-24-19 @ 00:05)  T(F): 98.8 (07-24-19 @ 15:38), Max: 98.9 (07-24-19 @ 00:05)  HR: 80 (07-24-19 @ 15:38) (78 - 87)  BP: 138/84 (07-24-19 @ 15:38) (106/78 - 138/84)  BP(mean): --  RR: 18 (07-24-19 @ 15:38) (18 - 18)  SpO2: 97% (07-24-19 @ 15:38) (97% - 99%)  Abd: Soft, NT, ND, Obese. PEG in place. TFs off at time of visit   Extremities: Warm. No edema in LEs  Neuro: Awake, nods yes/no to simple questions. L sided hemiplegia.     LABS:  POCT Blood Glucose.: 215 mg/dL (07-24-19 @ 14:13)  POCT Blood Glucose.: 216 mg/dL (07-24-19 @ 13:54)  POCT Blood Glucose.: 139 mg/dL (07-24-19 @ 05:08)  POCT Blood Glucose.: 140 mg/dL (07-23-19 @ 23:23)  POCT Blood Glucose.: 90 mg/dL (07-23-19 @ 22:00)  POCT Blood Glucose.: 133 mg/dL (07-23-19 @ 14:07)  POCT Blood Glucose.: 119 mg/dL (07-23-19 @ 12:41)  POCT Blood Glucose.: 174 mg/dL (07-23-19 @ 05:20)  POCT Blood Glucose.: 139 mg/dL (07-22-19 @ 22:45)  POCT Blood Glucose.: 145 mg/dL (07-22-19 @ 21:33)                          13.9   8.03  )-----------( 347      ( 18 Jul 2019 08:31 )             43.1         07-18    140  |  100  |  48<H>  ----------------------------<  200<H>  4.2   |  28  |  1.04    Ca    9.5      18 Jul 2019 04:37        Hemoglobin A1C, Whole Blood: 10.4 % <H> [4.0 - 5.6] (06-29-19 @ 09:33)

## 2019-07-24 NOTE — CHART NOTE - NSCHARTNOTEFT_GEN_A_CORE
Nutrition Follow Up Note    Patient seen for nutrition follow up. Chart reviewed, events noted. Pt is a 54 year old female with PMH of uncontrolled T2DM (HgbA1c 10.4%) with retinopathy admitted with CVA. Pt with dysphagia s/p PEG placement on bolus feeds at this time. Pt noted with hypoglycemic event on 7/22 (blood glucose 45). Endo following. Per endo, pt to be discharged on insulin. S/p Bedside Swallow Evaluation (7/20) recommending NPO with non-oral nutrition.    Source: Comprehensive chart review, RN    Diet : NPO with EN via PEG    Patient sleeping and not easily aroused at time of RD visit. Spoke to RN who reports pt is tolerating bolus feeds at this time. Denies pt complaints of acute GI distress. Last reported BM yesterday (loose stools). EN provision noted below. RD to continue to monitor tolerance to EN.      Enteral /Parenteral Nutrition: Bolus feeds via PEG of Glucerna 1.2, 480 mL three times daily (6 am, 2pm, 10 pm) to provide 1710 kcal, 85 grams protein (meets 29 kcal/kg, 1.4 g/kg protein using IBW 59 kg).      Enteral Nutrition Provision:  (7/23) 1440 mL - 100% of goal  (7/22) 960 mL - 66%  (7/21) 480 mL - 33%  (7/20) 490 mL - 33%  (7/19) 1440 mL - 100%    Daily weight - no new weight to address. Dosing weight 136.9 kG (6/28). Recommend obtain current weight to assess changes, if any.     Pertinent Medications: MEDICATIONS  (STANDING):  AQUAPHOR (petrolatum Ointment) 1 Application(s) Topical three times a day  aspirin  chewable 81 milliGRAM(s) Oral daily  atorvastatin 80 milliGRAM(s) Oral at bedtime  carvedilol 6.25 milliGRAM(s) Oral every 12 hours  clopidogrel Tablet 75 milliGRAM(s) Oral daily  dextrose 5%. 1000 milliLiter(s) (50 mL/Hr) IV Continuous <Continuous>  dextrose 50% Injectable 12.5 Gram(s) IV Push once  dextrose 50% Injectable 25 Gram(s) IV Push once  dextrose 50% Injectable 25 Gram(s) IV Push once  enoxaparin Injectable 40 milliGRAM(s) SubCutaneous daily  FLUoxetine Solution 20 milliGRAM(s) Oral daily  hydrocortisone 1% Ointment 1 Application(s) Topical two times a day  insulin glargine Injectable (LANTUS) 40 Unit(s) SubCutaneous at bedtime  insulin lispro (HumaLOG) corrective regimen sliding scale   SubCutaneous every 8 hours  insulin lispro Injectable (HumaLOG) 10 Unit(s) SubCutaneous every 8 hours  levETIRAcetam  Solution 500 milliGRAM(s) Oral two times a day  lisinopril 20 milliGRAM(s) Oral daily  loratadine Syrup 10 milliGRAM(s) Oral <User Schedule>  nystatin    Suspension 180328 Unit(s) Oral two times a day  polyethylene glycol 3350 17 Gram(s) Oral daily    MEDICATIONS  (PRN):  acetaminophen    Suspension .. 650 milliGRAM(s) Oral every 6 hours PRN Temp greater or equal to 38C (100.4F)  acetaminophen    Suspension .. 650 milliGRAM(s) Enteral Tube every 6 hours PRN Moderate Pain (4 - 6)  bisacodyl Suppository 10 milliGRAM(s) Rectal daily PRN Constipation  dextrose 40% Gel 15 Gram(s) Oral once PRN Blood Glucose LESS THAN 70 milliGRAM(s)/deciliter  diphenhydrAMINE 25 milliGRAM(s) Oral every 8 hours PRN Rash and/or Itching  glucagon  Injectable 1 milliGRAM(s) IntraMuscular once PRN Glucose LESS THAN 70 milligrams/deciliter  nystatin Powder 1 Application(s) Topical two times a day PRN dermatitis    Pertinent Labs: No recent labs to address. See POCT blood glucose below.     Finger Sticks:  POCT Blood Glucose.: 139 mg/dL (07-24 @ 05:08)  POCT Blood Glucose.: 140 mg/dL (07-23 @ 23:23)  POCT Blood Glucose.: 90 mg/dL (07-23 @ 22:00)  POCT Blood Glucose.: 133 mg/dL (07-23 @ 14:07)  POCT Blood Glucose.: 119 mg/dL (07-23 @ 12:41)    Skin per nursing documentation: no pressure injuries documented  Edema per nursing documentation: 1+ generalized    Estimated Needs:   [x ] no change since previous assessment  [ ] recalculated:     Previous Nutrition Diagnosis: Swallowing difficulty  Nutrition Diagnosis is ongoing, addressed with enteral nutrition via PEG    New Nutrition Diagnosis: n/a     Interventions: n/a    Recommend  1) Continue bolus feeds via PEG of Glucerna 1.2, 480 mL three times daily (6 am, 2pm, 10 pm) to provide 1710 kcal, 85 grams protein (meets 29 kcal/kg, 1.4 g/kg protein using IBW 59 kg).      Monitoring and Evaluation:     Continue to monitor Nutritional intake, Tolerance to diet prescription, weights, labs, skin integrity    RD remains available upon request and will follow up per protocol    Aspen Tafoya RD    Pager# [phone] Nutrition Follow Up Note    Patient seen for nutrition follow up. Chart reviewed, events noted. Pt is a 54 year old female with PMH of uncontrolled T2DM (HgbA1c 10.4%) with retinopathy admitted with CVA. Pt with dysphagia s/p PEG placement on bolus feeds at this time. Pt noted with hypoglycemic event on 7/22 (blood glucose 45). Endo following. Per endo, pt to be discharged on insulin. S/p Bedside Swallow Evaluation (7/20) recommending NPO with non-oral nutrition.    Source: Comprehensive chart review, RN    Diet : NPO with EN via PEG    Patient sleeping and not easily aroused at time of RD visit. Spoke to RN who reports pt is tolerating bolus feeds at this time. Denies pt complaints of acute GI distress. Last reported BM yesterday (loose stools). EN provision noted below. RD to continue to monitor tolerance to EN.      Enteral /Parenteral Nutrition: Bolus feeds via PEG of Glucerna 1.2, 480 mL three times daily (6 am, 2pm, 10 pm) to provide 1710 kcal, 85 grams protein (meets 29 kcal/kg, 1.4 g/kg protein using IBW 59 kg).      Enteral Nutrition Provision:  (7/23) 1440 mL - 100% of goal  (7/22) 960 mL - 66%  (7/21) 480 mL - 33%  (7/20) 490 mL - 33%  (7/19) 1440 mL - 100%    Daily weight - no new weight to address. Dosing weight 136.9 kG (6/28).     Pertinent Medications: MEDICATIONS  (STANDING):  AQUAPHOR (petrolatum Ointment) 1 Application(s) Topical three times a day  aspirin  chewable 81 milliGRAM(s) Oral daily  atorvastatin 80 milliGRAM(s) Oral at bedtime  carvedilol 6.25 milliGRAM(s) Oral every 12 hours  clopidogrel Tablet 75 milliGRAM(s) Oral daily  dextrose 5%. 1000 milliLiter(s) (50 mL/Hr) IV Continuous <Continuous>  dextrose 50% Injectable 12.5 Gram(s) IV Push once  dextrose 50% Injectable 25 Gram(s) IV Push once  dextrose 50% Injectable 25 Gram(s) IV Push once  enoxaparin Injectable 40 milliGRAM(s) SubCutaneous daily  FLUoxetine Solution 20 milliGRAM(s) Oral daily  hydrocortisone 1% Ointment 1 Application(s) Topical two times a day  insulin glargine Injectable (LANTUS) 40 Unit(s) SubCutaneous at bedtime  insulin lispro (HumaLOG) corrective regimen sliding scale   SubCutaneous every 8 hours  insulin lispro Injectable (HumaLOG) 10 Unit(s) SubCutaneous every 8 hours  levETIRAcetam  Solution 500 milliGRAM(s) Oral two times a day  lisinopril 20 milliGRAM(s) Oral daily  loratadine Syrup 10 milliGRAM(s) Oral <User Schedule>  nystatin    Suspension 405587 Unit(s) Oral two times a day  polyethylene glycol 3350 17 Gram(s) Oral daily    MEDICATIONS  (PRN):  acetaminophen    Suspension .. 650 milliGRAM(s) Oral every 6 hours PRN Temp greater or equal to 38C (100.4F)  acetaminophen    Suspension .. 650 milliGRAM(s) Enteral Tube every 6 hours PRN Moderate Pain (4 - 6)  bisacodyl Suppository 10 milliGRAM(s) Rectal daily PRN Constipation  dextrose 40% Gel 15 Gram(s) Oral once PRN Blood Glucose LESS THAN 70 milliGRAM(s)/deciliter  diphenhydrAMINE 25 milliGRAM(s) Oral every 8 hours PRN Rash and/or Itching  glucagon  Injectable 1 milliGRAM(s) IntraMuscular once PRN Glucose LESS THAN 70 milligrams/deciliter  nystatin Powder 1 Application(s) Topical two times a day PRN dermatitis    Pertinent Labs: No recent labs to address. See POCT blood glucose below.     Finger Sticks:  POCT Blood Glucose.: 139 mg/dL (07-24 @ 05:08)  POCT Blood Glucose.: 140 mg/dL (07-23 @ 23:23)  POCT Blood Glucose.: 90 mg/dL (07-23 @ 22:00)  POCT Blood Glucose.: 133 mg/dL (07-23 @ 14:07)  POCT Blood Glucose.: 119 mg/dL (07-23 @ 12:41)    Skin per nursing documentation: no pressure injuries documented  Edema per nursing documentation: 1+ generalized    Estimated Needs:   [x ] no change since previous assessment  [ ] recalculated:     Previous Nutrition Diagnosis: Swallowing difficulty  Nutrition Diagnosis is ongoing, addressed with enteral nutrition via PEG    New Nutrition Diagnosis: n/a     Interventions: n/a    Recommend  1) Continue bolus feeds via PEG of Glucerna 1.2, 480 mL three times daily (6 am, 2pm, 10 pm) to provide 1710 kcal, 85 grams protein (meets 29 kcal/kg, 1.4 g/kg protein using IBW 59 kg).    2) Recommend obtain current weight to assess changes, if any    Monitoring and Evaluation:     Continue to monitor Nutritional intake, Tolerance to diet prescription, weights, labs, skin integrity    RD remains available upon request and will follow up per protocol    Aspen Tafoya RD    Pager# [phone]

## 2019-07-25 LAB
GLUCOSE BLDC GLUCOMTR-MCNC: 144 MG/DL — HIGH (ref 70–99)
GLUCOSE BLDC GLUCOMTR-MCNC: 156 MG/DL — HIGH (ref 70–99)
GLUCOSE BLDC GLUCOMTR-MCNC: 157 MG/DL — HIGH (ref 70–99)

## 2019-07-25 PROCEDURE — 99232 SBSQ HOSP IP/OBS MODERATE 35: CPT

## 2019-07-25 RX ADMIN — Medication 1 APPLICATION(S): at 13:11

## 2019-07-25 RX ADMIN — Medication 10 UNIT(S): at 05:02

## 2019-07-25 RX ADMIN — Medication 1 APPLICATION(S): at 05:03

## 2019-07-25 RX ADMIN — ATORVASTATIN CALCIUM 80 MILLIGRAM(S): 80 TABLET, FILM COATED ORAL at 21:47

## 2019-07-25 RX ADMIN — Medication 500000 UNIT(S): at 05:02

## 2019-07-25 RX ADMIN — CARVEDILOL PHOSPHATE 6.25 MILLIGRAM(S): 80 CAPSULE, EXTENDED RELEASE ORAL at 17:32

## 2019-07-25 RX ADMIN — Medication 650 MILLIGRAM(S): at 23:15

## 2019-07-25 RX ADMIN — LEVETIRACETAM 500 MILLIGRAM(S): 250 TABLET, FILM COATED ORAL at 17:31

## 2019-07-25 RX ADMIN — Medication 500000 UNIT(S): at 17:31

## 2019-07-25 RX ADMIN — Medication 20 MILLIGRAM(S): at 13:10

## 2019-07-25 RX ADMIN — Medication 81 MILLIGRAM(S): at 13:09

## 2019-07-25 RX ADMIN — LORATADINE 10 MILLIGRAM(S): 10 TABLET ORAL at 02:33

## 2019-07-25 RX ADMIN — INSULIN GLARGINE 40 UNIT(S): 100 INJECTION, SOLUTION SUBCUTANEOUS at 21:47

## 2019-07-25 RX ADMIN — Medication 650 MILLIGRAM(S): at 22:45

## 2019-07-25 RX ADMIN — CLOPIDOGREL BISULFATE 75 MILLIGRAM(S): 75 TABLET, FILM COATED ORAL at 13:10

## 2019-07-25 RX ADMIN — Medication 10 UNIT(S): at 13:08

## 2019-07-25 RX ADMIN — LORATADINE 10 MILLIGRAM(S): 10 TABLET ORAL at 21:51

## 2019-07-25 RX ADMIN — Medication 10 UNIT(S): at 21:48

## 2019-07-25 RX ADMIN — Medication 650 MILLIGRAM(S): at 14:10

## 2019-07-25 RX ADMIN — Medication 2: at 21:49

## 2019-07-25 RX ADMIN — LEVETIRACETAM 500 MILLIGRAM(S): 250 TABLET, FILM COATED ORAL at 05:02

## 2019-07-25 RX ADMIN — Medication 1 APPLICATION(S): at 17:31

## 2019-07-25 RX ADMIN — Medication 650 MILLIGRAM(S): at 13:11

## 2019-07-25 RX ADMIN — Medication 2: at 13:07

## 2019-07-25 RX ADMIN — Medication 1 APPLICATION(S): at 21:49

## 2019-07-25 RX ADMIN — LISINOPRIL 20 MILLIGRAM(S): 2.5 TABLET ORAL at 05:01

## 2019-07-25 RX ADMIN — CARVEDILOL PHOSPHATE 6.25 MILLIGRAM(S): 80 CAPSULE, EXTENDED RELEASE ORAL at 05:01

## 2019-07-25 RX ADMIN — LORATADINE 10 MILLIGRAM(S): 10 TABLET ORAL at 13:11

## 2019-07-25 RX ADMIN — ENOXAPARIN SODIUM 40 MILLIGRAM(S): 100 INJECTION SUBCUTANEOUS at 13:09

## 2019-07-25 NOTE — PROGRESS NOTE ADULT - PROBLEM SELECTOR PLAN 1
-test BG Q8h  -C/w Lantus 40 units qhs  -C/w Humalog bolus 10 units Q8h w/each feed  -c.w Humalog moderate correction scale to q8h for now  -Discharge plan: pending feeding modality. Will need insulin at discharge   Contact info: [phone] (24/7)

## 2019-07-25 NOTE — PROGRESS NOTE ADULT - SUBJECTIVE AND OBJECTIVE BOX
Patient is a 54y old  Female who presents with a chief complaint of CVA (22 Jul 2019 13:41)      SUBJECTIVE / OVERNIGHT EVENTS:    Events noted.  CONSTITUTIONAL: No fever,  or fatigue  No N/V/Abd pain    MEDICATIONS  (STANDING):  AQUAPHOR (petrolatum Ointment) 1 Application(s) Topical three times a day  aspirin  chewable 81 milliGRAM(s) Oral daily  atorvastatin 80 milliGRAM(s) Oral at bedtime  carvedilol 6.25 milliGRAM(s) Oral every 12 hours  clopidogrel Tablet 75 milliGRAM(s) Oral daily  dextrose 5%. 1000 milliLiter(s) (50 mL/Hr) IV Continuous <Continuous>  dextrose 50% Injectable 12.5 Gram(s) IV Push once  dextrose 50% Injectable 25 Gram(s) IV Push once  dextrose 50% Injectable 25 Gram(s) IV Push once  enoxaparin Injectable 40 milliGRAM(s) SubCutaneous daily  FLUoxetine Solution 20 milliGRAM(s) Oral daily  hydrocortisone 1% Ointment 1 Application(s) Topical two times a day  insulin glargine Injectable (LANTUS) 40 Unit(s) SubCutaneous at bedtime  insulin lispro (HumaLOG) corrective regimen sliding scale   SubCutaneous every 8 hours  insulin lispro Injectable (HumaLOG) 10 Unit(s) SubCutaneous every 8 hours  levETIRAcetam  Solution 500 milliGRAM(s) Oral two times a day  lisinopril 20 milliGRAM(s) Oral daily  loratadine Syrup 10 milliGRAM(s) Oral <User Schedule>  nystatin    Suspension 163444 Unit(s) Oral two times a day  polyethylene glycol 3350 17 Gram(s) Oral daily    MEDICATIONS  (PRN):  acetaminophen    Suspension .. 650 milliGRAM(s) Oral every 6 hours PRN Temp greater or equal to 38C (100.4F)  acetaminophen    Suspension .. 650 milliGRAM(s) Enteral Tube every 6 hours PRN Moderate Pain (4 - 6)  bisacodyl Suppository 10 milliGRAM(s) Rectal daily PRN Constipation  dextrose 40% Gel 15 Gram(s) Oral once PRN Blood Glucose LESS THAN 70 milliGRAM(s)/deciliter  diphenhydrAMINE 25 milliGRAM(s) Oral every 8 hours PRN Rash and/or Itching  glucagon  Injectable 1 milliGRAM(s) IntraMuscular once PRN Glucose LESS THAN 70 milligrams/deciliter  nystatin Powder 1 Application(s) Topical two times a day PRN dermatitis        CAPILLARY BLOOD GLUCOSE      POCT Blood Glucose.: 133 mg/dL (23 Jul 2019 14:07)  POCT Blood Glucose.: 119 mg/dL (23 Jul 2019 12:41)  POCT Blood Glucose.: 174 mg/dL (23 Jul 2019 05:20)  POCT Blood Glucose.: 139 mg/dL (22 Jul 2019 22:45)  POCT Blood Glucose.: 145 mg/dL (22 Jul 2019 21:33)    I&O's Summary    22 Jul 2019 07:01  -  23 Jul 2019 07:00  --------------------------------------------------------  IN: 2140 mL / OUT: 1050 mL / NET: 1090 mL        PHYSICAL EXAM:    NECK: Supple, No JVD  CHEST/LUNG: Clear to auscultation bilaterally; No wheezing.  HEART: Regular rate and rhythm; No murmurs, rubs, or gallops  ABDOMEN: Soft, Nontender, Nondistended; Bowel sounds present  EXTREMITIES:   No edema  NEUROLOGY: AAO       LABS:                  CAPILLARY BLOOD GLUCOSE      POCT Blood Glucose.: 133 mg/dL (23 Jul 2019 14:07)  POCT Blood Glucose.: 119 mg/dL (23 Jul 2019 12:41)  POCT Blood Glucose.: 174 mg/dL (23 Jul 2019 05:20)  POCT Blood Glucose.: 139 mg/dL (22 Jul 2019 22:45)  POCT Blood Glucose.: 145 mg/dL (22 Jul 2019 21:33)                RADIOLOGY & ADDITIONAL TESTS:    Imaging Personally Reviewed:    Consultant(s) Notes Reviewed:      Care Discussed with Consultants/Other Providers:

## 2019-07-25 NOTE — PROGRESS NOTE ADULT - SUBJECTIVE AND OBJECTIVE BOX
Chief Complaint: Uncontrolled DM2    S: No ON events. Pt. resting comfortably w/o complaints.    MEDICATIONS  (STANDING):  AQUAPHOR (petrolatum Ointment) 1 Application(s) Topical three times a day  aspirin  chewable 81 milliGRAM(s) Oral daily  atorvastatin 80 milliGRAM(s) Oral at bedtime  carvedilol 6.25 milliGRAM(s) Oral every 12 hours  clopidogrel Tablet 75 milliGRAM(s) Oral daily  dextrose 5%. 1000 milliLiter(s) (50 mL/Hr) IV Continuous <Continuous>  dextrose 50% Injectable 12.5 Gram(s) IV Push once  dextrose 50% Injectable 25 Gram(s) IV Push once  dextrose 50% Injectable 25 Gram(s) IV Push once  enoxaparin Injectable 40 milliGRAM(s) SubCutaneous daily  FLUoxetine Solution 20 milliGRAM(s) Oral daily  hydrocortisone 1% Ointment 1 Application(s) Topical two times a day  insulin glargine Injectable (LANTUS) 40 Unit(s) SubCutaneous at bedtime  insulin lispro (HumaLOG) corrective regimen sliding scale   SubCutaneous every 8 hours  insulin lispro Injectable (HumaLOG) 10 Unit(s) SubCutaneous every 8 hours  levETIRAcetam  Solution 500 milliGRAM(s) Oral two times a day  lisinopril 20 milliGRAM(s) Oral daily  loratadine Syrup 10 milliGRAM(s) Oral <User Schedule>  nystatin    Suspension 275717 Unit(s) Oral two times a day  polyethylene glycol 3350 17 Gram(s) Oral daily    MEDICATIONS  (PRN):  acetaminophen    Suspension .. 650 milliGRAM(s) Oral every 6 hours PRN Temp greater or equal to 38C (100.4F)  acetaminophen    Suspension .. 650 milliGRAM(s) Enteral Tube every 6 hours PRN Moderate Pain (4 - 6)  bisacodyl Suppository 10 milliGRAM(s) Rectal daily PRN Constipation  dextrose 40% Gel 15 Gram(s) Oral once PRN Blood Glucose LESS THAN 70 milliGRAM(s)/deciliter  diphenhydrAMINE 25 milliGRAM(s) Oral every 8 hours PRN Rash and/or Itching  glucagon  Injectable 1 milliGRAM(s) IntraMuscular once PRN Glucose LESS THAN 70 milligrams/deciliter  nystatin Powder 1 Application(s) Topical two times a day PRN dermatitis      Allergies  No Known Allergies      Review of Systems: Pt. shakes head "no" when asked  Constitutional: No fever  Eyes: No blurry vision  Neuro: No tremors  HEENT: No pain  Cardiovascular: No chest pain, palpitations  Respiratory: No SOB, no cough  GI: No nausea, vomiting, abdominal pain  : No dysuria  Skin: no rash  Psych: no depression  Endocrine: no polyuria, polydipsia      PHYSICAL EXAM:  VITALS: T(C): 36.6 (07-25-19 @ 08:29)  T(F): 97.8 (07-25-19 @ 08:29), Max: 98.8 (07-24-19 @ 15:38)  HR: 85 (07-25-19 @ 08:29) (68 - 88)  BP: 127/73 (07-25-19 @ 08:29) (106/78 - 138/84)  RR:  (18 - 18)  SpO2:  (97% - 99%)  Wt(kg): --  GENERAL: NAD, well-groomed, well-developed  EYES: No proptosis, anicteric  HEENT:  Atraumatic, Normocephalic, moist mucous membranes  SKIN: Dry, intact,  Neuro: shakes head "yes" or "no" and gives one word answers.  PSYCH: Alert, can't assess orientation.      POCT Blood Glucose.: 144 mg/dL (07-25-19 @ 05:00)  POCT Blood Glucose.: 117 mg/dL (07-24-19 @ 21:46)  POCT Blood Glucose.: 215 mg/dL (07-24-19 @ 14:13)  POCT Blood Glucose.: 216 mg/dL (07-24-19 @ 13:54)  POCT Blood Glucose.: 139 mg/dL (07-24-19 @ 05:08)  POCT Blood Glucose.: 140 mg/dL (07-23-19 @ 23:23)  POCT Blood Glucose.: 90 mg/dL (07-23-19 @ 22:00)  POCT Blood Glucose.: 133 mg/dL (07-23-19 @ 14:07)  POCT Blood Glucose.: 119 mg/dL (07-23-19 @ 12:41)  POCT Blood Glucose.: 174 mg/dL (07-23-19 @ 05:20)  POCT Blood Glucose.: 139 mg/dL (07-22-19 @ 22:45)  POCT Blood Glucose.: 145 mg/dL (07-22-19 @ 21:33)  POCT Blood Glucose.: 133 mg/dL (07-22-19 @ 13:48)                  Thyroid Function Tests:      Hemoglobin A1C, Whole Blood: 10.4 % <H> [4.0 - 5.6] (06-29-19 @ 09:33)

## 2019-07-25 NOTE — PROGRESS NOTE ADULT - ASSESSMENT
54 year old woman w/uncontrolled T2DM (HbA1c 10.4%) c/b retinopathy here w/CVA L sided hemiplegia. Bolus feeds q8h with BG mostly at goal while on present insulin doses. BG goal 100s to 180s. Awaiting discharge plan since pt has no insurance.

## 2019-07-26 LAB
GLUCOSE BLDC GLUCOMTR-MCNC: 111 MG/DL — HIGH (ref 70–99)
GLUCOSE BLDC GLUCOMTR-MCNC: 112 MG/DL — HIGH (ref 70–99)
GLUCOSE BLDC GLUCOMTR-MCNC: 179 MG/DL — HIGH (ref 70–99)

## 2019-07-26 PROCEDURE — 99232 SBSQ HOSP IP/OBS MODERATE 35: CPT

## 2019-07-26 PROCEDURE — 93306 TTE W/DOPPLER COMPLETE: CPT | Mod: 26

## 2019-07-26 RX ADMIN — Medication 650 MILLIGRAM(S): at 14:30

## 2019-07-26 RX ADMIN — CARVEDILOL PHOSPHATE 6.25 MILLIGRAM(S): 80 CAPSULE, EXTENDED RELEASE ORAL at 18:56

## 2019-07-26 RX ADMIN — INSULIN GLARGINE 40 UNIT(S): 100 INJECTION, SOLUTION SUBCUTANEOUS at 22:39

## 2019-07-26 RX ADMIN — Medication 650 MILLIGRAM(S): at 20:19

## 2019-07-26 RX ADMIN — Medication 500000 UNIT(S): at 18:56

## 2019-07-26 RX ADMIN — LISINOPRIL 20 MILLIGRAM(S): 2.5 TABLET ORAL at 05:19

## 2019-07-26 RX ADMIN — LEVETIRACETAM 500 MILLIGRAM(S): 250 TABLET, FILM COATED ORAL at 18:56

## 2019-07-26 RX ADMIN — ATORVASTATIN CALCIUM 80 MILLIGRAM(S): 80 TABLET, FILM COATED ORAL at 22:39

## 2019-07-26 RX ADMIN — CARVEDILOL PHOSPHATE 6.25 MILLIGRAM(S): 80 CAPSULE, EXTENDED RELEASE ORAL at 05:19

## 2019-07-26 RX ADMIN — Medication 81 MILLIGRAM(S): at 13:39

## 2019-07-26 RX ADMIN — Medication 500000 UNIT(S): at 05:20

## 2019-07-26 RX ADMIN — Medication 650 MILLIGRAM(S): at 13:38

## 2019-07-26 RX ADMIN — Medication 1 APPLICATION(S): at 13:40

## 2019-07-26 RX ADMIN — Medication 1 APPLICATION(S): at 05:19

## 2019-07-26 RX ADMIN — Medication 10 UNIT(S): at 05:20

## 2019-07-26 RX ADMIN — Medication 20 MILLIGRAM(S): at 13:39

## 2019-07-26 RX ADMIN — CLOPIDOGREL BISULFATE 75 MILLIGRAM(S): 75 TABLET, FILM COATED ORAL at 13:38

## 2019-07-26 RX ADMIN — ENOXAPARIN SODIUM 40 MILLIGRAM(S): 100 INJECTION SUBCUTANEOUS at 13:36

## 2019-07-26 RX ADMIN — Medication 1 APPLICATION(S): at 18:57

## 2019-07-26 RX ADMIN — LEVETIRACETAM 500 MILLIGRAM(S): 250 TABLET, FILM COATED ORAL at 05:19

## 2019-07-26 RX ADMIN — Medication 10 UNIT(S): at 22:39

## 2019-07-26 RX ADMIN — LORATADINE 10 MILLIGRAM(S): 10 TABLET ORAL at 14:12

## 2019-07-26 RX ADMIN — Medication 10 UNIT(S): at 13:41

## 2019-07-26 RX ADMIN — POLYETHYLENE GLYCOL 3350 17 GRAM(S): 17 POWDER, FOR SOLUTION ORAL at 13:37

## 2019-07-26 RX ADMIN — Medication 2: at 22:39

## 2019-07-26 RX ADMIN — Medication 1 APPLICATION(S): at 05:20

## 2019-07-26 RX ADMIN — Medication 1 APPLICATION(S): at 22:40

## 2019-07-26 NOTE — SWALLOW BEDSIDE ASSESSMENT ADULT - SWALLOW EVAL: DIAGNOSIS
Pt seen for re-evaluation of swallow. Pt continues to present with an oropharyngeal dysphagia characterized by delayed oral transit time, delayed trigger of the pharyngeal swallow and multiple swallows indicative of post swallow residue. Suspect oral apraxic component. Cognitive-linguistic deficits persist. However,  pt presenting with an improved cognitive status from previous evaluation on 7/20/19. Pt demonstrating improved eye contact, ability to  follow simple 1-step commands  and overall improved/increased participation in exam. Suggest objective swallow study to further assess swallow mechanism and determine safety of initiating PO feeds.

## 2019-07-26 NOTE — PROGRESS NOTE ADULT - SUBJECTIVE AND OBJECTIVE BOX
Patient is a 54y old  Female who presents with a chief complaint of CVA (22 Jul 2019 13:41)      SUBJECTIVE / OVERNIGHT EVENTS:    Events noted.  CONSTITUTIONAL: No fever,  or fatigue  No N/V/Abd pain    MEDICATIONS  (STANDING):  AQUAPHOR (petrolatum Ointment) 1 Application(s) Topical three times a day  aspirin  chewable 81 milliGRAM(s) Oral daily  atorvastatin 80 milliGRAM(s) Oral at bedtime  carvedilol 6.25 milliGRAM(s) Oral every 12 hours  clopidogrel Tablet 75 milliGRAM(s) Oral daily  dextrose 5%. 1000 milliLiter(s) (50 mL/Hr) IV Continuous <Continuous>  dextrose 50% Injectable 12.5 Gram(s) IV Push once  dextrose 50% Injectable 25 Gram(s) IV Push once  dextrose 50% Injectable 25 Gram(s) IV Push once  enoxaparin Injectable 40 milliGRAM(s) SubCutaneous daily  FLUoxetine Solution 20 milliGRAM(s) Oral daily  hydrocortisone 1% Ointment 1 Application(s) Topical two times a day  insulin glargine Injectable (LANTUS) 40 Unit(s) SubCutaneous at bedtime  insulin lispro (HumaLOG) corrective regimen sliding scale   SubCutaneous every 8 hours  insulin lispro Injectable (HumaLOG) 10 Unit(s) SubCutaneous every 8 hours  levETIRAcetam  Solution 500 milliGRAM(s) Oral two times a day  lisinopril 20 milliGRAM(s) Oral daily  loratadine Syrup 10 milliGRAM(s) Oral <User Schedule>  nystatin    Suspension 798506 Unit(s) Oral two times a day  polyethylene glycol 3350 17 Gram(s) Oral daily    MEDICATIONS  (PRN):  acetaminophen    Suspension .. 650 milliGRAM(s) Oral every 6 hours PRN Temp greater or equal to 38C (100.4F)  acetaminophen    Suspension .. 650 milliGRAM(s) Enteral Tube every 6 hours PRN Moderate Pain (4 - 6)  bisacodyl Suppository 10 milliGRAM(s) Rectal daily PRN Constipation  dextrose 40% Gel 15 Gram(s) Oral once PRN Blood Glucose LESS THAN 70 milliGRAM(s)/deciliter  diphenhydrAMINE 25 milliGRAM(s) Oral every 8 hours PRN Rash and/or Itching  glucagon  Injectable 1 milliGRAM(s) IntraMuscular once PRN Glucose LESS THAN 70 milligrams/deciliter  nystatin Powder 1 Application(s) Topical two times a day PRN dermatitis        CAPILLARY BLOOD GLUCOSE      POCT Blood Glucose.: 133 mg/dL (23 Jul 2019 14:07)  POCT Blood Glucose.: 119 mg/dL (23 Jul 2019 12:41)  POCT Blood Glucose.: 174 mg/dL (23 Jul 2019 05:20)  POCT Blood Glucose.: 139 mg/dL (22 Jul 2019 22:45)  POCT Blood Glucose.: 145 mg/dL (22 Jul 2019 21:33)    I&O's Summary    22 Jul 2019 07:01  -  23 Jul 2019 07:00  --------------------------------------------------------  IN: 2140 mL / OUT: 1050 mL / NET: 1090 mL        PHYSICAL EXAM:    NECK: Supple, No JVD  CHEST/LUNG: Clear to auscultation bilaterally; No wheezing.  HEART: Regular rate and rhythm; No murmurs, rubs, or gallops  ABDOMEN: Soft, Nontender, Nondistended; Bowel sounds present  EXTREMITIES:   No edema  NEUROLOGY: AAO       LABS:                  CAPILLARY BLOOD GLUCOSE      POCT Blood Glucose.: 133 mg/dL (23 Jul 2019 14:07)  POCT Blood Glucose.: 119 mg/dL (23 Jul 2019 12:41)  POCT Blood Glucose.: 174 mg/dL (23 Jul 2019 05:20)  POCT Blood Glucose.: 139 mg/dL (22 Jul 2019 22:45)  POCT Blood Glucose.: 145 mg/dL (22 Jul 2019 21:33)                RADIOLOGY & ADDITIONAL TESTS:    Imaging Personally Reviewed:    Consultant(s) Notes Reviewed:      Care Discussed with Consultants/Other Providers:

## 2019-07-26 NOTE — SWALLOW BEDSIDE ASSESSMENT ADULT - ORAL PREPARATORY PHASE
decreased orientation to feeding process: poor awareness of smell/tactile input of PO trial, poor response to tactile cuing to increase oral aperture./Decreased mastication ability/Reduced oral grading
Reduced oral grading
Reduced oral grading/Anterior loss of bolus/reduced orientation to feeding
Within functional limits
Anterior loss of bolus/Lateral loss of bolus/Reduced oral grading/Bolus falls into left lateral sulci

## 2019-07-26 NOTE — SWALLOW BEDSIDE ASSESSMENT ADULT - SLP PERTINENT HISTORY OF CURRENT PROBLEM
H&P: Patient is a 54 year old Female w/ PMHx of HTN, HLD, DM transferred from [facility] for evaluation of stroke. Per daughter at bedside, last known normal 2pm, patient had sudden onset left sided paralysis, slurred speech, received TPA at at [place] at 5;30PM, completed at 6PM. NIHSS 13, MRS 1. Neurological exam significant for L sided hemiplegia, R gaze preference, L facial droop.  CTA head/neck showing R ICA occlusion.  Patient s/p tPA at 5:30 from [facility].  Impression: L hemiplegia secondary to R ICA occlusion.
H&P: Patient is a 54 year old Female w/ PMHx of HTN, HLD, DM transferred from [facility] for evaluation of stroke. Per daughter at bedside, last known normal 2pm, patient had sudden onset left sided paralysis, slurred speech, received TPA at at [place] at 5;30PM, completed at 6PM. NIHSS 13, MRS 1. Neurological exam significant for L sided hemiplegia, R gaze preference, L facial droop.  CTA head/neck showing R ICA occlusion.  Patient s/p tPA at 5:30 from [facility].  Impression: L hemiplegia secondary to R ICA occlusion.
H&P: Patient is a 54 year old Female w/ PMHx of HTN, HLD, DM transferred from [facility] for evaluation of stroke. Per daughter at bedside, last known normal 2pm, patient had sudden onset left sided paralysis, slurred speech, received TPA at at [place] at 5;30PM, completed at 6PM. NIHSS 13, MRS 1. Neurological exam significant for L sided hemiplegia, R gaze preference, L facial droop.  CTA head/neck showing R ICA occlusion.  Patient s/p tPA at 5:30 from [facility].  Impression: L hemiplegia secondary to R ICA occlusion.
H&P: Patient is a 54 year old Female w/ PMHx of HTN, HLD, DM transferred from [facility] for evaluation of stroke. Per daughter at bedside, last known normal 2pm, patient had sudden onset left sided paralysis, slurred speech, received TPA at at [place] at 5;30PM, completed at 6PM. NIHSS 13, MRS 1. Neurological exam significant for L sided hemiplegia, R gaze preference, L facial droop.  CTA head/neck showing R ICA occlusion.  Patient s/p tPA at 5:30 from [facility].  Impression: L hemiplegia secondary to R ICA occlusion.
H&P: Patient is a 54 year old Female w/ PMHx of HTN, HLD, DM transferred from [facility] for evaluation of stroke. Per daughter at bedside, last known normal 2pm, patient had sudden onset left sided paralysis, slurred speech, received TPA at at [place] at 5;30PM, completed at 6PM. NIHSS 13, MRS 1. Neurological exam significant for L sided hemiplegia, R gaze preference, L facial droop.  CTA head/neck showing R ICA occlusion.  Patient s/p tPA at 5:30 from [facility].  Impression: L hemiplegia secondary to R ICA occlusion.
H&P: Patient is a 54 year old Female w/ PMHx of HTN, HLD, DM transferred from [facility] for evaluation of stroke. Per daughter at bedside, last known normal 2pm, patient had sudden onset left sided paralysis, slurred speech, received TPA at at [place] at 5;30PM, completed at 6PM. NIHSS 13, MRS 1. Neurological exam significant for L sided hemiplegia, R gaze preference, L facial droop.  CTA head/neck showing R ICA occlusion.  Patient s/p tPA at 5:30 from [facility].  Impression: L hemiplegia secondary to R ICA occlusion.

## 2019-07-26 NOTE — SWALLOW BEDSIDE ASSESSMENT ADULT - SWALLOW EVAL: THERAPY FREQUENCY
as schedule permits
this service to reassess 7/2/19 in AM. per daughter, patient more alert in AM

## 2019-07-26 NOTE — SWALLOW BEDSIDE ASSESSMENT ADULT - SWALLOW EVAL: REHAB POTENTIAL
good, to achieve stated therapy goals
fair, will monitor progress closely
good, to achieve stated therapy goals

## 2019-07-26 NOTE — PROGRESS NOTE ADULT - PROBLEM SELECTOR PLAN 1
-test BG Q8h  -C/w Lantus 40 units qhs  -C/w Humalog bolus 10 units Q8h w/each feed. Hold in TFs off  -c.w Humalog moderate correction scale to q8h for now  -Discharge plan: pending feeding modality. Will need insulin at discharge   -Plan discussed with pt/team.  Contact info: [phone] (24/7). pager [phone]

## 2019-07-26 NOTE — SWALLOW BEDSIDE ASSESSMENT ADULT - SLP GENERAL OBSERVATIONS
Pt found in bed, sleeping upon contact. +KFT, + wrist restraint on Right hand. Left sided paresis. ?Eye opening apraxia. Answers simple y/n questions 75%, follows simple directives 50%. +Lethargy requiring frequent re-direction to task, not consistently effective to improve participation. Daughter/RN Rama providing encouragement re: participation in exam.
Pt found in bed, awake upon encounter. Pt waving to clinician and smiles at social greetings. + Eye contact. Pt requesting "Water, I want water". Pt following simple 1-step commands with delay in response time. Improved/increased participation from previous exam on 7/20/19. Left sided see-paresis.
Pt found in bed, eyes closed upon encounter. Pt non-verbal, occasionally nods y/n, not following directions. Pt on room air. +PEG.
Patient found in bed, awake, A+Ox1 to self in no apparent pain/distress. Patient observed with improved mentation and alertness compared to previous evaluation. Patient able to follow simple directions given verbal/visual cues 50% of the time given time to respond. +KFT, +right wrist restraint. Significant left-sided hemiparesis. Flat affect. Poor eye contact.
Pt found in bed, sleeping upon contact. Pt on room air. Eyes closed throughout exam except when given tactile cues however this is not consistent. Follows simple 1-step commands ~75%. Speech is perseverative, moderate-severe dysarthria. See formal language evaluation. Left sided hemiplegia.
Pt found in bed, eyes open. Family at bedside. Left sided see-paresis and + hand splint. Reduced eye contact. Improved participation from previous session. A&O x1 to name. Following simple directions ~60% given verbal cues. Suspect component of oral  apraxia. Responds to simple questions with 1-3 word responses,. Requires increased time to respond verbally. Will nod y/n spontaneously

## 2019-07-26 NOTE — SWALLOW BEDSIDE ASSESSMENT ADULT - COMMENTS
Continued:   6/29: Change in status notification: Pt s/p tPA 6/28 @1730 ([facility]). RN went to bedside report in ED. RN notices pt right facial twitch and foaming at the mouth. Pt was following commands seconds prior, pt no longer speaking or following commands.  6/29: Provider contact note: Pt had 3 more seizure episodes en route to floor  Pt given ativan and keppra   Bedside swallow evaluations completed on 6/29, 7/1, 7/2. Recommendations on each exam: NPO, with non-oral nutrition/hydration/medications. Please see reports for details.    S/P PEG on 7/3.  An attempt was made to complete a re-evaluation on 7/17 however, patient not following directions, not opening eyes or participating in exam. D/W daughter, Domitila via phone, who agreed to be present today 7/20/19 for re-evaluation to encourage participation as daughter endorses + alertness when family at bedside.   7/19: Care coordination: patient was declined from [facility] acute rehab. Family to take patient home at discharge   7/20: Re-evaluation of swallow completed. Per report: Pt presents with 1) an oropharyngeal dysphagia characterized by oral holding of food/liquid material requiring verbal/tactile cues to initiate swallow sequence, delayed or absent trigger of the pharyngeal swallow and changes in vocal quality on thin liquid indicative of possible laryngeal penetration/aspiration. 2) +Cognitive-linguistic deficits. Suspect component of oral/verbal apraxia 3) Dysphonia  See results tab re: imaging

## 2019-07-26 NOTE — SWALLOW BEDSIDE ASSESSMENT ADULT - MODE OF PRESENTATION
spoon/fed by clinician

## 2019-07-26 NOTE — SWALLOW BEDSIDE ASSESSMENT ADULT - ASR SWALLOW LABIAL MOBILITY
impaired retraction/impaired seal/impaired pursing
impaired pursing/impaired retraction
impaired retraction/impaired coordination/impaired pursing
impaired retraction/impaired seal/impaired pursing
reduced pursing, reduced retraction

## 2019-07-26 NOTE — SWALLOW BEDSIDE ASSESSMENT ADULT - SWALLOW EVAL: PATIENT/FAMILY GOALS STATEMENT
"I want water".
Per daughter  Domitila, "We don't want her to go home on the tube" re:  PEG tube. Education provided to daughter re: patient's cognitive status may prohibit safe and adequate PO intake therefore, requiring continued need for PEG tube feedings. Daughter verbalized understanding but is hopeful her mother will participate and be able to swallow safely with her encouragement on Saturday 7/20/19.
"I want water" Pt wishes to be called Lynn.
'I want my mom to get better but know it will take time'
"Can the PEG be taken out" per family
"They don't give me water"

## 2019-07-26 NOTE — SWALLOW BEDSIDE ASSESSMENT ADULT - SWALLOW EVAL: ANTICIPATED DISCHARGE DISPOSITION
rehabilitation facility
Plan per social work is to home with family and home health services
home
rehabilitation facility
home with family-recommend speech therapy services post discharge re: improving/increasing functional communication and dysphagia therapy

## 2019-07-26 NOTE — PROGRESS NOTE ADULT - ASSESSMENT
54 year old female w/uncontrolled T2DM (HbA1c 10.4%) c/b retinopathy here w/CVA L sided hemiplegia. Bolus feeds q8h with BG mostly at goal while on present insulin doses without hypoglycemia. Will continue present insulin regimen. BG goal 100s to 180s. Awaiting discharge plan since pt has no insurance.

## 2019-07-26 NOTE — SWALLOW BEDSIDE ASSESSMENT ADULT - ASR SWALLOW LINGUAL MOBILITY
generalized reduced strength/ROM/rate of motion
reduced rate of motion, reduced coordination, + protrusion
impaired left lateral movement/impaired protrusion/impaired right lateral movement
unable to cross midline/impaired protrusion/impaired anterior elevation/impaired left lateral movement
reduced rate of motion

## 2019-07-26 NOTE — SWALLOW BEDSIDE ASSESSMENT ADULT - ASR SWALLOW RECOMMEND DIAG
A modified barium swallow is recommended to further assess swallow mechanism Given body habitus ? ability to adequately visualize laryngeal/pharyngeal structures, however, do not suspect patient will be able to tolerate FEES./VFSS/MBS A modified barium swallow is recommended to further assess swallow mechanism. do not suspect patient will be able to tolerate FEES./VFSS/MBS

## 2019-07-26 NOTE — SWALLOW BEDSIDE ASSESSMENT ADULT - SWALLOW EVAL: CRITERIA FOR SKILLED INTERVENTION MET
demonstrates skilled criteria for swallowing intervention
demonstrates skilled criteria for swallowing intervention
demonstrates skilled criteria for swallowing intervention/Re-evaluation to be completed 7/1/19
demonstrates skilled criteria for swallowing intervention

## 2019-07-26 NOTE — SWALLOW BEDSIDE ASSESSMENT ADULT - PHARYNGEAL PHASE
Delayed pharyngeal swallow/Decreased laryngeal elevation/inconsistent multiple swallows x2-4/Multiple swallows

## 2019-07-26 NOTE — SWALLOW BEDSIDE ASSESSMENT ADULT - SWALLOW EVAL: RECOMMENDED DIET
Continue NPO, with non-oral nutrition/hydration/medications pending results of mbs
Continue NPO, with non-oral nutrition/hydration/medications at this time, pending reassessment of swallow.
NPO, with non-oral nutrition/hydration/medications.
NPO, with non-oral nutrition/hydration/medications.
continue NPO, with non-oral nutrition/hydration/medications.
NPO, with non-oral nutrition/hydration/medications.

## 2019-07-26 NOTE — PROGRESS NOTE ADULT - SUBJECTIVE AND OBJECTIVE BOX
Diabetes Follow up note: Saw pt earlier today  Interval Hx: 54 year old female w/uncontrolled T2DM (on insulin PTA) w/retinopathy, morbid obesity here w/CVA s/p PEG on bolus feeds of Glucerna 480cc q8h. Pt awake nodding to simple questions. States yes when asked if feeling hungry>Asking for "Milk". Glycemic control at goal while on present insulin regimen. No hypoglycemia.    Review of Systems:  General: As above  GI: Tolerating TF without any N/V/D/ABD PAIN.  CV: No CP/SOB  ENDO: denies s/s of hypoglycemia      MEDS:  atorvastatin 80 milliGRAM(s) Oral at bedtime  insulin glargine Injectable (LANTUS) 40 Unit(s) SubCutaneous at bedtime  insulin lispro (HumaLOG) corrective regimen sliding scale   SubCutaneous every 4 hours  insulin lispro Injectable (HumaLOG) 10 Unit(s) SubCutaneous every 8 hours  hydrocortisone 1% Ointment 1 Application(s) Topical two times a day    Allergies    No Known Allergies      PE:  General: Female sitting in chair in NAD>   Vital Signs Last 24 Hrs  T(C): 36.7 (07-26-19 @ 13:18), Max: 37.3 (07-25-19 @ 19:48)  T(F): 98.1 (07-26-19 @ 13:18), Max: 99.1 (07-25-19 @ 19:48)  HR: 82 (07-26-19 @ 13:18) (70 - 87)  BP: 153/74 (07-26-19 @ 13:18) (125/70 - 153/74)  BP(mean): --  RR: 18 (07-26-19 @ 13:18) (18 - 18)  SpO2: 98% (07-26-19 @ 13:18) (96% - 100%)  Abd: Soft, NT, ND, Obese. PEG in place. TFs off at time of visit   Extremities: Warm. No edema in LEs  Neuro: Awake, nods yes/no to simple questions. L sided hemiplegia. Spoke as noted above    LABS:  POCT Blood Glucose.: 112 mg/dL (07-26-19 @ 13:31)  POCT Blood Glucose.: 111 mg/dL (07-26-19 @ 05:17)  POCT Blood Glucose.: 157 mg/dL (07-25-19 @ 21:23)  POCT Blood Glucose.: 156 mg/dL (07-25-19 @ 13:02)  POCT Blood Glucose.: 144 mg/dL (07-25-19 @ 05:00)  POCT Blood Glucose.: 117 mg/dL (07-24-19 @ 21:46)  POCT Blood Glucose.: 215 mg/dL (07-24-19 @ 14:13)  POCT Blood Glucose.: 216 mg/dL (07-24-19 @ 13:54)                        13.9   8.03  )-----------( 347      ( 18 Jul 2019 08:31 )             43.1      07-18    140  |  100  |  48<H>  ----------------------------<  200<H>  4.2   |  28  |  1.04    Ca    9.5      18 Jul 2019 04:37        Hemoglobin A1C, Whole Blood: 10.4 % <H> [4.0 - 5.6] (06-29-19 @ 09:33)

## 2019-07-26 NOTE — SWALLOW BEDSIDE ASSESSMENT ADULT - SWALLOW EVAL: ORAL MUSCULATURE
limited 2/2 reduced command following
limited 2/2 inability to follow multi-step commands and suspect component of oral apraxia.
+ facial symmetry at rest/unable to assess due to poor participation/comprehension
limited assessment secondary to cognitive status. Suspect possible oral apraxia./anomalies present
limited 2/2 reduced command following/anomalies present
limited 2/2 reduced command following and suspect component of oral apraxia

## 2019-07-26 NOTE — SWALLOW BEDSIDE ASSESSMENT ADULT - SWALLOW EVAL: SECRETION MANAGEMENT
no overt difficulties managing secretions
no overt difficulties managing secretions
pooling/right corner drooling
no overt difficulties managing secretions

## 2019-07-26 NOTE — SWALLOW BEDSIDE ASSESSMENT ADULT - SWALLOW EVAL: BUCCAL STRENGTH AND MOBILITY
did not follow directions for task
did not follow directions for task
unable to assess secondary to cognitive status
unable to assess, did not follow command
did not follow directions for task

## 2019-07-26 NOTE — SWALLOW BEDSIDE ASSESSMENT ADULT - ADDITIONAL RECOMMENDATIONS
maintain good oral hygiene
Maintain good oral hygiene. Patient will benefit from exercises/stimulation for lingual/labial/buccal strength/ROM; daughter instructed and demonstrates understanding. This service will continue to follow for tx.
maintain good oral hygiene

## 2019-07-27 LAB
GLUCOSE BLDC GLUCOMTR-MCNC: 161 MG/DL — HIGH (ref 70–99)
GLUCOSE BLDC GLUCOMTR-MCNC: 174 MG/DL — HIGH (ref 70–99)
GLUCOSE BLDC GLUCOMTR-MCNC: 202 MG/DL — HIGH (ref 70–99)

## 2019-07-27 PROCEDURE — 74230 X-RAY XM SWLNG FUNCJ C+: CPT | Mod: 26

## 2019-07-27 RX ORDER — ACETAMINOPHEN 500 MG
650 TABLET ORAL ONCE
Refills: 0 | Status: COMPLETED | OUTPATIENT
Start: 2019-07-27 | End: 2019-07-27

## 2019-07-27 RX ADMIN — Medication 2: at 06:25

## 2019-07-27 RX ADMIN — Medication 4: at 14:03

## 2019-07-27 RX ADMIN — Medication 81 MILLIGRAM(S): at 12:45

## 2019-07-27 RX ADMIN — ATORVASTATIN CALCIUM 80 MILLIGRAM(S): 80 TABLET, FILM COATED ORAL at 22:41

## 2019-07-27 RX ADMIN — Medication 2: at 22:43

## 2019-07-27 RX ADMIN — LISINOPRIL 20 MILLIGRAM(S): 2.5 TABLET ORAL at 06:25

## 2019-07-27 RX ADMIN — Medication 500000 UNIT(S): at 06:25

## 2019-07-27 RX ADMIN — Medication 500000 UNIT(S): at 18:14

## 2019-07-27 RX ADMIN — Medication 650 MILLIGRAM(S): at 21:45

## 2019-07-27 RX ADMIN — Medication 1 APPLICATION(S): at 22:41

## 2019-07-27 RX ADMIN — INSULIN GLARGINE 40 UNIT(S): 100 INJECTION, SOLUTION SUBCUTANEOUS at 22:43

## 2019-07-27 RX ADMIN — ENOXAPARIN SODIUM 40 MILLIGRAM(S): 100 INJECTION SUBCUTANEOUS at 12:45

## 2019-07-27 RX ADMIN — Medication 1 APPLICATION(S): at 06:26

## 2019-07-27 RX ADMIN — CARVEDILOL PHOSPHATE 6.25 MILLIGRAM(S): 80 CAPSULE, EXTENDED RELEASE ORAL at 06:25

## 2019-07-27 RX ADMIN — POLYETHYLENE GLYCOL 3350 17 GRAM(S): 17 POWDER, FOR SOLUTION ORAL at 14:06

## 2019-07-27 RX ADMIN — LORATADINE 10 MILLIGRAM(S): 10 TABLET ORAL at 15:00

## 2019-07-27 RX ADMIN — Medication 1 APPLICATION(S): at 14:10

## 2019-07-27 RX ADMIN — Medication 20 MILLIGRAM(S): at 12:45

## 2019-07-27 RX ADMIN — Medication 10 UNIT(S): at 14:05

## 2019-07-27 RX ADMIN — CLOPIDOGREL BISULFATE 75 MILLIGRAM(S): 75 TABLET, FILM COATED ORAL at 12:45

## 2019-07-27 RX ADMIN — Medication 10 UNIT(S): at 22:43

## 2019-07-27 RX ADMIN — Medication 10 UNIT(S): at 06:25

## 2019-07-27 RX ADMIN — Medication 650 MILLIGRAM(S): at 20:44

## 2019-07-27 RX ADMIN — Medication 1 APPLICATION(S): at 18:15

## 2019-07-27 RX ADMIN — LORATADINE 10 MILLIGRAM(S): 10 TABLET ORAL at 00:06

## 2019-07-27 RX ADMIN — LORATADINE 10 MILLIGRAM(S): 10 TABLET ORAL at 23:34

## 2019-07-27 RX ADMIN — LEVETIRACETAM 500 MILLIGRAM(S): 250 TABLET, FILM COATED ORAL at 06:25

## 2019-07-27 RX ADMIN — LEVETIRACETAM 500 MILLIGRAM(S): 250 TABLET, FILM COATED ORAL at 18:14

## 2019-07-27 RX ADMIN — CARVEDILOL PHOSPHATE 6.25 MILLIGRAM(S): 80 CAPSULE, EXTENDED RELEASE ORAL at 18:16

## 2019-07-27 NOTE — SWALLOW VFSS/MBS ASSESSMENT ADULT - ORAL PHASE COMMENTS
REVA 3.7 seconds (1-2 swallows per bolus); max spillover to valleculae; mod spillover to pyriforms; mild lingual dorsum and palatal residue REVA approximately 12 seconds; max spillover to valleculae; mod spillover to a-e folds;  mild lingual dorsum and palatal residue REVA up to approximately 18 seconds; max spillover to valleculae; mod spillover to pyriforms;

## 2019-07-27 NOTE — PROGRESS NOTE ADULT - PROBLEM SELECTOR PLAN 2
- Right MCA and ENRIQUE distribution stroke   - DAPT   - EEG: No epileptiform activity  - care per neurology appreciated, follow their ongoing recommendations
- Right MCA and ENRIQUE distribution stroke   - DAPT   - care per neurology appreciated, follow their ongoing recommendations
- Right MCA and ENRIQUE distribution stroke   - DAPT   - EEG: No epileptiform activity  - care per neurology
- Right MCA and ENRIQUE distribution stroke   - DAPT   - EEG: No epileptiform activity  - care per neurology appreciated, follow their ongoing recommendations
- Right MCA and ENRIQUE distribution stroke   - DAPT   - care per neurology appreciated, follow their ongoing recommendations
- Right MCA and ENRIQUE distribution stroke   - DAPT   - f/u V EEG  - care per neurology appreciated, follow their ongoing recommendations
- Right MCA and ENRIQUE distribution stroke   - DAPT   - f/u V EEG  - care per neurology appreciated, follow their ongoing recommendations

## 2019-07-27 NOTE — SWALLOW VFSS/MBS ASSESSMENT ADULT - ORAL PHASE
Uncontrolled bolus / spillover in hypopharynx/Reduced anterior - posterior transport/Delayed oral transit time/Residue in oral cavity/Incomplete tongue to palate contact/Uncontrolled bolus / spillover in saran-pharynx Uncontrolled bolus / spillover in saran-pharynx/Delayed oral transit time/Residue in oral cavity/Uncontrolled bolus / spillover in hypopharynx/Reduced anterior - posterior transport/Incomplete tongue to palate contact Delayed oral transit time/Residue in oral cavity/Uncontrolled bolus / spillover in saran-pharynx/Uncontrolled bolus / spillover in hypopharynx/Reduced anterior - posterior transport/Incomplete tongue to palate contact

## 2019-07-27 NOTE — SWALLOW VFSS/MBS ASSESSMENT ADULT - SLP PERTINENT HISTORY OF CURRENT PROBLEM
H&P: Patient is a 54 year old Female w/ PMHx of HTN, HLD, DM transferred from [facility] for evaluation of stroke. Per daughter at bedside, last known normal 2pm, patient had sudden onset left sided paralysis, slurred speech, received TPA at at [place] at 5;30PM, completed at 6PM. NIHSS 13, MRS 1. Neurological exam significant for L sided hemiplegia, R gaze preference, L facial droop.  CTA head/neck showing R ICA occlusion.  Patient s/p tPA at 5:30 from [facility].  Impression: L hemiplegia secondary to R ICA occlusion.

## 2019-07-27 NOTE — SWALLOW VFSS/MBS ASSESSMENT ADULT - RECOMMENDED CONSISTENCY
Initated diet:  1)Dysphagia I with nectar thickened liquids VIA TEASPOON  2)100% assistance/supervision due to cognitive deficits  3) Aspiration precautions: 2 swallows per bite/sip, slow rate, allow increased time to swallow, remain upright for all PO, crush medications in applesauce (when feasible); must be alert/awake for all meals; encourage pt to be OOB in chair for all meals when feasible; limit distractions  *As pt's cognition improves, would suggest allowing pt to hold cup and participate in feeding self liquids under supervision of SLP

## 2019-07-27 NOTE — SWALLOW VFSS/MBS ASSESSMENT ADULT - DELAYED INITIATION OF THE PHARYNGEAL SWALLOW (IN SECONDS)
14.4 seconds; initiated at a-e folds 18 seconds; initiated at the pyriforms 1.6 seconds; initiated at the pyriforms

## 2019-07-27 NOTE — SWALLOW VFSS/MBS ASSESSMENT ADULT - SLP GENERAL OBSERVATIONS
Pt encountered upright, secured in WAYNE chair. AA&O4. +Cognitive deficits evident marked by right gaze preference, slow processing speed/delayed response time, overall poor initiation. Likely apraxic component of swallow. Able to follow simple 1-step directives, however, unable to employ swallow strategies. +Dysphonia

## 2019-07-27 NOTE — PROGRESS NOTE ADULT - PROBLEM SELECTOR PLAN 1
- s/p upper gastrointestinal endoscopy 7/3 with no gross lesions in esophagus.  - Gastritis.  - No gross lesions in duodenum.  - An endoscopically removable PEG placement was successfully completed  - Please follow the post-PEG recommendations including: antibiotic ointment   - feeds at goal, continue to monitor residuals  - aspiration precautions  - Care per primary team, dc planning in progress.

## 2019-07-27 NOTE — PROGRESS NOTE ADULT - PROBLEM SELECTOR PROBLEM 2
CVA (cerebral vascular accident)

## 2019-07-27 NOTE — SWALLOW VFSS/MBS ASSESSMENT ADULT - ORAL PREP COMMENTS
+mild anterior loss via cup sip +significant anterior loss of bolus; inability to capture swallow on fluoroscopy; does not appear functional at this time +delay in accepting PO via tsp, possibly apraxic component

## 2019-07-27 NOTE — SWALLOW VFSS/MBS ASSESSMENT ADULT - COMMENTS
Continued:   6/29: Change in status notification: Pt s/p tPA 6/28 @1730 ([facility]). RN went to bedside report in ED. RN notices pt right facial twitch and foaming at the mouth. Pt was following commands seconds prior, pt no longer speaking or following commands.  6/29: Provider contact note: Pt had 3 more seizure episodes en route to floor  Pt given ativan and keppra   Bedside swallow evaluations completed on 6/29, 7/1, 7/2. Recommendations on each exam: NPO, with non-oral nutrition/hydration/medications. Please see reports for details.    S/P PEG on 7/3.  An attempt was made to complete a re-evaluation on 7/17 however, patient not following directions, not opening eyes or participating in exam. D/W daughter, Domitila via phone, who agreed to be present today 7/20/19 for re-evaluation to encourage participation as daughter endorses + alertness when family at bedside.   7/19: Care coordination: patient was declined from [facility] acute rehab. Family to take patient home at discharge   7/20: Re-evaluation of swallow completed. Per report: Pt presents with 1) an oropharyngeal dysphagia characterized by oral holding of food/liquid material requiring verbal/tactile cues to initiate swallow sequence, delayed or absent trigger of the pharyngeal swallow and changes in vocal quality on thin liquid indicative of possible laryngeal penetration/aspiration. 2) +Cognitive-linguistic deficits. Suspect component of oral/verbal apraxia 3) Dysphonia  See results tab re: imaging  7/26: bedside swallow re-evaluation: Pt presenting with an improved cognitive status from previous evaluation on 7/20/19. Pt demonstrating improved eye contact, ability to  follow simple 1-step commands  and overall improved/increased participation in exam. Rec MBS.

## 2019-07-27 NOTE — SWALLOW VFSS/MBS ASSESSMENT ADULT - ADDITIONAL INFORMATION
+high shoulder girdle with partially impaired view of pharynx  +motion artifact with consistent turning of head to right

## 2019-07-27 NOTE — PROGRESS NOTE ADULT - SUBJECTIVE AND OBJECTIVE BOX
Patient is a 54y old  Female who presents with a chief complaint of CVA (26 Jul 2019 13:40)      SUBJECTIVE / OVERNIGHT EVENTS:    Events noted.  CONSTITUTIONAL: No fever,  or fatigue  RESPIRATORY: No cough, wheezing,  No shortness of breath  ,     MEDICATIONS  (STANDING):  AQUAPHOR (petrolatum Ointment) 1 Application(s) Topical three times a day  aspirin  chewable 81 milliGRAM(s) Oral daily  atorvastatin 80 milliGRAM(s) Oral at bedtime  carvedilol 6.25 milliGRAM(s) Oral every 12 hours  clopidogrel Tablet 75 milliGRAM(s) Oral daily  dextrose 5%. 1000 milliLiter(s) (50 mL/Hr) IV Continuous <Continuous>  dextrose 50% Injectable 12.5 Gram(s) IV Push once  dextrose 50% Injectable 25 Gram(s) IV Push once  dextrose 50% Injectable 25 Gram(s) IV Push once  enoxaparin Injectable 40 milliGRAM(s) SubCutaneous daily  FLUoxetine Solution 20 milliGRAM(s) Oral daily  hydrocortisone 1% Ointment 1 Application(s) Topical two times a day  insulin glargine Injectable (LANTUS) 40 Unit(s) SubCutaneous at bedtime  insulin lispro (HumaLOG) corrective regimen sliding scale   SubCutaneous every 8 hours  insulin lispro Injectable (HumaLOG) 10 Unit(s) SubCutaneous every 8 hours  levETIRAcetam  Solution 500 milliGRAM(s) Oral two times a day  lisinopril 20 milliGRAM(s) Oral daily  loratadine Syrup 10 milliGRAM(s) Oral <User Schedule>  nystatin    Suspension 052169 Unit(s) Oral two times a day  polyethylene glycol 3350 17 Gram(s) Oral daily    MEDICATIONS  (PRN):  acetaminophen    Suspension .. 650 milliGRAM(s) Enteral Tube every 6 hours PRN Moderate Pain (4 - 6)  bisacodyl Suppository 10 milliGRAM(s) Rectal daily PRN Constipation  dextrose 40% Gel 15 Gram(s) Oral once PRN Blood Glucose LESS THAN 70 milliGRAM(s)/deciliter  diphenhydrAMINE 25 milliGRAM(s) Oral every 8 hours PRN Rash and/or Itching  glucagon  Injectable 1 milliGRAM(s) IntraMuscular once PRN Glucose LESS THAN 70 milligrams/deciliter  nystatin Powder 1 Application(s) Topical two times a day PRN dermatitis        CAPILLARY BLOOD GLUCOSE      POCT Blood Glucose.: 174 mg/dL (27 Jul 2019 22:04)  POCT Blood Glucose.: 202 mg/dL (27 Jul 2019 12:20)  POCT Blood Glucose.: 161 mg/dL (27 Jul 2019 06:24)    I&O's Summary    26 Jul 2019 07:01  -  27 Jul 2019 07:00  --------------------------------------------------------  IN: 0 mL / OUT: 500 mL / NET: -500 mL        PHYSICAL EXAM:  GENERAL: NAD  NECK: Supple, No JVD  CHEST/LUNG: Clear to auscultation bilaterally; No wheezing.  HEART: Regular rate and rhythm; No murmurs, rubs, or gallops  ABDOMEN: Soft, Nontender, Nondistended; Bowel sounds present  EXTREMITIES:   No edema  NEUROLOGY: Awake      LABS:                  CAPILLARY BLOOD GLUCOSE      POCT Blood Glucose.: 174 mg/dL (27 Jul 2019 22:04)  POCT Blood Glucose.: 202 mg/dL (27 Jul 2019 12:20)  POCT Blood Glucose.: 161 mg/dL (27 Jul 2019 06:24)                RADIOLOGY & ADDITIONAL TESTS:    Imaging Personally Reviewed:    Consultant(s) Notes Reviewed:      Care Discussed with Consultants/Other Providers:

## 2019-07-27 NOTE — PROGRESS NOTE ADULT - SUBJECTIVE AND OBJECTIVE BOX
INTERVAL HPI/OVERNIGHT EVENTS: No acute events overnight.    MEDICATIONS  (STANDING):  AQUAPHOR (petrolatum Ointment) 1 Application(s) Topical three times a day  aspirin  chewable 81 milliGRAM(s) Oral daily  atorvastatin 80 milliGRAM(s) Oral at bedtime  carvedilol 6.25 milliGRAM(s) Oral every 12 hours  clopidogrel Tablet 75 milliGRAM(s) Oral daily  dextrose 5%. 1000 milliLiter(s) (50 mL/Hr) IV Continuous <Continuous>  dextrose 50% Injectable 12.5 Gram(s) IV Push once  dextrose 50% Injectable 25 Gram(s) IV Push once  dextrose 50% Injectable 25 Gram(s) IV Push once  enoxaparin Injectable 40 milliGRAM(s) SubCutaneous daily  FLUoxetine Solution 20 milliGRAM(s) Oral daily  hydrocortisone 1% Ointment 1 Application(s) Topical two times a day  insulin glargine Injectable (LANTUS) 40 Unit(s) SubCutaneous at bedtime  insulin lispro (HumaLOG) corrective regimen sliding scale   SubCutaneous every 8 hours  insulin lispro Injectable (HumaLOG) 10 Unit(s) SubCutaneous every 8 hours  levETIRAcetam  Solution 500 milliGRAM(s) Oral two times a day  lisinopril 20 milliGRAM(s) Oral daily  loratadine Syrup 10 milliGRAM(s) Oral <User Schedule>  nystatin    Suspension 966058 Unit(s) Oral two times a day  polyethylene glycol 3350 17 Gram(s) Oral daily    MEDICATIONS  (PRN):  acetaminophen    Suspension .. 650 milliGRAM(s) Oral every 6 hours PRN Temp greater or equal to 38C (100.4F)  acetaminophen    Suspension .. 650 milliGRAM(s) Enteral Tube every 6 hours PRN Moderate Pain (4 - 6)  bisacodyl Suppository 10 milliGRAM(s) Rectal daily PRN Constipation  dextrose 40% Gel 15 Gram(s) Oral once PRN Blood Glucose LESS THAN 70 milliGRAM(s)/deciliter  diphenhydrAMINE 25 milliGRAM(s) Oral every 8 hours PRN Rash and/or Itching  glucagon  Injectable 1 milliGRAM(s) IntraMuscular once PRN Glucose LESS THAN 70 milligrams/deciliter  nystatin Powder 1 Application(s) Topical two times a day PRN dermatitis      Allergies    No Known Allergies    Intolerances        Review of Systems:    Unable to obtain due to patient's cognitive status.     Vital Signs Last 24 Hrs  T(C): 36.6 (27 Jul 2019 09:35), Max: 37.1 (26 Jul 2019 15:37)  T(F): 97.8 (27 Jul 2019 09:35), Max: 98.7 (26 Jul 2019 15:37)  HR: 72 (27 Jul 2019 09:35) (72 - 91)  BP: 145/81 (27 Jul 2019 09:35) (126/64 - 153/74)  BP(mean): --  RR: 18 (27 Jul 2019 09:35) (18 - 18)  SpO2: 98% (27 Jul 2019 09:35) (95% - 98%)    PHYSICAL EXAM:    Constitutional: NAD  HEENT: EOMI, throat clear  Neck: No LAD, supple  Respiratory: CTA and P  Cardiovascular: S1 and S2, RRR, no M  Gastrointestinal: BS+, soft, NT/ND, neg HSM,  Extremities: No peripheral edema, neg clubbing, cyanosis  Vascular: 2+ peripheral pulses  Neurological: A/O x 0  Skin: No rashes      LABS:                RADIOLOGY & ADDITIONAL TESTS:

## 2019-07-27 NOTE — SWALLOW VFSS/MBS ASSESSMENT ADULT - DIAGNOSTIC IMPRESSIONS
Pt presents with an oropharyngeal dysphagia with a suspected apraxia of swallow marked by impaired oral cavity opening with impaired PO acceptance as well as initiation of swallow. With progression of trials, improvement in initiation and participation in study. Oral stage is marked by significant anterior loss of thin liquids via cup sip with inability to capture pharyngeal phase under fluoroscopy. This material not deemed function due to oral stage deficits. Delayed oral transit time with premature loss of bolus to the oropharynx and hypopharynx across consistencies due to reduced lingual control.  Mildly reduced hyolaryngeal elevation with mild pharyngeal stasis which is effectively cleared with cued secondary swallows. No penetration or aspiration observed while under fluoroscopy.    Disorders: reduced oral competence, reduced lingual contro and coordination, reduced BOT to posterior pharyngeal wall contact, delay in trigger of the swallow reflex, reduced laryngeal elevation Pt presents with an oropharyngeal dysphagia with a suspected apraxia of swallow marked by impaired oral cavity opening with impaired PO acceptance as well as initiation of swallow. With progression of trials, improvement in initiation and participation in study. Oral stage is marked by significant anterior loss of thin liquids via cup sip with inability to capture pharyngeal phase under fluoroscopy. This material not deemed function due to oral stage deficits. Delayed oral transit time with premature loss of bolus to the oropharynx and hypopharynx across consistencies due to reduced lingual control.  Mildly reduced hyolaryngeal elevation with mild pharyngeal stasis which is effectively cleared with cued secondary swallows. No penetration or aspiration observed while under fluoroscopy.    Disorders: reduced oral competence, reduced lingual control and coordination, reduced BOT to posterior pharyngeal wall contact, delay in trigger of the swallow reflex, reduced laryngeal elevation

## 2019-07-28 LAB
GLUCOSE BLDC GLUCOMTR-MCNC: 106 MG/DL — HIGH (ref 70–99)
GLUCOSE BLDC GLUCOMTR-MCNC: 137 MG/DL — HIGH (ref 70–99)
GLUCOSE BLDC GLUCOMTR-MCNC: 191 MG/DL — HIGH (ref 70–99)
GLUCOSE BLDC GLUCOMTR-MCNC: 226 MG/DL — HIGH (ref 70–99)
GLUCOSE BLDC GLUCOMTR-MCNC: 61 MG/DL — LOW (ref 70–99)
GLUCOSE BLDC GLUCOMTR-MCNC: 64 MG/DL — LOW (ref 70–99)
GLUCOSE BLDC GLUCOMTR-MCNC: 89 MG/DL — SIGNIFICANT CHANGE UP (ref 70–99)

## 2019-07-28 RX ORDER — DEXTROSE 50 % IN WATER 50 %
12.5 SYRINGE (ML) INTRAVENOUS ONCE
Refills: 0 | Status: COMPLETED | OUTPATIENT
Start: 2019-07-28 | End: 2019-07-28

## 2019-07-28 RX ORDER — INSULIN GLARGINE 100 [IU]/ML
30 INJECTION, SOLUTION SUBCUTANEOUS ONCE
Refills: 0 | Status: DISCONTINUED | OUTPATIENT
Start: 2019-07-28 | End: 2019-07-28

## 2019-07-28 RX ORDER — INSULIN LISPRO 100/ML
VIAL (ML) SUBCUTANEOUS
Refills: 0 | Status: DISCONTINUED | OUTPATIENT
Start: 2019-07-28 | End: 2019-07-29

## 2019-07-28 RX ORDER — INSULIN GLARGINE 100 [IU]/ML
10 INJECTION, SOLUTION SUBCUTANEOUS ONCE
Refills: 0 | Status: COMPLETED | OUTPATIENT
Start: 2019-07-28 | End: 2019-07-28

## 2019-07-28 RX ORDER — INSULIN LISPRO 100/ML
10 VIAL (ML) SUBCUTANEOUS
Refills: 0 | Status: DISCONTINUED | OUTPATIENT
Start: 2019-07-28 | End: 2019-07-29

## 2019-07-28 RX ADMIN — Medication 650 MILLIGRAM(S): at 23:25

## 2019-07-28 RX ADMIN — Medication 81 MILLIGRAM(S): at 12:52

## 2019-07-28 RX ADMIN — Medication 10 UNIT(S): at 17:39

## 2019-07-28 RX ADMIN — Medication 10 UNIT(S): at 06:22

## 2019-07-28 RX ADMIN — CARVEDILOL PHOSPHATE 6.25 MILLIGRAM(S): 80 CAPSULE, EXTENDED RELEASE ORAL at 06:21

## 2019-07-28 RX ADMIN — Medication 20 MILLIGRAM(S): at 12:53

## 2019-07-28 RX ADMIN — Medication 650 MILLIGRAM(S): at 17:01

## 2019-07-28 RX ADMIN — Medication 500000 UNIT(S): at 17:01

## 2019-07-28 RX ADMIN — Medication 650 MILLIGRAM(S): at 06:22

## 2019-07-28 RX ADMIN — LORATADINE 10 MILLIGRAM(S): 10 TABLET ORAL at 23:27

## 2019-07-28 RX ADMIN — LEVETIRACETAM 500 MILLIGRAM(S): 250 TABLET, FILM COATED ORAL at 06:22

## 2019-07-28 RX ADMIN — Medication 1 APPLICATION(S): at 13:25

## 2019-07-28 RX ADMIN — CLOPIDOGREL BISULFATE 75 MILLIGRAM(S): 75 TABLET, FILM COATED ORAL at 12:53

## 2019-07-28 RX ADMIN — ATORVASTATIN CALCIUM 80 MILLIGRAM(S): 80 TABLET, FILM COATED ORAL at 22:26

## 2019-07-28 RX ADMIN — Medication 12.5 GRAM(S): at 22:44

## 2019-07-28 RX ADMIN — LEVETIRACETAM 500 MILLIGRAM(S): 250 TABLET, FILM COATED ORAL at 17:01

## 2019-07-28 RX ADMIN — INSULIN GLARGINE 10 UNIT(S): 100 INJECTION, SOLUTION SUBCUTANEOUS at 23:33

## 2019-07-28 RX ADMIN — Medication 1 APPLICATION(S): at 22:26

## 2019-07-28 RX ADMIN — NYSTATIN CREAM 1 APPLICATION(S): 100000 CREAM TOPICAL at 13:26

## 2019-07-28 RX ADMIN — Medication 10 UNIT(S): at 13:27

## 2019-07-28 RX ADMIN — Medication 650 MILLIGRAM(S): at 07:00

## 2019-07-28 RX ADMIN — LISINOPRIL 20 MILLIGRAM(S): 2.5 TABLET ORAL at 06:21

## 2019-07-28 RX ADMIN — Medication 650 MILLIGRAM(S): at 17:30

## 2019-07-28 RX ADMIN — ENOXAPARIN SODIUM 40 MILLIGRAM(S): 100 INJECTION SUBCUTANEOUS at 12:53

## 2019-07-28 RX ADMIN — Medication 4: at 13:27

## 2019-07-28 RX ADMIN — Medication 1 APPLICATION(S): at 06:21

## 2019-07-28 RX ADMIN — Medication 500000 UNIT(S): at 06:21

## 2019-07-28 RX ADMIN — CARVEDILOL PHOSPHATE 6.25 MILLIGRAM(S): 80 CAPSULE, EXTENDED RELEASE ORAL at 17:01

## 2019-07-28 RX ADMIN — Medication 2: at 17:39

## 2019-07-28 NOTE — PROGRESS NOTE ADULT - ASSESSMENT
· Assessment	  54 year old female admitted with acute CVA.     Rash:  Improving.    Problem/Plan - 2:  ·  Problem: CVA (cerebral vascular accident).  Plan: - Right MCA and ENRIQUE distribution stroke   - Dysphagia diet and PEG feeding  - Endo f/up noted.    DM II:  FSSS/Lantus    Dc planning

## 2019-07-28 NOTE — PROGRESS NOTE ADULT - SUBJECTIVE AND OBJECTIVE BOX
Patient is a 54y old  Female who presents with a chief complaint of CVA (26 Jul 2019 13:40)      SUBJECTIVE / OVERNIGHT EVENTS:    Events noted.  CONSTITUTIONAL: No fever,  or fatigue  RESPIRATORY: No cough, wheezing,  No shortness of breath  No CP/HA/N/V    MEDICATIONS  (STANDING):  AQUAPHOR (petrolatum Ointment) 1 Application(s) Topical three times a day  aspirin  chewable 81 milliGRAM(s) Oral daily  atorvastatin 80 milliGRAM(s) Oral at bedtime  carvedilol 6.25 milliGRAM(s) Oral every 12 hours  clopidogrel Tablet 75 milliGRAM(s) Oral daily  dextrose 5%. 1000 milliLiter(s) (50 mL/Hr) IV Continuous <Continuous>  dextrose 50% Injectable 12.5 Gram(s) IV Push once  dextrose 50% Injectable 25 Gram(s) IV Push once  dextrose 50% Injectable 25 Gram(s) IV Push once  enoxaparin Injectable 40 milliGRAM(s) SubCutaneous daily  FLUoxetine Solution 20 milliGRAM(s) Oral daily  hydrocortisone 1% Ointment 1 Application(s) Topical two times a day  insulin glargine Injectable (LANTUS) 40 Unit(s) SubCutaneous at bedtime  insulin lispro (HumaLOG) corrective regimen sliding scale   SubCutaneous Before meals and at bedtime  insulin lispro Injectable (HumaLOG) 10 Unit(s) SubCutaneous three times a day before meals  levETIRAcetam  Solution 500 milliGRAM(s) Oral two times a day  lisinopril 20 milliGRAM(s) Oral daily  loratadine Syrup 10 milliGRAM(s) Oral <User Schedule>  nystatin    Suspension 275335 Unit(s) Oral two times a day  polyethylene glycol 3350 17 Gram(s) Oral daily    MEDICATIONS  (PRN):  acetaminophen    Suspension .. 650 milliGRAM(s) Enteral Tube every 6 hours PRN Moderate Pain (4 - 6)  bisacodyl Suppository 10 milliGRAM(s) Rectal daily PRN Constipation  dextrose 40% Gel 15 Gram(s) Oral once PRN Blood Glucose LESS THAN 70 milliGRAM(s)/deciliter  diphenhydrAMINE 25 milliGRAM(s) Oral every 8 hours PRN Rash and/or Itching  glucagon  Injectable 1 milliGRAM(s) IntraMuscular once PRN Glucose LESS THAN 70 milligrams/deciliter  nystatin Powder 1 Application(s) Topical two times a day PRN dermatitis        CAPILLARY BLOOD GLUCOSE      POCT Blood Glucose.: 191 mg/dL (28 Jul 2019 17:05)  POCT Blood Glucose.: 226 mg/dL (28 Jul 2019 13:06)  POCT Blood Glucose.: 137 mg/dL (28 Jul 2019 06:10)  POCT Blood Glucose.: 174 mg/dL (27 Jul 2019 22:04)    I&O's Summary    27 Jul 2019 07:01  -  28 Jul 2019 07:00  --------------------------------------------------------  IN: 1200 mL / OUT: 600 mL / NET: 600 mL    28 Jul 2019 07:01  -  28 Jul 2019 19:29  --------------------------------------------------------  IN: 650 mL / OUT: 650 mL / NET: 0 mL        PHYSICAL EXAM:    NECK: Supple, No JVD  CHEST/LUNG: Clear to auscultation bilaterally; No wheezing.  HEART: Regular rate and rhythm; No murmurs, rubs, or gallops  ABDOMEN: Soft, Nontender, Nondistended; Bowel sounds present  EXTREMITIES:   No edema  NEUROLOGY: AAO      LABS:                  CAPILLARY BLOOD GLUCOSE      POCT Blood Glucose.: 191 mg/dL (28 Jul 2019 17:05)  POCT Blood Glucose.: 226 mg/dL (28 Jul 2019 13:06)  POCT Blood Glucose.: 137 mg/dL (28 Jul 2019 06:10)  POCT Blood Glucose.: 174 mg/dL (27 Jul 2019 22:04)                RADIOLOGY & ADDITIONAL TESTS:    Imaging Personally Reviewed:    Consultant(s) Notes Reviewed:      Care Discussed with Consultants/Other Providers:

## 2019-07-28 NOTE — PROGRESS NOTE ADULT - PROBLEM SELECTOR PLAN 1
- Patient is now on dysphagia diet with possible bolus feeds overnight , to be decided by nutritionist.   - Would recommend to continue Lantus 40 units qhs  - Continue Humalog 10 units before meals, hold if patient is not eating   - Continue with Humalog moderate correction scale before meals and at bedtime for now   - If decide to give bolus feeds overnight, would recommend to check FS every 4 hours, based on that will decide if she needs additional insulin coverage overnight.   -Discharge plan: Will likely need insulin at discharge , TBD based on hospital course.   -Plan discussed with pt/team.

## 2019-07-28 NOTE — PROGRESS NOTE ADULT - SUBJECTIVE AND OBJECTIVE BOX
Follow-up on: T2DM    Subjective: Patient seen and examined at bedside, doing well, passed dysphagia screen, started to eat.     MEDICATIONS  (STANDING):  AQUAPHOR (petrolatum Ointment) 1 Application(s) Topical three times a day  aspirin  chewable 81 milliGRAM(s) Oral daily  atorvastatin 80 milliGRAM(s) Oral at bedtime  carvedilol 6.25 milliGRAM(s) Oral every 12 hours  clopidogrel Tablet 75 milliGRAM(s) Oral daily  dextrose 5%. 1000 milliLiter(s) (50 mL/Hr) IV Continuous <Continuous>  dextrose 50% Injectable 12.5 Gram(s) IV Push once  dextrose 50% Injectable 25 Gram(s) IV Push once  dextrose 50% Injectable 25 Gram(s) IV Push once  enoxaparin Injectable 40 milliGRAM(s) SubCutaneous daily  FLUoxetine Solution 20 milliGRAM(s) Oral daily  hydrocortisone 1% Ointment 1 Application(s) Topical two times a day  insulin glargine Injectable (LANTUS) 40 Unit(s) SubCutaneous at bedtime  insulin lispro (HumaLOG) corrective regimen sliding scale   SubCutaneous Before meals and at bedtime  insulin lispro Injectable (HumaLOG) 10 Unit(s) SubCutaneous three times a day before meals  levETIRAcetam  Solution 500 milliGRAM(s) Oral two times a day  lisinopril 20 milliGRAM(s) Oral daily  loratadine Syrup 10 milliGRAM(s) Oral <User Schedule>  nystatin    Suspension 030376 Unit(s) Oral two times a day  polyethylene glycol 3350 17 Gram(s) Oral daily    MEDICATIONS  (PRN):  acetaminophen    Suspension .. 650 milliGRAM(s) Enteral Tube every 6 hours PRN Moderate Pain (4 - 6)  bisacodyl Suppository 10 milliGRAM(s) Rectal daily PRN Constipation  dextrose 40% Gel 15 Gram(s) Oral once PRN Blood Glucose LESS THAN 70 milliGRAM(s)/deciliter  diphenhydrAMINE 25 milliGRAM(s) Oral every 8 hours PRN Rash and/or Itching  glucagon  Injectable 1 milliGRAM(s) IntraMuscular once PRN Glucose LESS THAN 70 milligrams/deciliter  nystatin Powder 1 Application(s) Topical two times a day PRN dermatitis      PHYSICAL EXAM:  VITALS: T(C): 37.1 (07-28-19 @ 16:10)  T(F): 98.7 (07-28-19 @ 16:10), Max: 98.8 (07-27-19 @ 18:32)  HR: 77 (07-28-19 @ 16:10) (73 - 93)  BP: 139/84 (07-28-19 @ 16:10) (127/83 - 149/86)  RR:  (18 - 18)  SpO2:  (95% - 99%)  Wt(kg): --  GENERAL: NAD, well-groomed, well-developed  RESPIRATORY: Clear to auscultation bilaterally; No rales, rhonchi, wheezing, or rubs  CARDIOVASCULAR: Regular rate and rhythm; No murmurs; no peripheral edema  GI: Soft, nontender, non distended, normal bowel sounds    POCT Blood Glucose.: 226 mg/dL (07-28-19 @ 13:06)  POCT Blood Glucose.: 137 mg/dL (07-28-19 @ 06:10)  POCT Blood Glucose.: 174 mg/dL (07-27-19 @ 22:04)  POCT Blood Glucose.: 202 mg/dL (07-27-19 @ 12:20)  POCT Blood Glucose.: 161 mg/dL (07-27-19 @ 06:24)  POCT Blood Glucose.: 179 mg/dL (07-26-19 @ 21:59)  POCT Blood Glucose.: 112 mg/dL (07-26-19 @ 13:31)  POCT Blood Glucose.: 111 mg/dL (07-26-19 @ 05:17)  POCT Blood Glucose.: 157 mg/dL (07-25-19 @ 21:23)          Hemoglobin A1C, Whole Blood: 10.4 % <H> [4.0 - 5.6] (06-29-19 @ 09:33)

## 2019-07-29 LAB
ANION GAP SERPL CALC-SCNC: 8 MMOL/L — SIGNIFICANT CHANGE UP (ref 5–17)
BUN SERPL-MCNC: 31 MG/DL — HIGH (ref 7–23)
CALCIUM SERPL-MCNC: 9.2 MG/DL — SIGNIFICANT CHANGE UP (ref 8.4–10.5)
CHLORIDE SERPL-SCNC: 100 MMOL/L — SIGNIFICANT CHANGE UP (ref 96–108)
CO2 SERPL-SCNC: 31 MMOL/L — SIGNIFICANT CHANGE UP (ref 22–31)
CREAT SERPL-MCNC: 0.88 MG/DL — SIGNIFICANT CHANGE UP (ref 0.5–1.3)
GLUCOSE BLDC GLUCOMTR-MCNC: 199 MG/DL — HIGH (ref 70–99)
GLUCOSE BLDC GLUCOMTR-MCNC: 212 MG/DL — HIGH (ref 70–99)
GLUCOSE BLDC GLUCOMTR-MCNC: 221 MG/DL — HIGH (ref 70–99)
GLUCOSE BLDC GLUCOMTR-MCNC: 234 MG/DL — HIGH (ref 70–99)
GLUCOSE BLDC GLUCOMTR-MCNC: 256 MG/DL — HIGH (ref 70–99)
GLUCOSE SERPL-MCNC: 182 MG/DL — HIGH (ref 70–99)
HCT VFR BLD CALC: 33.8 % — LOW (ref 34.5–45)
HGB BLD-MCNC: 11.2 G/DL — LOW (ref 11.5–15.5)
MCHC RBC-ENTMCNC: 28.7 PG — SIGNIFICANT CHANGE UP (ref 27–34)
MCHC RBC-ENTMCNC: 33.2 GM/DL — SIGNIFICANT CHANGE UP (ref 32–36)
MCV RBC AUTO: 86.5 FL — SIGNIFICANT CHANGE UP (ref 80–100)
PLATELET # BLD AUTO: 292 K/UL — SIGNIFICANT CHANGE UP (ref 150–400)
POTASSIUM SERPL-MCNC: 4.5 MMOL/L — SIGNIFICANT CHANGE UP (ref 3.5–5.3)
POTASSIUM SERPL-SCNC: 4.5 MMOL/L — SIGNIFICANT CHANGE UP (ref 3.5–5.3)
RBC # BLD: 3.9 M/UL — SIGNIFICANT CHANGE UP (ref 3.8–5.2)
RBC # FLD: 13.1 % — SIGNIFICANT CHANGE UP (ref 10.3–14.5)
SODIUM SERPL-SCNC: 139 MMOL/L — SIGNIFICANT CHANGE UP (ref 135–145)
WBC # BLD: 8 K/UL — SIGNIFICANT CHANGE UP (ref 3.8–10.5)
WBC # FLD AUTO: 8 K/UL — SIGNIFICANT CHANGE UP (ref 3.8–10.5)

## 2019-07-29 PROCEDURE — 99232 SBSQ HOSP IP/OBS MODERATE 35: CPT

## 2019-07-29 RX ORDER — INSULIN LISPRO 100/ML
VIAL (ML) SUBCUTANEOUS EVERY 8 HOURS
Refills: 0 | Status: DISCONTINUED | OUTPATIENT
Start: 2019-07-29 | End: 2019-08-02

## 2019-07-29 RX ORDER — INSULIN LISPRO 100/ML
10 VIAL (ML) SUBCUTANEOUS EVERY 8 HOURS
Refills: 0 | Status: DISCONTINUED | OUTPATIENT
Start: 2019-07-29 | End: 2019-08-02

## 2019-07-29 RX ORDER — INSULIN GLARGINE 100 [IU]/ML
35 INJECTION, SOLUTION SUBCUTANEOUS AT BEDTIME
Refills: 0 | Status: DISCONTINUED | OUTPATIENT
Start: 2019-07-29 | End: 2019-07-30

## 2019-07-29 RX ORDER — INSULIN LISPRO 100/ML
VIAL (ML) SUBCUTANEOUS
Refills: 0 | Status: DISCONTINUED | OUTPATIENT
Start: 2019-07-29 | End: 2019-07-29

## 2019-07-29 RX ADMIN — INSULIN GLARGINE 35 UNIT(S): 100 INJECTION, SOLUTION SUBCUTANEOUS at 21:37

## 2019-07-29 RX ADMIN — Medication 10 UNIT(S): at 08:06

## 2019-07-29 RX ADMIN — CARVEDILOL PHOSPHATE 6.25 MILLIGRAM(S): 80 CAPSULE, EXTENDED RELEASE ORAL at 05:35

## 2019-07-29 RX ADMIN — Medication 1 APPLICATION(S): at 17:21

## 2019-07-29 RX ADMIN — Medication 20 MILLIGRAM(S): at 11:58

## 2019-07-29 RX ADMIN — CARVEDILOL PHOSPHATE 6.25 MILLIGRAM(S): 80 CAPSULE, EXTENDED RELEASE ORAL at 17:21

## 2019-07-29 RX ADMIN — CLOPIDOGREL BISULFATE 75 MILLIGRAM(S): 75 TABLET, FILM COATED ORAL at 11:58

## 2019-07-29 RX ADMIN — Medication 1 APPLICATION(S): at 13:44

## 2019-07-29 RX ADMIN — LEVETIRACETAM 500 MILLIGRAM(S): 250 TABLET, FILM COATED ORAL at 05:35

## 2019-07-29 RX ADMIN — Medication 2: at 12:10

## 2019-07-29 RX ADMIN — LEVETIRACETAM 500 MILLIGRAM(S): 250 TABLET, FILM COATED ORAL at 17:21

## 2019-07-29 RX ADMIN — Medication 650 MILLIGRAM(S): at 00:10

## 2019-07-29 RX ADMIN — Medication 10 UNIT(S): at 21:36

## 2019-07-29 RX ADMIN — Medication 650 MILLIGRAM(S): at 07:20

## 2019-07-29 RX ADMIN — ENOXAPARIN SODIUM 40 MILLIGRAM(S): 100 INJECTION SUBCUTANEOUS at 11:58

## 2019-07-29 RX ADMIN — Medication 1 APPLICATION(S): at 05:36

## 2019-07-29 RX ADMIN — Medication 500000 UNIT(S): at 05:35

## 2019-07-29 RX ADMIN — Medication 650 MILLIGRAM(S): at 13:42

## 2019-07-29 RX ADMIN — LISINOPRIL 20 MILLIGRAM(S): 2.5 TABLET ORAL at 05:35

## 2019-07-29 RX ADMIN — LORATADINE 10 MILLIGRAM(S): 10 TABLET ORAL at 13:44

## 2019-07-29 RX ADMIN — Medication 2: at 21:36

## 2019-07-29 RX ADMIN — Medication 650 MILLIGRAM(S): at 06:50

## 2019-07-29 RX ADMIN — Medication 81 MILLIGRAM(S): at 11:58

## 2019-07-29 RX ADMIN — Medication 650 MILLIGRAM(S): at 14:12

## 2019-07-29 RX ADMIN — Medication 1 APPLICATION(S): at 21:37

## 2019-07-29 RX ADMIN — ATORVASTATIN CALCIUM 80 MILLIGRAM(S): 80 TABLET, FILM COATED ORAL at 21:37

## 2019-07-29 RX ADMIN — Medication 500000 UNIT(S): at 17:21

## 2019-07-29 RX ADMIN — Medication 10 UNIT(S): at 12:09

## 2019-07-29 NOTE — PROGRESS NOTE ADULT - ASSESSMENT
54 year old female w/uncontrolled T2DM (HbA1c 10.4%) c/b retinopathy here w/CVA L sided hemiplegia. Bolus feeds q8h plus PO intake with BG variable between 60s and 200s. Steadily hyperglycemic today after getting low dose of Lantus last night due to hypoglycemia. Not taking much POs. Mainly has PEG bolus. Will adjust Lantus dose and set up Humalog with bolus feeds instead of ac meals since pt is not taking much POs.

## 2019-07-29 NOTE — CHART NOTE - NSCHARTNOTEFT_GEN_A_CORE
Pt seen due to phone call to RD from Brandy [phone]. Provider asked RD to review enteral nutrition regimen 2/2 recent hypo- and hyperglycemia. Pt recently passed speech/swallow evaluation () and is now on Dysphagia 1 Puree diet with [place] thick fluids and bolus feeds via PEG.     Pt seen and examined. At time of last visit, pt ordered for 480 mL bolus of Glucerna 1.5 three times daily. Per chart note Event note medicine NP, "Patient tube feeding recently  and patient is also on a diet". As of , pt is currently ordered for Glucerna 1.5, 240 mL every 8 hours, which would provide 720 mL in volume (half the amount since pt now also being fed PO diet). However, total volume of bolus noted in current order is 1440 mL. Spoke to RN who reports today, pt has already received two 480 mL boluses (6 am, 2 pm) in order to meet total volume ordered.     RN reports pt is tolerating tube feeds at this time and is consuming a few teaspoons of PO food per RN/PCA, mainly thickened water. However because lunch was provided close to time of bolus feed (480 mL), pt was not hungry for puree diet.    Recommend as PO intake improves, decrease bolus feeds. At this time, pt's PO intake does not indicate a decrease in bolus feeds 2/2 RN's reports of pt's minimal PO intake. Thus, recommend reinitiate previous EN regimen of Glucerna 1.5, 480 mL bolus three times daily (6 am, 2 pm, 10 pm), in addition to Dysphagia 1 Puree-Nectar thick fluids. This EN regimen would provide 1710 kcal, 85 grams protein (meets 29 kcal/kg, 1.4 g/kg protein using IBW 59 kg).    Spoke to Brandy at [phone] regarding bolus regimen and discrepancy regarding total volume. Recommended refer to endocrine to assist in regulating blood sugars. RD to remain available.

## 2019-07-29 NOTE — PROGRESS NOTE ADULT - ASSESSMENT
· Assessment	  54 year old female admitted with acute CVA.     Problem/Plan - 2:  ·  Problem: CVA (cerebral vascular accident).  Plan: - Right MCA and ENRIQUE distribution stroke   - Dysphagia diet and PEG feeding  - Endo f/up noted.    DM II:  FSSS/Lantus    Dc planning

## 2019-07-29 NOTE — PROGRESS NOTE ADULT - SUBJECTIVE AND OBJECTIVE BOX
Patient is a 54y old  Female who presents with a chief complaint of CVA (29 Jul 2019 17:14)      SUBJECTIVE / OVERNIGHT EVENTS:    Events noted.  CONSTITUTIONAL: No fever,  or fatigue  RESPIRATORY: No cough, wheezing,  No shortness of breath  CARDIOVASCULAR: No chest pain, palpitations, dizziness, or leg swelling  GASTROINTESTINAL: No abdominal or epigastric pain. No nausea, vomiting.  NEUROLOGICAL: No headaches,     MEDICATIONS  (STANDING):  AQUAPHOR (petrolatum Ointment) 1 Application(s) Topical three times a day  aspirin  chewable 81 milliGRAM(s) Oral daily  atorvastatin 80 milliGRAM(s) Oral at bedtime  carvedilol 6.25 milliGRAM(s) Oral every 12 hours  clopidogrel Tablet 75 milliGRAM(s) Oral daily  dextrose 5%. 1000 milliLiter(s) (50 mL/Hr) IV Continuous <Continuous>  dextrose 50% Injectable 12.5 Gram(s) IV Push once  dextrose 50% Injectable 25 Gram(s) IV Push once  dextrose 50% Injectable 25 Gram(s) IV Push once  enoxaparin Injectable 40 milliGRAM(s) SubCutaneous daily  FLUoxetine Solution 20 milliGRAM(s) Oral daily  hydrocortisone 1% Ointment 1 Application(s) Topical two times a day  insulin glargine Injectable (LANTUS) 35 Unit(s) SubCutaneous at bedtime  insulin lispro (HumaLOG) corrective regimen sliding scale   SubCutaneous every 8 hours  insulin lispro Injectable (HumaLOG) 10 Unit(s) SubCutaneous every 8 hours  levETIRAcetam  Solution 500 milliGRAM(s) Oral two times a day  lisinopril 20 milliGRAM(s) Oral daily  loratadine Syrup 10 milliGRAM(s) Oral <User Schedule>  nystatin    Suspension 000901 Unit(s) Oral two times a day  polyethylene glycol 3350 17 Gram(s) Oral daily    MEDICATIONS  (PRN):  acetaminophen    Suspension .. 650 milliGRAM(s) Enteral Tube every 6 hours PRN Moderate Pain (4 - 6)  bisacodyl Suppository 10 milliGRAM(s) Rectal daily PRN Constipation  dextrose 40% Gel 15 Gram(s) Oral once PRN Blood Glucose LESS THAN 70 milliGRAM(s)/deciliter  diphenhydrAMINE 25 milliGRAM(s) Oral every 8 hours PRN Rash and/or Itching  glucagon  Injectable 1 milliGRAM(s) IntraMuscular once PRN Glucose LESS THAN 70 milligrams/deciliter  nystatin Powder 1 Application(s) Topical two times a day PRN dermatitis        CAPILLARY BLOOD GLUCOSE      POCT Blood Glucose.: 221 mg/dL (29 Jul 2019 21:05)  POCT Blood Glucose.: 212 mg/dL (29 Jul 2019 17:04)  POCT Blood Glucose.: 234 mg/dL (29 Jul 2019 12:08)  POCT Blood Glucose.: 256 mg/dL (29 Jul 2019 08:44)  POCT Blood Glucose.: 199 mg/dL (29 Jul 2019 05:55)    I&O's Summary    28 Jul 2019 07:01  -  29 Jul 2019 07:00  --------------------------------------------------------  IN: 2340 mL / OUT: 1050 mL / NET: 1290 mL    29 Jul 2019 07:01  -  29 Jul 2019 23:13  --------------------------------------------------------  IN: 480 mL / OUT: 0 mL / NET: 480 mL        PHYSICAL EXAM:  GENERAL: NAD  NECK: Supple, No JVD  CHEST/LUNG: Clear to auscultation bilaterally; No wheezing.  HEART: Regular rate and rhythm; No murmurs, rubs, or gallops  ABDOMEN: Soft, Nontender, Nondistended; Bowel sounds present  EXTREMITIES:   No edema  NEUROLOGY: AAO X 3      LABS:                        11.2   8.0   )-----------( 292      ( 29 Jul 2019 15:41 )             33.8     07-29    139  |  100  |  31<H>  ----------------------------<  182<H>  4.5   |  31  |  0.88    Ca    9.2      29 Jul 2019 15:41              CAPILLARY BLOOD GLUCOSE      POCT Blood Glucose.: 221 mg/dL (29 Jul 2019 21:05)  POCT Blood Glucose.: 212 mg/dL (29 Jul 2019 17:04)  POCT Blood Glucose.: 234 mg/dL (29 Jul 2019 12:08)  POCT Blood Glucose.: 256 mg/dL (29 Jul 2019 08:44)  POCT Blood Glucose.: 199 mg/dL (29 Jul 2019 05:55)                RADIOLOGY & ADDITIONAL TESTS:    Imaging Personally Reviewed:    Consultant(s) Notes Reviewed:      Care Discussed with Consultants/Other Providers:

## 2019-07-29 NOTE — PROGRESS NOTE ADULT - PROBLEM SELECTOR PLAN 1
-test BG Q8h  -Change Lantus dose to 35  units qhs  -C/w Humalog bolus 10 units Q8h w/each feed. Hold in TFs off  -c.w Humalog moderate correction scale to q8h for now instead of ac meals [facility] ept is hardly taking any POs per report  -Discharge plan: pending feeding modality. Will need insulin at discharge   -Plan discussed with pt/team.  Contact info: [phone] (24/7). pager [phone] -test BG Q8h  -Change Lantus dose to 35  units qhs  -C/w Humalog bolus 10 units Q8h w/each feed. Hold in TFs off  -c.w Humalog moderate correction scale to q8h for now instead of ac meals sin ept is hardly taking any POs per report  -Discharge plan: pending feeding modality. Will need insulin at discharge  -nNutrition consult to evaluate formula since pt is having loose BMs.    -Plan discussed with pt/team.  Contact info: [phone] (24/7). pager [phone]

## 2019-07-29 NOTE — PROGRESS NOTE ADULT - SUBJECTIVE AND OBJECTIVE BOX
Diabetes Follow up note: Saw pt earlier today  Interval Hx: 54 year old female w/uncontrolled T2DM (on insulin PTA) w/retinopathy, morbid obesity here w/CVA s/p PEG on bolus feeds of Glucerna 1440cc q8h. Pt awake having PT at time of visit. Pt able to follow very simple commands. Nods to questions but doesn't talk today. Pt also on Dysphagia 1 pureed diet with nectar consistency liquids.  Per staff hardly taking POs. Glycemic control variable while on bolus feeds plus diet. Noted hypoglycemic event last night  at 10 pm with BG in 60s. Pt received Lantus 10 units instead of 40 units last nioght with rebound hyperglycemia in am today. Per staff pt also having loose BMs while on present insulin formula.    Review of Systems:  General: As above  GI: Tolerating TF without any N/V/D/ABD PAIN.  CV: No CP/SOB  ENDO: denies s/s of hypoglycemia      MEDS:  atorvastatin 80 milliGRAM(s) Oral at bedtime  insulin glargine Injectable (LANTUS) 40 Unit(s) SubCutaneous at bedtime  insulin lispro (HumaLOG) corrective regimen sliding scale   SubCutaneous every 4 hours  insulin lispro Injectable (HumaLOG) 10 Unit(s) SubCutaneous every 8 hours  hydrocortisone 1% Ointment 1 Application(s) Topical two times a day    Allergies    No Known Allergies      PE:  General: Female sitting at edge of bed in NAD>PT staff holding pt while doing PT   Vital Signs Last 24 Hrs  T(C): 36.7 (07-29-19 @ 16:33), Max: 36.8 (07-28-19 @ 20:25)  T(F): 98.1 (07-29-19 @ 16:33), Max: 98.2 (07-28-19 @ 20:25)  HR: 87 (07-29-19 @ 16:33) (69 - 88)  BP: 151/74 (07-29-19 @ 16:33) (122/80 - 151/74)  BP(mean): --  RR: 18 (07-29-19 @ 16:33) (18 - 18)  SpO2: 97% (07-29-19 @ 16:33) (94% - 99%)  Abd: Soft, NT, ND, Obese. PEG in place. TFs off at time of visit   Extremities: Warm. No edema in LEs  Neuro: Awake, nods yes/no to simple questions. L sided hemiplegia.     LABS:  POCT Blood Glucose.: 212 mg/dL (07-29-19 @ 17:04)  POCT Blood Glucose.: 234 mg/dL (07-29-19 @ 12:08)  POCT Blood Glucose.: 256 mg/dL (07-29-19 @ 08:44)  POCT Blood Glucose.: 199 mg/dL (07-29-19 @ 05:55)  POCT Blood Glucose.: 106 mg/dL (07-28-19 @ 23:02)  POCT Blood Glucose.: 61 mg/dL (07-28-19 @ 22:28)  POCT Blood Glucose.: 64 mg/dL (07-28-19 @ 22:27)  POCT Blood Glucose.: 89 mg/dL (07-28-19 @ 21:23)  POCT Blood Glucose.: 191 mg/dL (07-28-19 @ 17:05)  POCT Blood Glucose.: 226 mg/dL (07-28-19 @ 13:06)  POCT Blood Glucose.: 137 mg/dL (07-28-19 @ 06:10)  POCT Blood Glucose.: 174 mg/dL (07-27-19 @ 22:04)                          11.2   8.0   )-----------( 292      ( 29 Jul 2019 15:41 )             33.8       07-29    139  |  100  |  31<H>  ----------------------------<  182<H>  4.5   |  31  |  0.88    Ca    9.2      29 Jul 2019 15:41      Hemoglobin A1C, Whole Blood: 10.4 % <H> [4.0 - 5.6] (06-29-19 @ 09:33)

## 2019-07-29 NOTE — CHART NOTE - NSCHARTNOTEFT_GEN_A_CORE
Hypoglycemia 64 and 61    Notified that finger stick was 89 and 30 units was ordered. Prior to administration finger stick checked and repeat 64 and 61. Patient asymptomatic. Patient tube feeding recently  and patient is also on a diet.      CAPILLARY BLOOD GLUCOSE      POCT Blood Glucose.: 106 mg/dL (2019 23:02)  POCT Blood Glucose.: 61 mg/dL (2019 22:28)  POCT Blood Glucose.: 64 mg/dL (2019 22:27)  POCT Blood Glucose.: 89 mg/dL (2019 21:23)  POCT Blood Glucose.: 191 mg/dL (2019 17:05)  POCT Blood Glucose.: 226 mg/dL (2019 13:06)  POCT Blood Glucose.: 137 mg/dL (2019 06:10)      Plan  ordered 10 units of Lantus  Primary team to consider, adjusting lantus as diet was modified    CAPILLARY BLOOD GLUCOSE    Finger stick this am  POCT Blood Glucose.: 199 mg/dL (2019 05:55)    Will endorse to primary day team, attending to follow  Tracey Kmi NP  [facility] [id number]

## 2019-07-30 LAB
GLUCOSE BLDC GLUCOMTR-MCNC: 117 MG/DL — HIGH (ref 70–99)
GLUCOSE BLDC GLUCOMTR-MCNC: 129 MG/DL — HIGH (ref 70–99)
GLUCOSE BLDC GLUCOMTR-MCNC: 165 MG/DL — HIGH (ref 70–99)
GLUCOSE BLDC GLUCOMTR-MCNC: 180 MG/DL — HIGH (ref 70–99)
GLUCOSE BLDC GLUCOMTR-MCNC: 188 MG/DL — HIGH (ref 70–99)
GLUCOSE BLDC GLUCOMTR-MCNC: 203 MG/DL — HIGH (ref 70–99)

## 2019-07-30 PROCEDURE — 99232 SBSQ HOSP IP/OBS MODERATE 35: CPT

## 2019-07-30 RX ORDER — INSULIN GLARGINE 100 [IU]/ML
32 INJECTION, SOLUTION SUBCUTANEOUS AT BEDTIME
Refills: 0 | Status: DISCONTINUED | OUTPATIENT
Start: 2019-07-30 | End: 2019-08-02

## 2019-07-30 RX ADMIN — Medication 1: at 22:38

## 2019-07-30 RX ADMIN — Medication 20 MILLIGRAM(S): at 13:51

## 2019-07-30 RX ADMIN — Medication 1 APPLICATION(S): at 22:16

## 2019-07-30 RX ADMIN — Medication 1: at 13:56

## 2019-07-30 RX ADMIN — Medication 1 APPLICATION(S): at 18:54

## 2019-07-30 RX ADMIN — CARVEDILOL PHOSPHATE 6.25 MILLIGRAM(S): 80 CAPSULE, EXTENDED RELEASE ORAL at 05:39

## 2019-07-30 RX ADMIN — ENOXAPARIN SODIUM 40 MILLIGRAM(S): 100 INJECTION SUBCUTANEOUS at 13:51

## 2019-07-30 RX ADMIN — Medication 650 MILLIGRAM(S): at 14:04

## 2019-07-30 RX ADMIN — Medication 500000 UNIT(S): at 06:04

## 2019-07-30 RX ADMIN — ATORVASTATIN CALCIUM 80 MILLIGRAM(S): 80 TABLET, FILM COATED ORAL at 22:16

## 2019-07-30 RX ADMIN — LORATADINE 10 MILLIGRAM(S): 10 TABLET ORAL at 22:17

## 2019-07-30 RX ADMIN — CARVEDILOL PHOSPHATE 6.25 MILLIGRAM(S): 80 CAPSULE, EXTENDED RELEASE ORAL at 18:54

## 2019-07-30 RX ADMIN — Medication 650 MILLIGRAM(S): at 14:45

## 2019-07-30 RX ADMIN — Medication 1 APPLICATION(S): at 05:39

## 2019-07-30 RX ADMIN — Medication 1 APPLICATION(S): at 13:50

## 2019-07-30 RX ADMIN — Medication 81 MILLIGRAM(S): at 13:51

## 2019-07-30 RX ADMIN — LEVETIRACETAM 500 MILLIGRAM(S): 250 TABLET, FILM COATED ORAL at 18:53

## 2019-07-30 RX ADMIN — Medication 10 UNIT(S): at 09:17

## 2019-07-30 RX ADMIN — Medication 1 APPLICATION(S): at 05:38

## 2019-07-30 RX ADMIN — Medication 500000 UNIT(S): at 18:54

## 2019-07-30 RX ADMIN — INSULIN GLARGINE 32 UNIT(S): 100 INJECTION, SOLUTION SUBCUTANEOUS at 23:25

## 2019-07-30 RX ADMIN — Medication 650 MILLIGRAM(S): at 22:47

## 2019-07-30 RX ADMIN — CLOPIDOGREL BISULFATE 75 MILLIGRAM(S): 75 TABLET, FILM COATED ORAL at 13:50

## 2019-07-30 RX ADMIN — Medication 10 UNIT(S): at 22:37

## 2019-07-30 RX ADMIN — LISINOPRIL 20 MILLIGRAM(S): 2.5 TABLET ORAL at 06:37

## 2019-07-30 RX ADMIN — Medication 650 MILLIGRAM(S): at 22:17

## 2019-07-30 RX ADMIN — Medication 10 UNIT(S): at 13:56

## 2019-07-30 RX ADMIN — LEVETIRACETAM 500 MILLIGRAM(S): 250 TABLET, FILM COATED ORAL at 05:39

## 2019-07-30 NOTE — PROGRESS NOTE ADULT - PROBLEM SELECTOR PLAN 1
-test BG Q8h  -Change Lantus dose to 32 units qhs  -C/w Humalog bolus 10 units Q8h w/each feed. Hold in TFs off  -c.w Humalog moderate correction scale to q8h for now instead of ac meals [facility] ept is hardly taking any POs per report  -Discharge plan: pending feeding modality. Will need insulin at discharge  -Will contact RD to address loose BMs. Noted consult done today but loose BMs were not addressed .   -Plan discussed with pt/team.  Contact info: [phone] (24/7). pager [phone]

## 2019-07-30 NOTE — PROGRESS NOTE ADULT - SUBJECTIVE AND OBJECTIVE BOX
Diabetes Follow up note: Saw pt earlier today  Interval Hx: 54 year old female w/uncontrolled T2DM (on insulin PTA) w/retinopathy, morbid obesity here w/CVA s/p PEG tolerating bolus feeds of Glucerna 1440cc q8h. Pt awake able to follow simple commands. Nods to questions > states feeling "cold". Taking very small amounts of Dysphagia 1 pureed diet with nectar consistency liquids per report. Glycemic control fair while on present insulin doses. No further hypoglycemia on lower basal insulin. Noted drop of FBG  from bedtime value from 221 to 117.       Review of Systems:  General: As above  GI: Tolerating TF without any N/V/D/ABD PAIN.  CV: No CP/SOB  ENDO: denies s/s of hypoglycemia      MEDS:  atorvastatin 80 milliGRAM(s) Oral at bedtime  insulin glargine Injectable (LANTUS) 35 Unit(s) SubCutaneous at bedtime  insulin lispro (HumaLOG) corrective regimen sliding scale   SubCutaneous every 4 hours  insulin lispro Injectable (HumaLOG) 10 Unit(s) SubCutaneous every 8 hours  hydrocortisone 1% Ointment 1 Application(s) Topical two times a day    Allergies    No Known Allergies      PE:  General: Female laying in bed in NAD  Vital Signs Last 24 Hrs  T(C): 37.1 (07-30-19 @ 16:09), Max: 37.3 (07-30-19 @ 00:14)  T(F): 98.8 (07-30-19 @ 16:09), Max: 99.1 (07-30-19 @ 00:14)  HR: 85 (07-30-19 @ 16:09) (78 - 94)  BP: 110/77 (07-30-19 @ 16:09) (110/77 - 145/97)  BP(mean): --  RR: 18 (07-30-19 @ 16:09) (18 - 18)  SpO2: 97% (07-30-19 @ 16:09) (96% - 99%)  Abd: Soft, NT, ND, Obese. PEG in place. TFs off at time of visit   Extremities: Warm. No edema in LEs  Neuro: Awake, nods yes/no to simple questions. L sided hemiplegia.     LABS:  POCT Blood Glucose.: 188 mg/dL (07-30-19 @ 13:54)  POCT Blood Glucose.: 180 mg/dL (07-30-19 @ 12:20)  POCT Blood Glucose.: 129 mg/dL (07-30-19 @ 08:39)  POCT Blood Glucose.: 117 mg/dL (07-30-19 @ 06:11)  POCT Blood Glucose.: 221 mg/dL (07-29-19 @ 21:05)  POCT Blood Glucose.: 212 mg/dL (07-29-19 @ 17:04)  POCT Blood Glucose.: 234 mg/dL (07-29-19 @ 12:08)  POCT Blood Glucose.: 256 mg/dL (07-29-19 @ 08:44)  POCT Blood Glucose.: 199 mg/dL (07-29-19 @ 05:55)  POCT Blood Glucose.: 106 mg/dL (07-28-19 @ 23:02)  POCT Blood Glucose.: 61 mg/dL (07-28-19 @ 22:28)  POCT Blood Glucose.: 64 mg/dL (07-28-19 @ 22:27)  POCT Blood Glucose.: 89 mg/dL (07-28-19 @ 21:23)                          11.2   8.0   )-----------( 292      ( 29 Jul 2019 15:41 )             33.8          07-29    139  |  100  |  31<H>  ----------------------------<  182<H>  4.5   |  31  |  0.88    Ca    9.2      29 Jul 2019 15:41      Hemoglobin A1C, Whole Blood: 10.4 % <H> [4.0 - 5.6] (06-29-19 @ 09:33)

## 2019-07-30 NOTE — PROGRESS NOTE ADULT - SUBJECTIVE AND OBJECTIVE BOX
Patient is a 54y old  Female who presents with a chief complaint of CVA (30 Jul 2019 17:25)      SUBJECTIVE / OVERNIGHT EVENTS:    Events noted.  CONSTITUTIONAL: No fever,  or fatigue  No N/V/abd pain  No HA     MEDICATIONS  (STANDING):  AQUAPHOR (petrolatum Ointment) 1 Application(s) Topical three times a day  aspirin  chewable 81 milliGRAM(s) Oral daily  atorvastatin 80 milliGRAM(s) Oral at bedtime  carvedilol 6.25 milliGRAM(s) Oral every 12 hours  clopidogrel Tablet 75 milliGRAM(s) Oral daily  dextrose 5%. 1000 milliLiter(s) (50 mL/Hr) IV Continuous <Continuous>  dextrose 50% Injectable 12.5 Gram(s) IV Push once  dextrose 50% Injectable 25 Gram(s) IV Push once  dextrose 50% Injectable 25 Gram(s) IV Push once  enoxaparin Injectable 40 milliGRAM(s) SubCutaneous daily  FLUoxetine Solution 20 milliGRAM(s) Oral daily  hydrocortisone 1% Ointment 1 Application(s) Topical two times a day  insulin glargine Injectable (LANTUS) 32 Unit(s) SubCutaneous at bedtime  insulin lispro (HumaLOG) corrective regimen sliding scale   SubCutaneous every 8 hours  insulin lispro Injectable (HumaLOG) 10 Unit(s) SubCutaneous every 8 hours  levETIRAcetam  Solution 500 milliGRAM(s) Oral two times a day  lisinopril 20 milliGRAM(s) Oral daily  loratadine Syrup 10 milliGRAM(s) Oral <User Schedule>  nystatin    Suspension 040458 Unit(s) Oral two times a day  polyethylene glycol 3350 17 Gram(s) Oral daily    MEDICATIONS  (PRN):  acetaminophen    Suspension .. 650 milliGRAM(s) Enteral Tube every 6 hours PRN Moderate Pain (4 - 6)  bisacodyl Suppository 10 milliGRAM(s) Rectal daily PRN Constipation  dextrose 40% Gel 15 Gram(s) Oral once PRN Blood Glucose LESS THAN 70 milliGRAM(s)/deciliter  diphenhydrAMINE 25 milliGRAM(s) Oral every 8 hours PRN Rash and/or Itching  glucagon  Injectable 1 milliGRAM(s) IntraMuscular once PRN Glucose LESS THAN 70 milligrams/deciliter  nystatin Powder 1 Application(s) Topical two times a day PRN dermatitis        CAPILLARY BLOOD GLUCOSE      POCT Blood Glucose.: 165 mg/dL (30 Jul 2019 22:07)  POCT Blood Glucose.: 188 mg/dL (30 Jul 2019 13:54)  POCT Blood Glucose.: 180 mg/dL (30 Jul 2019 12:20)  POCT Blood Glucose.: 129 mg/dL (30 Jul 2019 08:39)  POCT Blood Glucose.: 117 mg/dL (30 Jul 2019 06:11)    I&O's Summary    29 Jul 2019 07:01  -  30 Jul 2019 07:00  --------------------------------------------------------  IN: 600 mL / OUT: 0 mL / NET: 600 mL    30 Jul 2019 07:01  -  30 Jul 2019 22:46  --------------------------------------------------------  IN: 2040 mL / OUT: 700 mL / NET: 1340 mL        PHYSICAL EXAM:    NECK: Supple, No JVD  CHEST/LUNG: Clear to auscultation bilaterally; No wheezing.  HEART: Regular rate and rhythm; No murmurs, rubs, or gallops  ABDOMEN: Soft, Nontender, Nondistended; Bowel sounds present  EXTREMITIES:   No edema  NEUROLOGY: AAO       LABS:                        11.2   8.0   )-----------( 292      ( 29 Jul 2019 15:41 )             33.8     07-29    139  |  100  |  31<H>  ----------------------------<  182<H>  4.5   |  31  |  0.88    Ca    9.2      29 Jul 2019 15:41              CAPILLARY BLOOD GLUCOSE      POCT Blood Glucose.: 165 mg/dL (30 Jul 2019 22:07)  POCT Blood Glucose.: 188 mg/dL (30 Jul 2019 13:54)  POCT Blood Glucose.: 180 mg/dL (30 Jul 2019 12:20)  POCT Blood Glucose.: 129 mg/dL (30 Jul 2019 08:39)  POCT Blood Glucose.: 117 mg/dL (30 Jul 2019 06:11)                RADIOLOGY & ADDITIONAL TESTS:    Imaging Personally Reviewed:    Consultant(s) Notes Reviewed:      Care Discussed with Consultants/Other Providers:

## 2019-07-30 NOTE — PROGRESS NOTE ADULT - ASSESSMENT
54 year old female w/uncontrolled T2DM (HbA1c 10.4%) c/b retinopathy here w/CVA L sided hemiplegia. Bolus feeds q8h plus PO intake with BGs improving while on present insulin doses. Will preventively decrease basal insulin due to drop of >100pts between bedtime BG and fasting BG. No further hypoglycemia.

## 2019-07-31 LAB
GLUCOSE BLDC GLUCOMTR-MCNC: 180 MG/DL — HIGH (ref 70–99)
GLUCOSE BLDC GLUCOMTR-MCNC: 188 MG/DL — HIGH (ref 70–99)
GLUCOSE BLDC GLUCOMTR-MCNC: 264 MG/DL — HIGH (ref 70–99)

## 2019-07-31 PROCEDURE — 99232 SBSQ HOSP IP/OBS MODERATE 35: CPT

## 2019-07-31 RX ADMIN — Medication 1 APPLICATION(S): at 05:25

## 2019-07-31 RX ADMIN — Medication 20 MILLIGRAM(S): at 11:55

## 2019-07-31 RX ADMIN — Medication 10 UNIT(S): at 06:19

## 2019-07-31 RX ADMIN — Medication 1 APPLICATION(S): at 21:32

## 2019-07-31 RX ADMIN — Medication 10 UNIT(S): at 22:47

## 2019-07-31 RX ADMIN — Medication 81 MILLIGRAM(S): at 11:56

## 2019-07-31 RX ADMIN — ATORVASTATIN CALCIUM 80 MILLIGRAM(S): 80 TABLET, FILM COATED ORAL at 21:32

## 2019-07-31 RX ADMIN — Medication 500000 UNIT(S): at 17:00

## 2019-07-31 RX ADMIN — Medication 3: at 22:46

## 2019-07-31 RX ADMIN — Medication 500000 UNIT(S): at 05:26

## 2019-07-31 RX ADMIN — LEVETIRACETAM 500 MILLIGRAM(S): 250 TABLET, FILM COATED ORAL at 17:00

## 2019-07-31 RX ADMIN — CLOPIDOGREL BISULFATE 75 MILLIGRAM(S): 75 TABLET, FILM COATED ORAL at 11:56

## 2019-07-31 RX ADMIN — Medication 1 APPLICATION(S): at 05:26

## 2019-07-31 RX ADMIN — LEVETIRACETAM 500 MILLIGRAM(S): 250 TABLET, FILM COATED ORAL at 06:19

## 2019-07-31 RX ADMIN — Medication 10 UNIT(S): at 14:31

## 2019-07-31 RX ADMIN — Medication 650 MILLIGRAM(S): at 22:33

## 2019-07-31 RX ADMIN — LORATADINE 10 MILLIGRAM(S): 10 TABLET ORAL at 23:11

## 2019-07-31 RX ADMIN — Medication 1: at 14:31

## 2019-07-31 RX ADMIN — CARVEDILOL PHOSPHATE 6.25 MILLIGRAM(S): 80 CAPSULE, EXTENDED RELEASE ORAL at 05:26

## 2019-07-31 RX ADMIN — Medication 650 MILLIGRAM(S): at 21:33

## 2019-07-31 RX ADMIN — LORATADINE 10 MILLIGRAM(S): 10 TABLET ORAL at 11:55

## 2019-07-31 RX ADMIN — CARVEDILOL PHOSPHATE 6.25 MILLIGRAM(S): 80 CAPSULE, EXTENDED RELEASE ORAL at 17:00

## 2019-07-31 RX ADMIN — ENOXAPARIN SODIUM 40 MILLIGRAM(S): 100 INJECTION SUBCUTANEOUS at 12:23

## 2019-07-31 RX ADMIN — INSULIN GLARGINE 32 UNIT(S): 100 INJECTION, SOLUTION SUBCUTANEOUS at 22:47

## 2019-07-31 RX ADMIN — Medication 1 APPLICATION(S): at 17:00

## 2019-07-31 RX ADMIN — LISINOPRIL 20 MILLIGRAM(S): 2.5 TABLET ORAL at 05:26

## 2019-07-31 RX ADMIN — Medication 1: at 06:19

## 2019-07-31 RX ADMIN — Medication 1 APPLICATION(S): at 13:08

## 2019-07-31 NOTE — CHART NOTE - NSCHARTNOTEFT_GEN_A_CORE
follow up- discussed with RD regarding tube feed to address loose BMs with current tube feeds with glucerna; per nutrition, changed peg tube feeds to vital 480ml bolus feeds every 8 hours; monitor .  Ele Chan(NP)  [facility], [phone]

## 2019-07-31 NOTE — CHART NOTE - NSCHARTNOTEFT_GEN_A_CORE
Nutrition Follow Up Note  Patient seen for:  Nutrition consult by NP for persistent diarrhea following bolus feed administration.     Source:  chart reviewed    54 year old female w/uncontrolled T2DM (on insulin PTA) w/retinopathy, morbid obesity here w/CVA, neurological exam significant for L sided hemiplegia, R gaze preference, L facial droop.  CTA head/neck showing R ICA occlusion,  L hemiplegia    Patient states: Pt awake able to follow simple commands. States she dislikes the food. States the diarrhea is from the tube feeding.         S/P  MBS 7/27 by SLP with PO diet recommendations   Diet :  Dysphagia 1 nectar thickened liquids consistent carbohydrate PLUS Enteral Nutrition support via PEG bolus  feeds Glucerna 1.5 480ml 3x daily        PO intake : small amounts of Dysphagia 1 pureed diet with nectar consistency liquids per report. Glycemic control fair while on present insulin doses. No further hypoglycemia on lower basal insulin. Noted drop of  50-60% noted in record, confirmed by PCA             Dosing Weight 301 lbs, Patine states he usual weight is 210 lbs   % Weight Change    Pertinent Medications: MEDICATIONS  (STANDING):  AQUAPHOR (petrolatum Ointment) 1 Application(s) Topical three times a day  aspirin  chewable 81 milliGRAM(s) Oral daily  atorvastatin 80 milliGRAM(s) Oral at bedtime  carvedilol 6.25 milliGRAM(s) Oral every 12 hours  clopidogrel Tablet 75 milliGRAM(s) Oral daily  dextrose 5%. 1000 milliLiter(s) (50 mL/Hr) IV Continuous <Continuous>  dextrose 50% Injectable 12.5 Gram(s) IV Push once  dextrose 50% Injectable 25 Gram(s) IV Push once  dextrose 50% Injectable 25 Gram(s) IV Push once  enoxaparin Injectable 40 milliGRAM(s) SubCutaneous daily  FLUoxetine Solution 20 milliGRAM(s) Oral daily  hydrocortisone 1% Ointment 1 Application(s) Topical two times a day  insulin glargine Injectable (LANTUS) 32 Unit(s) SubCutaneous at bedtime  insulin lispro (HumaLOG) corrective regimen sliding scale   SubCutaneous every 8 hours  insulin lispro Injectable (HumaLOG) 10 Unit(s) SubCutaneous every 8 hours  levETIRAcetam  Solution 500 milliGRAM(s) Oral two times a day  lisinopril 20 milliGRAM(s) Oral daily  loratadine Syrup 10 milliGRAM(s) Oral <User Schedule>  nystatin    Suspension 503810 Unit(s) Oral two times a day  polyethylene glycol 3350 17 Gram(s) Oral daily    MEDICATIONS  (PRN):  acetaminophen    Suspension .. 650 milliGRAM(s) Enteral Tube every 6 hours PRN Moderate Pain (4 - 6)  bisacodyl Suppository 10 milliGRAM(s) Rectal daily PRN Constipation  dextrose 40% Gel 15 Gram(s) Oral once PRN Blood Glucose LESS THAN 70 milliGRAM(s)/deciliter  diphenhydrAMINE 25 milliGRAM(s) Oral every 8 hours PRN Rash and/or Itching  glucagon  Injectable 1 milliGRAM(s) IntraMuscular once PRN Glucose LESS THAN 70 milligrams/deciliter  nystatin Powder 1 Application(s) Topical two times a day PRN dermatitis    Pertinent Labs:   Finger Sticks:  POCT Blood Glucose.: 180 mg/dL (07-31 @ 06:15)  POCT Blood Glucose.: 203 mg/dL (07-30 @ 23:23)  POCT Blood Glucose.: 165 mg/dL (07-30 @ 22:07)  POCT Blood Glucose.: 188 mg/dL (07-30 @ 13:54)    Skin per nursing documentation: no pressure injuries documented  Edema per nursing documentation: 1+ generalized    Estimated Needs:   [x ] no change since previous assessment  [ ] recalculated:     Previous Nutrition Diagnosis: Swallowing difficulty  Nutrition Diagnosis is ongoing, addressed with enteral nutrition via PEG          Recommend  1) change enteral nutrition to Vital AF 1.2 bolus feeds 480ml 3x daily, total 1440ml, 1728 calories, 108gm protein 0.8gm/kg based on dosing weight 136kg  2)continue  Dysphagia 1 nectar thickened liquids consistent carbohydrate PLUS Enteral Nutrition support as above  3) total assist with feeding, patient have small bites/sips per SLP evaluation  4) monitor Tf tolerance  Monitoring and Evaluation:     Continue to monitor Nutritional intake, Tolerance to diet prescription, weights, labs, skin integrity    RD remains available upon request and will follow up per protocol

## 2019-07-31 NOTE — PROGRESS NOTE ADULT - SUBJECTIVE AND OBJECTIVE BOX
Diabetes Follow up note: Saw pt earlier today  Interval Hx: 54 year old female w/uncontrolled T2DM (on insulin PTA) w/retinopathy, morbid obesity here w/CVA s/p PEG on bolus feeds of Glucerna 1440cc q8h with persistent loose BMs. Pt awake able to follow simple commands. Nods to questions > nods yes when asked if she still has diarrhea. Per RN pt had one loose BM this am after TF bolus was given. Awaiting for RD to evaluate and give recs regarding feeds. Pt taking very small amounts of Dysphagia 1 pureed diet with nectar consistency liquids per report. Glycemic control fair while on present insulin doses. No further hypoglycemia.      Review of Systems:  General: As above  GI: Tolerating TF without any N/V/ABD PAIN. + diarrhea  CV: No CP/SOB  ENDO: denies s/s of hypoglycemia      MEDS:  atorvastatin 80 milliGRAM(s) Oral at bedtime  insulin glargine Injectable (LANTUS) 32 Unit(s) SubCutaneous at bedtime  insulin lispro (HumaLOG) corrective regimen sliding scale   SubCutaneous every 4 hours  insulin lispro Injectable (HumaLOG) 10 Unit(s) SubCutaneous every 8 hours  hydrocortisone 1% Ointment 1 Application(s) Topical two times a day    Allergies    No Known Allergies      PE:  General: Female laying in bed in NAD  Vital Signs Last 24 Hrs  T(C): 36.9 (07-31-19 @ 13:21), Max: 37.1 (07-30-19 @ 16:09)  T(F): 98.4 (07-31-19 @ 13:21), Max: 98.8 (07-30-19 @ 16:09)  HR: 86 (07-31-19 @ 13:21) (83 - 90)  BP: 130/82 (07-31-19 @ 13:21) (103/70 - 135/72)  BP(mean): --  RR: 18 (07-31-19 @ 13:21) (18 - 18)  SpO2: 100% (07-31-19 @ 13:21) (95% - 100%)  Abd: Soft, NT, ND, Obese. PEG in place. TFs off at time of visit   Extremities: Warm. No edema in LEs  Neuro: Awake, nods yes/no to simple questions. L sided hemiplegia. Able to move RUE. More awake today and able to speak some words.     LABS:  POCT Blood Glucose.: 180 mg/dL (07-31-19 @ 06:15)  POCT Blood Glucose.: 203 mg/dL (07-30-19 @ 23:23)  POCT Blood Glucose.: 165 mg/dL (07-30-19 @ 22:07)  POCT Blood Glucose.: 188 mg/dL (07-30-19 @ 13:54)  POCT Blood Glucose.: 180 mg/dL (07-30-19 @ 12:20)  POCT Blood Glucose.: 129 mg/dL (07-30-19 @ 08:39)  POCT Blood Glucose.: 117 mg/dL (07-30-19 @ 06:11)  POCT Blood Glucose.: 221 mg/dL (07-29-19 @ 21:05)  POCT Blood Glucose.: 212 mg/dL (07-29-19 @ 17:04)                                   11.2   8.0   )-----------( 292      ( 29 Jul 2019 15:41 )             33.8      07-29    139  |  100  |  31<H>  ----------------------------<  182<H>  4.5   |  31  |  0.88    Ca    9.2      29 Jul 2019 15:41      Hemoglobin A1C, Whole Blood: 10.4 % <H> [4.0 - 5.6] (06-29-19 @ 09:33)

## 2019-07-31 NOTE — PROGRESS NOTE ADULT - PROBLEM SELECTOR PLAN 1
-test BG Q8h  -C/w Lantus dose to 32 units qhs  -C/w Humalog bolus 10 units Q8h w/each feed. Hold in TFs off  -c.w Humalog moderate correction scale to q8h for now instead of ac meals since pt is hardly taking any POs yet.  -Discharge plan: pending feeding modality. Will need insulin at discharge   -Plan discussed with pt's team.  Contact info: [phone] (24/7). pager [phone]

## 2019-07-31 NOTE — PROGRESS NOTE ADULT - SUBJECTIVE AND OBJECTIVE BOX
Patient is a 54y old  Female who presents with a chief complaint of CVA (31 Jul 2019 14:02)      SUBJECTIVE / OVERNIGHT EVENTS:    Events noted.  CONSTITUTIONAL: No fever,  or fatigue  No N/V/Abd pain      MEDICATIONS  (STANDING):  AQUAPHOR (petrolatum Ointment) 1 Application(s) Topical three times a day  aspirin  chewable 81 milliGRAM(s) Oral daily  atorvastatin 80 milliGRAM(s) Oral at bedtime  carvedilol 6.25 milliGRAM(s) Oral every 12 hours  clopidogrel Tablet 75 milliGRAM(s) Oral daily  dextrose 5%. 1000 milliLiter(s) (50 mL/Hr) IV Continuous <Continuous>  dextrose 50% Injectable 12.5 Gram(s) IV Push once  dextrose 50% Injectable 25 Gram(s) IV Push once  dextrose 50% Injectable 25 Gram(s) IV Push once  enoxaparin Injectable 40 milliGRAM(s) SubCutaneous daily  FLUoxetine Solution 20 milliGRAM(s) Oral daily  hydrocortisone 1% Ointment 1 Application(s) Topical two times a day  insulin glargine Injectable (LANTUS) 32 Unit(s) SubCutaneous at bedtime  insulin lispro (HumaLOG) corrective regimen sliding scale   SubCutaneous every 8 hours  insulin lispro Injectable (HumaLOG) 10 Unit(s) SubCutaneous every 8 hours  levETIRAcetam  Solution 500 milliGRAM(s) Oral two times a day  lisinopril 20 milliGRAM(s) Oral daily  loratadine Syrup 10 milliGRAM(s) Oral <User Schedule>  nystatin    Suspension 524055 Unit(s) Oral two times a day  polyethylene glycol 3350 17 Gram(s) Oral daily    MEDICATIONS  (PRN):  acetaminophen    Suspension .. 650 milliGRAM(s) Enteral Tube every 6 hours PRN Moderate Pain (4 - 6)  bisacodyl Suppository 10 milliGRAM(s) Rectal daily PRN Constipation  dextrose 40% Gel 15 Gram(s) Oral once PRN Blood Glucose LESS THAN 70 milliGRAM(s)/deciliter  diphenhydrAMINE 25 milliGRAM(s) Oral every 8 hours PRN Rash and/or Itching  glucagon  Injectable 1 milliGRAM(s) IntraMuscular once PRN Glucose LESS THAN 70 milligrams/deciliter  nystatin Powder 1 Application(s) Topical two times a day PRN dermatitis        CAPILLARY BLOOD GLUCOSE      POCT Blood Glucose.: 264 mg/dL (31 Jul 2019 22:28)  POCT Blood Glucose.: 188 mg/dL (31 Jul 2019 14:28)  POCT Blood Glucose.: 180 mg/dL (31 Jul 2019 06:15)  POCT Blood Glucose.: 203 mg/dL (30 Jul 2019 23:23)    I&O's Summary    30 Jul 2019 07:01  -  31 Jul 2019 07:00  --------------------------------------------------------  IN: 3120 mL / OUT: 700 mL / NET: 2420 mL    31 Jul 2019 07:01  -  31 Jul 2019 23:01  --------------------------------------------------------  IN: 1260 mL / OUT: 700 mL / NET: 560 mL        PHYSICAL EXAM:    NECK: Supple, No JVD  CHEST/LUNG: Clear to auscultation bilaterally; No wheezing.  HEART: Regular rate and rhythm; No murmurs, rubs, or gallops  ABDOMEN: Soft, Nontender, Nondistended; Bowel sounds present  EXTREMITIES:   No edema  NEUROLOGY: Awake      LABS:        CAPILLARY BLOOD GLUCOSE      POCT Blood Glucose.: 264 mg/dL (31 Jul 2019 22:28)  POCT Blood Glucose.: 188 mg/dL (31 Jul 2019 14:28)  POCT Blood Glucose.: 180 mg/dL (31 Jul 2019 06:15)  POCT Blood Glucose.: 203 mg/dL (30 Jul 2019 23:23)                RADIOLOGY & ADDITIONAL TESTS:    Imaging Personally Reviewed:    Consultant(s) Notes Reviewed:      Care Discussed with Consultants/Other Providers:

## 2019-07-31 NOTE — PROGRESS NOTE ADULT - ASSESSMENT
54 year old female w/uncontrolled T2DM (HbA1c 10.4%) c/b retinopathy here w/CVA L sided hemiplegia. On bolus feeds q8h plus PO intake with BGs fairly controlled while on present insulin doses. However, pt having loose BM after each bolus feed. Spoke to team NP about the need to reevaluate TF formula. Noted order changed to Vital TF > same dose. Will continue present insulin doses for now since new ordered TF formula has almost same amount of carbs than Glucerna. No further hypoglycemia. Will follow. 54 year old female w/uncontrolled T2DM (HbA1c 10.4%) c/b retinopathy here w/CVA L sided hemiplegia. On bolus feeds q8h plus PO intake with BGs fairly controlled while on present insulin doses. However, pt having loose BM after each bolus feed. Spoke to team NP about the need to reevaluate TF formula. Noted order changed to Vital feeds > same dose/timing. Will continue present insulin doses for now since new ordered TF formula has almost same amount of carbs than Glucerna feeding. No further hypoglycemia. Will follow.

## 2019-08-01 LAB
ANION GAP SERPL CALC-SCNC: 10 MMOL/L — SIGNIFICANT CHANGE UP (ref 5–17)
BUN SERPL-MCNC: 26 MG/DL — HIGH (ref 7–23)
CALCIUM SERPL-MCNC: 9.3 MG/DL — SIGNIFICANT CHANGE UP (ref 8.4–10.5)
CHLORIDE SERPL-SCNC: 100 MMOL/L — SIGNIFICANT CHANGE UP (ref 96–108)
CO2 SERPL-SCNC: 30 MMOL/L — SIGNIFICANT CHANGE UP (ref 22–31)
CREAT SERPL-MCNC: 0.75 MG/DL — SIGNIFICANT CHANGE UP (ref 0.5–1.3)
GLUCOSE BLDC GLUCOMTR-MCNC: 186 MG/DL — HIGH (ref 70–99)
GLUCOSE BLDC GLUCOMTR-MCNC: 225 MG/DL — HIGH (ref 70–99)
GLUCOSE BLDC GLUCOMTR-MCNC: 236 MG/DL — HIGH (ref 70–99)
GLUCOSE BLDC GLUCOMTR-MCNC: 258 MG/DL — HIGH (ref 70–99)
GLUCOSE SERPL-MCNC: 238 MG/DL — HIGH (ref 70–99)
HCT VFR BLD CALC: 32.7 % — LOW (ref 34.5–45)
HGB BLD-MCNC: 10.3 G/DL — LOW (ref 11.5–15.5)
MCHC RBC-ENTMCNC: 27.7 PG — SIGNIFICANT CHANGE UP (ref 27–34)
MCHC RBC-ENTMCNC: 31.5 GM/DL — LOW (ref 32–36)
MCV RBC AUTO: 87.9 FL — SIGNIFICANT CHANGE UP (ref 80–100)
PLATELET # BLD AUTO: 268 K/UL — SIGNIFICANT CHANGE UP (ref 150–400)
POTASSIUM SERPL-MCNC: 4.3 MMOL/L — SIGNIFICANT CHANGE UP (ref 3.5–5.3)
POTASSIUM SERPL-SCNC: 4.3 MMOL/L — SIGNIFICANT CHANGE UP (ref 3.5–5.3)
RBC # BLD: 3.72 M/UL — LOW (ref 3.8–5.2)
RBC # FLD: 13.9 % — SIGNIFICANT CHANGE UP (ref 10.3–14.5)
SODIUM SERPL-SCNC: 140 MMOL/L — SIGNIFICANT CHANGE UP (ref 135–145)
WBC # BLD: 7.36 K/UL — SIGNIFICANT CHANGE UP (ref 3.8–10.5)
WBC # FLD AUTO: 7.36 K/UL — SIGNIFICANT CHANGE UP (ref 3.8–10.5)

## 2019-08-01 PROCEDURE — 99232 SBSQ HOSP IP/OBS MODERATE 35: CPT

## 2019-08-01 RX ADMIN — Medication 650 MILLIGRAM(S): at 18:15

## 2019-08-01 RX ADMIN — CARVEDILOL PHOSPHATE 6.25 MILLIGRAM(S): 80 CAPSULE, EXTENDED RELEASE ORAL at 17:26

## 2019-08-01 RX ADMIN — Medication 20 MILLIGRAM(S): at 12:45

## 2019-08-01 RX ADMIN — LEVETIRACETAM 500 MILLIGRAM(S): 250 TABLET, FILM COATED ORAL at 06:26

## 2019-08-01 RX ADMIN — LEVETIRACETAM 500 MILLIGRAM(S): 250 TABLET, FILM COATED ORAL at 17:27

## 2019-08-01 RX ADMIN — Medication 650 MILLIGRAM(S): at 17:26

## 2019-08-01 RX ADMIN — CARVEDILOL PHOSPHATE 6.25 MILLIGRAM(S): 80 CAPSULE, EXTENDED RELEASE ORAL at 06:27

## 2019-08-01 RX ADMIN — Medication 1: at 06:23

## 2019-08-01 RX ADMIN — Medication 10 UNIT(S): at 06:24

## 2019-08-01 RX ADMIN — ATORVASTATIN CALCIUM 80 MILLIGRAM(S): 80 TABLET, FILM COATED ORAL at 22:17

## 2019-08-01 RX ADMIN — Medication 1 APPLICATION(S): at 22:16

## 2019-08-01 RX ADMIN — CLOPIDOGREL BISULFATE 75 MILLIGRAM(S): 75 TABLET, FILM COATED ORAL at 12:45

## 2019-08-01 RX ADMIN — INSULIN GLARGINE 32 UNIT(S): 100 INJECTION, SOLUTION SUBCUTANEOUS at 22:25

## 2019-08-01 RX ADMIN — Medication 500000 UNIT(S): at 06:27

## 2019-08-01 RX ADMIN — Medication 2: at 13:56

## 2019-08-01 RX ADMIN — Medication 1 APPLICATION(S): at 06:28

## 2019-08-01 RX ADMIN — Medication 10 UNIT(S): at 13:56

## 2019-08-01 RX ADMIN — Medication 1 APPLICATION(S): at 13:56

## 2019-08-01 RX ADMIN — Medication 1 APPLICATION(S): at 06:27

## 2019-08-01 RX ADMIN — ENOXAPARIN SODIUM 40 MILLIGRAM(S): 100 INJECTION SUBCUTANEOUS at 12:45

## 2019-08-01 RX ADMIN — LISINOPRIL 20 MILLIGRAM(S): 2.5 TABLET ORAL at 06:27

## 2019-08-01 RX ADMIN — Medication 2: at 22:25

## 2019-08-01 RX ADMIN — LORATADINE 10 MILLIGRAM(S): 10 TABLET ORAL at 22:17

## 2019-08-01 RX ADMIN — Medication 1 APPLICATION(S): at 17:27

## 2019-08-01 RX ADMIN — Medication 10 UNIT(S): at 22:25

## 2019-08-01 RX ADMIN — LORATADINE 10 MILLIGRAM(S): 10 TABLET ORAL at 17:26

## 2019-08-01 RX ADMIN — Medication 81 MILLIGRAM(S): at 12:45

## 2019-08-01 RX ADMIN — Medication 500000 UNIT(S): at 17:26

## 2019-08-01 NOTE — PROGRESS NOTE ADULT - SUBJECTIVE AND OBJECTIVE BOX
Chief Complaint: Uncontrolled DM2    S: No ON events. Pt. states she still has diarrhea despite change in TF's yesterday.    MEDICATIONS  (STANDING):  AQUAPHOR (petrolatum Ointment) 1 Application(s) Topical three times a day  aspirin  chewable 81 milliGRAM(s) Oral daily  atorvastatin 80 milliGRAM(s) Oral at bedtime  carvedilol 6.25 milliGRAM(s) Oral every 12 hours  clopidogrel Tablet 75 milliGRAM(s) Oral daily  dextrose 5%. 1000 milliLiter(s) (50 mL/Hr) IV Continuous <Continuous>  dextrose 50% Injectable 12.5 Gram(s) IV Push once  dextrose 50% Injectable 25 Gram(s) IV Push once  dextrose 50% Injectable 25 Gram(s) IV Push once  enoxaparin Injectable 40 milliGRAM(s) SubCutaneous daily  FLUoxetine Solution 20 milliGRAM(s) Oral daily  hydrocortisone 1% Ointment 1 Application(s) Topical two times a day  insulin glargine Injectable (LANTUS) 32 Unit(s) SubCutaneous at bedtime  insulin lispro (HumaLOG) corrective regimen sliding scale   SubCutaneous every 8 hours  insulin lispro Injectable (HumaLOG) 10 Unit(s) SubCutaneous every 8 hours  levETIRAcetam  Solution 500 milliGRAM(s) Oral two times a day  lisinopril 20 milliGRAM(s) Oral daily  loratadine Syrup 10 milliGRAM(s) Oral <User Schedule>  nystatin    Suspension 824468 Unit(s) Oral two times a day  polyethylene glycol 3350 17 Gram(s) Oral daily    MEDICATIONS  (PRN):  acetaminophen    Suspension .. 650 milliGRAM(s) Enteral Tube every 6 hours PRN Moderate Pain (4 - 6)  bisacodyl Suppository 10 milliGRAM(s) Rectal daily PRN Constipation  dextrose 40% Gel 15 Gram(s) Oral once PRN Blood Glucose LESS THAN 70 milliGRAM(s)/deciliter  diphenhydrAMINE 25 milliGRAM(s) Oral every 8 hours PRN Rash and/or Itching  glucagon  Injectable 1 milliGRAM(s) IntraMuscular once PRN Glucose LESS THAN 70 milligrams/deciliter  nystatin Powder 1 Application(s) Topical two times a day PRN dermatitis      Allergies  No Known Allergies    Review of Systems:  Constitutional: No fever  Eyes: No blurry vision  Neuro: No tremors  HEENT: No pain  Cardiovascular: No chest pain, palpitations  Respiratory: No SOB, no cough  GI: No nausea, vomiting, abdominal pain, +diarrhea  : No dysuria  Skin: no rash  Psych: no depression  Endocrine: no polyuria, polydipsia  Hem/lymph: no swelling  Osteoporosis: no fractures      PHYSICAL EXAM:  VITALS: T(C): 36.8 (08-01-19 @ 11:09)  T(F): 98.3 (08-01-19 @ 11:09), Max: 99.2 (08-01-19 @ 00:57)  HR: 95 (08-01-19 @ 11:09) (80 - 98)  BP: 134/78 (08-01-19 @ 11:09) (111/54 - 138/83)  RR:  (18 - 20)  SpO2:  (96% - 100%)  Wt(kg): --  GENERAL: NAD, well-groomed, well-developed  EYES: No proptosis, , anicteric  HEENT:  Atraumatic, Normocephalic, moist mucous membranes  SKIN: Dry, intact  PSYCH: Alert, can't asses orientation      POCT Blood Glucose.: 186 mg/dL (08-01-19 @ 05:53)  POCT Blood Glucose.: 264 mg/dL (07-31-19 @ 22:28)  POCT Blood Glucose.: 188 mg/dL (07-31-19 @ 14:28)  POCT Blood Glucose.: 180 mg/dL (07-31-19 @ 06:15)  POCT Blood Glucose.: 203 mg/dL (07-30-19 @ 23:23)  POCT Blood Glucose.: 165 mg/dL (07-30-19 @ 22:07)  POCT Blood Glucose.: 188 mg/dL (07-30-19 @ 13:54)  POCT Blood Glucose.: 180 mg/dL (07-30-19 @ 12:20)  POCT Blood Glucose.: 129 mg/dL (07-30-19 @ 08:39)  POCT Blood Glucose.: 117 mg/dL (07-30-19 @ 06:11)  POCT Blood Glucose.: 221 mg/dL (07-29-19 @ 21:05)  POCT Blood Glucose.: 212 mg/dL (07-29-19 @ 17:04)  POCT Blood Glucose.: 234 mg/dL (07-29-19 @ 12:08)      07-29    139  |  100  |  31<H>  ----------------------------<  182<H>  4.5   |  31  |  0.88    EGFR if : 86  EGFR if non : 74    Ca    9.2      07-29            Thyroid Function Tests:      Hemoglobin A1C, Whole Blood: 10.4 % <H> [4.0 - 5.6] (06-29-19 @ 09:33)

## 2019-08-01 NOTE — PROGRESS NOTE ADULT - ASSESSMENT
54 year old woman w/uncontrolled T2DM (HbA1c 10.4%) c/b retinopathy here w/CVA L sided hemiplegia. On bolus feeds q8h plus PO intake with BGs fairly controlled while on present insulin doses, but did have elevated values in the evening.

## 2019-08-01 NOTE — PROGRESS NOTE ADULT - PROBLEM SELECTOR PLAN 1
-test BG Q8h  -C/w Lantus dose to 32 units qhs  -C/W Humalog Q8h w/each feed. Hold in TFs off- If evening FS remains elevated, will adjust mid-day dose.  -c.w Humalog moderate correction scale to q8h for now instead of ac meals since pt is hardly taking any POs yet.  -Discharge plan: pending feeding modality. Will need insulin at discharge   -Plan discussed with pt's team.  Contact info: [phone] (24/7).

## 2019-08-01 NOTE — PROGRESS NOTE ADULT - SUBJECTIVE AND OBJECTIVE BOX
Patient is a 54y old  Female who presents with a chief complaint of CVA (01 Aug 2019 11:41)      SUBJECTIVE / OVERNIGHT EVENTS:    Events noted.  CONSTITUTIONAL: No fever,  or fatigue  Awake  No N/V    MEDICATIONS  (STANDING):  AQUAPHOR (petrolatum Ointment) 1 Application(s) Topical three times a day  aspirin  chewable 81 milliGRAM(s) Oral daily  atorvastatin 80 milliGRAM(s) Oral at bedtime  carvedilol 6.25 milliGRAM(s) Oral every 12 hours  clopidogrel Tablet 75 milliGRAM(s) Oral daily  dextrose 5%. 1000 milliLiter(s) (50 mL/Hr) IV Continuous <Continuous>  dextrose 50% Injectable 12.5 Gram(s) IV Push once  dextrose 50% Injectable 25 Gram(s) IV Push once  dextrose 50% Injectable 25 Gram(s) IV Push once  enoxaparin Injectable 40 milliGRAM(s) SubCutaneous daily  FLUoxetine Solution 20 milliGRAM(s) Oral daily  hydrocortisone 1% Ointment 1 Application(s) Topical two times a day  insulin glargine Injectable (LANTUS) 32 Unit(s) SubCutaneous at bedtime  insulin lispro (HumaLOG) corrective regimen sliding scale   SubCutaneous every 8 hours  insulin lispro Injectable (HumaLOG) 10 Unit(s) SubCutaneous every 8 hours  levETIRAcetam  Solution 500 milliGRAM(s) Oral two times a day  lisinopril 20 milliGRAM(s) Oral daily  loratadine Syrup 10 milliGRAM(s) Oral <User Schedule>  nystatin    Suspension 120772 Unit(s) Oral two times a day  polyethylene glycol 3350 17 Gram(s) Oral daily    MEDICATIONS  (PRN):  acetaminophen    Suspension .. 650 milliGRAM(s) Enteral Tube every 6 hours PRN Moderate Pain (4 - 6)  bisacodyl Suppository 10 milliGRAM(s) Rectal daily PRN Constipation  dextrose 40% Gel 15 Gram(s) Oral once PRN Blood Glucose LESS THAN 70 milliGRAM(s)/deciliter  diphenhydrAMINE 25 milliGRAM(s) Oral every 8 hours PRN Rash and/or Itching  glucagon  Injectable 1 milliGRAM(s) IntraMuscular once PRN Glucose LESS THAN 70 milligrams/deciliter  nystatin Powder 1 Application(s) Topical two times a day PRN dermatitis        CAPILLARY BLOOD GLUCOSE      POCT Blood Glucose.: 225 mg/dL (01 Aug 2019 13:55)  POCT Blood Glucose.: 186 mg/dL (01 Aug 2019 05:53)  POCT Blood Glucose.: 264 mg/dL (31 Jul 2019 22:28)    I&O's Summary    31 Jul 2019 07:01  -  01 Aug 2019 07:00  --------------------------------------------------------  IN: 2040 mL / OUT: 1300 mL / NET: 740 mL    01 Aug 2019 07:01  -  01 Aug 2019 20:53  --------------------------------------------------------  IN: 1210 mL / OUT: 900 mL / NET: 310 mL        PHYSICAL EXAM:    NECK: Supple, No JVD  CHEST/LUNG: Clear to auscultation bilaterally; No wheezing.  HEART: Regular rate and rhythm; No murmurs, rubs, or gallops  ABDOMEN: Soft, Nontender, Nondistended; Bowel sounds present  EXTREMITIES:   No edema  NEUROLOGY: Awake      LABS:                        10.3   7.36  )-----------( 268      ( 01 Aug 2019 16:38 )             32.7     08-01    140  |  100  |  26<H>  ----------------------------<  238<H>  4.3   |  30  |  0.75    Ca    9.3      01 Aug 2019 11:43              CAPILLARY BLOOD GLUCOSE      POCT Blood Glucose.: 225 mg/dL (01 Aug 2019 13:55)  POCT Blood Glucose.: 186 mg/dL (01 Aug 2019 05:53)  POCT Blood Glucose.: 264 mg/dL (31 Jul 2019 22:28)                RADIOLOGY & ADDITIONAL TESTS:    Imaging Personally Reviewed:    Consultant(s) Notes Reviewed:      Care Discussed with Consultants/Other Providers:

## 2019-08-02 LAB
ANION GAP SERPL CALC-SCNC: 9 MMOL/L — SIGNIFICANT CHANGE UP (ref 5–17)
BUN SERPL-MCNC: 25 MG/DL — HIGH (ref 7–23)
CALCIUM SERPL-MCNC: 9.3 MG/DL — SIGNIFICANT CHANGE UP (ref 8.4–10.5)
CHLORIDE SERPL-SCNC: 103 MMOL/L — SIGNIFICANT CHANGE UP (ref 96–108)
CO2 SERPL-SCNC: 28 MMOL/L — SIGNIFICANT CHANGE UP (ref 22–31)
CREAT SERPL-MCNC: 0.74 MG/DL — SIGNIFICANT CHANGE UP (ref 0.5–1.3)
GLUCOSE BLDC GLUCOMTR-MCNC: 233 MG/DL — HIGH (ref 70–99)
GLUCOSE BLDC GLUCOMTR-MCNC: 241 MG/DL — HIGH (ref 70–99)
GLUCOSE BLDC GLUCOMTR-MCNC: 257 MG/DL — HIGH (ref 70–99)
GLUCOSE BLDC GLUCOMTR-MCNC: 313 MG/DL — HIGH (ref 70–99)
GLUCOSE SERPL-MCNC: 279 MG/DL — HIGH (ref 70–99)
HCT VFR BLD CALC: 32.3 % — LOW (ref 34.5–45)
HGB BLD-MCNC: 10.3 G/DL — LOW (ref 11.5–15.5)
MCHC RBC-ENTMCNC: 28 PG — SIGNIFICANT CHANGE UP (ref 27–34)
MCHC RBC-ENTMCNC: 31.9 GM/DL — LOW (ref 32–36)
MCV RBC AUTO: 87.8 FL — SIGNIFICANT CHANGE UP (ref 80–100)
PLATELET # BLD AUTO: 246 K/UL — SIGNIFICANT CHANGE UP (ref 150–400)
POTASSIUM SERPL-MCNC: 4.4 MMOL/L — SIGNIFICANT CHANGE UP (ref 3.5–5.3)
POTASSIUM SERPL-SCNC: 4.4 MMOL/L — SIGNIFICANT CHANGE UP (ref 3.5–5.3)
RBC # BLD: 3.68 M/UL — LOW (ref 3.8–5.2)
RBC # FLD: 14 % — SIGNIFICANT CHANGE UP (ref 10.3–14.5)
SODIUM SERPL-SCNC: 140 MMOL/L — SIGNIFICANT CHANGE UP (ref 135–145)
WBC # BLD: 6.13 K/UL — SIGNIFICANT CHANGE UP (ref 3.8–10.5)
WBC # FLD AUTO: 6.13 K/UL — SIGNIFICANT CHANGE UP (ref 3.8–10.5)

## 2019-08-02 PROCEDURE — 99232 SBSQ HOSP IP/OBS MODERATE 35: CPT

## 2019-08-02 RX ORDER — INSULIN LISPRO 100/ML
12 VIAL (ML) SUBCUTANEOUS EVERY 8 HOURS
Refills: 0 | Status: DISCONTINUED | OUTPATIENT
Start: 2019-08-02 | End: 2019-08-07

## 2019-08-02 RX ORDER — INSULIN GLARGINE 100 [IU]/ML
34 INJECTION, SOLUTION SUBCUTANEOUS AT BEDTIME
Refills: 0 | Status: DISCONTINUED | OUTPATIENT
Start: 2019-08-02 | End: 2019-08-05

## 2019-08-02 RX ORDER — INSULIN LISPRO 100/ML
VIAL (ML) SUBCUTANEOUS EVERY 8 HOURS
Refills: 0 | Status: DISCONTINUED | OUTPATIENT
Start: 2019-08-02 | End: 2019-08-12

## 2019-08-02 RX ADMIN — LORATADINE 10 MILLIGRAM(S): 10 TABLET ORAL at 12:24

## 2019-08-02 RX ADMIN — Medication 1 APPLICATION(S): at 22:32

## 2019-08-02 RX ADMIN — INSULIN GLARGINE 34 UNIT(S): 100 INJECTION, SOLUTION SUBCUTANEOUS at 22:30

## 2019-08-02 RX ADMIN — Medication 500000 UNIT(S): at 05:39

## 2019-08-02 RX ADMIN — Medication 10 UNIT(S): at 06:37

## 2019-08-02 RX ADMIN — Medication 650 MILLIGRAM(S): at 16:03

## 2019-08-02 RX ADMIN — Medication 1 APPLICATION(S): at 13:28

## 2019-08-02 RX ADMIN — Medication 4: at 22:30

## 2019-08-02 RX ADMIN — Medication 650 MILLIGRAM(S): at 16:35

## 2019-08-02 RX ADMIN — ENOXAPARIN SODIUM 40 MILLIGRAM(S): 100 INJECTION SUBCUTANEOUS at 12:24

## 2019-08-02 RX ADMIN — CARVEDILOL PHOSPHATE 6.25 MILLIGRAM(S): 80 CAPSULE, EXTENDED RELEASE ORAL at 17:32

## 2019-08-02 RX ADMIN — Medication 20 MILLIGRAM(S): at 12:23

## 2019-08-02 RX ADMIN — Medication 10 UNIT(S): at 13:28

## 2019-08-02 RX ADMIN — CARVEDILOL PHOSPHATE 6.25 MILLIGRAM(S): 80 CAPSULE, EXTENDED RELEASE ORAL at 05:39

## 2019-08-02 RX ADMIN — LORATADINE 10 MILLIGRAM(S): 10 TABLET ORAL at 23:19

## 2019-08-02 RX ADMIN — CLOPIDOGREL BISULFATE 75 MILLIGRAM(S): 75 TABLET, FILM COATED ORAL at 12:23

## 2019-08-02 RX ADMIN — Medication 81 MILLIGRAM(S): at 12:24

## 2019-08-02 RX ADMIN — Medication 12 UNIT(S): at 22:30

## 2019-08-02 RX ADMIN — Medication 1 APPLICATION(S): at 17:32

## 2019-08-02 RX ADMIN — Medication 500000 UNIT(S): at 17:32

## 2019-08-02 RX ADMIN — ATORVASTATIN CALCIUM 80 MILLIGRAM(S): 80 TABLET, FILM COATED ORAL at 22:30

## 2019-08-02 RX ADMIN — Medication 4: at 13:28

## 2019-08-02 RX ADMIN — LEVETIRACETAM 500 MILLIGRAM(S): 250 TABLET, FILM COATED ORAL at 17:32

## 2019-08-02 RX ADMIN — LEVETIRACETAM 500 MILLIGRAM(S): 250 TABLET, FILM COATED ORAL at 05:39

## 2019-08-02 RX ADMIN — LISINOPRIL 20 MILLIGRAM(S): 2.5 TABLET ORAL at 05:39

## 2019-08-02 RX ADMIN — Medication 2: at 06:37

## 2019-08-02 RX ADMIN — Medication 1 APPLICATION(S): at 05:40

## 2019-08-02 RX ADMIN — Medication 1 APPLICATION(S): at 05:39

## 2019-08-02 NOTE — PROGRESS NOTE ADULT - SUBJECTIVE AND OBJECTIVE BOX
Patient is a 54y old  Female who presents with a chief complaint of CVA (01 Aug 2019 11:41)      SUBJECTIVE / OVERNIGHT EVENTS:    Events noted.  CONSTITUTIONAL: No fever,  or fatigue  Awake  No N/V    MEDICATIONS  (STANDING):  AQUAPHOR (petrolatum Ointment) 1 Application(s) Topical three times a day  aspirin  chewable 81 milliGRAM(s) Oral daily  atorvastatin 80 milliGRAM(s) Oral at bedtime  carvedilol 6.25 milliGRAM(s) Oral every 12 hours  clopidogrel Tablet 75 milliGRAM(s) Oral daily  dextrose 5%. 1000 milliLiter(s) (50 mL/Hr) IV Continuous <Continuous>  dextrose 50% Injectable 12.5 Gram(s) IV Push once  dextrose 50% Injectable 25 Gram(s) IV Push once  dextrose 50% Injectable 25 Gram(s) IV Push once  enoxaparin Injectable 40 milliGRAM(s) SubCutaneous daily  FLUoxetine Solution 20 milliGRAM(s) Oral daily  hydrocortisone 1% Ointment 1 Application(s) Topical two times a day  insulin glargine Injectable (LANTUS) 32 Unit(s) SubCutaneous at bedtime  insulin lispro (HumaLOG) corrective regimen sliding scale   SubCutaneous every 8 hours  insulin lispro Injectable (HumaLOG) 10 Unit(s) SubCutaneous every 8 hours  levETIRAcetam  Solution 500 milliGRAM(s) Oral two times a day  lisinopril 20 milliGRAM(s) Oral daily  loratadine Syrup 10 milliGRAM(s) Oral <User Schedule>  nystatin    Suspension 425948 Unit(s) Oral two times a day  polyethylene glycol 3350 17 Gram(s) Oral daily    MEDICATIONS  (PRN):  acetaminophen    Suspension .. 650 milliGRAM(s) Enteral Tube every 6 hours PRN Moderate Pain (4 - 6)  bisacodyl Suppository 10 milliGRAM(s) Rectal daily PRN Constipation  dextrose 40% Gel 15 Gram(s) Oral once PRN Blood Glucose LESS THAN 70 milliGRAM(s)/deciliter  diphenhydrAMINE 25 milliGRAM(s) Oral every 8 hours PRN Rash and/or Itching  glucagon  Injectable 1 milliGRAM(s) IntraMuscular once PRN Glucose LESS THAN 70 milligrams/deciliter  nystatin Powder 1 Application(s) Topical two times a day PRN dermatitis        CAPILLARY BLOOD GLUCOSE      POCT Blood Glucose.: 225 mg/dL (01 Aug 2019 13:55)  POCT Blood Glucose.: 186 mg/dL (01 Aug 2019 05:53)  POCT Blood Glucose.: 264 mg/dL (31 Jul 2019 22:28)    I&O's Summary    31 Jul 2019 07:01  -  01 Aug 2019 07:00  --------------------------------------------------------  IN: 2040 mL / OUT: 1300 mL / NET: 740 mL    01 Aug 2019 07:01  -  01 Aug 2019 20:53  --------------------------------------------------------  IN: 1210 mL / OUT: 900 mL / NET: 310 mL        PHYSICAL EXAM:    NECK: Supple, No JVD  CHEST/LUNG: Clear to auscultation bilaterally; No wheezing.  HEART: Regular rate and rhythm; No murmurs, rubs, or gallops  ABDOMEN: Soft, Nontender, Nondistended; Bowel sounds present  EXTREMITIES:   No edema  NEUROLOGY: Awake      LABS:                        10.3   7.36  )-----------( 268      ( 01 Aug 2019 16:38 )             32.7     08-01    140  |  100  |  26<H>  ----------------------------<  238<H>  4.3   |  30  |  0.75    Ca    9.3      01 Aug 2019 11:43              CAPILLARY BLOOD GLUCOSE      POCT Blood Glucose.: 225 mg/dL (01 Aug 2019 13:55)  POCT Blood Glucose.: 186 mg/dL (01 Aug 2019 05:53)  POCT Blood Glucose.: 264 mg/dL (31 Jul 2019 22:28)                RADIOLOGY & ADDITIONAL TESTS:    Imaging Personally Reviewed:    Consultant(s) Notes Reviewed:      Care Discussed with Consultants/Other Providers:

## 2019-08-02 NOTE — PROGRESS NOTE ADULT - ASSESSMENT
54 year old female w/uncontrolled T2DM (HbA1c 10.4%) c/b retinopathy here w/CVA L sided hemiplegia. On bolus feeds q8h plus PO intake with BGs above goal while on new TF formula and improving PO intake. Still not eating enough with meals.  No further diarrhea.  No further hypoglycemia. Will increase insulin doses since pt is now constantly hyperglycemic. Pt is uninsured.

## 2019-08-02 NOTE — PROGRESS NOTE ADULT - SUBJECTIVE AND OBJECTIVE BOX
Diabetes Follow up note: Saw pt earlier today  Interval Hx: 54 year old female w/uncontrolled T2DM (on insulin PTA) w/retinopathy, morbid obesity here w/CVA s/p PEG now on bolus feeds of Vital 1440cc q8h without diarrhea. Pt awake and able to speak more. States eating better every day. Had eggs and coffee with some apple sauce this am.  Glycemic control now above goal while on new TF formula and improved PO intake (>200S).  No further hypoglycemia.      Review of Systems:  General: As above  GI: Tolerating TF without any N/V/ABD PAIN. + diarrhea  CV: No CP/SOB  ENDO: denies s/s of hypoglycemia      MEDS:  atorvastatin 80 milliGRAM(s) Oral at bedtime  insulin glargine Injectable (LANTUS) 32 Unit(s) SubCutaneous at bedtime  insulin lispro (HumaLOG) corrective regimen sliding scale   SubCutaneous every 4 hours  insulin lispro Injectable (HumaLOG) 10 Unit(s) SubCutaneous every 8 hours  hydrocortisone 1% Ointment 1 Application(s) Topical two times a day    Allergies    No Known Allergies      PE:  General: Female laying in bed in NAD  Vital Signs Last 24 Hrs  T(C): 36.7 (08-02-19 @ 13:09), Max: 37 (08-02-19 @ 05:37)  T(F): 98.1 (08-02-19 @ 13:09), Max: 98.6 (08-02-19 @ 05:37)  HR: 88 (08-02-19 @ 13:09) (78 - 94)  BP: 132/81 (08-02-19 @ 13:09) (122/78 - 144/82)  BP(mean): --  RR: 18 (08-02-19 @ 13:09) (18 - 18)  SpO2: 96% (08-02-19 @ 13:09) (96% - 97%)  Abd: Soft, NT, ND, Obese. PEG in place. TFs off at time of visit   Extremities: Warm. No edema in LEs  Neuro: Awake. L sided hemiplegia. Able to move RUE. More awake today and able to verbalize and answer simple questions.     LABS:  POCT Blood Glucose.: 313 mg/dL (08-02-19 @ 13:19)  POCT Blood Glucose.: 241 mg/dL (08-02-19 @ 06:16)  POCT Blood Glucose.: 258 mg/dL (08-01-19 @ 23:53)  POCT Blood Glucose.: 236 mg/dL (08-01-19 @ 22:14)  POCT Blood Glucose.: 225 mg/dL (08-01-19 @ 13:55)  POCT Blood Glucose.: 186 mg/dL (08-01-19 @ 05:53)  POCT Blood Glucose.: 264 mg/dL (07-31-19 @ 22:28)                          10.3   7.36  )-----------( 268      ( 01 Aug 2019 16:38 )             32.7     08-02    140  |  103  |  25<H>  ----------------------------<  279<H>  4.4   |  28  |  0.74    Ca    9.3      02 Aug 2019 11:29        Hemoglobin A1C, Whole Blood: 10.4 % <H> [4.0 - 5.6] (06-29-19 @ 09:33)

## 2019-08-02 NOTE — PROGRESS NOTE ADULT - PROBLEM SELECTOR PLAN 1
-test BG Q8h  -Increase Lantus dose to 34 units qhs  -Increase Humalog bolus to 12 units Q8h w/each feed. Hold in TFs off  -c/w Humalog moderate correction scale to q8h for now until eating better   -Discharge plan: pending feeding modality. Will need at least basal insulin at discharge if taking POs and basal/bolus if on TFs   -Plan discussed with pt's team NP.  Contact info: [phone] (24/7). pager [phone] -test BG Q8h  -Increase Lantus dose to 34 units qhs  -Increase Humalog bolus to 12 units Q8h w/each feed. Hold in TFs off  -change Humalog to moderate correction scale q8h   -Discharge plan: pending feeding modality. Will need at least basal insulin at discharge if taking POs and basal/bolus if on TFs   -Plan discussed with pt's team NP.  Contact info: [phone] (24/7). pager [phone]

## 2019-08-03 LAB
GLUCOSE BLDC GLUCOMTR-MCNC: 164 MG/DL — HIGH (ref 70–99)
GLUCOSE BLDC GLUCOMTR-MCNC: 180 MG/DL — HIGH (ref 70–99)
GLUCOSE BLDC GLUCOMTR-MCNC: 181 MG/DL — HIGH (ref 70–99)

## 2019-08-03 RX ADMIN — Medication 20 MILLIGRAM(S): at 11:33

## 2019-08-03 RX ADMIN — LORATADINE 10 MILLIGRAM(S): 10 TABLET ORAL at 23:21

## 2019-08-03 RX ADMIN — Medication 2: at 13:21

## 2019-08-03 RX ADMIN — CARVEDILOL PHOSPHATE 6.25 MILLIGRAM(S): 80 CAPSULE, EXTENDED RELEASE ORAL at 18:04

## 2019-08-03 RX ADMIN — Medication 1 APPLICATION(S): at 06:03

## 2019-08-03 RX ADMIN — Medication 81 MILLIGRAM(S): at 11:33

## 2019-08-03 RX ADMIN — Medication 650 MILLIGRAM(S): at 20:11

## 2019-08-03 RX ADMIN — Medication 500000 UNIT(S): at 06:03

## 2019-08-03 RX ADMIN — INSULIN GLARGINE 34 UNIT(S): 100 INJECTION, SOLUTION SUBCUTANEOUS at 22:25

## 2019-08-03 RX ADMIN — LEVETIRACETAM 500 MILLIGRAM(S): 250 TABLET, FILM COATED ORAL at 06:04

## 2019-08-03 RX ADMIN — Medication 12 UNIT(S): at 22:26

## 2019-08-03 RX ADMIN — CLOPIDOGREL BISULFATE 75 MILLIGRAM(S): 75 TABLET, FILM COATED ORAL at 11:33

## 2019-08-03 RX ADMIN — ATORVASTATIN CALCIUM 80 MILLIGRAM(S): 80 TABLET, FILM COATED ORAL at 22:25

## 2019-08-03 RX ADMIN — Medication 650 MILLIGRAM(S): at 20:41

## 2019-08-03 RX ADMIN — Medication 1 APPLICATION(S): at 18:04

## 2019-08-03 RX ADMIN — Medication 12 UNIT(S): at 13:21

## 2019-08-03 RX ADMIN — LEVETIRACETAM 500 MILLIGRAM(S): 250 TABLET, FILM COATED ORAL at 18:03

## 2019-08-03 RX ADMIN — Medication 2: at 06:04

## 2019-08-03 RX ADMIN — LORATADINE 10 MILLIGRAM(S): 10 TABLET ORAL at 11:33

## 2019-08-03 RX ADMIN — CARVEDILOL PHOSPHATE 6.25 MILLIGRAM(S): 80 CAPSULE, EXTENDED RELEASE ORAL at 06:03

## 2019-08-03 RX ADMIN — LISINOPRIL 20 MILLIGRAM(S): 2.5 TABLET ORAL at 06:03

## 2019-08-03 RX ADMIN — Medication 1 APPLICATION(S): at 11:32

## 2019-08-03 RX ADMIN — Medication 2: at 22:26

## 2019-08-03 RX ADMIN — Medication 12 UNIT(S): at 06:04

## 2019-08-03 RX ADMIN — Medication 1 APPLICATION(S): at 22:24

## 2019-08-03 RX ADMIN — ENOXAPARIN SODIUM 40 MILLIGRAM(S): 100 INJECTION SUBCUTANEOUS at 11:33

## 2019-08-03 RX ADMIN — Medication 500000 UNIT(S): at 18:03

## 2019-08-03 NOTE — PROGRESS NOTE ADULT - SUBJECTIVE AND OBJECTIVE BOX
Patient is a 54y old  Female who presents with a chief complaint of CVA (01 Aug 2019 11:41)      SUBJECTIVE / OVERNIGHT EVENTS:    Events noted.  CONSTITUTIONAL: No fever,  or fatigue  Awake  No N/V    MEDICATIONS  (STANDING):  AQUAPHOR (petrolatum Ointment) 1 Application(s) Topical three times a day  aspirin  chewable 81 milliGRAM(s) Oral daily  atorvastatin 80 milliGRAM(s) Oral at bedtime  carvedilol 6.25 milliGRAM(s) Oral every 12 hours  clopidogrel Tablet 75 milliGRAM(s) Oral daily  dextrose 5%. 1000 milliLiter(s) (50 mL/Hr) IV Continuous <Continuous>  dextrose 50% Injectable 12.5 Gram(s) IV Push once  dextrose 50% Injectable 25 Gram(s) IV Push once  dextrose 50% Injectable 25 Gram(s) IV Push once  enoxaparin Injectable 40 milliGRAM(s) SubCutaneous daily  FLUoxetine Solution 20 milliGRAM(s) Oral daily  hydrocortisone 1% Ointment 1 Application(s) Topical two times a day  insulin glargine Injectable (LANTUS) 32 Unit(s) SubCutaneous at bedtime  insulin lispro (HumaLOG) corrective regimen sliding scale   SubCutaneous every 8 hours  insulin lispro Injectable (HumaLOG) 10 Unit(s) SubCutaneous every 8 hours  levETIRAcetam  Solution 500 milliGRAM(s) Oral two times a day  lisinopril 20 milliGRAM(s) Oral daily  loratadine Syrup 10 milliGRAM(s) Oral <User Schedule>  nystatin    Suspension 320307 Unit(s) Oral two times a day  polyethylene glycol 3350 17 Gram(s) Oral daily    MEDICATIONS  (PRN):  acetaminophen    Suspension .. 650 milliGRAM(s) Enteral Tube every 6 hours PRN Moderate Pain (4 - 6)  bisacodyl Suppository 10 milliGRAM(s) Rectal daily PRN Constipation  dextrose 40% Gel 15 Gram(s) Oral once PRN Blood Glucose LESS THAN 70 milliGRAM(s)/deciliter  diphenhydrAMINE 25 milliGRAM(s) Oral every 8 hours PRN Rash and/or Itching  glucagon  Injectable 1 milliGRAM(s) IntraMuscular once PRN Glucose LESS THAN 70 milligrams/deciliter  nystatin Powder 1 Application(s) Topical two times a day PRN dermatitis        CAPILLARY BLOOD GLUCOSE      POCT Blood Glucose.: 225 mg/dL (01 Aug 2019 13:55)  POCT Blood Glucose.: 186 mg/dL (01 Aug 2019 05:53)  POCT Blood Glucose.: 264 mg/dL (31 Jul 2019 22:28)    I&O's Summary    31 Jul 2019 07:01  -  01 Aug 2019 07:00  --------------------------------------------------------  IN: 2040 mL / OUT: 1300 mL / NET: 740 mL    01 Aug 2019 07:01  -  01 Aug 2019 20:53  --------------------------------------------------------  IN: 1210 mL / OUT: 900 mL / NET: 310 mL        PHYSICAL EXAM:    NECK: Supple, No JVD  CHEST/LUNG: Clear to auscultation bilaterally; No wheezing.  HEART: Regular rate and rhythm; No murmurs, rubs, or gallops  ABDOMEN: Soft, Nontender, Nondistended; Bowel sounds present  EXTREMITIES:   No edema  NEUROLOGY: Awake      LABS:                        10.3   7.36  )-----------( 268      ( 01 Aug 2019 16:38 )             32.7     08-01    140  |  100  |  26<H>  ----------------------------<  238<H>  4.3   |  30  |  0.75    Ca    9.3      01 Aug 2019 11:43              CAPILLARY BLOOD GLUCOSE      POCT Blood Glucose.: 225 mg/dL (01 Aug 2019 13:55)  POCT Blood Glucose.: 186 mg/dL (01 Aug 2019 05:53)  POCT Blood Glucose.: 264 mg/dL (31 Jul 2019 22:28)                RADIOLOGY & ADDITIONAL TESTS:    Imaging Personally Reviewed:    Consultant(s) Notes Reviewed:      Care Discussed with Consultants/Other Providers:

## 2019-08-04 LAB
GLUCOSE BLDC GLUCOMTR-MCNC: 148 MG/DL — HIGH (ref 70–99)
GLUCOSE BLDC GLUCOMTR-MCNC: 182 MG/DL — HIGH (ref 70–99)
GLUCOSE BLDC GLUCOMTR-MCNC: 230 MG/DL — HIGH (ref 70–99)

## 2019-08-04 RX ADMIN — Medication 12 UNIT(S): at 05:38

## 2019-08-04 RX ADMIN — Medication 20 MILLIGRAM(S): at 11:46

## 2019-08-04 RX ADMIN — LEVETIRACETAM 500 MILLIGRAM(S): 250 TABLET, FILM COATED ORAL at 05:31

## 2019-08-04 RX ADMIN — Medication 1 APPLICATION(S): at 13:58

## 2019-08-04 RX ADMIN — Medication 650 MILLIGRAM(S): at 17:47

## 2019-08-04 RX ADMIN — Medication 12 UNIT(S): at 22:19

## 2019-08-04 RX ADMIN — Medication 650 MILLIGRAM(S): at 12:30

## 2019-08-04 RX ADMIN — CARVEDILOL PHOSPHATE 6.25 MILLIGRAM(S): 80 CAPSULE, EXTENDED RELEASE ORAL at 17:43

## 2019-08-04 RX ADMIN — Medication 2: at 22:18

## 2019-08-04 RX ADMIN — Medication 1 APPLICATION(S): at 05:30

## 2019-08-04 RX ADMIN — Medication 1 APPLICATION(S): at 22:17

## 2019-08-04 RX ADMIN — ATORVASTATIN CALCIUM 80 MILLIGRAM(S): 80 TABLET, FILM COATED ORAL at 22:17

## 2019-08-04 RX ADMIN — LEVETIRACETAM 500 MILLIGRAM(S): 250 TABLET, FILM COATED ORAL at 17:43

## 2019-08-04 RX ADMIN — Medication 650 MILLIGRAM(S): at 11:46

## 2019-08-04 RX ADMIN — LORATADINE 10 MILLIGRAM(S): 10 TABLET ORAL at 22:17

## 2019-08-04 RX ADMIN — Medication 650 MILLIGRAM(S): at 18:37

## 2019-08-04 RX ADMIN — CARVEDILOL PHOSPHATE 6.25 MILLIGRAM(S): 80 CAPSULE, EXTENDED RELEASE ORAL at 05:31

## 2019-08-04 RX ADMIN — Medication 1 APPLICATION(S): at 05:31

## 2019-08-04 RX ADMIN — Medication 12 UNIT(S): at 13:57

## 2019-08-04 RX ADMIN — CLOPIDOGREL BISULFATE 75 MILLIGRAM(S): 75 TABLET, FILM COATED ORAL at 11:46

## 2019-08-04 RX ADMIN — Medication 1 APPLICATION(S): at 17:42

## 2019-08-04 RX ADMIN — Medication 4: at 05:38

## 2019-08-04 RX ADMIN — INSULIN GLARGINE 34 UNIT(S): 100 INJECTION, SOLUTION SUBCUTANEOUS at 22:18

## 2019-08-04 RX ADMIN — Medication 500000 UNIT(S): at 17:43

## 2019-08-04 RX ADMIN — Medication 500000 UNIT(S): at 05:31

## 2019-08-04 RX ADMIN — Medication 81 MILLIGRAM(S): at 11:46

## 2019-08-04 RX ADMIN — LORATADINE 10 MILLIGRAM(S): 10 TABLET ORAL at 13:57

## 2019-08-04 RX ADMIN — LISINOPRIL 20 MILLIGRAM(S): 2.5 TABLET ORAL at 05:31

## 2019-08-04 RX ADMIN — ENOXAPARIN SODIUM 40 MILLIGRAM(S): 100 INJECTION SUBCUTANEOUS at 11:46

## 2019-08-04 NOTE — PROGRESS NOTE ADULT - SUBJECTIVE AND OBJECTIVE BOX
Patient is a 54y old  Female who presents with a chief complaint of CVA (02 Aug 2019 16:08)      SUBJECTIVE / OVERNIGHT EVENTS:    Events noted.  CONSTITUTIONAL: No fever,  or fatigue  No N/V    MEDICATIONS  (STANDING):  AQUAPHOR (petrolatum Ointment) 1 Application(s) Topical three times a day  aspirin  chewable 81 milliGRAM(s) Oral daily  atorvastatin 80 milliGRAM(s) Oral at bedtime  carvedilol 6.25 milliGRAM(s) Oral every 12 hours  clopidogrel Tablet 75 milliGRAM(s) Oral daily  dextrose 5%. 1000 milliLiter(s) (50 mL/Hr) IV Continuous <Continuous>  dextrose 50% Injectable 12.5 Gram(s) IV Push once  dextrose 50% Injectable 25 Gram(s) IV Push once  dextrose 50% Injectable 25 Gram(s) IV Push once  enoxaparin Injectable 40 milliGRAM(s) SubCutaneous daily  FLUoxetine Solution 20 milliGRAM(s) Oral daily  hydrocortisone 1% Ointment 1 Application(s) Topical two times a day  insulin glargine Injectable (LANTUS) 34 Unit(s) SubCutaneous at bedtime  insulin lispro (HumaLOG) corrective regimen sliding scale   SubCutaneous every 8 hours  insulin lispro Injectable (HumaLOG) 12 Unit(s) SubCutaneous every 8 hours  levETIRAcetam  Solution 500 milliGRAM(s) Oral two times a day  lisinopril 20 milliGRAM(s) Oral daily  loratadine Syrup 10 milliGRAM(s) Oral <User Schedule>  nystatin    Suspension 507690 Unit(s) Oral two times a day  polyethylene glycol 3350 17 Gram(s) Oral daily    MEDICATIONS  (PRN):  acetaminophen    Suspension .. 650 milliGRAM(s) Enteral Tube every 6 hours PRN Moderate Pain (4 - 6)  bisacodyl Suppository 10 milliGRAM(s) Rectal daily PRN Constipation  dextrose 40% Gel 15 Gram(s) Oral once PRN Blood Glucose LESS THAN 70 milliGRAM(s)/deciliter  diphenhydrAMINE 25 milliGRAM(s) Oral every 8 hours PRN Rash and/or Itching  glucagon  Injectable 1 milliGRAM(s) IntraMuscular once PRN Glucose LESS THAN 70 milligrams/deciliter  nystatin Powder 1 Application(s) Topical two times a day PRN dermatitis        CAPILLARY BLOOD GLUCOSE      POCT Blood Glucose.: 148 mg/dL (04 Aug 2019 13:50)  POCT Blood Glucose.: 230 mg/dL (04 Aug 2019 05:29)  POCT Blood Glucose.: 181 mg/dL (03 Aug 2019 22:11)    I&O's Summary    03 Aug 2019 07:01  -  04 Aug 2019 07:00  --------------------------------------------------------  IN: 3080 mL / OUT: 1750 mL / NET: 1330 mL    04 Aug 2019 07:01  -  04 Aug 2019 17:53  --------------------------------------------------------  IN: 360 mL / OUT: 350 mL / NET: 10 mL        PHYSICAL EXAM:    NECK: Supple, No JVD  CHEST/LUNG: Clear to auscultation bilaterally; No wheezing.  HEART: Regular rate and rhythm; No murmurs, rubs, or gallops  ABDOMEN: Soft, Nontender, Nondistended; Bowel sounds present  EXTREMITIES:   No edema  NEUROLOGY: Awake      LABS:                  CAPILLARY BLOOD GLUCOSE      POCT Blood Glucose.: 148 mg/dL (04 Aug 2019 13:50)  POCT Blood Glucose.: 230 mg/dL (04 Aug 2019 05:29)  POCT Blood Glucose.: 181 mg/dL (03 Aug 2019 22:11)                RADIOLOGY & ADDITIONAL TESTS:    Imaging Personally Reviewed:    Consultant(s) Notes Reviewed:      Care Discussed with Consultants/Other Providers:

## 2019-08-05 LAB
ANION GAP SERPL CALC-SCNC: 10 MMOL/L — SIGNIFICANT CHANGE UP (ref 5–17)
BUN SERPL-MCNC: 23 MG/DL — SIGNIFICANT CHANGE UP (ref 7–23)
CALCIUM SERPL-MCNC: 9.3 MG/DL — SIGNIFICANT CHANGE UP (ref 8.4–10.5)
CHLORIDE SERPL-SCNC: 100 MMOL/L — SIGNIFICANT CHANGE UP (ref 96–108)
CO2 SERPL-SCNC: 27 MMOL/L — SIGNIFICANT CHANGE UP (ref 22–31)
CREAT SERPL-MCNC: 0.72 MG/DL — SIGNIFICANT CHANGE UP (ref 0.5–1.3)
GLUCOSE BLDC GLUCOMTR-MCNC: 176 MG/DL — HIGH (ref 70–99)
GLUCOSE BLDC GLUCOMTR-MCNC: 221 MG/DL — HIGH (ref 70–99)
GLUCOSE BLDC GLUCOMTR-MCNC: 249 MG/DL — HIGH (ref 70–99)
GLUCOSE SERPL-MCNC: 162 MG/DL — HIGH (ref 70–99)
HCT VFR BLD CALC: 30.8 % — LOW (ref 34.5–45)
HGB BLD-MCNC: 10.1 G/DL — LOW (ref 11.5–15.5)
MCHC RBC-ENTMCNC: 28.1 PG — SIGNIFICANT CHANGE UP (ref 27–34)
MCHC RBC-ENTMCNC: 32.8 GM/DL — SIGNIFICANT CHANGE UP (ref 32–36)
MCV RBC AUTO: 85.8 FL — SIGNIFICANT CHANGE UP (ref 80–100)
PLATELET # BLD AUTO: 241 K/UL — SIGNIFICANT CHANGE UP (ref 150–400)
POTASSIUM SERPL-MCNC: 4.4 MMOL/L — SIGNIFICANT CHANGE UP (ref 3.5–5.3)
POTASSIUM SERPL-SCNC: 4.4 MMOL/L — SIGNIFICANT CHANGE UP (ref 3.5–5.3)
RBC # BLD: 3.59 M/UL — LOW (ref 3.8–5.2)
RBC # FLD: 13.7 % — SIGNIFICANT CHANGE UP (ref 10.3–14.5)
SODIUM SERPL-SCNC: 137 MMOL/L — SIGNIFICANT CHANGE UP (ref 135–145)
WBC # BLD: 6.12 K/UL — SIGNIFICANT CHANGE UP (ref 3.8–10.5)
WBC # FLD AUTO: 6.12 K/UL — SIGNIFICANT CHANGE UP (ref 3.8–10.5)

## 2019-08-05 PROCEDURE — 99232 SBSQ HOSP IP/OBS MODERATE 35: CPT

## 2019-08-05 RX ORDER — INSULIN GLARGINE 100 [IU]/ML
38 INJECTION, SOLUTION SUBCUTANEOUS AT BEDTIME
Refills: 0 | Status: DISCONTINUED | OUTPATIENT
Start: 2019-08-05 | End: 2019-08-07

## 2019-08-05 RX ADMIN — INSULIN GLARGINE 38 UNIT(S): 100 INJECTION, SOLUTION SUBCUTANEOUS at 22:27

## 2019-08-05 RX ADMIN — CARVEDILOL PHOSPHATE 6.25 MILLIGRAM(S): 80 CAPSULE, EXTENDED RELEASE ORAL at 06:21

## 2019-08-05 RX ADMIN — Medication 81 MILLIGRAM(S): at 12:58

## 2019-08-05 RX ADMIN — Medication 1 APPLICATION(S): at 06:20

## 2019-08-05 RX ADMIN — CLOPIDOGREL BISULFATE 75 MILLIGRAM(S): 75 TABLET, FILM COATED ORAL at 12:58

## 2019-08-05 RX ADMIN — LEVETIRACETAM 500 MILLIGRAM(S): 250 TABLET, FILM COATED ORAL at 06:21

## 2019-08-05 RX ADMIN — Medication 12 UNIT(S): at 22:27

## 2019-08-05 RX ADMIN — Medication 500000 UNIT(S): at 06:21

## 2019-08-05 RX ADMIN — LEVETIRACETAM 500 MILLIGRAM(S): 250 TABLET, FILM COATED ORAL at 18:18

## 2019-08-05 RX ADMIN — ENOXAPARIN SODIUM 40 MILLIGRAM(S): 100 INJECTION SUBCUTANEOUS at 12:58

## 2019-08-05 RX ADMIN — CARVEDILOL PHOSPHATE 6.25 MILLIGRAM(S): 80 CAPSULE, EXTENDED RELEASE ORAL at 18:18

## 2019-08-05 RX ADMIN — Medication 650 MILLIGRAM(S): at 18:34

## 2019-08-05 RX ADMIN — LISINOPRIL 20 MILLIGRAM(S): 2.5 TABLET ORAL at 06:21

## 2019-08-05 RX ADMIN — Medication 2: at 06:21

## 2019-08-05 RX ADMIN — Medication 1 APPLICATION(S): at 18:18

## 2019-08-05 RX ADMIN — Medication 1 APPLICATION(S): at 06:21

## 2019-08-05 RX ADMIN — LORATADINE 10 MILLIGRAM(S): 10 TABLET ORAL at 15:02

## 2019-08-05 RX ADMIN — Medication 650 MILLIGRAM(S): at 00:35

## 2019-08-05 RX ADMIN — Medication 1 APPLICATION(S): at 22:28

## 2019-08-05 RX ADMIN — Medication 4: at 22:27

## 2019-08-05 RX ADMIN — Medication 1 APPLICATION(S): at 12:59

## 2019-08-05 RX ADMIN — Medication 20 MILLIGRAM(S): at 12:58

## 2019-08-05 RX ADMIN — Medication 4: at 15:02

## 2019-08-05 RX ADMIN — Medication 12 UNIT(S): at 15:02

## 2019-08-05 RX ADMIN — Medication 12 UNIT(S): at 06:22

## 2019-08-05 RX ADMIN — Medication 500000 UNIT(S): at 18:18

## 2019-08-05 RX ADMIN — Medication 650 MILLIGRAM(S): at 00:05

## 2019-08-05 RX ADMIN — ATORVASTATIN CALCIUM 80 MILLIGRAM(S): 80 TABLET, FILM COATED ORAL at 22:28

## 2019-08-05 NOTE — PROGRESS NOTE ADULT - ASSESSMENT
· Assessment	  54 year old female admitted with acute CVA.     Problem/Plan - 2:  ·  Problem: CVA (cerebral vascular accident).  Plan: - Right MCA and ENRIQUE distribution stroke   - Dysphagia diet and PEG feeding      DM II:  FSSS/Lantus    Dc planning

## 2019-08-05 NOTE — PROGRESS NOTE ADULT - SUBJECTIVE AND OBJECTIVE BOX
Patient is a 54y old  Female who presents with a chief complaint of CVA (02 Aug 2019 16:08)      SUBJECTIVE / OVERNIGHT EVENTS:    Events noted.  CONSTITUTIONAL: No fever,  No N/V/      MEDICATIONS  (STANDING):  AQUAPHOR (petrolatum Ointment) 1 Application(s) Topical three times a day  aspirin  chewable 81 milliGRAM(s) Oral daily  atorvastatin 80 milliGRAM(s) Oral at bedtime  carvedilol 6.25 milliGRAM(s) Oral every 12 hours  clopidogrel Tablet 75 milliGRAM(s) Oral daily  dextrose 5%. 1000 milliLiter(s) (50 mL/Hr) IV Continuous <Continuous>  dextrose 50% Injectable 12.5 Gram(s) IV Push once  dextrose 50% Injectable 25 Gram(s) IV Push once  dextrose 50% Injectable 25 Gram(s) IV Push once  enoxaparin Injectable 40 milliGRAM(s) SubCutaneous daily  FLUoxetine Solution 20 milliGRAM(s) Oral daily  hydrocortisone 1% Ointment 1 Application(s) Topical two times a day  insulin glargine Injectable (LANTUS) 38 Unit(s) SubCutaneous at bedtime  insulin lispro (HumaLOG) corrective regimen sliding scale   SubCutaneous every 8 hours  insulin lispro Injectable (HumaLOG) 12 Unit(s) SubCutaneous every 8 hours  levETIRAcetam  Solution 500 milliGRAM(s) Oral two times a day  lisinopril 20 milliGRAM(s) Oral daily  loratadine Syrup 10 milliGRAM(s) Oral <User Schedule>  nystatin    Suspension 884079 Unit(s) Oral two times a day  polyethylene glycol 3350 17 Gram(s) Oral daily    MEDICATIONS  (PRN):  acetaminophen    Suspension .. 650 milliGRAM(s) Enteral Tube every 6 hours PRN Moderate Pain (4 - 6)  bisacodyl Suppository 10 milliGRAM(s) Rectal daily PRN Constipation  dextrose 40% Gel 15 Gram(s) Oral once PRN Blood Glucose LESS THAN 70 milliGRAM(s)/deciliter  diphenhydrAMINE 25 milliGRAM(s) Oral every 8 hours PRN Rash and/or Itching  glucagon  Injectable 1 milliGRAM(s) IntraMuscular once PRN Glucose LESS THAN 70 milligrams/deciliter  nystatin Powder 1 Application(s) Topical two times a day PRN dermatitis        CAPILLARY BLOOD GLUCOSE      POCT Blood Glucose.: 249 mg/dL (05 Aug 2019 21:50)  POCT Blood Glucose.: 221 mg/dL (05 Aug 2019 14:07)  POCT Blood Glucose.: 176 mg/dL (05 Aug 2019 05:58)    I&O's Summary    04 Aug 2019 07:01  -  05 Aug 2019 07:00  --------------------------------------------------------  IN: 2560 mL / OUT: 1950 mL / NET: 610 mL    05 Aug 2019 07:01  -  05 Aug 2019 22:45  --------------------------------------------------------  IN: 480 mL / OUT: 1201 mL / NET: -721 mL        PHYSICAL EXAM:    NECK: Supple, No JVD  CHEST/LUNG: Clear to auscultation bilaterally; No wheezing.  HEART: Regular rate and rhythm; No murmurs, rubs, or gallops  ABDOMEN: Soft, Nontender, Nondistended; Bowel sounds present  EXTREMITIES:   No edema  NEUROLOGY: Awake      LABS:                        10.1   6.12  )-----------( 241      ( 05 Aug 2019 07:52 )             30.8     08-05    137  |  100  |  23  ----------------------------<  162<H>  4.4   |  27  |  0.72    Ca    9.3      05 Aug 2019 06:48              CAPILLARY BLOOD GLUCOSE      POCT Blood Glucose.: 249 mg/dL (05 Aug 2019 21:50)  POCT Blood Glucose.: 221 mg/dL (05 Aug 2019 14:07)  POCT Blood Glucose.: 176 mg/dL (05 Aug 2019 05:58)                RADIOLOGY & ADDITIONAL TESTS:    Imaging Personally Reviewed:    Consultant(s) Notes Reviewed:      Care Discussed with Consultants/Other Providers:

## 2019-08-05 NOTE — PROGRESS NOTE ADULT - SUBJECTIVE AND OBJECTIVE BOX
Diabetes Follow up note:  Interval Hx:  54 year old female w/uncontrolled T2DM (on insulin PTA) w/retinopathy, morbid obesity here w/CVA s/p PEG now on bolus feeds of Vital 1440cc q8h + dysphagia diet. Tolerating POs. Reports hand cramp. Receiving assistance from PCA from meals.       Review of Systems:  General: as above.   GI: Tolerating POs without any N/V/D/ABD PAIN. + discomfort around PEG site when receiving feeds.   CV: No CP/SOB  ENDO: No S&Sx of hypoglycemia  MEDS:  atorvastatin 80 milliGRAM(s) Oral at bedtime    insulin glargine Injectable (LANTUS) 34 Unit(s) SubCutaneous at bedtime  insulin lispro (HumaLOG) corrective regimen sliding scale   SubCutaneous every 8 hours  insulin lispro Injectable (HumaLOG) 12 Unit(s) SubCutaneous every 8 hours    nystatin    Suspension 722683 Unit(s) Oral two times a day    Allergies    No Known Allergies      PE:  General: Female lying in bed. NAD.   Vital Signs Last 24 Hrs  T(C): 36.8 (05 Aug 2019 10:53), Max: 37.3 (05 Aug 2019 00:39)  T(F): 98.2 (05 Aug 2019 10:53), Max: 99.1 (05 Aug 2019 00:39)  HR: 82 (05 Aug 2019 10:53) (73 - 86)  BP: 119/57 (05 Aug 2019 10:53) (114/73 - 138/81)  BP(mean): --  RR: 18 (05 Aug 2019 10:53) (18 - 18)  SpO2: 95% (05 Aug 2019 10:53) (95% - 100%)  Abd: Soft, NT,ND, PEG in place. Obese.   Extremities: Warm. L hand in brace.   Neuro: A&O X3. Dysarthria. L sided hemiplegia.     LABS:    POCT Blood Glucose.: 176 mg/dL (08-05-19 @ 05:58)  POCT Blood Glucose.: 182 mg/dL (08-04-19 @ 21:56)  POCT Blood Glucose.: 148 mg/dL (08-04-19 @ 13:50)  POCT Blood Glucose.: 230 mg/dL (08-04-19 @ 05:29)  POCT Blood Glucose.: 181 mg/dL (08-03-19 @ 22:11)  POCT Blood Glucose.: 180 mg/dL (08-03-19 @ 13:00)  POCT Blood Glucose.: 164 mg/dL (08-03-19 @ 05:54)  POCT Blood Glucose.: 233 mg/dL (08-02-19 @ 22:12)  POCT Blood Glucose.: 257 mg/dL (08-02-19 @ 17:25)  POCT Blood Glucose.: 313 mg/dL (08-02-19 @ 13:19)                            10.1   6.12  )-----------( 241      ( 05 Aug 2019 07:52 )             30.8       08-05    137  |  100  |  23  ----------------------------<  162<H>  4.4   |  27  |  0.72    Ca    9.3      05 Aug 2019 06:48        Hemoglobin A1C, Whole Blood: 10.4 % <H> [4.0 - 5.6] (06-29-19 @ 09:33)            Contact number: [place] [phone] or [phone]

## 2019-08-05 NOTE — PROGRESS NOTE ADULT - ASSESSMENT
54 year old female w/uncontrolled T2DM (HbA1c 10.4%) c/b retinopathy here w/CVA L sided hemiplegia. On bolus feeds q8h plus PO intake. Variable glycemic control on present insulin regimen. Tolerating POs, will increase basal insulin dose to improve overall glycemic control. No hypoglycemia documented. BG goal (100-180mg/dl). Pt will require daughter assistance w/insulin administration upon discharge.

## 2019-08-05 NOTE — PROGRESS NOTE ADULT - PROBLEM SELECTOR PLAN 1
-test BG Q8h  -Increase Lantus 38 units QHS  -c/w Humalog 12 units Q8h w/each feed.-change Humalog to moderate correction scale q8h   -Discharge plan: plan is for pt to be discharged on dysphagia diet + current tube feed regimen per medicine NP. Can be discharged on Lantus + Novolog. Need to verify w/daughter that pt has enough insulin at home and has means to obtain more through [facility] clinic where she follows for her DM  -discussed w/pt and medicine NP  pager: [phone]

## 2019-08-06 LAB
GLUCOSE BLDC GLUCOMTR-MCNC: 168 MG/DL — HIGH (ref 70–99)
GLUCOSE BLDC GLUCOMTR-MCNC: 176 MG/DL — HIGH (ref 70–99)
GLUCOSE BLDC GLUCOMTR-MCNC: 177 MG/DL — HIGH (ref 70–99)

## 2019-08-06 PROCEDURE — 99232 SBSQ HOSP IP/OBS MODERATE 35: CPT

## 2019-08-06 RX ADMIN — Medication 500000 UNIT(S): at 06:33

## 2019-08-06 RX ADMIN — LISINOPRIL 20 MILLIGRAM(S): 2.5 TABLET ORAL at 06:32

## 2019-08-06 RX ADMIN — Medication 500000 UNIT(S): at 17:17

## 2019-08-06 RX ADMIN — Medication 1 APPLICATION(S): at 17:16

## 2019-08-06 RX ADMIN — Medication 650 MILLIGRAM(S): at 22:17

## 2019-08-06 RX ADMIN — LORATADINE 10 MILLIGRAM(S): 10 TABLET ORAL at 22:19

## 2019-08-06 RX ADMIN — LEVETIRACETAM 500 MILLIGRAM(S): 250 TABLET, FILM COATED ORAL at 17:16

## 2019-08-06 RX ADMIN — Medication 650 MILLIGRAM(S): at 22:50

## 2019-08-06 RX ADMIN — Medication 650 MILLIGRAM(S): at 14:20

## 2019-08-06 RX ADMIN — Medication 650 MILLIGRAM(S): at 13:51

## 2019-08-06 RX ADMIN — CARVEDILOL PHOSPHATE 6.25 MILLIGRAM(S): 80 CAPSULE, EXTENDED RELEASE ORAL at 06:32

## 2019-08-06 RX ADMIN — Medication 2: at 22:18

## 2019-08-06 RX ADMIN — Medication 1 APPLICATION(S): at 06:33

## 2019-08-06 RX ADMIN — Medication 12 UNIT(S): at 22:18

## 2019-08-06 RX ADMIN — Medication 12 UNIT(S): at 13:50

## 2019-08-06 RX ADMIN — CLOPIDOGREL BISULFATE 75 MILLIGRAM(S): 75 TABLET, FILM COATED ORAL at 12:04

## 2019-08-06 RX ADMIN — Medication 20 MILLIGRAM(S): at 12:04

## 2019-08-06 RX ADMIN — Medication 12 UNIT(S): at 06:33

## 2019-08-06 RX ADMIN — Medication 81 MILLIGRAM(S): at 12:04

## 2019-08-06 RX ADMIN — Medication 2: at 06:33

## 2019-08-06 RX ADMIN — LORATADINE 10 MILLIGRAM(S): 10 TABLET ORAL at 10:19

## 2019-08-06 RX ADMIN — ATORVASTATIN CALCIUM 80 MILLIGRAM(S): 80 TABLET, FILM COATED ORAL at 22:17

## 2019-08-06 RX ADMIN — Medication 2: at 13:50

## 2019-08-06 RX ADMIN — Medication 1 APPLICATION(S): at 22:19

## 2019-08-06 RX ADMIN — Medication 1 APPLICATION(S): at 13:51

## 2019-08-06 RX ADMIN — CARVEDILOL PHOSPHATE 6.25 MILLIGRAM(S): 80 CAPSULE, EXTENDED RELEASE ORAL at 17:17

## 2019-08-06 RX ADMIN — LEVETIRACETAM 500 MILLIGRAM(S): 250 TABLET, FILM COATED ORAL at 06:32

## 2019-08-06 RX ADMIN — ENOXAPARIN SODIUM 40 MILLIGRAM(S): 100 INJECTION SUBCUTANEOUS at 12:04

## 2019-08-06 RX ADMIN — INSULIN GLARGINE 38 UNIT(S): 100 INJECTION, SOLUTION SUBCUTANEOUS at 22:17

## 2019-08-06 RX ADMIN — Medication 1 APPLICATION(S): at 06:34

## 2019-08-06 RX ADMIN — LORATADINE 10 MILLIGRAM(S): 10 TABLET ORAL at 00:44

## 2019-08-06 NOTE — PROGRESS NOTE ADULT - ASSESSMENT
54 year old female w/uncontrolled T2DM (HbA1c 10.4%) c/b retinopathy here w/CVA L sided hemiplegia. On bolus feeds q8h plus PO intake which remains very little. Variable glycemic control on present insulin regimen but at goal today. Will wait for glycemic patterns before making further insulin adjustments  No hypoglycemia. BG goal (100-180mg/dl). Pt will require daughter assistance w/insulin administration upon discharge.

## 2019-08-06 NOTE — PROGRESS NOTE ADULT - SUBJECTIVE AND OBJECTIVE BOX
Diabetes Follow up note: Saw pt earlier today  Interval Hx: 54 year old female w/uncontrolled T2DM (on insulin PTA) w/retinopathy, morbid obesity here w/CVA s/p PEG now on bolus feeds of Vital 1440cc q8h + dysphagia diet. Tolerating POs but noted pt eating very small amounts of food. States she gets full fast and doesn't have much appetite. Noted food tray with most of the food untouched. Still c/o hand cramps on and off. Glycemic control is variable but mostly at goal while on present insulin regimen.    Review of Systems:  General: as above.   GI: Tolerating POs without any N/V/D/ABD PAIN.   CV: No CP/SOB  ENDO: No S&Sx of hypoglycemia      MEDS:  atorvastatin 80 milliGRAM(s) Oral at bedtime  insulin glargine Injectable (LANTUS) 38 Unit(s) SubCutaneous at bedtime  insulin lispro (HumaLOG) corrective regimen sliding scale   SubCutaneous every 8 hours  insulin lispro Injectable (HumaLOG) 12 Unit(s) SubCutaneous every 8 hours    Allergies    No Known Allergies      PE:  General: Female lying in bed in bed in NAD.   Vital Signs Last 24 Hrs  T(C): 36.9 (08-06-19 @ 06:49), Max: 37.3 (08-05-19 @ 18:00)  T(F): 98.4 (08-06-19 @ 06:49), Max: 99.1 (08-05-19 @ 18:00)  HR: 76 (08-06-19 @ 06:49) (76 - 85)  BP: 133/76 (08-06-19 @ 06:49) (121/59 - 136/82)  BP(mean): --  RR: 18 (08-06-19 @ 06:49) (16 - 18)  SpO2: 98% (08-06-19 @ 06:49) (95% - 98%)  Abd: Soft, NT, ND, PEG in place with TFs off at time of visit. Obese.   Extremities: Warm. no edema noted in LEs.   Neuro: A&O X3. Dysarthria. L sided hemiplegia.     LABS:  POCT Blood Glucose.: 177 mg/dL (08-06-19 @ 13:45)  POCT Blood Glucose.: 168 mg/dL (08-06-19 @ 06:22)  POCT Blood Glucose.: 249 mg/dL (08-05-19 @ 21:50)  POCT Blood Glucose.: 221 mg/dL (08-05-19 @ 14:07)  POCT Blood Glucose.: 176 mg/dL (08-05-19 @ 05:58)  POCT Blood Glucose.: 182 mg/dL (08-04-19 @ 21:56)                        10.1   6.12  )-----------( 241      ( 05 Aug 2019 07:52 )             30.8   08-05    137  |  100  |  23  ----------------------------<  162<H>  4.4   |  27  |  0.72    Ca    9.3      05 Aug 2019 06:48    Hemoglobin A1C, Whole Blood: 10.4 % <H> [4.0 - 5.6] (06-29-19 @ 09:33)

## 2019-08-06 NOTE — PROGRESS NOTE ADULT - SUBJECTIVE AND OBJECTIVE BOX
Patient is a 54y old  Female who presents with a chief complaint of CVA (02 Aug 2019 16:08)      SUBJECTIVE / OVERNIGHT EVENTS:    Events noted.  CONSTITUTIONAL: No fever,  No N/V/      MEDICATIONS  (STANDING):  AQUAPHOR (petrolatum Ointment) 1 Application(s) Topical three times a day  aspirin  chewable 81 milliGRAM(s) Oral daily  atorvastatin 80 milliGRAM(s) Oral at bedtime  carvedilol 6.25 milliGRAM(s) Oral every 12 hours  clopidogrel Tablet 75 milliGRAM(s) Oral daily  dextrose 5%. 1000 milliLiter(s) (50 mL/Hr) IV Continuous <Continuous>  dextrose 50% Injectable 12.5 Gram(s) IV Push once  dextrose 50% Injectable 25 Gram(s) IV Push once  dextrose 50% Injectable 25 Gram(s) IV Push once  enoxaparin Injectable 40 milliGRAM(s) SubCutaneous daily  FLUoxetine Solution 20 milliGRAM(s) Oral daily  hydrocortisone 1% Ointment 1 Application(s) Topical two times a day  insulin glargine Injectable (LANTUS) 38 Unit(s) SubCutaneous at bedtime  insulin lispro (HumaLOG) corrective regimen sliding scale   SubCutaneous every 8 hours  insulin lispro Injectable (HumaLOG) 12 Unit(s) SubCutaneous every 8 hours  levETIRAcetam  Solution 500 milliGRAM(s) Oral two times a day  lisinopril 20 milliGRAM(s) Oral daily  loratadine Syrup 10 milliGRAM(s) Oral <User Schedule>  nystatin    Suspension 994363 Unit(s) Oral two times a day  polyethylene glycol 3350 17 Gram(s) Oral daily    MEDICATIONS  (PRN):  acetaminophen    Suspension .. 650 milliGRAM(s) Enteral Tube every 6 hours PRN Moderate Pain (4 - 6)  bisacodyl Suppository 10 milliGRAM(s) Rectal daily PRN Constipation  dextrose 40% Gel 15 Gram(s) Oral once PRN Blood Glucose LESS THAN 70 milliGRAM(s)/deciliter  diphenhydrAMINE 25 milliGRAM(s) Oral every 8 hours PRN Rash and/or Itching  glucagon  Injectable 1 milliGRAM(s) IntraMuscular once PRN Glucose LESS THAN 70 milligrams/deciliter  nystatin Powder 1 Application(s) Topical two times a day PRN dermatitis        CAPILLARY BLOOD GLUCOSE      POCT Blood Glucose.: 249 mg/dL (05 Aug 2019 21:50)  POCT Blood Glucose.: 221 mg/dL (05 Aug 2019 14:07)  POCT Blood Glucose.: 176 mg/dL (05 Aug 2019 05:58)    I&O's Summary    04 Aug 2019 07:01  -  05 Aug 2019 07:00  --------------------------------------------------------  IN: 2560 mL / OUT: 1950 mL / NET: 610 mL    05 Aug 2019 07:01  -  05 Aug 2019 22:45  --------------------------------------------------------  IN: 480 mL / OUT: 1201 mL / NET: -721 mL        PHYSICAL EXAM:    NECK: Supple, No JVD  CHEST/LUNG: Clear to auscultation bilaterally; No wheezing.  HEART: Regular rate and rhythm; No murmurs, rubs, or gallops  ABDOMEN: Soft, Nontender, Nondistended; Bowel sounds present  EXTREMITIES:   No edema  NEUROLOGY: Awake      LABS:                        10.1   6.12  )-----------( 241      ( 05 Aug 2019 07:52 )             30.8     08-05    137  |  100  |  23  ----------------------------<  162<H>  4.4   |  27  |  0.72    Ca    9.3      05 Aug 2019 06:48              CAPILLARY BLOOD GLUCOSE      POCT Blood Glucose.: 249 mg/dL (05 Aug 2019 21:50)  POCT Blood Glucose.: 221 mg/dL (05 Aug 2019 14:07)  POCT Blood Glucose.: 176 mg/dL (05 Aug 2019 05:58)                RADIOLOGY & ADDITIONAL TESTS:    Imaging Personally Reviewed:    Consultant(s) Notes Reviewed:      Care Discussed with Consultants/Other Providers:

## 2019-08-06 NOTE — PROGRESS NOTE ADULT - PROBLEM SELECTOR PLAN 1
-test BG Q8h  -C/w Lantus 38 units QHS  -c/w Humalog 12 units Q8h and  Humalog moderate correction scale q8h   -Discharge plan: plan is for pt to be discharged on dysphagia diet + current tube feed regimen per medicine NP. Can be discharged on Lantus + Novolog. Need to verify w/daughter that pt has enough insulin at home and has means to obtain more through [facility] clinic where she follows for her DM  -Plan discussed with pt/team.  Contact info: [phone] (24/7). pager [phone]

## 2019-08-07 LAB
GLUCOSE BLDC GLUCOMTR-MCNC: 184 MG/DL — HIGH (ref 70–99)
GLUCOSE BLDC GLUCOMTR-MCNC: 207 MG/DL — HIGH (ref 70–99)
GLUCOSE BLDC GLUCOMTR-MCNC: 217 MG/DL — HIGH (ref 70–99)

## 2019-08-07 PROCEDURE — 99232 SBSQ HOSP IP/OBS MODERATE 35: CPT

## 2019-08-07 RX ORDER — INSULIN GLARGINE 100 [IU]/ML
42 INJECTION, SOLUTION SUBCUTANEOUS AT BEDTIME
Refills: 0 | Status: DISCONTINUED | OUTPATIENT
Start: 2019-08-07 | End: 2019-08-08

## 2019-08-07 RX ORDER — INSULIN LISPRO 100/ML
13 VIAL (ML) SUBCUTANEOUS EVERY 8 HOURS
Refills: 0 | Status: DISCONTINUED | OUTPATIENT
Start: 2019-08-07 | End: 2019-08-08

## 2019-08-07 RX ADMIN — LEVETIRACETAM 500 MILLIGRAM(S): 250 TABLET, FILM COATED ORAL at 17:22

## 2019-08-07 RX ADMIN — Medication 1 APPLICATION(S): at 22:34

## 2019-08-07 RX ADMIN — CARVEDILOL PHOSPHATE 6.25 MILLIGRAM(S): 80 CAPSULE, EXTENDED RELEASE ORAL at 17:22

## 2019-08-07 RX ADMIN — Medication 650 MILLIGRAM(S): at 13:55

## 2019-08-07 RX ADMIN — Medication 4: at 22:36

## 2019-08-07 RX ADMIN — Medication 1 APPLICATION(S): at 13:20

## 2019-08-07 RX ADMIN — CARVEDILOL PHOSPHATE 6.25 MILLIGRAM(S): 80 CAPSULE, EXTENDED RELEASE ORAL at 06:39

## 2019-08-07 RX ADMIN — Medication 12 UNIT(S): at 13:20

## 2019-08-07 RX ADMIN — Medication 13 UNIT(S): at 22:37

## 2019-08-07 RX ADMIN — INSULIN GLARGINE 42 UNIT(S): 100 INJECTION, SOLUTION SUBCUTANEOUS at 22:36

## 2019-08-07 RX ADMIN — Medication 4: at 13:20

## 2019-08-07 RX ADMIN — ATORVASTATIN CALCIUM 80 MILLIGRAM(S): 80 TABLET, FILM COATED ORAL at 22:34

## 2019-08-07 RX ADMIN — LORATADINE 10 MILLIGRAM(S): 10 TABLET ORAL at 09:17

## 2019-08-07 RX ADMIN — LISINOPRIL 20 MILLIGRAM(S): 2.5 TABLET ORAL at 06:39

## 2019-08-07 RX ADMIN — CLOPIDOGREL BISULFATE 75 MILLIGRAM(S): 75 TABLET, FILM COATED ORAL at 11:40

## 2019-08-07 RX ADMIN — Medication 81 MILLIGRAM(S): at 11:40

## 2019-08-07 RX ADMIN — ENOXAPARIN SODIUM 40 MILLIGRAM(S): 100 INJECTION SUBCUTANEOUS at 11:40

## 2019-08-07 RX ADMIN — LEVETIRACETAM 500 MILLIGRAM(S): 250 TABLET, FILM COATED ORAL at 06:39

## 2019-08-07 RX ADMIN — Medication 1 APPLICATION(S): at 06:41

## 2019-08-07 RX ADMIN — LORATADINE 10 MILLIGRAM(S): 10 TABLET ORAL at 22:35

## 2019-08-07 RX ADMIN — Medication 1 APPLICATION(S): at 06:39

## 2019-08-07 RX ADMIN — Medication 500000 UNIT(S): at 06:39

## 2019-08-07 RX ADMIN — Medication 12 UNIT(S): at 06:47

## 2019-08-07 RX ADMIN — Medication 500000 UNIT(S): at 17:22

## 2019-08-07 RX ADMIN — Medication 2: at 06:46

## 2019-08-07 RX ADMIN — Medication 1 APPLICATION(S): at 17:22

## 2019-08-07 RX ADMIN — Medication 650 MILLIGRAM(S): at 13:22

## 2019-08-07 RX ADMIN — Medication 20 MILLIGRAM(S): at 11:40

## 2019-08-07 NOTE — PROGRESS NOTE ADULT - PROBLEM SELECTOR PLAN 1
-test BG Q8h  -Increase Lantus 42 units QHS  -Increase Humalog 13 units AC meals  -c/w Humalog moderate correction scale q8h  -Discharge plan: plan is for pt to be discharged on dysphagia diet + current tube feed regimen per medicine NP. Can be discharged on Lantus + Novolog. Need to verify w/daughter that pt has enough insulin at home and has means to obtain more through [facility] clinic where she follows for her DM  pager: [phone]

## 2019-08-07 NOTE — PROGRESS NOTE ADULT - SUBJECTIVE AND OBJECTIVE BOX
Diabetes Follow up note:  Interval Hx:  54 year old female w/uncontrolled T2DM (on insulin PTA) w/retinopathy, morbid obesity here w/CVA s/p PEG now on bolus feeds of Vital 1440cc q8h + dysphagia diet. BG values mostly above goal on present insulin regimen. Pt seen at bedside. Pt reports feeding herself now.     Review of Systems:  General: c/o hand cramping  GI: Tolerating POs without any N/V/D/ABD PAIN.  CV: No CP/SOB  ENDO: No S&Sx of hypoglycemia  MEDS:  atorvastatin 80 milliGRAM(s) Oral at bedtime    insulin glargine Injectable (LANTUS) 38 Unit(s) SubCutaneous at bedtime  insulin lispro (HumaLOG) corrective regimen sliding scale   SubCutaneous every 8 hours  insulin lispro Injectable (HumaLOG) 12 Unit(s) SubCutaneous every 8 hours    nystatin    Suspension 207384 Unit(s) Oral two times a day    Allergies    No Known Allergies      PE:  General: Female lying in bed. NAD.   Vital Signs Last 24 Hrs  T(C): 36.9 (07 Aug 2019 11:09), Max: 36.9 (07 Aug 2019 06:37)  T(F): 98.4 (07 Aug 2019 11:09), Max: 98.5 (07 Aug 2019 06:37)  HR: 80 (07 Aug 2019 11:09) (73 - 80)  BP: 141/84 (07 Aug 2019 11:09) (124/55 - 141/84)  BP(mean): --  RR: 18 (07 Aug 2019 11:09) (18 - 18)  SpO2: 95% (07 Aug 2019 11:09) (95% - 97%)  Abd: Soft, NT,ND, PEG in place. Obese.   Extremities: Warm. no edema.   Neuro: A&Ox3.     LABS:    POCT Blood Glucose.: 207 mg/dL (08-07-19 @ 13:01)  POCT Blood Glucose.: 184 mg/dL (08-07-19 @ 06:33)  POCT Blood Glucose.: 176 mg/dL (08-06-19 @ 21:43)  POCT Blood Glucose.: 177 mg/dL (08-06-19 @ 13:45)  POCT Blood Glucose.: 168 mg/dL (08-06-19 @ 06:22)  POCT Blood Glucose.: 249 mg/dL (08-05-19 @ 21:50)  POCT Blood Glucose.: 221 mg/dL (08-05-19 @ 14:07)  POCT Blood Glucose.: 176 mg/dL (08-05-19 @ 05:58)  POCT Blood Glucose.: 182 mg/dL (08-04-19 @ 21:56)            Hemoglobin A1C, Whole Blood: 10.4 % <H> [4.0 - 5.6] (06-29-19 @ 09:33)            Contact number: [place] [phone] or [phone]

## 2019-08-07 NOTE — PROGRESS NOTE ADULT - ASSESSMENT
54 year old female w/uncontrolled T2DM (HbA1c 10.4%) c/b retinopathy here w/CVA L sided hemiplegia. On bolus feeds q8h plus PO intake. BG values elevated slightly above goal so will increase doses to improve glycemic control. BG goal (100-180mg/dl).

## 2019-08-07 NOTE — PROGRESS NOTE ADULT - SUBJECTIVE AND OBJECTIVE BOX
Patient is a 54y old  Female who presents with a chief complaint of CVA (02 Aug 2019 16:08)      SUBJECTIVE / OVERNIGHT EVENTS:    Events noted.  CONSTITUTIONAL: No fever,  No N/V/      MEDICATIONS  (STANDING):  AQUAPHOR (petrolatum Ointment) 1 Application(s) Topical three times a day  aspirin  chewable 81 milliGRAM(s) Oral daily  atorvastatin 80 milliGRAM(s) Oral at bedtime  carvedilol 6.25 milliGRAM(s) Oral every 12 hours  clopidogrel Tablet 75 milliGRAM(s) Oral daily  dextrose 5%. 1000 milliLiter(s) (50 mL/Hr) IV Continuous <Continuous>  dextrose 50% Injectable 12.5 Gram(s) IV Push once  dextrose 50% Injectable 25 Gram(s) IV Push once  dextrose 50% Injectable 25 Gram(s) IV Push once  enoxaparin Injectable 40 milliGRAM(s) SubCutaneous daily  FLUoxetine Solution 20 milliGRAM(s) Oral daily  hydrocortisone 1% Ointment 1 Application(s) Topical two times a day  insulin glargine Injectable (LANTUS) 38 Unit(s) SubCutaneous at bedtime  insulin lispro (HumaLOG) corrective regimen sliding scale   SubCutaneous every 8 hours  insulin lispro Injectable (HumaLOG) 12 Unit(s) SubCutaneous every 8 hours  levETIRAcetam  Solution 500 milliGRAM(s) Oral two times a day  lisinopril 20 milliGRAM(s) Oral daily  loratadine Syrup 10 milliGRAM(s) Oral <User Schedule>  nystatin    Suspension 621153 Unit(s) Oral two times a day  polyethylene glycol 3350 17 Gram(s) Oral daily    MEDICATIONS  (PRN):  acetaminophen    Suspension .. 650 milliGRAM(s) Enteral Tube every 6 hours PRN Moderate Pain (4 - 6)  bisacodyl Suppository 10 milliGRAM(s) Rectal daily PRN Constipation  dextrose 40% Gel 15 Gram(s) Oral once PRN Blood Glucose LESS THAN 70 milliGRAM(s)/deciliter  diphenhydrAMINE 25 milliGRAM(s) Oral every 8 hours PRN Rash and/or Itching  glucagon  Injectable 1 milliGRAM(s) IntraMuscular once PRN Glucose LESS THAN 70 milligrams/deciliter  nystatin Powder 1 Application(s) Topical two times a day PRN dermatitis        CAPILLARY BLOOD GLUCOSE      POCT Blood Glucose.: 249 mg/dL (05 Aug 2019 21:50)  POCT Blood Glucose.: 221 mg/dL (05 Aug 2019 14:07)  POCT Blood Glucose.: 176 mg/dL (05 Aug 2019 05:58)    I&O's Summary    04 Aug 2019 07:01  -  05 Aug 2019 07:00  --------------------------------------------------------  IN: 2560 mL / OUT: 1950 mL / NET: 610 mL    05 Aug 2019 07:01  -  05 Aug 2019 22:45  --------------------------------------------------------  IN: 480 mL / OUT: 1201 mL / NET: -721 mL        PHYSICAL EXAM:    NECK: Supple, No JVD  CHEST/LUNG: Clear to auscultation bilaterally; No wheezing.  HEART: Regular rate and rhythm; No murmurs, rubs, or gallops  ABDOMEN: Soft, Nontender, Nondistended; Bowel sounds present  EXTREMITIES:   No edema  NEUROLOGY: Awake      LABS:                        10.1   6.12  )-----------( 241      ( 05 Aug 2019 07:52 )             30.8     08-05    137  |  100  |  23  ----------------------------<  162<H>  4.4   |  27  |  0.72    Ca    9.3      05 Aug 2019 06:48              CAPILLARY BLOOD GLUCOSE      POCT Blood Glucose.: 249 mg/dL (05 Aug 2019 21:50)  POCT Blood Glucose.: 221 mg/dL (05 Aug 2019 14:07)  POCT Blood Glucose.: 176 mg/dL (05 Aug 2019 05:58)                RADIOLOGY & ADDITIONAL TESTS:    Imaging Personally Reviewed:    Consultant(s) Notes Reviewed:      Care Discussed with Consultants/Other Providers:

## 2019-08-08 LAB
GLUCOSE BLDC GLUCOMTR-MCNC: 142 MG/DL — HIGH (ref 70–99)
GLUCOSE BLDC GLUCOMTR-MCNC: 154 MG/DL — HIGH (ref 70–99)
GLUCOSE BLDC GLUCOMTR-MCNC: 182 MG/DL — HIGH (ref 70–99)

## 2019-08-08 PROCEDURE — 99232 SBSQ HOSP IP/OBS MODERATE 35: CPT

## 2019-08-08 RX ORDER — METFORMIN HYDROCHLORIDE 850 MG/1
1 TABLET ORAL
Qty: 0 | Refills: 0 | DISCHARGE

## 2019-08-08 RX ORDER — CARVEDILOL PHOSPHATE 80 MG/1
1 CAPSULE, EXTENDED RELEASE ORAL
Qty: 0 | Refills: 0 | DISCHARGE

## 2019-08-08 RX ORDER — DAPAGLIFLOZIN 10 MG/1
1 TABLET, FILM COATED ORAL
Qty: 0 | Refills: 0 | DISCHARGE

## 2019-08-08 RX ORDER — LOSARTAN POTASSIUM 100 MG/1
1 TABLET, FILM COATED ORAL
Qty: 30 | Refills: 0
Start: 2019-08-08 | End: 2019-09-06

## 2019-08-08 RX ORDER — CLOPIDOGREL BISULFATE 75 MG/1
1 TABLET, FILM COATED ORAL
Qty: 30 | Refills: 0
Start: 2019-08-08 | End: 2019-09-06

## 2019-08-08 RX ORDER — CARVEDILOL PHOSPHATE 80 MG/1
1 CAPSULE, EXTENDED RELEASE ORAL
Qty: 60 | Refills: 0
Start: 2019-08-08 | End: 2019-09-06

## 2019-08-08 RX ORDER — LEVETIRACETAM 250 MG/1
5 TABLET, FILM COATED ORAL
Qty: 300 | Refills: 0
Start: 2019-08-08 | End: 2019-09-06

## 2019-08-08 RX ORDER — EZETIMIBE 10 MG/1
1 TABLET ORAL
Qty: 0 | Refills: 0 | DISCHARGE

## 2019-08-08 RX ORDER — PETROLATUM,WHITE
1 JELLY (GRAM) TOPICAL
Qty: 0 | Refills: 0 | DISCHARGE
Start: 2019-08-08

## 2019-08-08 RX ORDER — INSULIN GLARGINE 100 [IU]/ML
44 INJECTION, SOLUTION SUBCUTANEOUS AT BEDTIME
Refills: 0 | Status: DISCONTINUED | OUTPATIENT
Start: 2019-08-08 | End: 2019-08-09

## 2019-08-08 RX ORDER — HYDRALAZINE HCL 50 MG
1 TABLET ORAL
Qty: 0 | Refills: 0 | DISCHARGE

## 2019-08-08 RX ORDER — INSULIN DETEMIR 100/ML (3)
44 INSULIN PEN (ML) SUBCUTANEOUS
Qty: 1 | Refills: 0
Start: 2019-08-08 | End: 2019-09-06

## 2019-08-08 RX ORDER — ISOSORBIDE DINITRATE 5 MG/1
1 TABLET ORAL
Qty: 0 | Refills: 0 | DISCHARGE

## 2019-08-08 RX ORDER — INSULIN DETEMIR 100/ML (3)
70 INSULIN PEN (ML) SUBCUTANEOUS
Qty: 0 | Refills: 0 | DISCHARGE

## 2019-08-08 RX ORDER — FLUOXETINE HCL 10 MG
5 CAPSULE ORAL
Qty: 150 | Refills: 0
Start: 2019-08-08 | End: 2019-09-06

## 2019-08-08 RX ORDER — NYSTATIN CREAM 100000 [USP'U]/G
1 CREAM TOPICAL
Qty: 1 | Refills: 0
Start: 2019-08-08 | End: 2019-08-22

## 2019-08-08 RX ORDER — LOSARTAN POTASSIUM 100 MG/1
1 TABLET, FILM COATED ORAL
Qty: 0 | Refills: 0 | DISCHARGE

## 2019-08-08 RX ORDER — NYSTATIN 500MM UNIT
5 POWDER (EA) MISCELLANEOUS
Qty: 150 | Refills: 0
Start: 2019-08-08 | End: 2019-08-22

## 2019-08-08 RX ORDER — INSULIN LISPRO 100/ML
14 VIAL (ML) SUBCUTANEOUS EVERY 8 HOURS
Refills: 0 | Status: DISCONTINUED | OUTPATIENT
Start: 2019-08-08 | End: 2019-08-09

## 2019-08-08 RX ORDER — POLYETHYLENE GLYCOL 3350 17 G/17G
17 POWDER, FOR SOLUTION ORAL
Qty: 0 | Refills: 0 | DISCHARGE
Start: 2019-08-08

## 2019-08-08 RX ORDER — LIRAGLUTIDE 6 MG/ML
1.8 INJECTION SUBCUTANEOUS
Qty: 0 | Refills: 0 | DISCHARGE

## 2019-08-08 RX ORDER — INSULIN ASPART 100 [IU]/ML
14 INJECTION, SOLUTION SUBCUTANEOUS
Qty: 1 | Refills: 0
Start: 2019-08-08 | End: 2019-09-06

## 2019-08-08 RX ORDER — INSULIN ASPART 100 [IU]/ML
24 INJECTION, SOLUTION SUBCUTANEOUS
Qty: 0 | Refills: 0 | DISCHARGE

## 2019-08-08 RX ADMIN — ENOXAPARIN SODIUM 40 MILLIGRAM(S): 100 INJECTION SUBCUTANEOUS at 12:59

## 2019-08-08 RX ADMIN — Medication 14 UNIT(S): at 14:08

## 2019-08-08 RX ADMIN — Medication 20 MILLIGRAM(S): at 12:59

## 2019-08-08 RX ADMIN — Medication 1 APPLICATION(S): at 06:12

## 2019-08-08 RX ADMIN — Medication 14 UNIT(S): at 22:26

## 2019-08-08 RX ADMIN — LEVETIRACETAM 500 MILLIGRAM(S): 250 TABLET, FILM COATED ORAL at 17:39

## 2019-08-08 RX ADMIN — Medication 1 APPLICATION(S): at 14:07

## 2019-08-08 RX ADMIN — LEVETIRACETAM 500 MILLIGRAM(S): 250 TABLET, FILM COATED ORAL at 06:13

## 2019-08-08 RX ADMIN — LORATADINE 10 MILLIGRAM(S): 10 TABLET ORAL at 14:07

## 2019-08-08 RX ADMIN — CLOPIDOGREL BISULFATE 75 MILLIGRAM(S): 75 TABLET, FILM COATED ORAL at 13:00

## 2019-08-08 RX ADMIN — LISINOPRIL 20 MILLIGRAM(S): 2.5 TABLET ORAL at 06:13

## 2019-08-08 RX ADMIN — Medication 1 APPLICATION(S): at 22:25

## 2019-08-08 RX ADMIN — ATORVASTATIN CALCIUM 80 MILLIGRAM(S): 80 TABLET, FILM COATED ORAL at 22:26

## 2019-08-08 RX ADMIN — Medication 81 MILLIGRAM(S): at 12:59

## 2019-08-08 RX ADMIN — Medication 500000 UNIT(S): at 06:13

## 2019-08-08 RX ADMIN — Medication 500000 UNIT(S): at 17:39

## 2019-08-08 RX ADMIN — Medication 13 UNIT(S): at 06:14

## 2019-08-08 RX ADMIN — Medication 2: at 14:08

## 2019-08-08 RX ADMIN — Medication 2: at 06:14

## 2019-08-08 RX ADMIN — Medication 1 APPLICATION(S): at 17:39

## 2019-08-08 RX ADMIN — Medication 650 MILLIGRAM(S): at 14:09

## 2019-08-08 RX ADMIN — LORATADINE 10 MILLIGRAM(S): 10 TABLET ORAL at 22:26

## 2019-08-08 RX ADMIN — CARVEDILOL PHOSPHATE 6.25 MILLIGRAM(S): 80 CAPSULE, EXTENDED RELEASE ORAL at 06:12

## 2019-08-08 RX ADMIN — CARVEDILOL PHOSPHATE 6.25 MILLIGRAM(S): 80 CAPSULE, EXTENDED RELEASE ORAL at 17:39

## 2019-08-08 RX ADMIN — INSULIN GLARGINE 44 UNIT(S): 100 INJECTION, SOLUTION SUBCUTANEOUS at 22:25

## 2019-08-08 RX ADMIN — Medication 650 MILLIGRAM(S): at 14:40

## 2019-08-08 NOTE — CHART NOTE - NSCHARTNOTEFT_GEN_A_CORE
Nutrition Follow Up Note  Patient seen per protocol, patient continued on  enteral nutrition support with PO diet . patient eating better, more alert and conversational, asking for menu items however not able to meet needs orally alone. Followed by endocrinology for insulin requirements    Source:  chart reviewed    54 year old female DM (on insulin PTA) w/retinopathy, morbid obesity here w/CVA, neurological exam significant for L sided hemiplegia, R gaze preference, L facial droop.  CTA head/neck showing R ICA occlusion,  L hemiplegia      S/P  MBS  by SLP with PO diet recommendations     Diet :  Dysphagia 1 nectar thickened liquids consistent carbohydrate PLUS Enteral Nutrition support via PEG bolus  feeds      GI BM  [id number] charted last 5 days, fewer stools, however loose      PO intake : 30% of tray     Source for PO intake: chart, staff     Enteral Nutrition:  Vital 1.2 bolus feeds 480ml 3x daily,  1440ml, 1728 calories, 12.6calories/kg, protein 108gm, ~0.8gm/kg based on dosing weight 136.9kg    Daily Weight in k.7 (-)  Dosing 136.7kg gradual controlled weight loss noted    Pertinent Medications: MEDICATIONS  (STANDING):  AQUAPHOR (petrolatum Ointment) 1 Application(s) Topical three times a day  aspirin  chewable 81 milliGRAM(s) Oral daily  atorvastatin 80 milliGRAM(s) Oral at bedtime  carvedilol 6.25 milliGRAM(s) Oral every 12 hours  clopidogrel Tablet 75 milliGRAM(s) Oral daily  dextrose 5%. 1000 milliLiter(s) (50 mL/Hr) IV Continuous <Continuous>  dextrose 50% Injectable 12.5 Gram(s) IV Push once  dextrose 50% Injectable 25 Gram(s) IV Push once  dextrose 50% Injectable 25 Gram(s) IV Push once  enoxaparin Injectable 40 milliGRAM(s) SubCutaneous daily  FLUoxetine Solution 20 milliGRAM(s) Oral daily  hydrocortisone 1% Ointment 1 Application(s) Topical two times a day  insulin glargine Injectable (LANTUS) 44 Unit(s) SubCutaneous at bedtime  insulin lispro (HumaLOG) corrective regimen sliding scale   SubCutaneous every 8 hours  insulin lispro Injectable (HumaLOG) 14 Unit(s) SubCutaneous every 8 hours  levETIRAcetam  Solution 500 milliGRAM(s) Oral two times a day  lisinopril 20 milliGRAM(s) Oral daily  loratadine Syrup 10 milliGRAM(s) Oral <User Schedule>  nystatin    Suspension 445285 Unit(s) Oral two times a day  polyethylene glycol 3350 17 Gram(s) Oral daily    MEDICATIONS  (PRN):  acetaminophen    Suspension .. 650 milliGRAM(s) Enteral Tube every 6 hours PRN Moderate Pain (4 - 6)  bisacodyl Suppository 10 milliGRAM(s) Rectal daily PRN Constipation  dextrose 40% Gel 15 Gram(s) Oral once PRN Blood Glucose LESS THAN 70 milliGRAM(s)/deciliter  diphenhydrAMINE 25 milliGRAM(s) Oral every 8 hours PRN Rash and/or Itching  glucagon  Injectable 1 milliGRAM(s) IntraMuscular once PRN Glucose LESS THAN 70 milligrams/deciliter  nystatin Powder 1 Application(s) Topical two times a day PRN dermatitis    Pertinent Labs:   Finger Sticks:  POCT Blood Glucose.: 154 mg/dL ( @ 14:05)  POCT Blood Glucose.: 182 mg/dL ( @ 05:54)  POCT Blood Glucose.: 217 mg/dL ( @ 21:54)    Skin per nursing documentation: IAD  Edema: L leg  R leg 3+,  bilateral arms generalized    Estimated Needs:   [ X] no change since previous assessment  [ ] recalculated:     Previous Nutrition Diagnosis: Swallow difficulty  Nutrition Diagnosis is: addressed with enteral nutrition support    Recommend  1)continue current care plan: Vital 1.2 bolus feeds 480ml 3x daily,  1440ml, 1728 calories, 12.6calories/kg, protein 108gm, ~0.8gm/kg based on dosing weight 136.9kg  2) add banatrol x2 daily for loose stools  Discussed with team   Monitoring and Evaluation:     Continue to monitor Nutritional intake, Tolerance to diet prescription, weights, labs, skin integrity    RD remains available upon request and will follow up per protocol

## 2019-08-08 NOTE — PROVIDER CONTACT NOTE (OTHER) - REASON
At 2150 [employer] called that pt had 13 beats  AIVR
C/o abdominal pain
FS-106 after giving 50% dextrose
FS-89
Patient NIH score went from 17 to 20, not moving left side, lethargic at times, does not answer questions correctly
Pt finger stick glucose 90
Pt is lethargic, not answering any questions and Left upper ext no  movement.
Pt not speaking throughout the day
Pt refuses to speak
Pt's stroke scale went from 12 to 15
Pt. not responding verbally to nurse during medication pass. AMS.
follow up finger stick glucose after bolus feed
patient with new rash and hives
pt , 12 units standing humalog and lantus due for bedtime
pt , clarification on 44 units lantus and 14 units of humalog orders
Pt had 3 more seizure episodes en route to floor
f/s 45, rpt f/s 49
patient had temp 101.4 rectally
Fs-64 repeated FS is 61

## 2019-08-08 NOTE — PROGRESS NOTE ADULT - ASSESSMENT
54 year old female w/uncontrolled T2DM (HbA1c 10.4%) c/b retinopathy here w/CVA L sided hemiplegia. On bolus feeds q8h plus PO intake. BG values elevated slightly above goal so will increase doses to improve glycemic control. BG goal (100-180mg/dl). Pt to go home on insulin therapy w/family assistance.

## 2019-08-08 NOTE — PROGRESS NOTE ADULT - SUBJECTIVE AND OBJECTIVE BOX
Diabetes Follow up note:  Interval Hx:    glycemic control     Review of Systems:  General:  GI: Tolerating POs without any N/V/D/ABD PAIN.  CV: No CP/SOB  ENDO: No S&Sx of hypoglycemia  MEDS:  atorvastatin 80 milliGRAM(s) Oral at bedtime  dextrose 40% Gel 15 Gram(s) Oral once PRN  dextrose 50% Injectable 12.5 Gram(s) IV Push once  dextrose 50% Injectable 25 Gram(s) IV Push once  dextrose 50% Injectable 25 Gram(s) IV Push once  glucagon  Injectable 1 milliGRAM(s) IntraMuscular once PRN  insulin glargine Injectable (LANTUS) 42 Unit(s) SubCutaneous at bedtime  insulin lispro (HumaLOG) corrective regimen sliding scale   SubCutaneous every 8 hours  insulin lispro Injectable (HumaLOG) 13 Unit(s) SubCutaneous every 8 hours    nystatin    Suspension 957401 Unit(s) Oral two times a day    Allergies    No Known Allergies    Intolerances      PE:  General:  Vital Signs Last 24 Hrs  T(C): 37.3 (08 Aug 2019 10:42), Max: 37.5 (07 Aug 2019 19:57)  T(F): 99.1 (08 Aug 2019 10:42), Max: 99.5 (07 Aug 2019 19:57)  HR: 80 (08 Aug 2019 10:42) (79 - 90)  BP: 141/77 (08 Aug 2019 10:42) (114/70 - 141/77)  BP(mean): --  RR: 18 (08 Aug 2019 10:42) (18 - 18)  SpO2: 97% (08 Aug 2019 10:42) (97% - 99%)  Abd: Soft, NT,ND,   Extremities: Warm  Neuro: A&O X3    LABS:    POCT Blood Glucose.: 182 mg/dL (08-08-19 @ 05:54)  POCT Blood Glucose.: 217 mg/dL (08-07-19 @ 21:54)  POCT Blood Glucose.: 207 mg/dL (08-07-19 @ 13:01)  POCT Blood Glucose.: 184 mg/dL (08-07-19 @ 06:33)  POCT Blood Glucose.: 176 mg/dL (08-06-19 @ 21:43)  POCT Blood Glucose.: 177 mg/dL (08-06-19 @ 13:45)  POCT Blood Glucose.: 168 mg/dL (08-06-19 @ 06:22)  POCT Blood Glucose.: 249 mg/dL (08-05-19 @ 21:50)  POCT Blood Glucose.: 221 mg/dL (08-05-19 @ 14:07)                  Thyroid Function Tests:      Hemoglobin A1C, Whole Blood: 10.4 % <H> [4.0 - 5.6] (06-29-19 @ 09:33)            Contact number: [place] [phone] or [phone] Diabetes Follow up note:  Interval Hx:  54 year old female w/uncontrolled T2DM (on insulin PTA) w/retinopathy, morbid obesity here w/CVA s/p PEG now on bolus feeds of Vital 1440cc q8h + dysphagia diet. BG values still slightly above goal despite increases in insulin regimen. Pt assisted w/lunch by PCA when at bedside.       Review of Systems:  General: no complaints.   GI: Tolerating PO/TFs without any N/V/D/ABD PAIN.  CV: No CP/SOB  ENDO: No S&Sx of hypoglycemia  MEDS:  atorvastatin 80 milliGRAM(s) Oral at bedtime    insulin glargine Injectable (LANTUS) 42 Unit(s) SubCutaneous at bedtime  insulin lispro (HumaLOG) corrective regimen sliding scale   SubCutaneous every 8 hours  insulin lispro Injectable (HumaLOG) 13 Unit(s) SubCutaneous every 8 hours    nystatin    Suspension 049426 Unit(s) Oral two times a day    Allergies    No Known Allergies        PE:  General: Female lying in bed. NAD.   Vital Signs Last 24 Hrs  T(C): 37.3 (08 Aug 2019 10:42), Max: 37.5 (07 Aug 2019 19:57)  T(F): 99.1 (08 Aug 2019 10:42), Max: 99.5 (07 Aug 2019 19:57)  HR: 80 (08 Aug 2019 10:42) (79 - 90)  BP: 141/77 (08 Aug 2019 10:42) (114/70 - 141/77)  BP(mean): --  RR: 18 (08 Aug 2019 10:42) (18 - 18)  SpO2: 97% (08 Aug 2019 10:42) (97% - 99%)  Abd: Soft, NT,ND, PEG in place. Obese.   Extremities: Warm.   Neuro: Awake. L sided hemiparesis    LABS:    POCT Blood Glucose.: 182 mg/dL (08-08-19 @ 05:54)  POCT Blood Glucose.: 217 mg/dL (08-07-19 @ 21:54)  POCT Blood Glucose.: 207 mg/dL (08-07-19 @ 13:01)  POCT Blood Glucose.: 184 mg/dL (08-07-19 @ 06:33)  POCT Blood Glucose.: 176 mg/dL (08-06-19 @ 21:43)  POCT Blood Glucose.: 177 mg/dL (08-06-19 @ 13:45)  POCT Blood Glucose.: 168 mg/dL (08-06-19 @ 06:22)  POCT Blood Glucose.: 249 mg/dL (08-05-19 @ 21:50)  POCT Blood Glucose.: 221 mg/dL (08-05-19 @ 14:07)                    Hemoglobin A1C, Whole Blood: 10.4 % <H> [4.0 - 5.6] (06-29-19 @ 09:33)            Contact number: [place] [phone] or [phone]

## 2019-08-08 NOTE — PROVIDER CONTACT NOTE (OTHER) - BACKGROUND
admitted with stroke.
CVA
HX R Frontal Parietal Infarct.  R ICA Infarct.
Hx R Frontal Parietal Infarct, R ICA Stenosis
Patient admitted for CVA
Pt was a tx from [facility] for evaluation of stroke. Admitted for other symptom or sign involving musculoskeletal system
Stroke
Stroke   R carotid stenosis  R MCA stenosis
cva infarct
pt admitted for R frontal parietal infarct, R ICA stenosis. Also found to have R MCA/ENRIQUE infarct.
pt admitted for right frontal parietal infarct, Rt ICA stenosis
pt admitted for R frontal parietal infarct, R ICA stenosis. Also found to have R MCA/ENRIQUE infarct.
s/p tPA 6/28 @1797

## 2019-08-08 NOTE — PROGRESS NOTE ADULT - PROBLEM SELECTOR PLAN 1
-test BG Q8h  -Increase Lantus 44 units QHS  -Increase Humalog 14 units q8h w/bolus feeds.   -c/w Humalog moderate correction scale Q8h  Discharge plan: plan is for pt to be discharged on dysphagia diet + current tube feed regimen per medicine NP. Can be discharged on Lantus + Novolog.  -will follow up w/[facility]  -discussed case w/Medicine NP Ele  pager: [phone]

## 2019-08-08 NOTE — PROVIDER CONTACT NOTE (OTHER) - ACTION/TREATMENT ORDERED:
Ativan 2mg, Keppra 500mg. MD aware of pupil
PA notified, to give 25gm D50W IV push and follow protocol to recheck 15min post treatment.
no trement ordered yet. awaiting for orders
NP came to bedside to assess patient. NP states to hold antibiotic dose and she is calling ID for help. also ordered ointments and prednisone and benadryl PRN.
50% dextrose 12.5gm IVPx1 given.
Continue to monitor, no interventions at this time
Explain the importance of complete neuro assessment and continue to encourage pt to speak.
, give bedtime lantus and humalog as ordered
As per NP no action at this time; will continue to monitor
CT Head ordered and done.
Lantus insulin reduced to 10 units one time order given
Lantus reduced to 30 units
MD Radford made aware and at bedside to assess Pt. CT head ordered. Pt. began to respond to RN after about 30 mins. MD at bedside to reassess Pt. Pt. sent for CT head. Will continue to monitor.
No orders.  Will continue to monitor.
Tylenol suspension 650mg via PEG given.
as per NP ok to give insulin as ordered
awaiting orders
give 22:00 bolus feed and recheck finger stick and notify provider
to give bolus feed due at 10pm and insulin as ordered. Continue to monitor patient.

## 2019-08-08 NOTE — PROVIDER CONTACT NOTE (OTHER) - RECOMMENDATIONS
Ativan 2mg, Keppra 500mg.
D50W 25gm IV push.
chest xray, blood culture, cbc, urine analysis and culture, tylenol
Notified NP
Assess Pt. possible transfer to stroke unit
Hold standing humalog/lantus
MD to assess Pt. at bedside.
NP notified no action at this time
Need MD to assess.
Need PA to assess.
Notified NP
Notified NP
Reduce the lantus insulin dose
give bedtime insulin
hold insulin or give
n/a

## 2019-08-08 NOTE — PROVIDER CONTACT NOTE (OTHER) - NAME OF MD/NP/PA/DO NOTIFIED:
MARYJO Webb
Angela PORTILLO
BANDAR Gautam
Brandon Ross MD
CHAPIS Carroll medicine
CHAPIS Ferris
CHAPIS Varghese
Cristine. BANDAR
Dr Rodriguez
Laisha Falk NP
MARYJO Webb
MARYJO Webb
MD Radford
Mariya Brooks, NP
NP
Tracey Kim NP
Carolyne Arredondo)
MD Rayo
Dr. Solis mallory

## 2019-08-08 NOTE — PROVIDER CONTACT NOTE (OTHER) - ASSESSMENT
patient had temp 101.4 rectally. vital signs 175/101, heart rate 87, pox 99 and resp 24
Fs-64 repeated FS is 61
patient in no distress.
At 2150 [employer] called that pt had 13 beats  AIVR.  Now pt in NSR.  Pt remains alert and neurologically stable.  HR 84 BP 02632.
C/o abdominal pain
FS-106 after giving 50% dextrose
FS-89
Patient lethargic at times, hemiplegic on left side, does not answer questions correctly.
Pt VSS, refuses to speak, but nods or shakes head upon assessment, no major neuro deficit found.
Pt alert and oriented to yes and no questions. Vital signs stable. Pt however refusing to speak
Pt finger stick glucose 90, bedtime 40 lantus and 10 of humalog due
Pt is lethargic, not answering any questions and Left upper ext no  movement.  /80, HR 78 RESP 21 pox 97
Pt. stoke scale went from 12 to 15. loss of sensation on L sided extremities - Pt was guessing on whether she was being touched. Pt more aphasic
VS stable     Pt. follows some commands but not responding verbally.
pt AOx4, forgetful, VSS, in no acute distress, no c/o pain or discomfort
pt finger stick 124, pt asymptomatic, neurologically unchanged
pt neurologically unchanged, finger stick 140
pt Seizing, not following commands
pt is drowsy, difficult to arouse, responsive to verbal stimuli.

## 2019-08-08 NOTE — PROGRESS NOTE ADULT - SUBJECTIVE AND OBJECTIVE BOX
Patient is a 54y old  Female who presents with a chief complaint of CVA (02 Aug 2019 16:08)      SUBJECTIVE / OVERNIGHT EVENTS:    Events noted.  CONSTITUTIONAL: No fever,  No N/V/      MEDICATIONS  (STANDING):  AQUAPHOR (petrolatum Ointment) 1 Application(s) Topical three times a day  aspirin  chewable 81 milliGRAM(s) Oral daily  atorvastatin 80 milliGRAM(s) Oral at bedtime  carvedilol 6.25 milliGRAM(s) Oral every 12 hours  clopidogrel Tablet 75 milliGRAM(s) Oral daily  dextrose 5%. 1000 milliLiter(s) (50 mL/Hr) IV Continuous <Continuous>  dextrose 50% Injectable 12.5 Gram(s) IV Push once  dextrose 50% Injectable 25 Gram(s) IV Push once  dextrose 50% Injectable 25 Gram(s) IV Push once  enoxaparin Injectable 40 milliGRAM(s) SubCutaneous daily  FLUoxetine Solution 20 milliGRAM(s) Oral daily  hydrocortisone 1% Ointment 1 Application(s) Topical two times a day  insulin glargine Injectable (LANTUS) 38 Unit(s) SubCutaneous at bedtime  insulin lispro (HumaLOG) corrective regimen sliding scale   SubCutaneous every 8 hours  insulin lispro Injectable (HumaLOG) 12 Unit(s) SubCutaneous every 8 hours  levETIRAcetam  Solution 500 milliGRAM(s) Oral two times a day  lisinopril 20 milliGRAM(s) Oral daily  loratadine Syrup 10 milliGRAM(s) Oral <User Schedule>  nystatin    Suspension 749917 Unit(s) Oral two times a day  polyethylene glycol 3350 17 Gram(s) Oral daily    MEDICATIONS  (PRN):  acetaminophen    Suspension .. 650 milliGRAM(s) Enteral Tube every 6 hours PRN Moderate Pain (4 - 6)  bisacodyl Suppository 10 milliGRAM(s) Rectal daily PRN Constipation  dextrose 40% Gel 15 Gram(s) Oral once PRN Blood Glucose LESS THAN 70 milliGRAM(s)/deciliter  diphenhydrAMINE 25 milliGRAM(s) Oral every 8 hours PRN Rash and/or Itching  glucagon  Injectable 1 milliGRAM(s) IntraMuscular once PRN Glucose LESS THAN 70 milligrams/deciliter  nystatin Powder 1 Application(s) Topical two times a day PRN dermatitis        CAPILLARY BLOOD GLUCOSE      POCT Blood Glucose.: 249 mg/dL (05 Aug 2019 21:50)  POCT Blood Glucose.: 221 mg/dL (05 Aug 2019 14:07)  POCT Blood Glucose.: 176 mg/dL (05 Aug 2019 05:58)    I&O's Summary    04 Aug 2019 07:01  -  05 Aug 2019 07:00  --------------------------------------------------------  IN: 2560 mL / OUT: 1950 mL / NET: 610 mL    05 Aug 2019 07:01  -  05 Aug 2019 22:45  --------------------------------------------------------  IN: 480 mL / OUT: 1201 mL / NET: -721 mL        PHYSICAL EXAM:    NECK: Supple, No JVD  CHEST/LUNG: Clear to auscultation bilaterally; No wheezing.  HEART: Regular rate and rhythm; No murmurs, rubs, or gallops  ABDOMEN: Soft, Nontender, Nondistended; Bowel sounds present  EXTREMITIES:   No edema  NEUROLOGY: Awake      LABS:                        10.1   6.12  )-----------( 241      ( 05 Aug 2019 07:52 )             30.8     08-05    137  |  100  |  23  ----------------------------<  162<H>  4.4   |  27  |  0.72    Ca    9.3      05 Aug 2019 06:48              CAPILLARY BLOOD GLUCOSE      POCT Blood Glucose.: 249 mg/dL (05 Aug 2019 21:50)  POCT Blood Glucose.: 221 mg/dL (05 Aug 2019 14:07)  POCT Blood Glucose.: 176 mg/dL (05 Aug 2019 05:58)                RADIOLOGY & ADDITIONAL TESTS:    Imaging Personally Reviewed:    Consultant(s) Notes Reviewed:      Care Discussed with Consultants/Other Providers:

## 2019-08-08 NOTE — PROVIDER CONTACT NOTE (OTHER) - DATE AND TIME:
28-Jul-2019 22:30
04-Jul-2019 06:00
04-Jul-2019 21:50
08-Aug-2019 22:08
12-Jul-2019 18:00
12-Jul-2019 20:51
16-Jul-2019 12:45
16-Jul-2019 23:55
18-Jul-2019 17:00
19-Jul-2019 21:42
23-Jul-2019 22:01
23-Jul-2019 23:20
28-Jul-2019 21:45
28-Jul-2019 23:03
29-Jul-2019 06:45
30-Jun-2019 08:21
22-Jul-2019 07:55
28-Jun-2019 23:10
30-Jun-2019 20:44

## 2019-08-08 NOTE — PROVIDER CONTACT NOTE (OTHER) - SITUATION
Fs-64 repeated FS is 61
At 2150 [employer] called that pt had 13 beats  AIVR
C/o abdominal pain
FS-106 after giving 50% dextrose
FS-89
Patient NIH score went from 17 to 20, patient not moving left side, lethargic at times, does not answer questions correctly
Pt finger sticks q4, finger stick 124, due for lantus 45 units and humalog standing 12 units
Pt is lethargic, not answering any questions and Left upper ext no  movement
Pt neurologically unchanged, VSS. Finger stick glucose 90 at 22:00
Pt not uncooperative during assessments; refusing to speak. Only responding with yes/no nodding of the head.
Pt's stroke scale went from 12-15
Pt. not responding verbally to nurse during medication pass.
patient with new rash and hives
pt , clarification on 44 units lantus and 14 units of humalog orders
pt neurologically unchanged, fs glucose 140
upon neuro assessment, pt refuses to speak, thus unable to complete assessment
Pt had 3 more seizure episodes en route to stroke unit from CT scan. Pt has already received ativan and keppra IV. Pt left pupil 4 fixed. 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
f/s 45, rpt f/s 49
patient had temp 101.4 rectally

## 2019-08-09 LAB
GLUCOSE BLDC GLUCOMTR-MCNC: 177 MG/DL — HIGH (ref 70–99)
GLUCOSE BLDC GLUCOMTR-MCNC: 254 MG/DL — HIGH (ref 70–99)
GLUCOSE BLDC GLUCOMTR-MCNC: 310 MG/DL — HIGH (ref 70–99)

## 2019-08-09 PROCEDURE — 99232 SBSQ HOSP IP/OBS MODERATE 35: CPT

## 2019-08-09 RX ORDER — INSULIN GLARGINE 100 [IU]/ML
46 INJECTION, SOLUTION SUBCUTANEOUS AT BEDTIME
Refills: 0 | Status: DISCONTINUED | OUTPATIENT
Start: 2019-08-09 | End: 2019-08-10

## 2019-08-09 RX ORDER — INSULIN LISPRO 100/ML
16 VIAL (ML) SUBCUTANEOUS EVERY 8 HOURS
Refills: 0 | Status: DISCONTINUED | OUTPATIENT
Start: 2019-08-09 | End: 2019-08-10

## 2019-08-09 RX ADMIN — Medication 1 APPLICATION(S): at 06:17

## 2019-08-09 RX ADMIN — Medication 8: at 22:20

## 2019-08-09 RX ADMIN — Medication 6: at 14:07

## 2019-08-09 RX ADMIN — CARVEDILOL PHOSPHATE 6.25 MILLIGRAM(S): 80 CAPSULE, EXTENDED RELEASE ORAL at 06:18

## 2019-08-09 RX ADMIN — Medication 500000 UNIT(S): at 06:17

## 2019-08-09 RX ADMIN — Medication 500000 UNIT(S): at 17:49

## 2019-08-09 RX ADMIN — Medication 81 MILLIGRAM(S): at 13:04

## 2019-08-09 RX ADMIN — Medication 16 UNIT(S): at 22:20

## 2019-08-09 RX ADMIN — Medication 20 MILLIGRAM(S): at 13:04

## 2019-08-09 RX ADMIN — Medication 1 APPLICATION(S): at 22:00

## 2019-08-09 RX ADMIN — LISINOPRIL 20 MILLIGRAM(S): 2.5 TABLET ORAL at 06:17

## 2019-08-09 RX ADMIN — LEVETIRACETAM 500 MILLIGRAM(S): 250 TABLET, FILM COATED ORAL at 06:18

## 2019-08-09 RX ADMIN — CLOPIDOGREL BISULFATE 75 MILLIGRAM(S): 75 TABLET, FILM COATED ORAL at 13:04

## 2019-08-09 RX ADMIN — Medication 1 APPLICATION(S): at 17:49

## 2019-08-09 RX ADMIN — LEVETIRACETAM 500 MILLIGRAM(S): 250 TABLET, FILM COATED ORAL at 17:49

## 2019-08-09 RX ADMIN — Medication 14 UNIT(S): at 06:19

## 2019-08-09 RX ADMIN — LORATADINE 10 MILLIGRAM(S): 10 TABLET ORAL at 13:04

## 2019-08-09 RX ADMIN — ATORVASTATIN CALCIUM 80 MILLIGRAM(S): 80 TABLET, FILM COATED ORAL at 22:20

## 2019-08-09 RX ADMIN — CARVEDILOL PHOSPHATE 6.25 MILLIGRAM(S): 80 CAPSULE, EXTENDED RELEASE ORAL at 17:49

## 2019-08-09 RX ADMIN — LORATADINE 10 MILLIGRAM(S): 10 TABLET ORAL at 22:20

## 2019-08-09 RX ADMIN — Medication 14 UNIT(S): at 14:07

## 2019-08-09 RX ADMIN — INSULIN GLARGINE 46 UNIT(S): 100 INJECTION, SOLUTION SUBCUTANEOUS at 22:20

## 2019-08-09 RX ADMIN — ENOXAPARIN SODIUM 40 MILLIGRAM(S): 100 INJECTION SUBCUTANEOUS at 13:04

## 2019-08-09 RX ADMIN — Medication 1 APPLICATION(S): at 13:06

## 2019-08-09 RX ADMIN — Medication 2: at 06:18

## 2019-08-09 NOTE — PROGRESS NOTE ADULT - ASSESSMENT
54 year old female w/uncontrolled T2DM (HbA1c 10.4%) c/b retinopathy here w/CVA L sided hemiplegia. On bolus feeds q8h plus PO intake improving per report. BG values are variable due to mismatch between TF/insulin administration and PO intake time.  Will increase Lantus dose further since pt still waking up with BG >150s. Will also increase Humalog with bolus feeds to try to cover for meal time requirements. However, this will be difficult to achieve until pt is able to take meals without requiring bolus feeds. BG goal (100-180mg/dl). Pt to go home on insulin therapy w/family assistance.

## 2019-08-09 NOTE — PROGRESS NOTE ADULT - PROBLEM SELECTOR PLAN 1
-test BG Q8h  -Increase Lantus to 46 units QHS  -Increase Humalog 16 units q8h w/bolus feeds.   -c/w Humalog moderate correction scale Q8h  Discharge plan: Will be determined if pt going home on POs only or POs plus TFs.   -will follow up w/[facility]. Per NP sent rx for insulin.   -Plan discussed with pt/team.  Contact info: [phone] (24/7). pager [phone]

## 2019-08-09 NOTE — PROGRESS NOTE ADULT - SUBJECTIVE AND OBJECTIVE BOX
Patient is a 54y old  Female who presents with a chief complaint of CVA (02 Aug 2019 16:08)      SUBJECTIVE / OVERNIGHT EVENTS:    Events noted.  CONSTITUTIONAL: No fever,  No N/V/      MEDICATIONS  (STANDING):  AQUAPHOR (petrolatum Ointment) 1 Application(s) Topical three times a day  aspirin  chewable 81 milliGRAM(s) Oral daily  atorvastatin 80 milliGRAM(s) Oral at bedtime  carvedilol 6.25 milliGRAM(s) Oral every 12 hours  clopidogrel Tablet 75 milliGRAM(s) Oral daily  dextrose 5%. 1000 milliLiter(s) (50 mL/Hr) IV Continuous <Continuous>  dextrose 50% Injectable 12.5 Gram(s) IV Push once  dextrose 50% Injectable 25 Gram(s) IV Push once  dextrose 50% Injectable 25 Gram(s) IV Push once  enoxaparin Injectable 40 milliGRAM(s) SubCutaneous daily  FLUoxetine Solution 20 milliGRAM(s) Oral daily  hydrocortisone 1% Ointment 1 Application(s) Topical two times a day  insulin glargine Injectable (LANTUS) 38 Unit(s) SubCutaneous at bedtime  insulin lispro (HumaLOG) corrective regimen sliding scale   SubCutaneous every 8 hours  insulin lispro Injectable (HumaLOG) 12 Unit(s) SubCutaneous every 8 hours  levETIRAcetam  Solution 500 milliGRAM(s) Oral two times a day  lisinopril 20 milliGRAM(s) Oral daily  loratadine Syrup 10 milliGRAM(s) Oral <User Schedule>  nystatin    Suspension 190323 Unit(s) Oral two times a day  polyethylene glycol 3350 17 Gram(s) Oral daily    MEDICATIONS  (PRN):  acetaminophen    Suspension .. 650 milliGRAM(s) Enteral Tube every 6 hours PRN Moderate Pain (4 - 6)  bisacodyl Suppository 10 milliGRAM(s) Rectal daily PRN Constipation  dextrose 40% Gel 15 Gram(s) Oral once PRN Blood Glucose LESS THAN 70 milliGRAM(s)/deciliter  diphenhydrAMINE 25 milliGRAM(s) Oral every 8 hours PRN Rash and/or Itching  glucagon  Injectable 1 milliGRAM(s) IntraMuscular once PRN Glucose LESS THAN 70 milligrams/deciliter  nystatin Powder 1 Application(s) Topical two times a day PRN dermatitis        CAPILLARY BLOOD GLUCOSE      POCT Blood Glucose.: 249 mg/dL (05 Aug 2019 21:50)  POCT Blood Glucose.: 221 mg/dL (05 Aug 2019 14:07)  POCT Blood Glucose.: 176 mg/dL (05 Aug 2019 05:58)    I&O's Summary    04 Aug 2019 07:01  -  05 Aug 2019 07:00  --------------------------------------------------------  IN: 2560 mL / OUT: 1950 mL / NET: 610 mL    05 Aug 2019 07:01  -  05 Aug 2019 22:45  --------------------------------------------------------  IN: 480 mL / OUT: 1201 mL / NET: -721 mL        PHYSICAL EXAM:    NECK: Supple, No JVD  CHEST/LUNG: Clear to auscultation bilaterally; No wheezing.  HEART: Regular rate and rhythm; No murmurs, rubs, or gallops  ABDOMEN: Soft, Nontender, Nondistended; Bowel sounds present  EXTREMITIES:   No edema  NEUROLOGY: Awake      LABS:                        10.1   6.12  )-----------( 241      ( 05 Aug 2019 07:52 )             30.8     08-05    137  |  100  |  23  ----------------------------<  162<H>  4.4   |  27  |  0.72    Ca    9.3      05 Aug 2019 06:48              CAPILLARY BLOOD GLUCOSE      POCT Blood Glucose.: 249 mg/dL (05 Aug 2019 21:50)  POCT Blood Glucose.: 221 mg/dL (05 Aug 2019 14:07)  POCT Blood Glucose.: 176 mg/dL (05 Aug 2019 05:58)                RADIOLOGY & ADDITIONAL TESTS:    Imaging Personally Reviewed:    Consultant(s) Notes Reviewed:      Care Discussed with Consultants/Other Providers:

## 2019-08-09 NOTE — CHART NOTE - NSCHARTNOTEFT_GEN_A_CORE
follow up- per d/w pt daughter sent medication prescription to  [facility]; prescription for hospital bed and hoyerlift to SW;  per d/w SW, pt family to come in to meet with team to make sure home care plan is in place for safe discharge home.  Ele Chan(NP)  [facility], [phone]

## 2019-08-09 NOTE — PROGRESS NOTE ADULT - SUBJECTIVE AND OBJECTIVE BOX
Diabetes Follow up note: Saw pt earlier today  Interval Hx: 54 year old female w/uncontrolled T2DM (on insulin PTA) w/retinopathy, morbid obesity here w/CVA s/p PEG now on bolus feeds of Vital and eating dysphagia diet. Per pt and staff report, she is eating more of her meals with assistance Started calorie count today to evaluate PO intake and maybe discontinue TFs. BG levels variable because TFs are administered q8h but meals are given at different times. BG values 100s to 200s depending on PO intake before or after bolus TFs. No hypoglycemia.        Review of Systems:  General: no complaints. States she is hungry and is eating more. Still c/o loose BMs even with present formula.   GI: Tolerating PO/TFs without any N/V/ABD PAIN. + diarrhea.   CV: No CP/SOB  ENDO: No S&Sx of hypoglycemia      MEDS:  atorvastatin 80 milliGRAM(s) Oral at bedtime  insulin glargine Injectable (LANTUS) 44 Unit(s) SubCutaneous at bedtime  insulin lispro (HumaLOG) corrective regimen sliding scale   SubCutaneous every 8 hours  insulin lispro Injectable (HumaLOG) 14 Unit(s) SubCutaneous every 8 hours  nystatin    Suspension 030802 Unit(s) Oral two times a day        Allergies    No Known Allergies        PE:  General: Female lying in bed. NAD.   Vital Signs Last 24 Hrs  T(C): 37.1 (08-09-19 @ 14:27), Max: 37.1 (08-09-19 @ 14:27)  T(F): 98.7 (08-09-19 @ 14:27), Max: 98.7 (08-09-19 @ 14:27)  HR: 73 (08-09-19 @ 14:27) (73 - 77)  BP: 133/73 (08-09-19 @ 14:27) (118/75 - 148/83)  BP(mean): --  RR: 18 (08-09-19 @ 14:27) (18 - 18)  SpO2: 99% (08-09-19 @ 14:27) (97% - 100%)  Abd: Soft, NT, ND, PEG in place> TFs off at time of visit. Obese.   Extremities: Warm. No edema in all 4 exts  Neuro: Awake. L sided hemiparesis    LABS:  POCT Blood Glucose.: 254 mg/dL (08-09-19 @ 14:05)  POCT Blood Glucose.: 177 mg/dL (08-09-19 @ 06:13)  POCT Blood Glucose.: 142 mg/dL (08-08-19 @ 22:01)  POCT Blood Glucose.: 154 mg/dL (08-08-19 @ 14:05)  POCT Blood Glucose.: 182 mg/dL (08-08-19 @ 05:54)  POCT Blood Glucose.: 217 mg/dL (08-07-19 @ 21:54)      Hemoglobin A1C, Whole Blood: 10.4 % <H> [4.0 - 5.6] (06-29-19 @ 09:33)

## 2019-08-10 LAB
GLUCOSE BLDC GLUCOMTR-MCNC: 171 MG/DL — HIGH (ref 70–99)
GLUCOSE BLDC GLUCOMTR-MCNC: 207 MG/DL — HIGH (ref 70–99)
GLUCOSE BLDC GLUCOMTR-MCNC: 214 MG/DL — HIGH (ref 70–99)

## 2019-08-10 PROCEDURE — 99232 SBSQ HOSP IP/OBS MODERATE 35: CPT

## 2019-08-10 RX ORDER — INSULIN LISPRO 100/ML
18 VIAL (ML) SUBCUTANEOUS EVERY 8 HOURS
Refills: 0 | Status: DISCONTINUED | OUTPATIENT
Start: 2019-08-10 | End: 2019-08-11

## 2019-08-10 RX ORDER — INSULIN GLARGINE 100 [IU]/ML
50 INJECTION, SOLUTION SUBCUTANEOUS AT BEDTIME
Refills: 0 | Status: DISCONTINUED | OUTPATIENT
Start: 2019-08-10 | End: 2019-08-11

## 2019-08-10 RX ADMIN — Medication 1 APPLICATION(S): at 22:47

## 2019-08-10 RX ADMIN — Medication 650 MILLIGRAM(S): at 21:30

## 2019-08-10 RX ADMIN — Medication 2: at 06:23

## 2019-08-10 RX ADMIN — LISINOPRIL 20 MILLIGRAM(S): 2.5 TABLET ORAL at 06:21

## 2019-08-10 RX ADMIN — Medication 650 MILLIGRAM(S): at 13:28

## 2019-08-10 RX ADMIN — Medication 500000 UNIT(S): at 17:27

## 2019-08-10 RX ADMIN — CARVEDILOL PHOSPHATE 6.25 MILLIGRAM(S): 80 CAPSULE, EXTENDED RELEASE ORAL at 17:28

## 2019-08-10 RX ADMIN — Medication 18 UNIT(S): at 22:45

## 2019-08-10 RX ADMIN — Medication 1 APPLICATION(S): at 17:28

## 2019-08-10 RX ADMIN — INSULIN GLARGINE 50 UNIT(S): 100 INJECTION, SOLUTION SUBCUTANEOUS at 22:44

## 2019-08-10 RX ADMIN — LEVETIRACETAM 500 MILLIGRAM(S): 250 TABLET, FILM COATED ORAL at 06:21

## 2019-08-10 RX ADMIN — Medication 81 MILLIGRAM(S): at 11:28

## 2019-08-10 RX ADMIN — Medication 650 MILLIGRAM(S): at 14:00

## 2019-08-10 RX ADMIN — Medication 650 MILLIGRAM(S): at 21:00

## 2019-08-10 RX ADMIN — Medication 16 UNIT(S): at 13:29

## 2019-08-10 RX ADMIN — LORATADINE 10 MILLIGRAM(S): 10 TABLET ORAL at 11:28

## 2019-08-10 RX ADMIN — Medication 500000 UNIT(S): at 06:21

## 2019-08-10 RX ADMIN — Medication 20 MILLIGRAM(S): at 11:27

## 2019-08-10 RX ADMIN — Medication 16 UNIT(S): at 06:23

## 2019-08-10 RX ADMIN — CLOPIDOGREL BISULFATE 75 MILLIGRAM(S): 75 TABLET, FILM COATED ORAL at 11:28

## 2019-08-10 RX ADMIN — Medication 1 APPLICATION(S): at 06:22

## 2019-08-10 RX ADMIN — Medication 1 APPLICATION(S): at 13:29

## 2019-08-10 RX ADMIN — LEVETIRACETAM 500 MILLIGRAM(S): 250 TABLET, FILM COATED ORAL at 17:27

## 2019-08-10 RX ADMIN — ATORVASTATIN CALCIUM 80 MILLIGRAM(S): 80 TABLET, FILM COATED ORAL at 21:02

## 2019-08-10 RX ADMIN — CARVEDILOL PHOSPHATE 6.25 MILLIGRAM(S): 80 CAPSULE, EXTENDED RELEASE ORAL at 06:21

## 2019-08-10 RX ADMIN — Medication 4: at 22:45

## 2019-08-10 RX ADMIN — Medication 4: at 13:28

## 2019-08-10 RX ADMIN — Medication 1 APPLICATION(S): at 06:21

## 2019-08-10 RX ADMIN — ENOXAPARIN SODIUM 40 MILLIGRAM(S): 100 INJECTION SUBCUTANEOUS at 11:27

## 2019-08-10 NOTE — PROGRESS NOTE ADULT - PROBLEM SELECTOR PLAN 1
-test BG q8h  -Increase Lantus 50 units QHS  -Increase Humalog 18 units TID w/bolus feeds.   -c/w Humalog moderate correction scale Q8h  -Please have calorie count reviewed on Monday by RD  Discharge plan: Will be determined if pt going home on POs only or POs plus TFs.   -will follow up w/[facility]. Per NP sent rx for insulin.   pager: [phone]

## 2019-08-10 NOTE — PROGRESS NOTE ADULT - ASSESSMENT
54 year old female w/uncontrolled T2DM (HbA1c 10.4%) c/b retinopathy here w/CVA L sided hemiplegia. On bolus feeds q8h plus PO intake improving per report. BG values are variable due to mismatch between TF/insulin administration and PO intake time. Pt now on calorie count, eating most of her meals. BG goal (100-180mg/dl).

## 2019-08-10 NOTE — CHART NOTE - NSCHARTNOTEFT_GEN_A_CORE
Brief Note: Calorie Count Posted    Nutrition consult received for calorie count. Calorie count posted 8/9 by RN. Pt continues with minimal PO intake and bolus feeds.    Diet: Dysphagia 1 DASH, Consistent Carb (no snack) and Bolus Vital 1.2 1440mL/d, 480mL q8 hours via PEG    8/10: 480mL Vital 1.2 thus far.  8/9: 1440mL- 100% of goal.    RD will follow-up when calorie count is completed.

## 2019-08-10 NOTE — PROGRESS NOTE ADULT - SUBJECTIVE AND OBJECTIVE BOX
Diabetes Follow up note:  Interval Hx:    glycemic control     Review of Systems:  General:  GI: Tolerating POs without any N/V/D/ABD PAIN.  CV: No CP/SOB  ENDO: No S&Sx of hypoglycemia  MEDS:  atorvastatin 80 milliGRAM(s) Oral at bedtime  dextrose 40% Gel 15 Gram(s) Oral once PRN  dextrose 50% Injectable 12.5 Gram(s) IV Push once  dextrose 50% Injectable 25 Gram(s) IV Push once  dextrose 50% Injectable 25 Gram(s) IV Push once  glucagon  Injectable 1 milliGRAM(s) IntraMuscular once PRN  insulin glargine Injectable (LANTUS) 46 Unit(s) SubCutaneous at bedtime  insulin lispro (HumaLOG) corrective regimen sliding scale   SubCutaneous every 8 hours  insulin lispro Injectable (HumaLOG) 16 Unit(s) SubCutaneous every 8 hours    nystatin    Suspension 348893 Unit(s) Oral two times a day    Allergies    No Known Allergies    Intolerances      PE:  General:  Vital Signs Last 24 Hrs  T(C): 37.2 (10 Aug 2019 15:43), Max: 37.3 (10 Aug 2019 14:00)  T(F): 98.9 (10 Aug 2019 15:43), Max: 99.1 (10 Aug 2019 14:00)  HR: 80 (10 Aug 2019 15:43) (69 - 86)  BP: 120/65 (10 Aug 2019 15:43) (116/53 - 148/80)  BP(mean): --  RR: 18 (10 Aug 2019 15:43) (18 - 20)  SpO2: 95% (10 Aug 2019 15:43) (95% - 99%)  Abd: Soft, NT,ND,   Extremities: Warm  Neuro: A&O X3    LABS:    POCT Blood Glucose.: 214 mg/dL (08-10-19 @ 13:21)  POCT Blood Glucose.: 171 mg/dL (08-10-19 @ 06:07)  POCT Blood Glucose.: 310 mg/dL (08-09-19 @ 21:58)  POCT Blood Glucose.: 254 mg/dL (08-09-19 @ 14:05)  POCT Blood Glucose.: 177 mg/dL (08-09-19 @ 06:13)  POCT Blood Glucose.: 142 mg/dL (08-08-19 @ 22:01)  POCT Blood Glucose.: 154 mg/dL (08-08-19 @ 14:05)  POCT Blood Glucose.: 182 mg/dL (08-08-19 @ 05:54)  POCT Blood Glucose.: 217 mg/dL (08-07-19 @ 21:54)                  Thyroid Function Tests:      Hemoglobin A1C, Whole Blood: 10.4 % <H> [4.0 - 5.6] (06-29-19 @ 09:33)            Contact number: [place] [phone] or [phone] Diabetes Follow up note:  Interval Hx:  54 year old female w/uncontrolled T2DM (on insulin PTA) w/retinopathy, morbid obesity here w/CVA s/p PEG now on bolus feeds of Vital and eating dysphagia diet. Pt now on calorie count and improved PO intake. BG values remain elevated on present insulin regimen. Pt seen at bedside. Reports eating well but upset that she is continually sent mashed potatoes even though she doesn't like them. Pt expresses desire to return home soon.       Review of Systems:  General:  + arm cramp  GI: Tolerating PO/TFs without any N/V/D/ABD PAIN.  CV: No CP/SOB  ENDO: No S&Sx of hypoglycemia  MEDS:  atorvastatin 80 milliGRAM(s) Oral at bedtime    insulin glargine Injectable (LANTUS) 46 Unit(s) SubCutaneous at bedtime  insulin lispro (HumaLOG) corrective regimen sliding scale   SubCutaneous every 8 hours  insulin lispro Injectable (HumaLOG) 16 Unit(s) SubCutaneous every 8 hours    nystatin    Suspension 530792 Unit(s) Oral two times a day    Allergies    No Known Allergies        PE:  General: Female lying in bed. NAD.   Vital Signs Last 24 Hrs  T(C): 37.2 (10 Aug 2019 15:43), Max: 37.3 (10 Aug 2019 14:00)  T(F): 98.9 (10 Aug 2019 15:43), Max: 99.1 (10 Aug 2019 14:00)  HR: 80 (10 Aug 2019 15:43) (69 - 86)  BP: 120/65 (10 Aug 2019 15:43) (116/53 - 148/80)  BP(mean): --  RR: 18 (10 Aug 2019 15:43) (18 - 20)  SpO2: 95% (10 Aug 2019 15:43) (95% - 99%)  Abd: Soft, NT,ND, Obese. PEG in place.   Extremities: Warm.   Neuro: A&O X3. L sided hemiplegia     LABS:    POCT Blood Glucose.: 214 mg/dL (08-10-19 @ 13:21)  POCT Blood Glucose.: 171 mg/dL (08-10-19 @ 06:07)  POCT Blood Glucose.: 310 mg/dL (08-09-19 @ 21:58)  POCT Blood Glucose.: 254 mg/dL (08-09-19 @ 14:05)  POCT Blood Glucose.: 177 mg/dL (08-09-19 @ 06:13)  POCT Blood Glucose.: 142 mg/dL (08-08-19 @ 22:01)  POCT Blood Glucose.: 154 mg/dL (08-08-19 @ 14:05)  POCT Blood Glucose.: 182 mg/dL (08-08-19 @ 05:54)  POCT Blood Glucose.: 217 mg/dL (08-07-19 @ 21:54)                  Hemoglobin A1C, Whole Blood: 10.4 % <H> [4.0 - 5.6] (06-29-19 @ 09:33)            Contact number: [place] [phone] or [phone]

## 2019-08-10 NOTE — PROGRESS NOTE ADULT - SUBJECTIVE AND OBJECTIVE BOX
Patient is a 54y old  Female who presents with a chief complaint of CVA (02 Aug 2019 16:08)      SUBJECTIVE / OVERNIGHT EVENTS:    Events noted.  CONSTITUTIONAL: No fever,  No N/V/      MEDICATIONS  (STANDING):  AQUAPHOR (petrolatum Ointment) 1 Application(s) Topical three times a day  aspirin  chewable 81 milliGRAM(s) Oral daily  atorvastatin 80 milliGRAM(s) Oral at bedtime  carvedilol 6.25 milliGRAM(s) Oral every 12 hours  clopidogrel Tablet 75 milliGRAM(s) Oral daily  dextrose 5%. 1000 milliLiter(s) (50 mL/Hr) IV Continuous <Continuous>  dextrose 50% Injectable 12.5 Gram(s) IV Push once  dextrose 50% Injectable 25 Gram(s) IV Push once  dextrose 50% Injectable 25 Gram(s) IV Push once  enoxaparin Injectable 40 milliGRAM(s) SubCutaneous daily  FLUoxetine Solution 20 milliGRAM(s) Oral daily  hydrocortisone 1% Ointment 1 Application(s) Topical two times a day  insulin glargine Injectable (LANTUS) 38 Unit(s) SubCutaneous at bedtime  insulin lispro (HumaLOG) corrective regimen sliding scale   SubCutaneous every 8 hours  insulin lispro Injectable (HumaLOG) 12 Unit(s) SubCutaneous every 8 hours  levETIRAcetam  Solution 500 milliGRAM(s) Oral two times a day  lisinopril 20 milliGRAM(s) Oral daily  loratadine Syrup 10 milliGRAM(s) Oral <User Schedule>  nystatin    Suspension 451604 Unit(s) Oral two times a day  polyethylene glycol 3350 17 Gram(s) Oral daily    MEDICATIONS  (PRN):  acetaminophen    Suspension .. 650 milliGRAM(s) Enteral Tube every 6 hours PRN Moderate Pain (4 - 6)  bisacodyl Suppository 10 milliGRAM(s) Rectal daily PRN Constipation  dextrose 40% Gel 15 Gram(s) Oral once PRN Blood Glucose LESS THAN 70 milliGRAM(s)/deciliter  diphenhydrAMINE 25 milliGRAM(s) Oral every 8 hours PRN Rash and/or Itching  glucagon  Injectable 1 milliGRAM(s) IntraMuscular once PRN Glucose LESS THAN 70 milligrams/deciliter  nystatin Powder 1 Application(s) Topical two times a day PRN dermatitis        CAPILLARY BLOOD GLUCOSE      POCT Blood Glucose.: 249 mg/dL (05 Aug 2019 21:50)  POCT Blood Glucose.: 221 mg/dL (05 Aug 2019 14:07)  POCT Blood Glucose.: 176 mg/dL (05 Aug 2019 05:58)    I&O's Summary    04 Aug 2019 07:01  -  05 Aug 2019 07:00  --------------------------------------------------------  IN: 2560 mL / OUT: 1950 mL / NET: 610 mL    05 Aug 2019 07:01  -  05 Aug 2019 22:45  --------------------------------------------------------  IN: 480 mL / OUT: 1201 mL / NET: -721 mL        PHYSICAL EXAM:    NECK: Supple, No JVD  CHEST/LUNG: Clear to auscultation bilaterally; No wheezing.  HEART: Regular rate and rhythm; No murmurs, rubs, or gallops  ABDOMEN: Soft, Nontender, Nondistended; Bowel sounds present  EXTREMITIES:   No edema  NEUROLOGY: Awake      LABS:                        10.1   6.12  )-----------( 241      ( 05 Aug 2019 07:52 )             30.8     08-05    137  |  100  |  23  ----------------------------<  162<H>  4.4   |  27  |  0.72    Ca    9.3      05 Aug 2019 06:48              CAPILLARY BLOOD GLUCOSE      POCT Blood Glucose.: 249 mg/dL (05 Aug 2019 21:50)  POCT Blood Glucose.: 221 mg/dL (05 Aug 2019 14:07)  POCT Blood Glucose.: 176 mg/dL (05 Aug 2019 05:58)                RADIOLOGY & ADDITIONAL TESTS:    Imaging Personally Reviewed:    Consultant(s) Notes Reviewed:      Care Discussed with Consultants/Other Providers:

## 2019-08-11 LAB
ANION GAP SERPL CALC-SCNC: 10 MMOL/L — SIGNIFICANT CHANGE UP (ref 5–17)
BUN SERPL-MCNC: 28 MG/DL — HIGH (ref 7–23)
CALCIUM SERPL-MCNC: 9.4 MG/DL — SIGNIFICANT CHANGE UP (ref 8.4–10.5)
CHLORIDE SERPL-SCNC: 98 MMOL/L — SIGNIFICANT CHANGE UP (ref 96–108)
CO2 SERPL-SCNC: 28 MMOL/L — SIGNIFICANT CHANGE UP (ref 22–31)
CREAT SERPL-MCNC: 0.76 MG/DL — SIGNIFICANT CHANGE UP (ref 0.5–1.3)
GLUCOSE BLDC GLUCOMTR-MCNC: 174 MG/DL — HIGH (ref 70–99)
GLUCOSE BLDC GLUCOMTR-MCNC: 271 MG/DL — HIGH (ref 70–99)
GLUCOSE BLDC GLUCOMTR-MCNC: 309 MG/DL — HIGH (ref 70–99)
GLUCOSE SERPL-MCNC: 249 MG/DL — HIGH (ref 70–99)
HCT VFR BLD CALC: 32 % — LOW (ref 34.5–45)
HGB BLD-MCNC: 10.6 G/DL — LOW (ref 11.5–15.5)
MAGNESIUM SERPL-MCNC: 1.7 MG/DL — SIGNIFICANT CHANGE UP (ref 1.6–2.6)
MCHC RBC-ENTMCNC: 28.9 PG — SIGNIFICANT CHANGE UP (ref 27–34)
MCHC RBC-ENTMCNC: 33.1 GM/DL — SIGNIFICANT CHANGE UP (ref 32–36)
MCV RBC AUTO: 87.2 FL — SIGNIFICANT CHANGE UP (ref 80–100)
PHOSPHATE SERPL-MCNC: 3.6 MG/DL — SIGNIFICANT CHANGE UP (ref 2.5–4.5)
PLATELET # BLD AUTO: 291 K/UL — SIGNIFICANT CHANGE UP (ref 150–400)
POTASSIUM SERPL-MCNC: 4.4 MMOL/L — SIGNIFICANT CHANGE UP (ref 3.5–5.3)
POTASSIUM SERPL-SCNC: 4.4 MMOL/L — SIGNIFICANT CHANGE UP (ref 3.5–5.3)
RBC # BLD: 3.67 M/UL — LOW (ref 3.8–5.2)
RBC # FLD: 14.8 % — HIGH (ref 10.3–14.5)
SODIUM SERPL-SCNC: 136 MMOL/L — SIGNIFICANT CHANGE UP (ref 135–145)
WBC # BLD: 6.34 K/UL — SIGNIFICANT CHANGE UP (ref 3.8–10.5)
WBC # FLD AUTO: 6.34 K/UL — SIGNIFICANT CHANGE UP (ref 3.8–10.5)

## 2019-08-11 PROCEDURE — 99232 SBSQ HOSP IP/OBS MODERATE 35: CPT

## 2019-08-11 RX ORDER — INSULIN GLARGINE 100 [IU]/ML
54 INJECTION, SOLUTION SUBCUTANEOUS AT BEDTIME
Refills: 0 | Status: DISCONTINUED | OUTPATIENT
Start: 2019-08-11 | End: 2019-08-12

## 2019-08-11 RX ORDER — INSULIN LISPRO 100/ML
20 VIAL (ML) SUBCUTANEOUS EVERY 8 HOURS
Refills: 0 | Status: DISCONTINUED | OUTPATIENT
Start: 2019-08-11 | End: 2019-08-12

## 2019-08-11 RX ADMIN — Medication 18 UNIT(S): at 06:30

## 2019-08-11 RX ADMIN — Medication 20 MILLIGRAM(S): at 11:13

## 2019-08-11 RX ADMIN — Medication 500000 UNIT(S): at 05:51

## 2019-08-11 RX ADMIN — Medication 6: at 13:06

## 2019-08-11 RX ADMIN — Medication 500000 UNIT(S): at 17:27

## 2019-08-11 RX ADMIN — LEVETIRACETAM 500 MILLIGRAM(S): 250 TABLET, FILM COATED ORAL at 17:27

## 2019-08-11 RX ADMIN — LEVETIRACETAM 500 MILLIGRAM(S): 250 TABLET, FILM COATED ORAL at 05:51

## 2019-08-11 RX ADMIN — Medication 1 APPLICATION(S): at 17:27

## 2019-08-11 RX ADMIN — Medication 81 MILLIGRAM(S): at 11:13

## 2019-08-11 RX ADMIN — ENOXAPARIN SODIUM 40 MILLIGRAM(S): 100 INJECTION SUBCUTANEOUS at 11:13

## 2019-08-11 RX ADMIN — ATORVASTATIN CALCIUM 80 MILLIGRAM(S): 80 TABLET, FILM COATED ORAL at 22:14

## 2019-08-11 RX ADMIN — LORATADINE 10 MILLIGRAM(S): 10 TABLET ORAL at 11:14

## 2019-08-11 RX ADMIN — LORATADINE 10 MILLIGRAM(S): 10 TABLET ORAL at 06:29

## 2019-08-11 RX ADMIN — Medication 18 UNIT(S): at 13:06

## 2019-08-11 RX ADMIN — Medication 2: at 06:30

## 2019-08-11 RX ADMIN — LISINOPRIL 20 MILLIGRAM(S): 2.5 TABLET ORAL at 05:48

## 2019-08-11 RX ADMIN — Medication 8: at 22:10

## 2019-08-11 RX ADMIN — Medication 1 APPLICATION(S): at 13:06

## 2019-08-11 RX ADMIN — Medication 650 MILLIGRAM(S): at 18:00

## 2019-08-11 RX ADMIN — Medication 1 APPLICATION(S): at 22:14

## 2019-08-11 RX ADMIN — CLOPIDOGREL BISULFATE 75 MILLIGRAM(S): 75 TABLET, FILM COATED ORAL at 11:13

## 2019-08-11 RX ADMIN — INSULIN GLARGINE 54 UNIT(S): 100 INJECTION, SOLUTION SUBCUTANEOUS at 22:09

## 2019-08-11 RX ADMIN — Medication 650 MILLIGRAM(S): at 17:27

## 2019-08-11 RX ADMIN — CARVEDILOL PHOSPHATE 6.25 MILLIGRAM(S): 80 CAPSULE, EXTENDED RELEASE ORAL at 05:48

## 2019-08-11 RX ADMIN — Medication 1 APPLICATION(S): at 05:48

## 2019-08-11 RX ADMIN — Medication 1 APPLICATION(S): at 05:49

## 2019-08-11 RX ADMIN — CARVEDILOL PHOSPHATE 6.25 MILLIGRAM(S): 80 CAPSULE, EXTENDED RELEASE ORAL at 17:27

## 2019-08-11 RX ADMIN — Medication 20 UNIT(S): at 22:10

## 2019-08-11 NOTE — PROGRESS NOTE ADULT - ASSESSMENT
[address] [facility] [address]  History and Physical Exam    Patient: Maria Esther Barreto MRN: [id number]  SSN: [id number]    YOB: 1959  Age: 62 y.o. Sex: female      Subjective:      Chief Complaint: right knee pain    History of Present Illness:  Patient complains of right knee pain and difficulty ambulating. Past Medical History:   Diagnosis Date    Allergic rhinitis Dr. Ishaan Freedman 2011     Colon adenoma 08/2016    Dr Fina Pearson 2007, 2011, 8/16    DJD (degenerative joint disease)     B knees Dr. Domingo Nickerson FHx: heart disease     [facility]es[facility]    Hyperlipidemia     calculated risk score 1.9% (2/13); elected to take statins due to Fhx, elev hscrp/ldl-p but intol of 4 diff ones    Hypertension     resolved    Obesity (BMI 30.0-34.9)     peak weight 189 lbs, bmi 31.5 from 12/16    Overweight (BMI 25.0-29.9)     saw Dr Viv Day; qsymia ineffective 6/14-6/15    Sjogren's disease, probable Dr. Cassidy Bagley 8/12     Past Surgical History:   Procedure Laterality Date    HX APPENDECTOMY  1980s    HX BREAST BIOPSY      right    HX CHOLECYSTECTOMY  1980s    HX COLONOSCOPY      [facility] 2007, Dr. Ana Pearson 2011, 8/16 adenoma    HX TUBAL LIGATION       Social History     Occupational History     [employer]      Social History Main Topics    Smoking status: Former Smoker     Packs/day: 0.50    Smokeless tobacco: Never Used    Alcohol use 0.6 oz/week     1 Glasses of wine per week    Drug use: No    Sexual activity: Not on file     Prior to Admission medications    Medication Sig Start Date End Date Taking? Authorizing Provider   ACETAMINOPHEN/DIPHENHYDRAMINE (TYLENOL PM PO) Take  by mouth nightly. Historical Provider   acetaminophen (TYLENOL ARTHRITIS PAIN) 650 mg TbER Take 650 mg by mouth every eight (8) hours.     Historical Provider   pantoprazole (PROTONIX) 40 mg tablet take 1 tablet by mouth once daily  Patient taking differently: daily. take 1 tablet by mouth once daily  Indications: gastroesophageal reflux disease 6/30/15   Jose Vincent MD   [facility]) 30 mg capsule take 1 capsule by mouth three times a day  Patient taking differently: 60 mg every morning. take 1 capsule by mouth three times a day  Indications: XEROSTOMIA SECONDARY TO SJOGREN'S SYNDROME 6/30/15   Jose Vincent MD   multivitamin (ONE A DAY) tablet Take 1 Tab by mouth daily. Historical Provider   CALCIUM CARBONATE/VITAMIN D3 (CALCIUM WITH VITAMIN D PO) Take  by mouth. Historical Provider       Allergies: Allergies   Allergen Reactions    Benazepril Cough    Chantix [Varenicline] Nausea and Vomiting    Codeine Nausea and Vomiting and Other (comments)     Abdominal Pain    Crestor [Rosuvastatin] Myalgia    Lipitor [Atorvastatin] Myalgia    Pravastatin Myalgia    Prevacid [Lansoprazole] Other (comments)     Constipation    Zocor [Simvastatin] Myalgia      Family History: arthritis, heart disease, hypertension    Review of Systems:  A comprehensive review of systems was negative except for that written in the History of Present Illness. Objective:       Physical Exam:  HEENT: Normocephalic, atraumatic  Lungs:  Clear to auscultation  Heart:   Regular rate and rhythm  Abdomen: Soft  Extremities:  Pain with range of motion of the right knee  Neurological: Grossly neurovascularly intact    Assessment:      Arthritis of the right knee. Plan:       The patient has failed previous efforts of conservative management to include non-steroidal anti-inflammatory medications. Due to the fact that conservative efforts failed, the patient became a candidate for surgical intervention. Proceed with scheduled right total knee arthroplasty. The various methods of treatment have been discussed with the patient and family.  After consideration of risks, benefits, and other options for treatment, the patient has consented 54 year old female w/uncontrolled T2DM (HbA1c 10.4%) c/b retinopathy here w/CVA L sided hemiplegia. On bolus feeds q8h plus PO intake improving per report. to surgical interventions. Questions were answered and preoperative teaching was done by Dr. Jennie Youssef.      Signed By: Mitali Schaefer PA-C     March 8, 2018 54 year old female w/uncontrolled T2DM (HbA1c 10.4%) c/b retinopathy here w/CVA L sided hemiplegia. On bolus feeds q8h plus PO intake improving per report. Now on calorie count. Hyperglycemic on present insulin regimen. Will need to evaluate if pt can have tube feeds discontinued prior to discharge. Tolerating POs w/out issue. BG goal (100-180mg/dl).

## 2019-08-11 NOTE — PROGRESS NOTE ADULT - SUBJECTIVE AND OBJECTIVE BOX
Patient is a 54y old  Female who presents with a chief complaint of CVA (02 Aug 2019 16:08)      SUBJECTIVE / OVERNIGHT EVENTS:    Events noted.  CONSTITUTIONAL: No fever,  No N/V/      MEDICATIONS  (STANDING):  AQUAPHOR (petrolatum Ointment) 1 Application(s) Topical three times a day  aspirin  chewable 81 milliGRAM(s) Oral daily  atorvastatin 80 milliGRAM(s) Oral at bedtime  carvedilol 6.25 milliGRAM(s) Oral every 12 hours  clopidogrel Tablet 75 milliGRAM(s) Oral daily  dextrose 5%. 1000 milliLiter(s) (50 mL/Hr) IV Continuous <Continuous>  dextrose 50% Injectable 12.5 Gram(s) IV Push once  dextrose 50% Injectable 25 Gram(s) IV Push once  dextrose 50% Injectable 25 Gram(s) IV Push once  enoxaparin Injectable 40 milliGRAM(s) SubCutaneous daily  FLUoxetine Solution 20 milliGRAM(s) Oral daily  hydrocortisone 1% Ointment 1 Application(s) Topical two times a day  insulin glargine Injectable (LANTUS) 38 Unit(s) SubCutaneous at bedtime  insulin lispro (HumaLOG) corrective regimen sliding scale   SubCutaneous every 8 hours  insulin lispro Injectable (HumaLOG) 12 Unit(s) SubCutaneous every 8 hours  levETIRAcetam  Solution 500 milliGRAM(s) Oral two times a day  lisinopril 20 milliGRAM(s) Oral daily  loratadine Syrup 10 milliGRAM(s) Oral <User Schedule>  nystatin    Suspension 192466 Unit(s) Oral two times a day  polyethylene glycol 3350 17 Gram(s) Oral daily    MEDICATIONS  (PRN):  acetaminophen    Suspension .. 650 milliGRAM(s) Enteral Tube every 6 hours PRN Moderate Pain (4 - 6)  bisacodyl Suppository 10 milliGRAM(s) Rectal daily PRN Constipation  dextrose 40% Gel 15 Gram(s) Oral once PRN Blood Glucose LESS THAN 70 milliGRAM(s)/deciliter  diphenhydrAMINE 25 milliGRAM(s) Oral every 8 hours PRN Rash and/or Itching  glucagon  Injectable 1 milliGRAM(s) IntraMuscular once PRN Glucose LESS THAN 70 milligrams/deciliter  nystatin Powder 1 Application(s) Topical two times a day PRN dermatitis        CAPILLARY BLOOD GLUCOSE      POCT Blood Glucose.: 249 mg/dL (05 Aug 2019 21:50)  POCT Blood Glucose.: 221 mg/dL (05 Aug 2019 14:07)  POCT Blood Glucose.: 176 mg/dL (05 Aug 2019 05:58)    I&O's Summary    04 Aug 2019 07:01  -  05 Aug 2019 07:00  --------------------------------------------------------  IN: 2560 mL / OUT: 1950 mL / NET: 610 mL    05 Aug 2019 07:01  -  05 Aug 2019 22:45  --------------------------------------------------------  IN: 480 mL / OUT: 1201 mL / NET: -721 mL        PHYSICAL EXAM:    NECK: Supple, No JVD  CHEST/LUNG: Clear to auscultation bilaterally; No wheezing.  HEART: Regular rate and rhythm; No murmurs, rubs, or gallops  ABDOMEN: Soft, Nontender, Nondistended; Bowel sounds present  EXTREMITIES:   No edema  NEUROLOGY: Awake      LABS:                        10.1   6.12  )-----------( 241      ( 05 Aug 2019 07:52 )             30.8     08-05    137  |  100  |  23  ----------------------------<  162<H>  4.4   |  27  |  0.72    Ca    9.3      05 Aug 2019 06:48              CAPILLARY BLOOD GLUCOSE      POCT Blood Glucose.: 249 mg/dL (05 Aug 2019 21:50)  POCT Blood Glucose.: 221 mg/dL (05 Aug 2019 14:07)  POCT Blood Glucose.: 176 mg/dL (05 Aug 2019 05:58)                RADIOLOGY & ADDITIONAL TESTS:    Imaging Personally Reviewed:    Consultant(s) Notes Reviewed:      Care Discussed with Consultants/Other Providers:

## 2019-08-11 NOTE — PROGRESS NOTE ADULT - SUBJECTIVE AND OBJECTIVE BOX
Diabetes Follow up note:  Interval Hx:  54 year old female w/uncontrolled T2DM (on insulin PTA) w/retinopathy, morbid obesity here w/CVA s/p PEG now on bolus feeds of Vital and eating dysphagia diet.       Review of Systems:  General:  GI: Tolerating POs without any N/V/D/ABD PAIN.  CV: No CP/SOB  ENDO: No S&Sx of hypoglycemia  MEDS:  atorvastatin 80 milliGRAM(s) Oral at bedtime    insulin glargine Injectable (LANTUS) 50 Unit(s) SubCutaneous at bedtime  insulin lispro (HumaLOG) corrective regimen sliding scale   SubCutaneous every 8 hours  insulin lispro Injectable (HumaLOG) 18 Unit(s) SubCutaneous every 8 hours    nystatin    Suspension 476324 Unit(s) Oral two times a day    Allergies    No Known Allergies        PE:  General:   Vital Signs Last 24 Hrs  T(C): 36.7 (11 Aug 2019 11:21), Max: 37.2 (10 Aug 2019 15:43)  T(F): 98.1 (11 Aug 2019 11:21), Max: 98.9 (10 Aug 2019 15:43)  HR: 76 (11 Aug 2019 11:21) (76 - 87)  BP: 100/74 (11 Aug 2019 11:21) (100/74 - 130/60)  BP(mean): --  RR: 18 (11 Aug 2019 11:21) (18 - 20)  SpO2: 97% (11 Aug 2019 11:21) (95% - 97%)  Abd: Soft, NT,ND,   Extremities: Warm  Neuro: A&O X3    LABS:    POCT Blood Glucose.: 271 mg/dL (08-11-19 @ 12:51)  POCT Blood Glucose.: 174 mg/dL (08-11-19 @ 06:03)  POCT Blood Glucose.: 207 mg/dL (08-10-19 @ 22:01)  POCT Blood Glucose.: 214 mg/dL (08-10-19 @ 13:21)  POCT Blood Glucose.: 171 mg/dL (08-10-19 @ 06:07)  POCT Blood Glucose.: 310 mg/dL (08-09-19 @ 21:58)  POCT Blood Glucose.: 254 mg/dL (08-09-19 @ 14:05)  POCT Blood Glucose.: 177 mg/dL (08-09-19 @ 06:13)  POCT Blood Glucose.: 142 mg/dL (08-08-19 @ 22:01)  POCT Blood Glucose.: 154 mg/dL (08-08-19 @ 14:05)                            10.6   6.34  )-----------( 291      ( 11 Aug 2019 12:23 )             32.0       08-11    136  |  98  |  28<H>  ----------------------------<  249<H>  4.4   |  28  |  0.76    Ca    9.4      11 Aug 2019 10:31  Phos  3.6     08-11  Mg     1.7     08-11            Hemoglobin A1C, Whole Blood: 10.4 % <H> [4.0 - 5.6] (06-29-19 @ 09:33)            Contact number: [place] [phone] or [phone] Diabetes Follow up note:  Interval Hx:  54 year old female w/uncontrolled T2DM (on insulin PTA) w/retinopathy, morbid obesity here w/CVA s/p PEG now on bolus feeds of Vital and eating dysphagia diet. Pt now on calorie count and improved PO intake. BG values remain elevated on present insulin regimen. Pt seen at bedside. Reports good appetite, continuing to have hand cramps. BG tested after eating and prior to bolus feed today.       Review of Systems:  General: as above.   GI: Tolerating PO/TFs without any N/V/D/ABD PAIN.  CV: No CP/SOB  ENDO: No S&Sx of hypoglycemia  MEDS:  atorvastatin 80 milliGRAM(s) Oral at bedtime    insulin glargine Injectable (LANTUS) 50 Unit(s) SubCutaneous at bedtime  insulin lispro (HumaLOG) corrective regimen sliding scale   SubCutaneous every 8 hours  insulin lispro Injectable (HumaLOG) 18 Unit(s) SubCutaneous every 8 hours    nystatin    Suspension 886575 Unit(s) Oral two times a day    Allergies    No Known Allergies        PE:  General: Female lying in bed. NAD.   Vital Signs Last 24 Hrs  T(C): 36.7 (11 Aug 2019 11:21), Max: 37.2 (10 Aug 2019 15:43)  T(F): 98.1 (11 Aug 2019 11:21), Max: 98.9 (10 Aug 2019 15:43)  HR: 76 (11 Aug 2019 11:21) (76 - 87)  BP: 100/74 (11 Aug 2019 11:21) (100/74 - 130/60)  BP(mean): --  RR: 18 (11 Aug 2019 11:21) (18 - 20)  SpO2: 97% (11 Aug 2019 11:21) (95% - 97%)  Abd: Soft, NT,ND, Obese. PEG in place.   Extremities: Warm  Neuro: A&O X3. L sided hemiplegia.     LABS:    POCT Blood Glucose.: 271 mg/dL (08-11-19 @ 12:51)  POCT Blood Glucose.: 174 mg/dL (08-11-19 @ 06:03)  POCT Blood Glucose.: 207 mg/dL (08-10-19 @ 22:01)  POCT Blood Glucose.: 214 mg/dL (08-10-19 @ 13:21)  POCT Blood Glucose.: 171 mg/dL (08-10-19 @ 06:07)  POCT Blood Glucose.: 310 mg/dL (08-09-19 @ 21:58)  POCT Blood Glucose.: 254 mg/dL (08-09-19 @ 14:05)  POCT Blood Glucose.: 177 mg/dL (08-09-19 @ 06:13)  POCT Blood Glucose.: 142 mg/dL (08-08-19 @ 22:01)  POCT Blood Glucose.: 154 mg/dL (08-08-19 @ 14:05)                            10.6   6.34  )-----------( 291      ( 11 Aug 2019 12:23 )             32.0       08-11    136  |  98  |  28<H>  ----------------------------<  249<H>  4.4   |  28  |  0.76    Ca    9.4      11 Aug 2019 10:31  Phos  3.6     08-11  Mg     1.7     08-11            Hemoglobin A1C, Whole Blood: 10.4 % <H> [4.0 - 5.6] (06-29-19 @ 09:33)            Contact number: [place] [phone] or [phone]

## 2019-08-11 NOTE — PROGRESS NOTE ADULT - PROBLEM SELECTOR PLAN 1
-test BG AC/HS  -Increase Lantus 54 units QHS  -Increase Humalog 20 units q8h w/bolus feeds  -c/w Humalog moderate correction scale q8h while on bolus feeds.   -Please have calorie count reviewed on Monday by RD  -If tube feeds discontinued, please notify endocrine team as we will need at adjust insulin regimen.   Discharge plan: Will be determined if pt going home on POs only or POs plus TFs.   -will follow up w/[facility]. Per NP sent rx for insulin.   pager: [phone].

## 2019-08-12 LAB
ANION GAP SERPL CALC-SCNC: 9 MMOL/L — SIGNIFICANT CHANGE UP (ref 5–17)
BUN SERPL-MCNC: 28 MG/DL — HIGH (ref 7–23)
CALCIUM SERPL-MCNC: 9.5 MG/DL — SIGNIFICANT CHANGE UP (ref 8.4–10.5)
CHLORIDE SERPL-SCNC: 95 MMOL/L — LOW (ref 96–108)
CO2 SERPL-SCNC: 28 MMOL/L — SIGNIFICANT CHANGE UP (ref 22–31)
CREAT SERPL-MCNC: 0.75 MG/DL — SIGNIFICANT CHANGE UP (ref 0.5–1.3)
GLUCOSE BLDC GLUCOMTR-MCNC: 148 MG/DL — HIGH (ref 70–99)
GLUCOSE BLDC GLUCOMTR-MCNC: 169 MG/DL — HIGH (ref 70–99)
GLUCOSE BLDC GLUCOMTR-MCNC: 176 MG/DL — HIGH (ref 70–99)
GLUCOSE BLDC GLUCOMTR-MCNC: 204 MG/DL — HIGH (ref 70–99)
GLUCOSE SERPL-MCNC: 220 MG/DL — HIGH (ref 70–99)
POTASSIUM SERPL-MCNC: 4.3 MMOL/L — SIGNIFICANT CHANGE UP (ref 3.5–5.3)
POTASSIUM SERPL-SCNC: 4.3 MMOL/L — SIGNIFICANT CHANGE UP (ref 3.5–5.3)
SODIUM SERPL-SCNC: 132 MMOL/L — LOW (ref 135–145)

## 2019-08-12 PROCEDURE — 99232 SBSQ HOSP IP/OBS MODERATE 35: CPT

## 2019-08-12 RX ORDER — INSULIN LISPRO 100/ML
10 VIAL (ML) SUBCUTANEOUS
Refills: 0 | Status: DISCONTINUED | OUTPATIENT
Start: 2019-08-12 | End: 2019-08-26

## 2019-08-12 RX ORDER — INSULIN LISPRO 100/ML
VIAL (ML) SUBCUTANEOUS AT BEDTIME
Refills: 0 | Status: DISCONTINUED | OUTPATIENT
Start: 2019-08-12 | End: 2019-08-16

## 2019-08-12 RX ORDER — INSULIN GLARGINE 100 [IU]/ML
30 INJECTION, SOLUTION SUBCUTANEOUS AT BEDTIME
Refills: 0 | Status: DISCONTINUED | OUTPATIENT
Start: 2019-08-12 | End: 2019-08-26

## 2019-08-12 RX ORDER — INSULIN LISPRO 100/ML
VIAL (ML) SUBCUTANEOUS
Refills: 0 | Status: DISCONTINUED | OUTPATIENT
Start: 2019-08-12 | End: 2019-08-12

## 2019-08-12 RX ORDER — INSULIN LISPRO 100/ML
VIAL (ML) SUBCUTANEOUS
Refills: 0 | Status: DISCONTINUED | OUTPATIENT
Start: 2019-08-12 | End: 2019-08-16

## 2019-08-12 RX ADMIN — Medication 650 MILLIGRAM(S): at 22:08

## 2019-08-12 RX ADMIN — LEVETIRACETAM 500 MILLIGRAM(S): 250 TABLET, FILM COATED ORAL at 06:43

## 2019-08-12 RX ADMIN — LORATADINE 10 MILLIGRAM(S): 10 TABLET ORAL at 08:13

## 2019-08-12 RX ADMIN — LEVETIRACETAM 500 MILLIGRAM(S): 250 TABLET, FILM COATED ORAL at 18:03

## 2019-08-12 RX ADMIN — Medication 650 MILLIGRAM(S): at 10:30

## 2019-08-12 RX ADMIN — Medication 650 MILLIGRAM(S): at 21:39

## 2019-08-12 RX ADMIN — Medication 10 UNIT(S): at 18:02

## 2019-08-12 RX ADMIN — CLOPIDOGREL BISULFATE 75 MILLIGRAM(S): 75 TABLET, FILM COATED ORAL at 12:53

## 2019-08-12 RX ADMIN — Medication 81 MILLIGRAM(S): at 12:53

## 2019-08-12 RX ADMIN — Medication 1 APPLICATION(S): at 18:03

## 2019-08-12 RX ADMIN — Medication 500000 UNIT(S): at 18:03

## 2019-08-12 RX ADMIN — Medication 1 APPLICATION(S): at 06:42

## 2019-08-12 RX ADMIN — LISINOPRIL 20 MILLIGRAM(S): 2.5 TABLET ORAL at 06:44

## 2019-08-12 RX ADMIN — Medication 2: at 06:41

## 2019-08-12 RX ADMIN — ATORVASTATIN CALCIUM 80 MILLIGRAM(S): 80 TABLET, FILM COATED ORAL at 21:39

## 2019-08-12 RX ADMIN — CARVEDILOL PHOSPHATE 6.25 MILLIGRAM(S): 80 CAPSULE, EXTENDED RELEASE ORAL at 18:03

## 2019-08-12 RX ADMIN — Medication 20 UNIT(S): at 06:41

## 2019-08-12 RX ADMIN — Medication 1 APPLICATION(S): at 14:09

## 2019-08-12 RX ADMIN — ENOXAPARIN SODIUM 40 MILLIGRAM(S): 100 INJECTION SUBCUTANEOUS at 12:53

## 2019-08-12 RX ADMIN — Medication 4: at 14:08

## 2019-08-12 RX ADMIN — Medication 1 APPLICATION(S): at 06:43

## 2019-08-12 RX ADMIN — Medication 20 MILLIGRAM(S): at 12:53

## 2019-08-12 RX ADMIN — CARVEDILOL PHOSPHATE 6.25 MILLIGRAM(S): 80 CAPSULE, EXTENDED RELEASE ORAL at 06:44

## 2019-08-12 RX ADMIN — LORATADINE 10 MILLIGRAM(S): 10 TABLET ORAL at 06:42

## 2019-08-12 RX ADMIN — LORATADINE 10 MILLIGRAM(S): 10 TABLET ORAL at 22:20

## 2019-08-12 RX ADMIN — INSULIN GLARGINE 30 UNIT(S): 100 INJECTION, SOLUTION SUBCUTANEOUS at 22:20

## 2019-08-12 RX ADMIN — Medication 500000 UNIT(S): at 08:12

## 2019-08-12 RX ADMIN — Medication 1 APPLICATION(S): at 21:40

## 2019-08-12 RX ADMIN — Medication 10 UNIT(S): at 14:08

## 2019-08-12 RX ADMIN — Medication 650 MILLIGRAM(S): at 09:59

## 2019-08-12 NOTE — PROGRESS NOTE ADULT - PROBLEM SELECTOR PLAN 1
-test BG AC/HS  -Change Lantus to 30 units QHS  -Change Humalog to 10 units ac meals. HOLD IF NOT EATING  -c/w Humalog moderate correction scale but adjust to ac and hs.   Discharge plan: Basal/bolus. Doses TBD  -will follow up w/[facility]. Per NP sent rx for insulin.   -Plan discussed with pt/team.  Contact info: [phone] (24/7). pager [phone]

## 2019-08-12 NOTE — PROGRESS NOTE ADULT - ASSESSMENT
54 year old female w/uncontrolled T2DM (HbA1c 10.4%) c/b retinopathy here w/CVA L sided hemiplegia. On bolus feeds q8h plus PO intake improved by calorie count eval. Per team will discontinue bolus feed and pt will continue Dysphagia diet alone. Remains hyperglycemic while on present insulin regimen but expecting BG to improve now that bolus feed with stop. Will decrease basal/bolus dose and re-evalute insulin need while on POs alone.  Pt will still require insulin therapy upon discharge due to insulin resistance. BG goal (100-180mg/dl).

## 2019-08-12 NOTE — CHART NOTE - NSCHARTNOTEFT_GEN_A_CORE
Nutrition Follow Up Note  Patient seen for: calorie count follow up    Source: chart reviewed events noted:   54 year old female DM (on insulin PTA) w/retinopathy, morbid obesity here w/CVA, neurological exam significant for L sided hemiplegia, R gaze preference, L facial droop.  CTA head/neck showing R ICA occlusion,  L hemiplegia now improving with alertness, communication and eating      Diet : Dysphagia  1 nectar thickened liquids DASH with supplemental Enteral feeds via PEG     Patient reports: no abdominal issues; patient tolerating PO diet well, feeds self with  right hand; accepts nectar thickened liquids  GI improved  BM x3-4 daily        Source for PO intake: calorie count complete:    Day #1 patient consumed 100% of  3 meals                                                                         Day #2 patient consumed 75% of meat at dinner, other 2 meals 100% consumption                                                                         Day #3 patient consumed 100% of 3 meals      Enteral  Nutrition: Vital 1.2 bolus 480ml 3 x daily       Daily Weight in k.7 (08-07)  Dosing Weight 136.9kg desirable   weight loss noted 13 lbs     IBW= 130lbs  +/- 10%      Pertinent Medications: MEDICATIONS  (STANDING):  AQUAPHOR (petrolatum Ointment) 1 Application(s) Topical three times a day  aspirin  chewable 81 milliGRAM(s) Oral daily  atorvastatin 80 milliGRAM(s) Oral at bedtime  carvedilol 6.25 milliGRAM(s) Oral every 12 hours  clopidogrel Tablet 75 milliGRAM(s) Oral daily  dextrose 5%. 1000 milliLiter(s) (50 mL/Hr) IV Continuous <Continuous>  dextrose 50% Injectable 12.5 Gram(s) IV Push once  dextrose 50% Injectable 25 Gram(s) IV Push once  dextrose 50% Injectable 25 Gram(s) IV Push once  enoxaparin Injectable 40 milliGRAM(s) SubCutaneous daily  FLUoxetine Solution 20 milliGRAM(s) Oral daily  hydrocortisone 1% Ointment 1 Application(s) Topical two times a day  insulin glargine Injectable (LANTUS) 54 Unit(s) SubCutaneous at bedtime  insulin lispro (HumaLOG) corrective regimen sliding scale   SubCutaneous every 8 hours  insulin lispro Injectable (HumaLOG) 20 Unit(s) SubCutaneous every 8 hours  levETIRAcetam  Solution 500 milliGRAM(s) Oral two times a day  lisinopril 20 milliGRAM(s) Oral daily  loratadine Syrup 10 milliGRAM(s) Oral <User Schedule>  nystatin    Suspension 515107 Unit(s) Oral two times a day  polyethylene glycol 3350 17 Gram(s) Oral daily    MEDICATIONS  (PRN):  acetaminophen    Suspension .. 650 milliGRAM(s) Enteral Tube every 6 hours PRN Moderate Pain (4 - 6)  bisacodyl Suppository 10 milliGRAM(s) Rectal daily PRN Constipation  dextrose 40% Gel 15 Gram(s) Oral once PRN Blood Glucose LESS THAN 70 milliGRAM(s)/deciliter  diphenhydrAMINE 25 milliGRAM(s) Oral every 8 hours PRN Rash and/or Itching  glucagon  Injectable 1 milliGRAM(s) IntraMuscular once PRN Glucose LESS THAN 70 milligrams/deciliter  nystatin Powder 1 Application(s) Topical two times a day PRN dermatitis    Pertinent Labs:  @ 09:57: Na 132<L>, BUN 28<H>, Cr 0.75, <H>, K+ 4.3, Phos --, Mg --, Alk Phos --, ALT/SGPT --, AST/SGOT --, HbA1c --    Finger Sticks:  POCT Blood Glucose.: 204 mg/dL ( @ 13:19)  POCT Blood Glucose.: 176 mg/dL ( @ 05:59)  POCT Blood Glucose.: 309 mg/dL ( @ 21:33)      Skin per nursing documentation: MAD  Edema: L arm, R arm 2 +    Estimated Needs:   [X ] no change since previous assessment  [ ] recalculated:     Previous Nutrition Diagnosis:   Swallow Difficulty  Nutrition Diagnosis is: ongoing , addressed with Dysphagia diet  New Nutrtion diagnosis: Mild malnutrition  related to BMI>40    Recommend  1) discontinue bolus feeding  2) supplement/meet nutrient needs with added oral Glucerna 1.2  nectar thickened 2x daily; monitor acceptance/tolerance  3) assist with menus, tray prep , feeding as needed due to L sided hemiplegia  4) monitor intake, patient expected to consume 100% of meals, 100% glucerna 1.2    5) adjust insulin for lower calorie intake  Discussed with Ele PORTILLO, Kaci RN, team  Monitoring and Evaluation:     Continue to monitor Nutritional intake, Tolerance to diet prescription, weights, labs, skin integrity    RD remains available upon request and will follow up per protocol

## 2019-08-12 NOTE — PROGRESS NOTE ADULT - SUBJECTIVE AND OBJECTIVE BOX
Diabetes Follow up note: Saw pt earlier today  Interval Hx: 54 year old female w/uncontrolled T2DM (on insulin PTA) w/retinopathy, morbid obesity here w/CVA s/p PEG now on bolus feeds of Vital til this am. Calorie count eval done and  bolus TFs will be discontinued as per primary team. Pt reports feeling better. No complains at time of visit. BG values remain elevated while on present insulin regimen due to improved PO intake and the mismatch between POs and bolus TFs timing.     Review of Systems:  General: as above.   GI: Tolerating PO/TFs without any N/V/D/ABD PAIN.  CV: No CP/SOB  ENDO: No S&Sx of hypoglycemia      MEDS:  atorvastatin 80 milliGRAM(s) Oral at bedtime  insulin glargine Injectable (LANTUS) 54 Unit(s) SubCutaneous at bedtime  insulin lispro (HumaLOG) corrective regimen sliding scale   SubCutaneous every 8 hours  insulin lispro Injectable (HumaLOG) 20 Unit(s) SubCutaneous every 8 hours  nystatin    Suspension 118271 Unit(s) Oral two times a day    Allergies    No Known Allergies    PE:  General: Female lying in bed in NAD. Staff with pt.  Vital Signs Last 24 Hrs  T(C): 37.1 (08-12-19 @ 10:49), Max: 37.4 (08-11-19 @ 22:00)  T(F): 98.7 (08-12-19 @ 10:49), Max: 99.4 (08-11-19 @ 22:00)  HR: 77 (08-12-19 @ 10:49) (77 - 89)  BP: 122/52 (08-12-19 @ 10:49) (96/63 - 122/52)  BP(mean): --  RR: 18 (08-12-19 @ 10:49) (18 - 20)  SpO2: 98% (08-12-19 @ 10:49) (95% - 98%)  Abd: Soft, NT,ND, Obese. PEG in place. Tfs off at time of visit   Extremities: Warm, no edema noted in all 4 exts.  Neuro: A&O X3. L sided hemiplegia.     LABS:  POCT Blood Glucose.: 204 mg/dL (08-12-19 @ 13:19)  POCT Blood Glucose.: 176 mg/dL (08-12-19 @ 05:59)  POCT Blood Glucose.: 309 mg/dL (08-11-19 @ 21:33)  POCT Blood Glucose.: 271 mg/dL (08-11-19 @ 12:51)  POCT Blood Glucose.: 174 mg/dL (08-11-19 @ 06:03)  POCT Blood Glucose.: 207 mg/dL (08-10-19 @ 22:01)                      10.6   6.34  )-----------( 291      ( 11 Aug 2019 12:23 )             32.0     08-12    132<L>  |  95<L>  |  28<H>  ----------------------------<  220<H>  4.3   |  28  |  0.75    Ca    9.5      12 Aug 2019 09:57  Phos  3.6     08-11  Mg     1.7     08-11      Hemoglobin A1C, Whole Blood: 10.4 % <H> [4.0 - 5.6] (06-29-19 @ 09:33)

## 2019-08-12 NOTE — CHART NOTE - NSCHARTNOTEFT_GEN_A_CORE
Upon Nutritional Assessment by the Registered Dietitian your patient was determined to meet criteria / has evidence of the following diagnosis/diagnoses:          [X ]  Mild Protein Calorie Malnutrition        [ ]  Moderate Protein Calorie Malnutrition        [ ] Severe Protein Calorie Malnutrition        [ ] Unspecified Protein Calorie Malnutrition        [ ] Underweight / BMI <19        [X ] Morbid Obesity / BMI > 40      Findings as based on:  [X ] Comprehensive nutrition assessment   [ ] Nutrition Focused Physical Exam  [ ] Other:       Nutrition Plan/Recommendations: Reinforce diet adherence/diet education.         PROVIDER Section:     By signing this assessment you are acknowledging and agree with the diagnosis/diagnoses assigned by the Registered Dietitian    Comments:

## 2019-08-12 NOTE — PROGRESS NOTE ADULT - SUBJECTIVE AND OBJECTIVE BOX
Patient is a 54y old  Female who presents with a chief complaint of CVA (02 Aug 2019 16:08)      SUBJECTIVE / OVERNIGHT EVENTS:    Events noted.  CONSTITUTIONAL: No fever,  No N/V/      MEDICATIONS  (STANDING):  AQUAPHOR (petrolatum Ointment) 1 Application(s) Topical three times a day  aspirin  chewable 81 milliGRAM(s) Oral daily  atorvastatin 80 milliGRAM(s) Oral at bedtime  carvedilol 6.25 milliGRAM(s) Oral every 12 hours  clopidogrel Tablet 75 milliGRAM(s) Oral daily  dextrose 5%. 1000 milliLiter(s) (50 mL/Hr) IV Continuous <Continuous>  dextrose 50% Injectable 12.5 Gram(s) IV Push once  dextrose 50% Injectable 25 Gram(s) IV Push once  dextrose 50% Injectable 25 Gram(s) IV Push once  enoxaparin Injectable 40 milliGRAM(s) SubCutaneous daily  FLUoxetine Solution 20 milliGRAM(s) Oral daily  hydrocortisone 1% Ointment 1 Application(s) Topical two times a day  insulin glargine Injectable (LANTUS) 38 Unit(s) SubCutaneous at bedtime  insulin lispro (HumaLOG) corrective regimen sliding scale   SubCutaneous every 8 hours  insulin lispro Injectable (HumaLOG) 12 Unit(s) SubCutaneous every 8 hours  levETIRAcetam  Solution 500 milliGRAM(s) Oral two times a day  lisinopril 20 milliGRAM(s) Oral daily  loratadine Syrup 10 milliGRAM(s) Oral <User Schedule>  nystatin    Suspension 142138 Unit(s) Oral two times a day  polyethylene glycol 3350 17 Gram(s) Oral daily    MEDICATIONS  (PRN):  acetaminophen    Suspension .. 650 milliGRAM(s) Enteral Tube every 6 hours PRN Moderate Pain (4 - 6)  bisacodyl Suppository 10 milliGRAM(s) Rectal daily PRN Constipation  dextrose 40% Gel 15 Gram(s) Oral once PRN Blood Glucose LESS THAN 70 milliGRAM(s)/deciliter  diphenhydrAMINE 25 milliGRAM(s) Oral every 8 hours PRN Rash and/or Itching  glucagon  Injectable 1 milliGRAM(s) IntraMuscular once PRN Glucose LESS THAN 70 milligrams/deciliter  nystatin Powder 1 Application(s) Topical two times a day PRN dermatitis        CAPILLARY BLOOD GLUCOSE      POCT Blood Glucose.: 249 mg/dL (05 Aug 2019 21:50)  POCT Blood Glucose.: 221 mg/dL (05 Aug 2019 14:07)  POCT Blood Glucose.: 176 mg/dL (05 Aug 2019 05:58)    I&O's Summary    04 Aug 2019 07:01  -  05 Aug 2019 07:00  --------------------------------------------------------  IN: 2560 mL / OUT: 1950 mL / NET: 610 mL    05 Aug 2019 07:01  -  05 Aug 2019 22:45  --------------------------------------------------------  IN: 480 mL / OUT: 1201 mL / NET: -721 mL        PHYSICAL EXAM:    NECK: Supple, No JVD  CHEST/LUNG: Clear to auscultation bilaterally; No wheezing.  HEART: Regular rate and rhythm; No murmurs, rubs, or gallops  ABDOMEN: Soft, Nontender, Nondistended; Bowel sounds present  EXTREMITIES:   No edema  NEUROLOGY: Awake      LABS:                        10.1   6.12  )-----------( 241      ( 05 Aug 2019 07:52 )             30.8     08-05    137  |  100  |  23  ----------------------------<  162<H>  4.4   |  27  |  0.72    Ca    9.3      05 Aug 2019 06:48              CAPILLARY BLOOD GLUCOSE      POCT Blood Glucose.: 249 mg/dL (05 Aug 2019 21:50)  POCT Blood Glucose.: 221 mg/dL (05 Aug 2019 14:07)  POCT Blood Glucose.: 176 mg/dL (05 Aug 2019 05:58)                RADIOLOGY & ADDITIONAL TESTS:    Imaging Personally Reviewed:    Consultant(s) Notes Reviewed:      Care Discussed with Consultants/Other Providers:

## 2019-08-13 LAB
GLUCOSE BLDC GLUCOMTR-MCNC: 108 MG/DL — HIGH (ref 70–99)
GLUCOSE BLDC GLUCOMTR-MCNC: 139 MG/DL — HIGH (ref 70–99)
GLUCOSE BLDC GLUCOMTR-MCNC: 162 MG/DL — HIGH (ref 70–99)
GLUCOSE BLDC GLUCOMTR-MCNC: 197 MG/DL — HIGH (ref 70–99)

## 2019-08-13 PROCEDURE — 99232 SBSQ HOSP IP/OBS MODERATE 35: CPT

## 2019-08-13 RX ADMIN — Medication 2: at 17:21

## 2019-08-13 RX ADMIN — Medication 650 MILLIGRAM(S): at 21:01

## 2019-08-13 RX ADMIN — Medication 650 MILLIGRAM(S): at 10:13

## 2019-08-13 RX ADMIN — Medication 1 APPLICATION(S): at 14:09

## 2019-08-13 RX ADMIN — Medication 1 APPLICATION(S): at 17:21

## 2019-08-13 RX ADMIN — CARVEDILOL PHOSPHATE 6.25 MILLIGRAM(S): 80 CAPSULE, EXTENDED RELEASE ORAL at 17:22

## 2019-08-13 RX ADMIN — Medication 2: at 12:31

## 2019-08-13 RX ADMIN — LORATADINE 10 MILLIGRAM(S): 10 TABLET ORAL at 10:14

## 2019-08-13 RX ADMIN — Medication 20 MILLIGRAM(S): at 12:32

## 2019-08-13 RX ADMIN — CARVEDILOL PHOSPHATE 6.25 MILLIGRAM(S): 80 CAPSULE, EXTENDED RELEASE ORAL at 05:28

## 2019-08-13 RX ADMIN — Medication 1 APPLICATION(S): at 05:29

## 2019-08-13 RX ADMIN — ENOXAPARIN SODIUM 40 MILLIGRAM(S): 100 INJECTION SUBCUTANEOUS at 12:31

## 2019-08-13 RX ADMIN — ATORVASTATIN CALCIUM 80 MILLIGRAM(S): 80 TABLET, FILM COATED ORAL at 22:30

## 2019-08-13 RX ADMIN — Medication 1 APPLICATION(S): at 22:30

## 2019-08-13 RX ADMIN — Medication 10 UNIT(S): at 17:21

## 2019-08-13 RX ADMIN — Medication 500000 UNIT(S): at 17:22

## 2019-08-13 RX ADMIN — Medication 10 UNIT(S): at 08:59

## 2019-08-13 RX ADMIN — Medication 81 MILLIGRAM(S): at 12:32

## 2019-08-13 RX ADMIN — Medication 650 MILLIGRAM(S): at 20:31

## 2019-08-13 RX ADMIN — Medication 1 APPLICATION(S): at 05:28

## 2019-08-13 RX ADMIN — INSULIN GLARGINE 30 UNIT(S): 100 INJECTION, SOLUTION SUBCUTANEOUS at 22:29

## 2019-08-13 RX ADMIN — Medication 10 UNIT(S): at 12:32

## 2019-08-13 RX ADMIN — LISINOPRIL 20 MILLIGRAM(S): 2.5 TABLET ORAL at 05:28

## 2019-08-13 RX ADMIN — LEVETIRACETAM 500 MILLIGRAM(S): 250 TABLET, FILM COATED ORAL at 17:22

## 2019-08-13 RX ADMIN — LEVETIRACETAM 500 MILLIGRAM(S): 250 TABLET, FILM COATED ORAL at 05:28

## 2019-08-13 RX ADMIN — CLOPIDOGREL BISULFATE 75 MILLIGRAM(S): 75 TABLET, FILM COATED ORAL at 12:32

## 2019-08-13 RX ADMIN — LORATADINE 10 MILLIGRAM(S): 10 TABLET ORAL at 22:30

## 2019-08-13 RX ADMIN — Medication 500000 UNIT(S): at 05:29

## 2019-08-13 RX ADMIN — Medication 650 MILLIGRAM(S): at 10:45

## 2019-08-13 NOTE — PROGRESS NOTE ADULT - ASSESSMENT
54 year old female w/uncontrolled T2DM (HbA1c 10.4%) c/b retinopathy here w/CVA L sided hemiplegia. f bolus feeds and tolerating POs with improve BG levels. Will reevaluate BG ac dinner today if prandial BG remains >180s will increase premeal insulin dose. Pt states she is aware of the need to continue insulin therapy at home. Pt was on insulin PTA but will need assistance with administration due to L hemiparesis. BG goal (100-180mg/dl).

## 2019-08-13 NOTE — PROGRESS NOTE ADULT - SUBJECTIVE AND OBJECTIVE BOX
Patient is a 54y old  Female who presents with a chief complaint of CVA (02 Aug 2019 16:08)      SUBJECTIVE / OVERNIGHT EVENTS:    Events noted.  CONSTITUTIONAL: No fever,  No N/V/      MEDICATIONS  (STANDING):  AQUAPHOR (petrolatum Ointment) 1 Application(s) Topical three times a day  aspirin  chewable 81 milliGRAM(s) Oral daily  atorvastatin 80 milliGRAM(s) Oral at bedtime  carvedilol 6.25 milliGRAM(s) Oral every 12 hours  clopidogrel Tablet 75 milliGRAM(s) Oral daily  dextrose 5%. 1000 milliLiter(s) (50 mL/Hr) IV Continuous <Continuous>  dextrose 50% Injectable 12.5 Gram(s) IV Push once  dextrose 50% Injectable 25 Gram(s) IV Push once  dextrose 50% Injectable 25 Gram(s) IV Push once  enoxaparin Injectable 40 milliGRAM(s) SubCutaneous daily  FLUoxetine Solution 20 milliGRAM(s) Oral daily  hydrocortisone 1% Ointment 1 Application(s) Topical two times a day  insulin glargine Injectable (LANTUS) 38 Unit(s) SubCutaneous at bedtime  insulin lispro (HumaLOG) corrective regimen sliding scale   SubCutaneous every 8 hours  insulin lispro Injectable (HumaLOG) 12 Unit(s) SubCutaneous every 8 hours  levETIRAcetam  Solution 500 milliGRAM(s) Oral two times a day  lisinopril 20 milliGRAM(s) Oral daily  loratadine Syrup 10 milliGRAM(s) Oral <User Schedule>  nystatin    Suspension 799823 Unit(s) Oral two times a day  polyethylene glycol 3350 17 Gram(s) Oral daily    MEDICATIONS  (PRN):  acetaminophen    Suspension .. 650 milliGRAM(s) Enteral Tube every 6 hours PRN Moderate Pain (4 - 6)  bisacodyl Suppository 10 milliGRAM(s) Rectal daily PRN Constipation  dextrose 40% Gel 15 Gram(s) Oral once PRN Blood Glucose LESS THAN 70 milliGRAM(s)/deciliter  diphenhydrAMINE 25 milliGRAM(s) Oral every 8 hours PRN Rash and/or Itching  glucagon  Injectable 1 milliGRAM(s) IntraMuscular once PRN Glucose LESS THAN 70 milligrams/deciliter  nystatin Powder 1 Application(s) Topical two times a day PRN dermatitis        CAPILLARY BLOOD GLUCOSE      POCT Blood Glucose.: 249 mg/dL (05 Aug 2019 21:50)  POCT Blood Glucose.: 221 mg/dL (05 Aug 2019 14:07)  POCT Blood Glucose.: 176 mg/dL (05 Aug 2019 05:58)    I&O's Summary    04 Aug 2019 07:01  -  05 Aug 2019 07:00  --------------------------------------------------------  IN: 2560 mL / OUT: 1950 mL / NET: 610 mL    05 Aug 2019 07:01  -  05 Aug 2019 22:45  --------------------------------------------------------  IN: 480 mL / OUT: 1201 mL / NET: -721 mL        PHYSICAL EXAM:    NECK: Supple, No JVD  CHEST/LUNG: Clear to auscultation bilaterally; No wheezing.  HEART: Regular rate and rhythm; No murmurs, rubs, or gallops  ABDOMEN: Soft, Nontender, Nondistended; Bowel sounds present  EXTREMITIES:   No edema  NEUROLOGY: Awake      LABS:                        10.1   6.12  )-----------( 241      ( 05 Aug 2019 07:52 )             30.8     08-05    137  |  100  |  23  ----------------------------<  162<H>  4.4   |  27  |  0.72    Ca    9.3      05 Aug 2019 06:48              CAPILLARY BLOOD GLUCOSE      POCT Blood Glucose.: 249 mg/dL (05 Aug 2019 21:50)  POCT Blood Glucose.: 221 mg/dL (05 Aug 2019 14:07)  POCT Blood Glucose.: 176 mg/dL (05 Aug 2019 05:58)                RADIOLOGY & ADDITIONAL TESTS:    Imaging Personally Reviewed:    Consultant(s) Notes Reviewed:      Care Discussed with Consultants/Other Providers:

## 2019-08-13 NOTE — PROGRESS NOTE ADULT - SUBJECTIVE AND OBJECTIVE BOX
Diabetes Follow up note: Saw pt earlier today  Interval Hx: 54 year old female w/uncontrolled T2DM (on insulin PTA) w/retinopathy, morbid obesity here w/CVA s/p PEG now off bolus feeds and tolerating 100% of meals as per pt's RN.  Pt reports feeling wel except for on/off R arm cramps. BG values had improved after TFs bolus were discontinue yesterday. No hypoglycemia.      Review of Systems:  General: as above. Arm cramp at time of visit  GI: Tolerating PO without any N/V/D/ABD PAIN.  CV: No CP/SOB  ENDO: No S&Sx of hypoglycemia      MEDS:  atorvastatin 80 milliGRAM(s) Oral at bedtime  nystatin    Suspension 439952 Unit(s) Oral two times a day  insulin glargine Injectable (LANTUS) 30 Unit(s) SubCutaneous at bedtime  insulin lispro (HumaLOG) corrective regimen sliding scale   SubCutaneous at bedtime  insulin lispro (HumaLOG) corrective regimen sliding scale   SubCutaneous three times a day before meals  insulin lispro Injectable (HumaLOG) 10 Unit(s) SubCutaneous three times a day with meals      Allergies    No Known Allergies    PE:  General: Female lying in bed in NAD. Staff with pt.  Vital Signs Last 24 Hrs  T(C): 36.9 (08-13-19 @ 14:26), Max: 37 (08-13-19 @ 05:23)  T(F): 98.4 (08-13-19 @ 14:26), Max: 98.6 (08-13-19 @ 05:23)  HR: 79 (08-13-19 @ 14:26) (68 - 79)  BP: 140/80 (08-13-19 @ 14:26) (111/61 - 140/80)  BP(mean): --  RR: 18 (08-13-19 @ 14:26) (18 - 20)  SpO2: 99% (08-13-19 @ 14:26) (95% - 99%)  Abd: Soft, NT,ND, Obese. PEG in place. Off TFs  Extremities: Warm, no edema noted in all 4 exts.  Neuro: A&O X3. L sided hemiplegia.     LABS:  POCT Blood Glucose.: 197 mg/dL (08-13-19 @ 12:28)  POCT Blood Glucose.: 139 mg/dL (08-13-19 @ 08:44)  POCT Blood Glucose.: 169 mg/dL (08-12-19 @ 21:52)  POCT Blood Glucose.: 148 mg/dL (08-12-19 @ 17:33)  POCT Blood Glucose.: 204 mg/dL (08-12-19 @ 13:19)  POCT Blood Glucose.: 176 mg/dL (08-12-19 @ 05:59)  POCT Blood Glucose.: 309 mg/dL (08-11-19 @ 21:33)                        10.6   6.34  )-----------( 291      ( 11 Aug 2019 12:23 )             32.0     08-12    132<L>  |  95<L>  |  28<H>  ----------------------------<  220<H>  4.3   |  28  |  0.75    Ca    9.5      12 Aug 2019 09:57  Phos  3.6     08-11  Mg     1.7     08-11      Hemoglobin A1C, Whole Blood: 10.4 % <H> [4.0 - 5.6] (06-29-19 @ 09:33)

## 2019-08-13 NOTE — PROGRESS NOTE ADULT - PROBLEM SELECTOR PLAN 1
-test BG AC/HS  -C/w Lantus 30 units QHS for now  -C/w Humalog to 10 units ac meals. HOLD IF NOT EATING. If BG >180s this pm ac dinner increase dose to 12 units with meals  -c/w Humalog moderate correction scale but adjust to ac and hs.   Discharge plan: Basal/bolus. Doses TBD  -will follow up w/[facility]. Per NP sent rx for insulin.   -Plan discussed with pt/team.  Contact info: [phone] (24/7). pager [phone]

## 2019-08-14 LAB
GLUCOSE BLDC GLUCOMTR-MCNC: 106 MG/DL — HIGH (ref 70–99)
GLUCOSE BLDC GLUCOMTR-MCNC: 140 MG/DL — HIGH (ref 70–99)
GLUCOSE BLDC GLUCOMTR-MCNC: 143 MG/DL — HIGH (ref 70–99)
GLUCOSE BLDC GLUCOMTR-MCNC: 177 MG/DL — HIGH (ref 70–99)
GLUCOSE BLDC GLUCOMTR-MCNC: 177 MG/DL — HIGH (ref 70–99)

## 2019-08-14 PROCEDURE — 99232 SBSQ HOSP IP/OBS MODERATE 35: CPT

## 2019-08-14 RX ADMIN — Medication 10 UNIT(S): at 17:48

## 2019-08-14 RX ADMIN — CARVEDILOL PHOSPHATE 6.25 MILLIGRAM(S): 80 CAPSULE, EXTENDED RELEASE ORAL at 17:48

## 2019-08-14 RX ADMIN — Medication 1 APPLICATION(S): at 14:17

## 2019-08-14 RX ADMIN — Medication 1 APPLICATION(S): at 22:35

## 2019-08-14 RX ADMIN — LORATADINE 10 MILLIGRAM(S): 10 TABLET ORAL at 22:34

## 2019-08-14 RX ADMIN — Medication 650 MILLIGRAM(S): at 05:38

## 2019-08-14 RX ADMIN — Medication 10 UNIT(S): at 08:24

## 2019-08-14 RX ADMIN — LISINOPRIL 20 MILLIGRAM(S): 2.5 TABLET ORAL at 05:38

## 2019-08-14 RX ADMIN — Medication 2: at 12:54

## 2019-08-14 RX ADMIN — ENOXAPARIN SODIUM 40 MILLIGRAM(S): 100 INJECTION SUBCUTANEOUS at 11:57

## 2019-08-14 RX ADMIN — Medication 500000 UNIT(S): at 17:48

## 2019-08-14 RX ADMIN — Medication 1 APPLICATION(S): at 05:37

## 2019-08-14 RX ADMIN — Medication 81 MILLIGRAM(S): at 11:57

## 2019-08-14 RX ADMIN — Medication 10 UNIT(S): at 12:54

## 2019-08-14 RX ADMIN — LEVETIRACETAM 500 MILLIGRAM(S): 250 TABLET, FILM COATED ORAL at 05:38

## 2019-08-14 RX ADMIN — INSULIN GLARGINE 30 UNIT(S): 100 INJECTION, SOLUTION SUBCUTANEOUS at 22:34

## 2019-08-14 RX ADMIN — LORATADINE 10 MILLIGRAM(S): 10 TABLET ORAL at 10:51

## 2019-08-14 RX ADMIN — CLOPIDOGREL BISULFATE 75 MILLIGRAM(S): 75 TABLET, FILM COATED ORAL at 11:56

## 2019-08-14 RX ADMIN — ATORVASTATIN CALCIUM 80 MILLIGRAM(S): 80 TABLET, FILM COATED ORAL at 22:34

## 2019-08-14 RX ADMIN — Medication 500000 UNIT(S): at 05:37

## 2019-08-14 RX ADMIN — Medication 650 MILLIGRAM(S): at 06:08

## 2019-08-14 RX ADMIN — Medication 20 MILLIGRAM(S): at 11:57

## 2019-08-14 RX ADMIN — CARVEDILOL PHOSPHATE 6.25 MILLIGRAM(S): 80 CAPSULE, EXTENDED RELEASE ORAL at 05:38

## 2019-08-14 RX ADMIN — LEVETIRACETAM 500 MILLIGRAM(S): 250 TABLET, FILM COATED ORAL at 17:48

## 2019-08-14 NOTE — PROGRESS NOTE ADULT - PROBLEM SELECTOR PLAN 1
-test BG AC/HS  -c/w Lantus 30 units QHS  -c/w Humalog 10 units AC meals  -c/w Humalog moderate correction scale AC and Mod HS scale  Discharge plan: Basal/bolus. Doses TBD  -will follow up w/[email or web address] Per NP sent rx for insulin.   -Plan discussed with pt/team  pager: [phone]

## 2019-08-14 NOTE — PROGRESS NOTE ADULT - SUBJECTIVE AND OBJECTIVE BOX
Patient is a 54y old  Female who presents with a chief complaint of CVA (02 Aug 2019 16:08)      SUBJECTIVE / OVERNIGHT EVENTS:    No new events noted.  CONSTITUTIONAL: No fever,  No N/V/      MEDICATIONS  (STANDING):  AQUAPHOR (petrolatum Ointment) 1 Application(s) Topical three times a day  aspirin  chewable 81 milliGRAM(s) Oral daily  atorvastatin 80 milliGRAM(s) Oral at bedtime  carvedilol 6.25 milliGRAM(s) Oral every 12 hours  clopidogrel Tablet 75 milliGRAM(s) Oral daily  dextrose 5%. 1000 milliLiter(s) (50 mL/Hr) IV Continuous <Continuous>  dextrose 50% Injectable 12.5 Gram(s) IV Push once  dextrose 50% Injectable 25 Gram(s) IV Push once  dextrose 50% Injectable 25 Gram(s) IV Push once  enoxaparin Injectable 40 milliGRAM(s) SubCutaneous daily  FLUoxetine Solution 20 milliGRAM(s) Oral daily  hydrocortisone 1% Ointment 1 Application(s) Topical two times a day  insulin glargine Injectable (LANTUS) 38 Unit(s) SubCutaneous at bedtime  insulin lispro (HumaLOG) corrective regimen sliding scale   SubCutaneous every 8 hours  insulin lispro Injectable (HumaLOG) 12 Unit(s) SubCutaneous every 8 hours  levETIRAcetam  Solution 500 milliGRAM(s) Oral two times a day  lisinopril 20 milliGRAM(s) Oral daily  loratadine Syrup 10 milliGRAM(s) Oral <User Schedule>  nystatin    Suspension 495300 Unit(s) Oral two times a day  polyethylene glycol 3350 17 Gram(s) Oral daily    MEDICATIONS  (PRN):  acetaminophen    Suspension .. 650 milliGRAM(s) Enteral Tube every 6 hours PRN Moderate Pain (4 - 6)  bisacodyl Suppository 10 milliGRAM(s) Rectal daily PRN Constipation  dextrose 40% Gel 15 Gram(s) Oral once PRN Blood Glucose LESS THAN 70 milliGRAM(s)/deciliter  diphenhydrAMINE 25 milliGRAM(s) Oral every 8 hours PRN Rash and/or Itching  glucagon  Injectable 1 milliGRAM(s) IntraMuscular once PRN Glucose LESS THAN 70 milligrams/deciliter  nystatin Powder 1 Application(s) Topical two times a day PRN dermatitis        CAPILLARY BLOOD GLUCOSE      POCT Blood Glucose.: 249 mg/dL (05 Aug 2019 21:50)  POCT Blood Glucose.: 221 mg/dL (05 Aug 2019 14:07)  POCT Blood Glucose.: 176 mg/dL (05 Aug 2019 05:58)    I&O's Summary    04 Aug 2019 07:01  -  05 Aug 2019 07:00  --------------------------------------------------------  IN: 2560 mL / OUT: 1950 mL / NET: 610 mL    05 Aug 2019 07:01  -  05 Aug 2019 22:45  --------------------------------------------------------  IN: 480 mL / OUT: 1201 mL / NET: -721 mL        PHYSICAL EXAM:    NECK: Supple, No JVD  CHEST/LUNG: Clear to auscultation bilaterally; No wheezing.  HEART: Regular rate and rhythm; No murmurs, rubs, or gallops  ABDOMEN: Soft, Nontender, Nondistended; Bowel sounds present  EXTREMITIES:   No edema  NEUROLOGY: Awake      LABS:                        10.1   6.12  )-----------( 241      ( 05 Aug 2019 07:52 )             30.8     08-05    137  |  100  |  23  ----------------------------<  162<H>  4.4   |  27  |  0.72    Ca    9.3      05 Aug 2019 06:48              CAPILLARY BLOOD GLUCOSE      POCT Blood Glucose.: 249 mg/dL (05 Aug 2019 21:50)  POCT Blood Glucose.: 221 mg/dL (05 Aug 2019 14:07)  POCT Blood Glucose.: 176 mg/dL (05 Aug 2019 05:58)                RADIOLOGY & ADDITIONAL TESTS:    Imaging Personally Reviewed:    Consultant(s) Notes Reviewed:      Care Discussed with Consultants/Other Providers:

## 2019-08-14 NOTE — PROGRESS NOTE ADULT - ASSESSMENT
54 year old female w/uncontrolled T2DM (HbA1c 10.4%) c/b retinopathy here w/CVA L sided hemiplegia. Now off bolus feeds and tolerating POs. BG values at goal on present insulin regimen. Pt will need family assistance w/insulin administration at discharge. BG goal (100-180mg/dl).

## 2019-08-15 LAB
ANION GAP SERPL CALC-SCNC: 11 MMOL/L — SIGNIFICANT CHANGE UP (ref 5–17)
BUN SERPL-MCNC: 24 MG/DL — HIGH (ref 7–23)
CALCIUM SERPL-MCNC: 9.3 MG/DL — SIGNIFICANT CHANGE UP (ref 8.4–10.5)
CHLORIDE SERPL-SCNC: 100 MMOL/L — SIGNIFICANT CHANGE UP (ref 96–108)
CO2 SERPL-SCNC: 26 MMOL/L — SIGNIFICANT CHANGE UP (ref 22–31)
CREAT SERPL-MCNC: 0.71 MG/DL — SIGNIFICANT CHANGE UP (ref 0.5–1.3)
GLUCOSE BLDC GLUCOMTR-MCNC: 138 MG/DL — HIGH (ref 70–99)
GLUCOSE BLDC GLUCOMTR-MCNC: 161 MG/DL — HIGH (ref 70–99)
GLUCOSE BLDC GLUCOMTR-MCNC: 163 MG/DL — HIGH (ref 70–99)
GLUCOSE BLDC GLUCOMTR-MCNC: 175 MG/DL — HIGH (ref 70–99)
GLUCOSE BLDC GLUCOMTR-MCNC: 77 MG/DL — SIGNIFICANT CHANGE UP (ref 70–99)
GLUCOSE SERPL-MCNC: 206 MG/DL — HIGH (ref 70–99)
HCT VFR BLD CALC: 33.2 % — LOW (ref 34.5–45)
HGB BLD-MCNC: 11.1 G/DL — LOW (ref 11.5–15.5)
MCHC RBC-ENTMCNC: 28.7 PG — SIGNIFICANT CHANGE UP (ref 27–34)
MCHC RBC-ENTMCNC: 33.4 GM/DL — SIGNIFICANT CHANGE UP (ref 32–36)
MCV RBC AUTO: 85.8 FL — SIGNIFICANT CHANGE UP (ref 80–100)
PLATELET # BLD AUTO: 344 K/UL — SIGNIFICANT CHANGE UP (ref 150–400)
POTASSIUM SERPL-MCNC: 4.3 MMOL/L — SIGNIFICANT CHANGE UP (ref 3.5–5.3)
POTASSIUM SERPL-SCNC: 4.3 MMOL/L — SIGNIFICANT CHANGE UP (ref 3.5–5.3)
RBC # BLD: 3.87 M/UL — SIGNIFICANT CHANGE UP (ref 3.8–5.2)
RBC # FLD: 15.1 % — HIGH (ref 10.3–14.5)
SODIUM SERPL-SCNC: 137 MMOL/L — SIGNIFICANT CHANGE UP (ref 135–145)
WBC # BLD: 5.74 K/UL — SIGNIFICANT CHANGE UP (ref 3.8–10.5)
WBC # FLD AUTO: 5.74 K/UL — SIGNIFICANT CHANGE UP (ref 3.8–10.5)

## 2019-08-15 PROCEDURE — 99232 SBSQ HOSP IP/OBS MODERATE 35: CPT

## 2019-08-15 RX ORDER — FLUOXETINE HCL 10 MG
1 CAPSULE ORAL
Qty: 30 | Refills: 0
Start: 2019-08-15 | End: 2019-09-13

## 2019-08-15 RX ORDER — NYSTATIN CREAM 100000 [USP'U]/G
1 CREAM TOPICAL
Qty: 1 | Refills: 0
Start: 2019-08-15 | End: 2019-08-29

## 2019-08-15 RX ORDER — CLOPIDOGREL BISULFATE 75 MG/1
1 TABLET, FILM COATED ORAL
Qty: 30 | Refills: 0
Start: 2019-08-15 | End: 2019-09-13

## 2019-08-15 RX ORDER — CARVEDILOL PHOSPHATE 80 MG/1
1 CAPSULE, EXTENDED RELEASE ORAL
Qty: 60 | Refills: 0
Start: 2019-08-15 | End: 2019-09-13

## 2019-08-15 RX ORDER — LEVETIRACETAM 250 MG/1
500 TABLET, FILM COATED ORAL
Refills: 0 | Status: DISCONTINUED | OUTPATIENT
Start: 2019-08-15 | End: 2019-08-26

## 2019-08-15 RX ORDER — LEVETIRACETAM 250 MG/1
1 TABLET, FILM COATED ORAL
Qty: 60 | Refills: 0
Start: 2019-08-15 | End: 2019-09-13

## 2019-08-15 RX ORDER — FLUOXETINE HCL 10 MG
20 CAPSULE ORAL DAILY
Refills: 0 | Status: DISCONTINUED | OUTPATIENT
Start: 2019-08-15 | End: 2019-08-26

## 2019-08-15 RX ORDER — ATORVASTATIN CALCIUM 80 MG/1
80 TABLET, FILM COATED ORAL AT BEDTIME
Refills: 0 | Status: DISCONTINUED | OUTPATIENT
Start: 2019-08-15 | End: 2019-08-26

## 2019-08-15 RX ORDER — INSULIN ASPART 100 [IU]/ML
10 INJECTION, SOLUTION SUBCUTANEOUS
Qty: 1 | Refills: 0
Start: 2019-08-15 | End: 2019-09-13

## 2019-08-15 RX ORDER — ACETAMINOPHEN 500 MG
650 TABLET ORAL EVERY 6 HOURS
Refills: 0 | Status: DISCONTINUED | OUTPATIENT
Start: 2019-08-15 | End: 2019-08-26

## 2019-08-15 RX ORDER — INSULIN DETEMIR 100/ML (3)
30 INSULIN PEN (ML) SUBCUTANEOUS
Qty: 1 | Refills: 0
Start: 2019-08-15 | End: 2019-09-13

## 2019-08-15 RX ORDER — NYSTATIN 500MM UNIT
5 POWDER (EA) MISCELLANEOUS
Qty: 150 | Refills: 0
Start: 2019-08-15 | End: 2019-08-29

## 2019-08-15 RX ADMIN — LISINOPRIL 20 MILLIGRAM(S): 2.5 TABLET ORAL at 05:43

## 2019-08-15 RX ADMIN — Medication 650 MILLIGRAM(S): at 13:10

## 2019-08-15 RX ADMIN — Medication 30 MILLILITER(S): at 17:45

## 2019-08-15 RX ADMIN — Medication 81 MILLIGRAM(S): at 12:09

## 2019-08-15 RX ADMIN — Medication 20 MILLIGRAM(S): at 13:08

## 2019-08-15 RX ADMIN — ENOXAPARIN SODIUM 40 MILLIGRAM(S): 100 INJECTION SUBCUTANEOUS at 12:09

## 2019-08-15 RX ADMIN — Medication 1 APPLICATION(S): at 13:05

## 2019-08-15 RX ADMIN — Medication 500000 UNIT(S): at 05:44

## 2019-08-15 RX ADMIN — Medication 1 APPLICATION(S): at 05:44

## 2019-08-15 RX ADMIN — Medication 500000 UNIT(S): at 17:44

## 2019-08-15 RX ADMIN — CLOPIDOGREL BISULFATE 75 MILLIGRAM(S): 75 TABLET, FILM COATED ORAL at 12:09

## 2019-08-15 RX ADMIN — Medication 2: at 13:07

## 2019-08-15 RX ADMIN — Medication 10 UNIT(S): at 13:06

## 2019-08-15 RX ADMIN — Medication 650 MILLIGRAM(S): at 12:10

## 2019-08-15 RX ADMIN — Medication 2: at 08:58

## 2019-08-15 RX ADMIN — Medication 1 APPLICATION(S): at 22:11

## 2019-08-15 RX ADMIN — Medication 10 UNIT(S): at 08:57

## 2019-08-15 RX ADMIN — Medication 1 APPLICATION(S): at 05:43

## 2019-08-15 RX ADMIN — CARVEDILOL PHOSPHATE 6.25 MILLIGRAM(S): 80 CAPSULE, EXTENDED RELEASE ORAL at 17:44

## 2019-08-15 RX ADMIN — CARVEDILOL PHOSPHATE 6.25 MILLIGRAM(S): 80 CAPSULE, EXTENDED RELEASE ORAL at 05:43

## 2019-08-15 RX ADMIN — ATORVASTATIN CALCIUM 80 MILLIGRAM(S): 80 TABLET, FILM COATED ORAL at 22:10

## 2019-08-15 RX ADMIN — INSULIN GLARGINE 30 UNIT(S): 100 INJECTION, SOLUTION SUBCUTANEOUS at 22:51

## 2019-08-15 RX ADMIN — LEVETIRACETAM 500 MILLIGRAM(S): 250 TABLET, FILM COATED ORAL at 05:44

## 2019-08-15 RX ADMIN — LEVETIRACETAM 500 MILLIGRAM(S): 250 TABLET, FILM COATED ORAL at 17:44

## 2019-08-15 NOTE — CHART NOTE - NSCHARTNOTEFT_GEN_A_CORE
Nutrition Follow Up Note    Patient seen for nutrition follow up, mild malnutrition initial follow up. Per chart, pt is a 54 year old female DM (on insulin PTA) w/retinopathy, morbid obesity here w/CVA, neurological exam significant for L sided hemiplegia, R gaze preference, L facial droop.  CTA head/neck showing R ICA occlusion, L hemiplegia now improving with alertness, communication and eating. Discharge planning.    Source: Comprehensive chart review, patient    Diet : Dysphagia 1 Nectar Consistency, Consistent Carbohydrate, DASH, NC consistency Glucerna x2 daily    Patient reports good appetite and intake; RN confirms. Reports tolerating current modified diet consistency. Drinking both Glucerna per day. Denies nausea/vomiting, diarrhea/constipation other signs of acute GI distress at this time. Last BM yesterday.      PO intake : >75%     Source for PO intake: patient, RN     Enteral /Parenteral Nutrition: n/a    Daily Weight in k.8 (-14) 130.7 (08-07) - Weights stable.    Pertinent Medications: MEDICATIONS  (STANDING):  AQUAPHOR (petrolatum Ointment) 1 Application(s) Topical three times a day  aspirin  chewable 81 milliGRAM(s) Oral daily  atorvastatin 80 milliGRAM(s) Oral at bedtime  carvedilol 6.25 milliGRAM(s) Oral every 12 hours  clopidogrel Tablet 75 milliGRAM(s) Oral daily  dextrose 5%. 1000 milliLiter(s) (50 mL/Hr) IV Continuous <Continuous>  dextrose 50% Injectable 12.5 Gram(s) IV Push once  dextrose 50% Injectable 25 Gram(s) IV Push once  dextrose 50% Injectable 25 Gram(s) IV Push once  enoxaparin Injectable 40 milliGRAM(s) SubCutaneous daily  FLUoxetine 20 milliGRAM(s) Oral daily  insulin glargine Injectable (LANTUS) 30 Unit(s) SubCutaneous at bedtime  insulin lispro (HumaLOG) corrective regimen sliding scale   SubCutaneous at bedtime  insulin lispro (HumaLOG) corrective regimen sliding scale   SubCutaneous three times a day before meals  insulin lispro Injectable (HumaLOG) 10 Unit(s) SubCutaneous three times a day with meals  levETIRAcetam 500 milliGRAM(s) Oral two times a day  lisinopril 20 milliGRAM(s) Oral daily  nystatin    Suspension 436755 Unit(s) Oral two times a day  polyethylene glycol 3350 17 Gram(s) Oral daily    MEDICATIONS  (PRN):  acetaminophen   Tablet .. 650 milliGRAM(s) Oral every 6 hours PRN Moderate Pain (4 - 6)  bisacodyl Suppository 10 milliGRAM(s) Rectal daily PRN Constipation  dextrose 40% Gel 15 Gram(s) Oral once PRN Blood Glucose LESS THAN 70 milliGRAM(s)/deciliter  glucagon  Injectable 1 milliGRAM(s) IntraMuscular once PRN Glucose LESS THAN 70 milligrams/deciliter  nystatin Powder 1 Application(s) Topical two times a day PRN dermatitis    Pertinent Labs: 08-15 @ 11:47: Na 137, BUN 24<H>, Cr 0.71, <H>, K+ 4.3    Finger Sticks:  POCT Blood Glucose.: 161 mg/dL (08-15 @ 12:58)  POCT Blood Glucose.: 175 mg/dL (08-15 @ 08:40)  POCT Blood Glucose.: 163 mg/dL (08-15 @ 02:05)  POCT Blood Glucose.: 177 mg/dL ( @ 21:53)  POCT Blood Glucose.: 143 mg/dL ( @ 17:15)    Skin per nursing documentation: moisture associated dermatitis, left vulva skin tear  Edema per nursing documentation: 1+ bilateral hands    Estimated Needs:   [x ] no change since previous assessment  [ ] recalculated:     Previous Nutrition Diagnoses: Swallowing difficulty, Mild Malnutrition   Nutrition Diagnoses are ongoing, addressed with modified texture diet and provision of nutrition supplements    New Nutrition Diagnosis: n/a     Interventions: Obtained/honored food preferences. Encouraged PO intake. Pt did not wish to review T2DM diet education at this time.    Recommend  1) Continue current diet order of Dysphagia 1 [place] Consistency, Consistent Carbohydrate, DASH, NC consistency Glucerna x2 daily  2) Continue nectar-thick Glucerna x2 to optimize PO intake  3) Assist with menu ordering, tray set-up, and feeding as needed due to L sided hemiplegia  4) Obtain/honor preferences as able within diet restrictions     Monitoring and Evaluation:     Continue to monitor Nutritional intake, Tolerance to diet prescription, weights, labs, skin integrity    RD remains available upon request and will follow up per protocol      Aspen Tafoya RD    Pager# [phone]

## 2019-08-15 NOTE — PROGRESS NOTE ADULT - SUBJECTIVE AND OBJECTIVE BOX
Patient is a 54y old  Female who presents with a chief complaint of CVA (02 Aug 2019 16:08)      SUBJECTIVE / OVERNIGHT EVENTS:    No new events noted.  CONSTITUTIONAL: No fever,  No N/V/      MEDICATIONS  (STANDING):  AQUAPHOR (petrolatum Ointment) 1 Application(s) Topical three times a day  aspirin  chewable 81 milliGRAM(s) Oral daily  atorvastatin 80 milliGRAM(s) Oral at bedtime  carvedilol 6.25 milliGRAM(s) Oral every 12 hours  clopidogrel Tablet 75 milliGRAM(s) Oral daily  dextrose 5%. 1000 milliLiter(s) (50 mL/Hr) IV Continuous <Continuous>  dextrose 50% Injectable 12.5 Gram(s) IV Push once  dextrose 50% Injectable 25 Gram(s) IV Push once  dextrose 50% Injectable 25 Gram(s) IV Push once  enoxaparin Injectable 40 milliGRAM(s) SubCutaneous daily  FLUoxetine Solution 20 milliGRAM(s) Oral daily  hydrocortisone 1% Ointment 1 Application(s) Topical two times a day  insulin glargine Injectable (LANTUS) 38 Unit(s) SubCutaneous at bedtime  insulin lispro (HumaLOG) corrective regimen sliding scale   SubCutaneous every 8 hours  insulin lispro Injectable (HumaLOG) 12 Unit(s) SubCutaneous every 8 hours  levETIRAcetam  Solution 500 milliGRAM(s) Oral two times a day  lisinopril 20 milliGRAM(s) Oral daily  loratadine Syrup 10 milliGRAM(s) Oral <User Schedule>  nystatin    Suspension 500302 Unit(s) Oral two times a day  polyethylene glycol 3350 17 Gram(s) Oral daily    MEDICATIONS  (PRN):  acetaminophen    Suspension .. 650 milliGRAM(s) Enteral Tube every 6 hours PRN Moderate Pain (4 - 6)  bisacodyl Suppository 10 milliGRAM(s) Rectal daily PRN Constipation  dextrose 40% Gel 15 Gram(s) Oral once PRN Blood Glucose LESS THAN 70 milliGRAM(s)/deciliter  diphenhydrAMINE 25 milliGRAM(s) Oral every 8 hours PRN Rash and/or Itching  glucagon  Injectable 1 milliGRAM(s) IntraMuscular once PRN Glucose LESS THAN 70 milligrams/deciliter  nystatin Powder 1 Application(s) Topical two times a day PRN dermatitis        CAPILLARY BLOOD GLUCOSE      POCT Blood Glucose.: 249 mg/dL (05 Aug 2019 21:50)  POCT Blood Glucose.: 221 mg/dL (05 Aug 2019 14:07)  POCT Blood Glucose.: 176 mg/dL (05 Aug 2019 05:58)    I&O's Summary    04 Aug 2019 07:01  -  05 Aug 2019 07:00  --------------------------------------------------------  IN: 2560 mL / OUT: 1950 mL / NET: 610 mL    05 Aug 2019 07:01  -  05 Aug 2019 22:45  --------------------------------------------------------  IN: 480 mL / OUT: 1201 mL / NET: -721 mL        PHYSICAL EXAM:    NECK: Supple, No JVD  CHEST/LUNG: Clear to auscultation bilaterally; No wheezing.  HEART: Regular rate and rhythm; No murmurs, rubs, or gallops  ABDOMEN: Soft, Nontender, Nondistended; Bowel sounds present  EXTREMITIES:   No edema  NEUROLOGY: Awake      LABS:                        10.1   6.12  )-----------( 241      ( 05 Aug 2019 07:52 )             30.8     08-05    137  |  100  |  23  ----------------------------<  162<H>  4.4   |  27  |  0.72    Ca    9.3      05 Aug 2019 06:48              CAPILLARY BLOOD GLUCOSE      POCT Blood Glucose.: 249 mg/dL (05 Aug 2019 21:50)  POCT Blood Glucose.: 221 mg/dL (05 Aug 2019 14:07)  POCT Blood Glucose.: 176 mg/dL (05 Aug 2019 05:58)                RADIOLOGY & ADDITIONAL TESTS:    Imaging Personally Reviewed:    Consultant(s) Notes Reviewed:      Care Discussed with Consultants/Other Providers:

## 2019-08-15 NOTE — PROGRESS NOTE ADULT - SUBJECTIVE AND OBJECTIVE BOX
Diabetes Follow up note:  Interval Hx:  54 year old female w/uncontrolled T2DM (on insulin PTA) w/retinopathy, morbid obesity here w/CVA s/p PEG now off bolus feeds and on PO diet only. BG values persistently in mid 100s. Pt reports good appetite. Unsure when she is going home.     Review of Systems:  General: denies pain   GI: Tolerating POs without any N/V/D/ABD PAIN.  CV: No CP/SOB  ENDO: No S&Sx of hypoglycemia  MEDS:  atorvastatin 80 milliGRAM(s) Oral at bedtime    insulin glargine Injectable (LANTUS) 30 Unit(s) SubCutaneous at bedtime  insulin lispro (HumaLOG) corrective regimen sliding scale   SubCutaneous at bedtime  insulin lispro (HumaLOG) corrective regimen sliding scale   SubCutaneous three times a day before meals  insulin lispro Injectable (HumaLOG) 10 Unit(s) SubCutaneous three times a day with meals    nystatin    Suspension 050092 Unit(s) Oral two times a day    Allergies    No Known Allergies        PE:  General: Female lying in bed. NAD.   Vital Signs Last 24 Hrs  T(C): 36.9 (15 Aug 2019 11:09), Max: 37.1 (14 Aug 2019 17:46)  T(F): 98.5 (15 Aug 2019 11:09), Max: 98.7 (14 Aug 2019 17:46)  HR: 75 (15 Aug 2019 11:09) (75 - 84)  BP: 145/80 (15 Aug 2019 11:09) (114/73 - 154/86)  BP(mean): --  RR: 18 (15 Aug 2019 11:09) (18 - 18)  SpO2: 98% (15 Aug 2019 11:09) (94% - 99%)  Abd: Soft, NT,ND, Obese. PEG in place.   Extremities: Warm. no edema.   Neuro: A&O X3. L sided hemiplegia.     LABS:    POCT Blood Glucose.: 161 mg/dL (08-15-19 @ 12:58)  POCT Blood Glucose.: 175 mg/dL (08-15-19 @ 08:40)  POCT Blood Glucose.: 163 mg/dL (08-15-19 @ 02:05)  POCT Blood Glucose.: 177 mg/dL (08-14-19 @ 21:53)  POCT Blood Glucose.: 143 mg/dL (08-14-19 @ 17:15)  POCT Blood Glucose.: 177 mg/dL (08-14-19 @ 12:34)  POCT Blood Glucose.: 140 mg/dL (08-14-19 @ 08:07)  POCT Blood Glucose.: 106 mg/dL (08-14-19 @ 02:28)  POCT Blood Glucose.: 108 mg/dL (08-13-19 @ 22:08)  POCT Blood Glucose.: 162 mg/dL (08-13-19 @ 16:38)  POCT Blood Glucose.: 197 mg/dL (08-13-19 @ 12:28)  POCT Blood Glucose.: 139 mg/dL (08-13-19 @ 08:44)  POCT Blood Glucose.: 169 mg/dL (08-12-19 @ 21:52)  POCT Blood Glucose.: 148 mg/dL (08-12-19 @ 17:33)                            11.1   5.74  )-----------( 344      ( 15 Aug 2019 14:10 )             33.2       08-15    137  |  100  |  24<H>  ----------------------------<  206<H>  4.3   |  26  |  0.71    Ca    9.3      15 Aug 2019 11:47        Hemoglobin A1C, Whole Blood: 10.4 % <H> [4.0 - 5.6] (06-29-19 @ 09:33)            Contact number: [place] [phone] or [phone]

## 2019-08-15 NOTE — CHART NOTE - NSCHARTNOTEFT_GEN_A_CORE
follow up- spoke to SW and pt daughter Keily and discussed discharge plan and follow up; reviewed pt home medication and new medications prescription sent [facility] per d/w pt daughter; once home arrangement is made, pt can be discharge home; f/u by DENNISE.  Ele Chan(NP)  [facility], [phone]

## 2019-08-15 NOTE — PROGRESS NOTE ADULT - PROBLEM SELECTOR PLAN 1
-test BG AC/HS  -c/w Lantus 30 units QHS  -c/w Humalog moderate correction scale AC and Mod HS scale  Discharge plan: Basal/bolus. Can be discharged on present insulin doses. RX sent by NP to [facility].   -will follow up w/[facility].     pager: [phone]

## 2019-08-16 LAB
GLUCOSE BLDC GLUCOMTR-MCNC: 133 MG/DL — HIGH (ref 70–99)
GLUCOSE BLDC GLUCOMTR-MCNC: 135 MG/DL — HIGH (ref 70–99)
GLUCOSE BLDC GLUCOMTR-MCNC: 138 MG/DL — HIGH (ref 70–99)
GLUCOSE BLDC GLUCOMTR-MCNC: 139 MG/DL — HIGH (ref 70–99)
GLUCOSE BLDC GLUCOMTR-MCNC: 178 MG/DL — HIGH (ref 70–99)

## 2019-08-16 PROCEDURE — 99232 SBSQ HOSP IP/OBS MODERATE 35: CPT

## 2019-08-16 RX ORDER — INSULIN LISPRO 100/ML
VIAL (ML) SUBCUTANEOUS
Refills: 0 | Status: DISCONTINUED | OUTPATIENT
Start: 2019-08-16 | End: 2019-08-26

## 2019-08-16 RX ORDER — INSULIN LISPRO 100/ML
VIAL (ML) SUBCUTANEOUS AT BEDTIME
Refills: 0 | Status: DISCONTINUED | OUTPATIENT
Start: 2019-08-16 | End: 2019-08-26

## 2019-08-16 RX ADMIN — ENOXAPARIN SODIUM 40 MILLIGRAM(S): 100 INJECTION SUBCUTANEOUS at 12:58

## 2019-08-16 RX ADMIN — CARVEDILOL PHOSPHATE 6.25 MILLIGRAM(S): 80 CAPSULE, EXTENDED RELEASE ORAL at 17:32

## 2019-08-16 RX ADMIN — Medication 500000 UNIT(S): at 06:00

## 2019-08-16 RX ADMIN — Medication 500000 UNIT(S): at 21:14

## 2019-08-16 RX ADMIN — CLOPIDOGREL BISULFATE 75 MILLIGRAM(S): 75 TABLET, FILM COATED ORAL at 12:58

## 2019-08-16 RX ADMIN — INSULIN GLARGINE 30 UNIT(S): 100 INJECTION, SOLUTION SUBCUTANEOUS at 22:26

## 2019-08-16 RX ADMIN — LEVETIRACETAM 500 MILLIGRAM(S): 250 TABLET, FILM COATED ORAL at 06:00

## 2019-08-16 RX ADMIN — POLYETHYLENE GLYCOL 3350 17 GRAM(S): 17 POWDER, FOR SOLUTION ORAL at 12:59

## 2019-08-16 RX ADMIN — Medication 1 APPLICATION(S): at 06:00

## 2019-08-16 RX ADMIN — Medication 1 APPLICATION(S): at 13:23

## 2019-08-16 RX ADMIN — Medication 1 APPLICATION(S): at 21:15

## 2019-08-16 RX ADMIN — ATORVASTATIN CALCIUM 80 MILLIGRAM(S): 80 TABLET, FILM COATED ORAL at 21:15

## 2019-08-16 RX ADMIN — Medication 10 UNIT(S): at 08:47

## 2019-08-16 RX ADMIN — LEVETIRACETAM 500 MILLIGRAM(S): 250 TABLET, FILM COATED ORAL at 17:32

## 2019-08-16 RX ADMIN — LISINOPRIL 20 MILLIGRAM(S): 2.5 TABLET ORAL at 06:00

## 2019-08-16 RX ADMIN — Medication 2: at 12:59

## 2019-08-16 RX ADMIN — Medication 10 UNIT(S): at 12:59

## 2019-08-16 RX ADMIN — Medication 10 UNIT(S): at 17:32

## 2019-08-16 RX ADMIN — Medication 81 MILLIGRAM(S): at 12:58

## 2019-08-16 RX ADMIN — CARVEDILOL PHOSPHATE 6.25 MILLIGRAM(S): 80 CAPSULE, EXTENDED RELEASE ORAL at 06:01

## 2019-08-16 RX ADMIN — Medication 20 MILLIGRAM(S): at 12:59

## 2019-08-16 NOTE — PROGRESS NOTE ADULT - SUBJECTIVE AND OBJECTIVE BOX
Patient is a 54y old  Female who presents with a chief complaint of CVA (02 Aug 2019 16:08)      SUBJECTIVE / OVERNIGHT EVENTS:    No new events noted.  CONSTITUTIONAL: No fever,  No N/V/      MEDICATIONS  (STANDING):  AQUAPHOR (petrolatum Ointment) 1 Application(s) Topical three times a day  aspirin  chewable 81 milliGRAM(s) Oral daily  atorvastatin 80 milliGRAM(s) Oral at bedtime  carvedilol 6.25 milliGRAM(s) Oral every 12 hours  clopidogrel Tablet 75 milliGRAM(s) Oral daily  dextrose 5%. 1000 milliLiter(s) (50 mL/Hr) IV Continuous <Continuous>  dextrose 50% Injectable 12.5 Gram(s) IV Push once  dextrose 50% Injectable 25 Gram(s) IV Push once  dextrose 50% Injectable 25 Gram(s) IV Push once  enoxaparin Injectable 40 milliGRAM(s) SubCutaneous daily  FLUoxetine Solution 20 milliGRAM(s) Oral daily  hydrocortisone 1% Ointment 1 Application(s) Topical two times a day  insulin glargine Injectable (LANTUS) 38 Unit(s) SubCutaneous at bedtime  insulin lispro (HumaLOG) corrective regimen sliding scale   SubCutaneous every 8 hours  insulin lispro Injectable (HumaLOG) 12 Unit(s) SubCutaneous every 8 hours  levETIRAcetam  Solution 500 milliGRAM(s) Oral two times a day  lisinopril 20 milliGRAM(s) Oral daily  loratadine Syrup 10 milliGRAM(s) Oral <User Schedule>  nystatin    Suspension 494380 Unit(s) Oral two times a day  polyethylene glycol 3350 17 Gram(s) Oral daily    MEDICATIONS  (PRN):  acetaminophen    Suspension .. 650 milliGRAM(s) Enteral Tube every 6 hours PRN Moderate Pain (4 - 6)  bisacodyl Suppository 10 milliGRAM(s) Rectal daily PRN Constipation  dextrose 40% Gel 15 Gram(s) Oral once PRN Blood Glucose LESS THAN 70 milliGRAM(s)/deciliter  diphenhydrAMINE 25 milliGRAM(s) Oral every 8 hours PRN Rash and/or Itching  glucagon  Injectable 1 milliGRAM(s) IntraMuscular once PRN Glucose LESS THAN 70 milligrams/deciliter  nystatin Powder 1 Application(s) Topical two times a day PRN dermatitis        CAPILLARY BLOOD GLUCOSE      POCT Blood Glucose.: 249 mg/dL (05 Aug 2019 21:50)  POCT Blood Glucose.: 221 mg/dL (05 Aug 2019 14:07)  POCT Blood Glucose.: 176 mg/dL (05 Aug 2019 05:58)    I&O's Summary    04 Aug 2019 07:01  -  05 Aug 2019 07:00  --------------------------------------------------------  IN: 2560 mL / OUT: 1950 mL / NET: 610 mL    05 Aug 2019 07:01  -  05 Aug 2019 22:45  --------------------------------------------------------  IN: 480 mL / OUT: 1201 mL / NET: -721 mL        PHYSICAL EXAM:    NECK: Supple, No JVD  CHEST/LUNG: Clear to auscultation bilaterally; No wheezing.  HEART: Regular rate and rhythm; No murmurs, rubs, or gallops  ABDOMEN: Soft, Nontender, Nondistended; Bowel sounds present  EXTREMITIES:   No edema  NEUROLOGY: Awake      LABS:                        10.1   6.12  )-----------( 241      ( 05 Aug 2019 07:52 )             30.8     08-05    137  |  100  |  23  ----------------------------<  162<H>  4.4   |  27  |  0.72    Ca    9.3      05 Aug 2019 06:48              CAPILLARY BLOOD GLUCOSE      POCT Blood Glucose.: 249 mg/dL (05 Aug 2019 21:50)  POCT Blood Glucose.: 221 mg/dL (05 Aug 2019 14:07)  POCT Blood Glucose.: 176 mg/dL (05 Aug 2019 05:58)                RADIOLOGY & ADDITIONAL TESTS:    Imaging Personally Reviewed:    Consultant(s) Notes Reviewed:      Care Discussed with Consultants/Other Providers:

## 2019-08-16 NOTE — PROGRESS NOTE ADULT - SUBJECTIVE AND OBJECTIVE BOX
Diabetes Follow up note:  Interval Hx:  54 year old female w/uncontrolled T2DM (on insulin PTA) w/retinopathy, morbid obesity here w/CVA s/p PEG now off bolus feeds and on PO diet only. Pt had BG of 77mg/dl yesterday prior to dinner. Pt recalls eating lunch yesterday. Denies any hypoglycemia symptoms.     Review of Systems:  General: no complaints  GI: Tolerating POs without any N/V/D/ABD PAIN.  CV: No CP/SOB  ENDO: No S&Sx of hypoglycemia  MEDS:  atorvastatin 80 milliGRAM(s) Oral at bedtime    insulin glargine Injectable (LANTUS) 30 Unit(s) SubCutaneous at bedtime  insulin lispro (HumaLOG) corrective regimen sliding scale   SubCutaneous at bedtime  insulin lispro (HumaLOG) corrective regimen sliding scale   SubCutaneous three times a day before meals  insulin lispro Injectable (HumaLOG) 10 Unit(s) SubCutaneous three times a day with meals    nystatin    Suspension 137783 Unit(s) Oral two times a day    Allergies    No Known Allergies        PE:  General: Female lying in bed. NAD.   Vital Signs Last 24 Hrs  T(C): 36.1 (16 Aug 2019 13:42), Max: 37.3 (16 Aug 2019 00:34)  T(F): 97 (16 Aug 2019 13:42), Max: 99.1 (16 Aug 2019 00:34)  HR: 78 (16 Aug 2019 13:42) (71 - 83)  BP: 135/80 (16 Aug 2019 13:42) (94/57 - 137/84)  BP(mean): --  RR: 18 (16 Aug 2019 13:42) (18 - 19)  SpO2: 97% (16 Aug 2019 13:42) (93% - 98%)  Abd: Soft, NT,ND, Obese.   Extremities: Warm. no edema.   Neuro: A&O X3. L sided hemiplegia.     LABS:    POCT Blood Glucose.: 178 mg/dL (08-16-19 @ 12:40)  POCT Blood Glucose.: 135 mg/dL (08-16-19 @ 08:24)  POCT Blood Glucose.: 133 mg/dL (08-16-19 @ 02:12)  POCT Blood Glucose.: 138 mg/dL (08-15-19 @ 22:37)  POCT Blood Glucose.: 77 mg/dL (08-15-19 @ 17:23)  POCT Blood Glucose.: 161 mg/dL (08-15-19 @ 12:58)  POCT Blood Glucose.: 175 mg/dL (08-15-19 @ 08:40)  POCT Blood Glucose.: 163 mg/dL (08-15-19 @ 02:05)  POCT Blood Glucose.: 177 mg/dL (08-14-19 @ 21:53)  POCT Blood Glucose.: 143 mg/dL (08-14-19 @ 17:15)  POCT Blood Glucose.: 177 mg/dL (08-14-19 @ 12:34)  POCT Blood Glucose.: 140 mg/dL (08-14-19 @ 08:07)  POCT Blood Glucose.: 106 mg/dL (08-14-19 @ 02:28)  POCT Blood Glucose.: 108 mg/dL (08-13-19 @ 22:08)  POCT Blood Glucose.: 162 mg/dL (08-13-19 @ 16:38)                            11.1   5.74  )-----------( 344      ( 15 Aug 2019 14:10 )             33.2       08-15    137  |  100  |  24<H>  ----------------------------<  206<H>  4.3   |  26  |  0.71    Ca    9.3      15 Aug 2019 11:47        Hemoglobin A1C, Whole Blood: 10.4 % <H> [4.0 - 5.6] (06-29-19 @ 09:33)            Contact number: [place] [phone] or [phone]

## 2019-08-16 NOTE — PROGRESS NOTE ADULT - ASSESSMENT
54 year old female w/uncontrolled T2DM (HbA1c 10.4%) c/b retinopathy here w/CVA L sided hemiplegia. Now off bolus feeds and tolerating POs. BG values mostly at goal w/glucose dropping to 70's yesterday prior to dinner. Will continue to monitor on current regimen with adjustment of correction scale. BG goal (100-180mg/dl).

## 2019-08-16 NOTE — PROGRESS NOTE ADULT - PROBLEM SELECTOR PLAN 1
-test BG AC/HS  -c/w Lantus 30 units QHS  -c/w Humalog 10 units AC meals  -Adjust Humalog to low correction scale AC and Low HS scale to avoid overcorrection.   Discharge plan: Basal/bolus. Can be discharged on present insulin doses. RX sent by NP to [facility].   -will follow up w/[facility].     pager: [phone].

## 2019-08-17 LAB
GLUCOSE BLDC GLUCOMTR-MCNC: 111 MG/DL — HIGH (ref 70–99)
GLUCOSE BLDC GLUCOMTR-MCNC: 135 MG/DL — HIGH (ref 70–99)
GLUCOSE BLDC GLUCOMTR-MCNC: 158 MG/DL — HIGH (ref 70–99)
GLUCOSE BLDC GLUCOMTR-MCNC: 174 MG/DL — HIGH (ref 70–99)

## 2019-08-17 RX ADMIN — Medication 650 MILLIGRAM(S): at 18:04

## 2019-08-17 RX ADMIN — Medication 81 MILLIGRAM(S): at 12:40

## 2019-08-17 RX ADMIN — CARVEDILOL PHOSPHATE 6.25 MILLIGRAM(S): 80 CAPSULE, EXTENDED RELEASE ORAL at 05:41

## 2019-08-17 RX ADMIN — Medication 650 MILLIGRAM(S): at 17:31

## 2019-08-17 RX ADMIN — ENOXAPARIN SODIUM 40 MILLIGRAM(S): 100 INJECTION SUBCUTANEOUS at 12:39

## 2019-08-17 RX ADMIN — Medication 1: at 17:22

## 2019-08-17 RX ADMIN — Medication 10 UNIT(S): at 17:22

## 2019-08-17 RX ADMIN — CLOPIDOGREL BISULFATE 75 MILLIGRAM(S): 75 TABLET, FILM COATED ORAL at 12:40

## 2019-08-17 RX ADMIN — LEVETIRACETAM 500 MILLIGRAM(S): 250 TABLET, FILM COATED ORAL at 17:22

## 2019-08-17 RX ADMIN — INSULIN GLARGINE 30 UNIT(S): 100 INJECTION, SOLUTION SUBCUTANEOUS at 22:24

## 2019-08-17 RX ADMIN — Medication 500000 UNIT(S): at 05:41

## 2019-08-17 RX ADMIN — ATORVASTATIN CALCIUM 80 MILLIGRAM(S): 80 TABLET, FILM COATED ORAL at 22:24

## 2019-08-17 RX ADMIN — Medication 1 APPLICATION(S): at 05:42

## 2019-08-17 RX ADMIN — Medication 1 APPLICATION(S): at 22:29

## 2019-08-17 RX ADMIN — Medication 30 MILLILITER(S): at 20:09

## 2019-08-17 RX ADMIN — POLYETHYLENE GLYCOL 3350 17 GRAM(S): 17 POWDER, FOR SOLUTION ORAL at 12:40

## 2019-08-17 RX ADMIN — Medication 1: at 12:40

## 2019-08-17 RX ADMIN — Medication 10 UNIT(S): at 12:40

## 2019-08-17 RX ADMIN — Medication 10 UNIT(S): at 08:39

## 2019-08-17 RX ADMIN — Medication 1 APPLICATION(S): at 15:30

## 2019-08-17 RX ADMIN — Medication 500000 UNIT(S): at 17:22

## 2019-08-17 RX ADMIN — LISINOPRIL 20 MILLIGRAM(S): 2.5 TABLET ORAL at 05:41

## 2019-08-17 RX ADMIN — Medication 20 MILLIGRAM(S): at 12:40

## 2019-08-17 RX ADMIN — CARVEDILOL PHOSPHATE 6.25 MILLIGRAM(S): 80 CAPSULE, EXTENDED RELEASE ORAL at 17:21

## 2019-08-17 RX ADMIN — LEVETIRACETAM 500 MILLIGRAM(S): 250 TABLET, FILM COATED ORAL at 05:42

## 2019-08-17 NOTE — PROGRESS NOTE ADULT - SUBJECTIVE AND OBJECTIVE BOX
Patient is a 54y old  Female who presents with a chief complaint of CVA (02 Aug 2019 16:08)      SUBJECTIVE / OVERNIGHT EVENTS:    No new events noted.  CONSTITUTIONAL: No fever,  No N/V      MEDICATIONS  (STANDING):  AQUAPHOR (petrolatum Ointment) 1 Application(s) Topical three times a day  aspirin  chewable 81 milliGRAM(s) Oral daily  atorvastatin 80 milliGRAM(s) Oral at bedtime  carvedilol 6.25 milliGRAM(s) Oral every 12 hours  clopidogrel Tablet 75 milliGRAM(s) Oral daily  dextrose 5%. 1000 milliLiter(s) (50 mL/Hr) IV Continuous <Continuous>  dextrose 50% Injectable 12.5 Gram(s) IV Push once  dextrose 50% Injectable 25 Gram(s) IV Push once  dextrose 50% Injectable 25 Gram(s) IV Push once  enoxaparin Injectable 40 milliGRAM(s) SubCutaneous daily  FLUoxetine Solution 20 milliGRAM(s) Oral daily  hydrocortisone 1% Ointment 1 Application(s) Topical two times a day  insulin glargine Injectable (LANTUS) 38 Unit(s) SubCutaneous at bedtime  insulin lispro (HumaLOG) corrective regimen sliding scale   SubCutaneous every 8 hours  insulin lispro Injectable (HumaLOG) 12 Unit(s) SubCutaneous every 8 hours  levETIRAcetam  Solution 500 milliGRAM(s) Oral two times a day  lisinopril 20 milliGRAM(s) Oral daily  loratadine Syrup 10 milliGRAM(s) Oral <User Schedule>  nystatin    Suspension 331597 Unit(s) Oral two times a day  polyethylene glycol 3350 17 Gram(s) Oral daily    MEDICATIONS  (PRN):  acetaminophen    Suspension .. 650 milliGRAM(s) Enteral Tube every 6 hours PRN Moderate Pain (4 - 6)  bisacodyl Suppository 10 milliGRAM(s) Rectal daily PRN Constipation  dextrose 40% Gel 15 Gram(s) Oral once PRN Blood Glucose LESS THAN 70 milliGRAM(s)/deciliter  diphenhydrAMINE 25 milliGRAM(s) Oral every 8 hours PRN Rash and/or Itching  glucagon  Injectable 1 milliGRAM(s) IntraMuscular once PRN Glucose LESS THAN 70 milligrams/deciliter  nystatin Powder 1 Application(s) Topical two times a day PRN dermatitis        CAPILLARY BLOOD GLUCOSE      POCT Blood Glucose.: 249 mg/dL (05 Aug 2019 21:50)  POCT Blood Glucose.: 221 mg/dL (05 Aug 2019 14:07)  POCT Blood Glucose.: 176 mg/dL (05 Aug 2019 05:58)    I&O's Summary    04 Aug 2019 07:01  -  05 Aug 2019 07:00  --------------------------------------------------------  IN: 2560 mL / OUT: 1950 mL / NET: 610 mL    05 Aug 2019 07:01  -  05 Aug 2019 22:45  --------------------------------------------------------  IN: 480 mL / OUT: 1201 mL / NET: -721 mL        PHYSICAL EXAM:    NECK: Supple, No JVD  CHEST/LUNG: Clear to auscultation bilaterally; No wheezing.  HEART: Regular rate and rhythm; No murmurs, rubs, or gallops  ABDOMEN: Soft, Nontender, Nondistended; Bowel sounds present  EXTREMITIES:   No edema  NEUROLOGY: Awake      LABS:                        10.1   6.12  )-----------( 241      ( 05 Aug 2019 07:52 )             30.8     08-05    137  |  100  |  23  ----------------------------<  162<H>  4.4   |  27  |  0.72    Ca    9.3      05 Aug 2019 06:48              CAPILLARY BLOOD GLUCOSE      POCT Blood Glucose.: 249 mg/dL (05 Aug 2019 21:50)  POCT Blood Glucose.: 221 mg/dL (05 Aug 2019 14:07)  POCT Blood Glucose.: 176 mg/dL (05 Aug 2019 05:58)                RADIOLOGY & ADDITIONAL TESTS:    Imaging Personally Reviewed:    Consultant(s) Notes Reviewed:      Care Discussed with Consultants/Other Providers:

## 2019-08-18 LAB
GLUCOSE BLDC GLUCOMTR-MCNC: 129 MG/DL — HIGH (ref 70–99)
GLUCOSE BLDC GLUCOMTR-MCNC: 145 MG/DL — HIGH (ref 70–99)
GLUCOSE BLDC GLUCOMTR-MCNC: 151 MG/DL — HIGH (ref 70–99)
GLUCOSE BLDC GLUCOMTR-MCNC: 173 MG/DL — HIGH (ref 70–99)

## 2019-08-18 RX ADMIN — Medication 10 UNIT(S): at 08:31

## 2019-08-18 RX ADMIN — Medication 10 UNIT(S): at 12:21

## 2019-08-18 RX ADMIN — Medication 1 APPLICATION(S): at 13:50

## 2019-08-18 RX ADMIN — LEVETIRACETAM 500 MILLIGRAM(S): 250 TABLET, FILM COATED ORAL at 06:52

## 2019-08-18 RX ADMIN — CARVEDILOL PHOSPHATE 6.25 MILLIGRAM(S): 80 CAPSULE, EXTENDED RELEASE ORAL at 06:52

## 2019-08-18 RX ADMIN — LEVETIRACETAM 500 MILLIGRAM(S): 250 TABLET, FILM COATED ORAL at 17:50

## 2019-08-18 RX ADMIN — Medication 1 APPLICATION(S): at 06:52

## 2019-08-18 RX ADMIN — POLYETHYLENE GLYCOL 3350 17 GRAM(S): 17 POWDER, FOR SOLUTION ORAL at 12:21

## 2019-08-18 RX ADMIN — Medication 650 MILLIGRAM(S): at 21:03

## 2019-08-18 RX ADMIN — ENOXAPARIN SODIUM 40 MILLIGRAM(S): 100 INJECTION SUBCUTANEOUS at 12:23

## 2019-08-18 RX ADMIN — Medication 500000 UNIT(S): at 17:50

## 2019-08-18 RX ADMIN — Medication 81 MILLIGRAM(S): at 12:21

## 2019-08-18 RX ADMIN — ATORVASTATIN CALCIUM 80 MILLIGRAM(S): 80 TABLET, FILM COATED ORAL at 21:31

## 2019-08-18 RX ADMIN — LISINOPRIL 20 MILLIGRAM(S): 2.5 TABLET ORAL at 06:52

## 2019-08-18 RX ADMIN — Medication 10 UNIT(S): at 17:49

## 2019-08-18 RX ADMIN — Medication 500000 UNIT(S): at 06:52

## 2019-08-18 RX ADMIN — INSULIN GLARGINE 30 UNIT(S): 100 INJECTION, SOLUTION SUBCUTANEOUS at 22:45

## 2019-08-18 RX ADMIN — Medication 1: at 12:22

## 2019-08-18 RX ADMIN — CARVEDILOL PHOSPHATE 6.25 MILLIGRAM(S): 80 CAPSULE, EXTENDED RELEASE ORAL at 17:50

## 2019-08-18 RX ADMIN — Medication 1 APPLICATION(S): at 21:31

## 2019-08-18 RX ADMIN — CLOPIDOGREL BISULFATE 75 MILLIGRAM(S): 75 TABLET, FILM COATED ORAL at 12:21

## 2019-08-18 RX ADMIN — Medication 20 MILLIGRAM(S): at 12:21

## 2019-08-18 RX ADMIN — Medication 650 MILLIGRAM(S): at 20:03

## 2019-08-18 NOTE — PROGRESS NOTE ADULT - SUBJECTIVE AND OBJECTIVE BOX
Patient is a 54y old  Female who presents with a chief complaint of CVA (02 Aug 2019 16:08)      SUBJECTIVE / OVERNIGHT EVENTS:    No new events noted.  CONSTITUTIONAL: No fever,  No N/V      MEDICATIONS  (STANDING):  AQUAPHOR (petrolatum Ointment) 1 Application(s) Topical three times a day  aspirin  chewable 81 milliGRAM(s) Oral daily  atorvastatin 80 milliGRAM(s) Oral at bedtime  carvedilol 6.25 milliGRAM(s) Oral every 12 hours  clopidogrel Tablet 75 milliGRAM(s) Oral daily  dextrose 5%. 1000 milliLiter(s) (50 mL/Hr) IV Continuous <Continuous>  dextrose 50% Injectable 12.5 Gram(s) IV Push once  dextrose 50% Injectable 25 Gram(s) IV Push once  dextrose 50% Injectable 25 Gram(s) IV Push once  enoxaparin Injectable 40 milliGRAM(s) SubCutaneous daily  FLUoxetine Solution 20 milliGRAM(s) Oral daily  hydrocortisone 1% Ointment 1 Application(s) Topical two times a day  insulin glargine Injectable (LANTUS) 38 Unit(s) SubCutaneous at bedtime  insulin lispro (HumaLOG) corrective regimen sliding scale   SubCutaneous every 8 hours  insulin lispro Injectable (HumaLOG) 12 Unit(s) SubCutaneous every 8 hours  levETIRAcetam  Solution 500 milliGRAM(s) Oral two times a day  lisinopril 20 milliGRAM(s) Oral daily  loratadine Syrup 10 milliGRAM(s) Oral <User Schedule>  nystatin    Suspension 898014 Unit(s) Oral two times a day  polyethylene glycol 3350 17 Gram(s) Oral daily    MEDICATIONS  (PRN):  acetaminophen    Suspension .. 650 milliGRAM(s) Enteral Tube every 6 hours PRN Moderate Pain (4 - 6)  bisacodyl Suppository 10 milliGRAM(s) Rectal daily PRN Constipation  dextrose 40% Gel 15 Gram(s) Oral once PRN Blood Glucose LESS THAN 70 milliGRAM(s)/deciliter  diphenhydrAMINE 25 milliGRAM(s) Oral every 8 hours PRN Rash and/or Itching  glucagon  Injectable 1 milliGRAM(s) IntraMuscular once PRN Glucose LESS THAN 70 milligrams/deciliter  nystatin Powder 1 Application(s) Topical two times a day PRN dermatitis        CAPILLARY BLOOD GLUCOSE      POCT Blood Glucose.: 249 mg/dL (05 Aug 2019 21:50)  POCT Blood Glucose.: 221 mg/dL (05 Aug 2019 14:07)  POCT Blood Glucose.: 176 mg/dL (05 Aug 2019 05:58)    I&O's Summary    04 Aug 2019 07:01  -  05 Aug 2019 07:00  --------------------------------------------------------  IN: 2560 mL / OUT: 1950 mL / NET: 610 mL    05 Aug 2019 07:01  -  05 Aug 2019 22:45  --------------------------------------------------------  IN: 480 mL / OUT: 1201 mL / NET: -721 mL        PHYSICAL EXAM:    NECK: Supple, No JVD  CHEST/LUNG: Clear to auscultation bilaterally; No wheezing.  HEART: Regular rate and rhythm; No murmurs, rubs, or gallops  ABDOMEN: Soft, Nontender, Nondistended; Bowel sounds present  EXTREMITIES:   No edema  NEUROLOGY: Awake      LABS:                        10.1   6.12  )-----------( 241      ( 05 Aug 2019 07:52 )             30.8     08-05    137  |  100  |  23  ----------------------------<  162<H>  4.4   |  27  |  0.72    Ca    9.3      05 Aug 2019 06:48              CAPILLARY BLOOD GLUCOSE      POCT Blood Glucose.: 249 mg/dL (05 Aug 2019 21:50)  POCT Blood Glucose.: 221 mg/dL (05 Aug 2019 14:07)  POCT Blood Glucose.: 176 mg/dL (05 Aug 2019 05:58)                RADIOLOGY & ADDITIONAL TESTS:    Imaging Personally Reviewed:    Consultant(s) Notes Reviewed:      Care Discussed with Consultants/Other Providers:

## 2019-08-19 LAB
GLUCOSE BLDC GLUCOMTR-MCNC: 117 MG/DL — HIGH (ref 70–99)
GLUCOSE BLDC GLUCOMTR-MCNC: 142 MG/DL — HIGH (ref 70–99)
GLUCOSE BLDC GLUCOMTR-MCNC: 146 MG/DL — HIGH (ref 70–99)
GLUCOSE BLDC GLUCOMTR-MCNC: 148 MG/DL — HIGH (ref 70–99)

## 2019-08-19 PROCEDURE — 99232 SBSQ HOSP IP/OBS MODERATE 35: CPT

## 2019-08-19 RX ADMIN — Medication 500000 UNIT(S): at 05:38

## 2019-08-19 RX ADMIN — Medication 1 APPLICATION(S): at 05:38

## 2019-08-19 RX ADMIN — ATORVASTATIN CALCIUM 80 MILLIGRAM(S): 80 TABLET, FILM COATED ORAL at 21:48

## 2019-08-19 RX ADMIN — Medication 1 APPLICATION(S): at 13:15

## 2019-08-19 RX ADMIN — Medication 650 MILLIGRAM(S): at 23:54

## 2019-08-19 RX ADMIN — Medication 10 UNIT(S): at 08:42

## 2019-08-19 RX ADMIN — LEVETIRACETAM 500 MILLIGRAM(S): 250 TABLET, FILM COATED ORAL at 05:38

## 2019-08-19 RX ADMIN — Medication 81 MILLIGRAM(S): at 12:21

## 2019-08-19 RX ADMIN — ENOXAPARIN SODIUM 40 MILLIGRAM(S): 100 INJECTION SUBCUTANEOUS at 12:21

## 2019-08-19 RX ADMIN — Medication 10 UNIT(S): at 13:15

## 2019-08-19 RX ADMIN — Medication 500000 UNIT(S): at 17:39

## 2019-08-19 RX ADMIN — LISINOPRIL 20 MILLIGRAM(S): 2.5 TABLET ORAL at 05:38

## 2019-08-19 RX ADMIN — Medication 10 UNIT(S): at 17:39

## 2019-08-19 RX ADMIN — Medication 1 APPLICATION(S): at 21:48

## 2019-08-19 RX ADMIN — LEVETIRACETAM 500 MILLIGRAM(S): 250 TABLET, FILM COATED ORAL at 17:39

## 2019-08-19 RX ADMIN — CARVEDILOL PHOSPHATE 6.25 MILLIGRAM(S): 80 CAPSULE, EXTENDED RELEASE ORAL at 17:39

## 2019-08-19 RX ADMIN — CARVEDILOL PHOSPHATE 6.25 MILLIGRAM(S): 80 CAPSULE, EXTENDED RELEASE ORAL at 05:38

## 2019-08-19 RX ADMIN — CLOPIDOGREL BISULFATE 75 MILLIGRAM(S): 75 TABLET, FILM COATED ORAL at 12:21

## 2019-08-19 RX ADMIN — INSULIN GLARGINE 30 UNIT(S): 100 INJECTION, SOLUTION SUBCUTANEOUS at 22:36

## 2019-08-19 RX ADMIN — POLYETHYLENE GLYCOL 3350 17 GRAM(S): 17 POWDER, FOR SOLUTION ORAL at 12:21

## 2019-08-19 RX ADMIN — Medication 20 MILLIGRAM(S): at 12:21

## 2019-08-19 NOTE — PROGRESS NOTE ADULT - PROBLEM SELECTOR PLAN 1
: -test BG AC/HS  -c/w Lantus 30 units QHS  -c/w Humalog 10 units AC meals  -Adjust Humalog to low correction scale AC and Low HS scale to avoid overcorrection.   Discharge plan: Basal/bolus. Can be discharged on present insulin doses. RX sent by NP to [facility].   -will follow up w/[facility].   -Will sign off at this time. Contact endocrine team if further assistance required. Discussed w/medicine NP  pager: [phone].

## 2019-08-19 NOTE — PROGRESS NOTE ADULT - SUBJECTIVE AND OBJECTIVE BOX
Patient is a 54y old  Female who presents with a chief complaint of CVA (02 Aug 2019 16:08)      SUBJECTIVE / OVERNIGHT EVENTS:    No new events noted.  CONSTITUTIONAL: No fever,  No N/V      MEDICATIONS  (STANDING):  AQUAPHOR (petrolatum Ointment) 1 Application(s) Topical three times a day  aspirin  chewable 81 milliGRAM(s) Oral daily  atorvastatin 80 milliGRAM(s) Oral at bedtime  carvedilol 6.25 milliGRAM(s) Oral every 12 hours  clopidogrel Tablet 75 milliGRAM(s) Oral daily  dextrose 5%. 1000 milliLiter(s) (50 mL/Hr) IV Continuous <Continuous>  dextrose 50% Injectable 12.5 Gram(s) IV Push once  dextrose 50% Injectable 25 Gram(s) IV Push once  dextrose 50% Injectable 25 Gram(s) IV Push once  enoxaparin Injectable 40 milliGRAM(s) SubCutaneous daily  FLUoxetine Solution 20 milliGRAM(s) Oral daily  hydrocortisone 1% Ointment 1 Application(s) Topical two times a day  insulin glargine Injectable (LANTUS) 38 Unit(s) SubCutaneous at bedtime  insulin lispro (HumaLOG) corrective regimen sliding scale   SubCutaneous every 8 hours  insulin lispro Injectable (HumaLOG) 12 Unit(s) SubCutaneous every 8 hours  levETIRAcetam  Solution 500 milliGRAM(s) Oral two times a day  lisinopril 20 milliGRAM(s) Oral daily  loratadine Syrup 10 milliGRAM(s) Oral <User Schedule>  nystatin    Suspension 195196 Unit(s) Oral two times a day  polyethylene glycol 3350 17 Gram(s) Oral daily    MEDICATIONS  (PRN):  acetaminophen    Suspension .. 650 milliGRAM(s) Enteral Tube every 6 hours PRN Moderate Pain (4 - 6)  bisacodyl Suppository 10 milliGRAM(s) Rectal daily PRN Constipation  dextrose 40% Gel 15 Gram(s) Oral once PRN Blood Glucose LESS THAN 70 milliGRAM(s)/deciliter  diphenhydrAMINE 25 milliGRAM(s) Oral every 8 hours PRN Rash and/or Itching  glucagon  Injectable 1 milliGRAM(s) IntraMuscular once PRN Glucose LESS THAN 70 milligrams/deciliter  nystatin Powder 1 Application(s) Topical two times a day PRN dermatitis        CAPILLARY BLOOD GLUCOSE      POCT Blood Glucose.: 249 mg/dL (05 Aug 2019 21:50)  POCT Blood Glucose.: 221 mg/dL (05 Aug 2019 14:07)  POCT Blood Glucose.: 176 mg/dL (05 Aug 2019 05:58)    I&O's Summary    04 Aug 2019 07:01  -  05 Aug 2019 07:00  --------------------------------------------------------  IN: 2560 mL / OUT: 1950 mL / NET: 610 mL    05 Aug 2019 07:01  -  05 Aug 2019 22:45  --------------------------------------------------------  IN: 480 mL / OUT: 1201 mL / NET: -721 mL        PHYSICAL EXAM:    NECK: Supple, No JVD  CHEST/LUNG: Clear to auscultation bilaterally; No wheezing.  HEART: Regular rate and rhythm; No murmurs, rubs, or gallops  ABDOMEN: Soft, Nontender, Nondistended; Bowel sounds present  EXTREMITIES:   No edema  NEUROLOGY: Awake      LABS:                        10.1   6.12  )-----------( 241      ( 05 Aug 2019 07:52 )             30.8     08-05    137  |  100  |  23  ----------------------------<  162<H>  4.4   |  27  |  0.72    Ca    9.3      05 Aug 2019 06:48              CAPILLARY BLOOD GLUCOSE      POCT Blood Glucose.: 249 mg/dL (05 Aug 2019 21:50)  POCT Blood Glucose.: 221 mg/dL (05 Aug 2019 14:07)  POCT Blood Glucose.: 176 mg/dL (05 Aug 2019 05:58)                RADIOLOGY & ADDITIONAL TESTS:    Imaging Personally Reviewed:    Consultant(s) Notes Reviewed:      Care Discussed with Consultants/Other Providers:

## 2019-08-19 NOTE — PROGRESS NOTE ADULT - ASSESSMENT
54 year old female w/uncontrolled T2DM (HbA1c 10.4%) c/b retinopathy here w/CVA L sided hemiplegia. Now off bolus feeds and tolerating POs. BG values at goal on stable insulin regimen. Awaiting discharge home.

## 2019-08-19 NOTE — PROGRESS NOTE ADULT - SUBJECTIVE AND OBJECTIVE BOX
Diabetes Follow up note:  Interval Hx:  54 year old female w/uncontrolled T2DM (on insulin PTA) w/retinopathy, morbid obesity here w/CVA s/p PEG now off bolus feeds and on PO diet only. BG values at goal throughout the weekend. Still awaiting discharge once home equipment set up. Pt reports eating meals and tolerating PO diet well.     Review of Systems:  General: no complaints.   GI: Tolerating POs without any N/V/D/ABD PAIN.  CV: No CP/SOB  ENDO: No S&Sx of hypoglycemia  MEDS:  atorvastatin 80 milliGRAM(s) Oral at bedtime    insulin glargine Injectable (LANTUS) 30 Unit(s) SubCutaneous at bedtime  insulin lispro (HumaLOG) corrective regimen sliding scale   SubCutaneous at bedtime  insulin lispro (HumaLOG) corrective regimen sliding scale   SubCutaneous three times a day before meals  insulin lispro Injectable (HumaLOG) 10 Unit(s) SubCutaneous three times a day with meals    nystatin    Suspension 044595 Unit(s) Oral two times a day    Allergies    No Known Allergies        PE:  General: Female lying in bed. NAD.   Vital Signs Last 24 Hrs  T(C): 36.4 (19 Aug 2019 10:38), Max: 37.3 (18 Aug 2019 16:56)  T(F): 97.6 (19 Aug 2019 10:38), Max: 99.2 (18 Aug 2019 16:56)  HR: 80 (19 Aug 2019 10:38) (66 - 80)  BP: 127/79 (19 Aug 2019 10:38) (102/68 - 146/65)  BP(mean): --  RR: 18 (19 Aug 2019 10:38) (18 - 18)  SpO2: 97% (19 Aug 2019 10:38) (96% - 98%)  Abd: Soft, NT,ND, Obese.   Extremities: Warm. no edema.   Neuro: A&O X3. L sided hemiplegia.     LABS:    POCT Blood Glucose.: 148 mg/dL (08-19-19 @ 12:28)  POCT Blood Glucose.: 142 mg/dL (08-19-19 @ 08:25)  POCT Blood Glucose.: 151 mg/dL (08-18-19 @ 22:24)  POCT Blood Glucose.: 129 mg/dL (08-18-19 @ 17:07)  POCT Blood Glucose.: 173 mg/dL (08-18-19 @ 11:47)  POCT Blood Glucose.: 145 mg/dL (08-18-19 @ 08:28)  POCT Blood Glucose.: 111 mg/dL (08-17-19 @ 22:14)  POCT Blood Glucose.: 158 mg/dL (08-17-19 @ 17:05)  POCT Blood Glucose.: 174 mg/dL (08-17-19 @ 12:27)  POCT Blood Glucose.: 135 mg/dL (08-17-19 @ 08:11)  POCT Blood Glucose.: 138 mg/dL (08-16-19 @ 22:15)  POCT Blood Glucose.: 139 mg/dL (08-16-19 @ 17:12)                  Hemoglobin A1C, Whole Blood: 10.4 % <H> [4.0 - 5.6] (06-29-19 @ 09:33)            Contact number: [place] [phone] or [phone]

## 2019-08-20 LAB
GLUCOSE BLDC GLUCOMTR-MCNC: 105 MG/DL — HIGH (ref 70–99)
GLUCOSE BLDC GLUCOMTR-MCNC: 125 MG/DL — HIGH (ref 70–99)
GLUCOSE BLDC GLUCOMTR-MCNC: 131 MG/DL — HIGH (ref 70–99)
GLUCOSE BLDC GLUCOMTR-MCNC: 206 MG/DL — HIGH (ref 70–99)
GLUCOSE BLDC GLUCOMTR-MCNC: 70 MG/DL — SIGNIFICANT CHANGE UP (ref 70–99)

## 2019-08-20 RX ORDER — BACITRACIN ZINC 500 UNIT/G
1 OINTMENT IN PACKET (EA) TOPICAL DAILY
Refills: 0 | Status: DISCONTINUED | OUTPATIENT
Start: 2019-08-20 | End: 2019-08-26

## 2019-08-20 RX ORDER — ACETAMINOPHEN 500 MG
2 TABLET ORAL
Qty: 0 | Refills: 0 | DISCHARGE
Start: 2019-08-20

## 2019-08-20 RX ORDER — BACITRACIN ZINC 500 UNIT/G
1 OINTMENT IN PACKET (EA) TOPICAL
Qty: 0 | Refills: 0 | DISCHARGE
Start: 2019-08-20

## 2019-08-20 RX ADMIN — Medication 10 UNIT(S): at 13:10

## 2019-08-20 RX ADMIN — ATORVASTATIN CALCIUM 80 MILLIGRAM(S): 80 TABLET, FILM COATED ORAL at 21:59

## 2019-08-20 RX ADMIN — Medication 500000 UNIT(S): at 05:38

## 2019-08-20 RX ADMIN — Medication 1 APPLICATION(S): at 05:38

## 2019-08-20 RX ADMIN — Medication 650 MILLIGRAM(S): at 14:01

## 2019-08-20 RX ADMIN — INSULIN GLARGINE 30 UNIT(S): 100 INJECTION, SOLUTION SUBCUTANEOUS at 23:37

## 2019-08-20 RX ADMIN — Medication 10 UNIT(S): at 18:13

## 2019-08-20 RX ADMIN — Medication 1 APPLICATION(S): at 14:02

## 2019-08-20 RX ADMIN — Medication 81 MILLIGRAM(S): at 14:02

## 2019-08-20 RX ADMIN — Medication 2: at 18:13

## 2019-08-20 RX ADMIN — CARVEDILOL PHOSPHATE 6.25 MILLIGRAM(S): 80 CAPSULE, EXTENDED RELEASE ORAL at 18:13

## 2019-08-20 RX ADMIN — CLOPIDOGREL BISULFATE 75 MILLIGRAM(S): 75 TABLET, FILM COATED ORAL at 14:02

## 2019-08-20 RX ADMIN — Medication 10 UNIT(S): at 09:25

## 2019-08-20 RX ADMIN — Medication 650 MILLIGRAM(S): at 00:54

## 2019-08-20 RX ADMIN — Medication 1 APPLICATION(S): at 21:59

## 2019-08-20 RX ADMIN — LISINOPRIL 20 MILLIGRAM(S): 2.5 TABLET ORAL at 05:38

## 2019-08-20 RX ADMIN — CARVEDILOL PHOSPHATE 6.25 MILLIGRAM(S): 80 CAPSULE, EXTENDED RELEASE ORAL at 05:38

## 2019-08-20 RX ADMIN — Medication 650 MILLIGRAM(S): at 14:40

## 2019-08-20 RX ADMIN — ENOXAPARIN SODIUM 40 MILLIGRAM(S): 100 INJECTION SUBCUTANEOUS at 14:02

## 2019-08-20 RX ADMIN — Medication 20 MILLIGRAM(S): at 14:01

## 2019-08-20 RX ADMIN — LEVETIRACETAM 500 MILLIGRAM(S): 250 TABLET, FILM COATED ORAL at 05:38

## 2019-08-20 RX ADMIN — LEVETIRACETAM 500 MILLIGRAM(S): 250 TABLET, FILM COATED ORAL at 18:13

## 2019-08-20 NOTE — PROGRESS NOTE ADULT - SUBJECTIVE AND OBJECTIVE BOX
Patient is a 54y old  Female who presents with a chief complaint of CVA (02 Aug 2019 16:08)      SUBJECTIVE / OVERNIGHT EVENTS:    No new events noted.  CONSTITUTIONAL: No fever,  No N/V      MEDICATIONS  (STANDING):  AQUAPHOR (petrolatum Ointment) 1 Application(s) Topical three times a day  aspirin  chewable 81 milliGRAM(s) Oral daily  atorvastatin 80 milliGRAM(s) Oral at bedtime  carvedilol 6.25 milliGRAM(s) Oral every 12 hours  clopidogrel Tablet 75 milliGRAM(s) Oral daily  dextrose 5%. 1000 milliLiter(s) (50 mL/Hr) IV Continuous <Continuous>  dextrose 50% Injectable 12.5 Gram(s) IV Push once  dextrose 50% Injectable 25 Gram(s) IV Push once  dextrose 50% Injectable 25 Gram(s) IV Push once  enoxaparin Injectable 40 milliGRAM(s) SubCutaneous daily  FLUoxetine Solution 20 milliGRAM(s) Oral daily  hydrocortisone 1% Ointment 1 Application(s) Topical two times a day  insulin glargine Injectable (LANTUS) 38 Unit(s) SubCutaneous at bedtime  insulin lispro (HumaLOG) corrective regimen sliding scale   SubCutaneous every 8 hours  insulin lispro Injectable (HumaLOG) 12 Unit(s) SubCutaneous every 8 hours  levETIRAcetam  Solution 500 milliGRAM(s) Oral two times a day  lisinopril 20 milliGRAM(s) Oral daily  loratadine Syrup 10 milliGRAM(s) Oral <User Schedule>  nystatin    Suspension 301905 Unit(s) Oral two times a day  polyethylene glycol 3350 17 Gram(s) Oral daily    MEDICATIONS  (PRN):  acetaminophen    Suspension .. 650 milliGRAM(s) Enteral Tube every 6 hours PRN Moderate Pain (4 - 6)  bisacodyl Suppository 10 milliGRAM(s) Rectal daily PRN Constipation  dextrose 40% Gel 15 Gram(s) Oral once PRN Blood Glucose LESS THAN 70 milliGRAM(s)/deciliter  diphenhydrAMINE 25 milliGRAM(s) Oral every 8 hours PRN Rash and/or Itching  glucagon  Injectable 1 milliGRAM(s) IntraMuscular once PRN Glucose LESS THAN 70 milligrams/deciliter  nystatin Powder 1 Application(s) Topical two times a day PRN dermatitis        CAPILLARY BLOOD GLUCOSE      POCT Blood Glucose.: 249 mg/dL (05 Aug 2019 21:50)  POCT Blood Glucose.: 221 mg/dL (05 Aug 2019 14:07)  POCT Blood Glucose.: 176 mg/dL (05 Aug 2019 05:58)    I&O's Summary    04 Aug 2019 07:01  -  05 Aug 2019 07:00  --------------------------------------------------------  IN: 2560 mL / OUT: 1950 mL / NET: 610 mL    05 Aug 2019 07:01  -  05 Aug 2019 22:45  --------------------------------------------------------  IN: 480 mL / OUT: 1201 mL / NET: -721 mL        PHYSICAL EXAM:    NECK: Supple, No JVD  CHEST/LUNG: Clear to auscultation bilaterally; No wheezing.  HEART: Regular rate and rhythm; No murmurs, rubs, or gallops  ABDOMEN: Soft, Nontender, Nondistended; Bowel sounds present  EXTREMITIES:   No edema  NEUROLOGY: Awake      LABS:                        10.1   6.12  )-----------( 241      ( 05 Aug 2019 07:52 )             30.8     08-05    137  |  100  |  23  ----------------------------<  162<H>  4.4   |  27  |  0.72    Ca    9.3      05 Aug 2019 06:48              CAPILLARY BLOOD GLUCOSE      POCT Blood Glucose.: 249 mg/dL (05 Aug 2019 21:50)  POCT Blood Glucose.: 221 mg/dL (05 Aug 2019 14:07)  POCT Blood Glucose.: 176 mg/dL (05 Aug 2019 05:58)                RADIOLOGY & ADDITIONAL TESTS:    Imaging Personally Reviewed:    Consultant(s) Notes Reviewed:      Care Discussed with Consultants/Other Providers:

## 2019-08-21 LAB
GLUCOSE BLDC GLUCOMTR-MCNC: 108 MG/DL — HIGH (ref 70–99)
GLUCOSE BLDC GLUCOMTR-MCNC: 110 MG/DL — HIGH (ref 70–99)
GLUCOSE BLDC GLUCOMTR-MCNC: 120 MG/DL — HIGH (ref 70–99)
GLUCOSE BLDC GLUCOMTR-MCNC: 144 MG/DL — HIGH (ref 70–99)

## 2019-08-21 RX ADMIN — CLOPIDOGREL BISULFATE 75 MILLIGRAM(S): 75 TABLET, FILM COATED ORAL at 13:18

## 2019-08-21 RX ADMIN — Medication 1 APPLICATION(S): at 22:37

## 2019-08-21 RX ADMIN — LEVETIRACETAM 500 MILLIGRAM(S): 250 TABLET, FILM COATED ORAL at 17:12

## 2019-08-21 RX ADMIN — Medication 1 APPLICATION(S): at 15:16

## 2019-08-21 RX ADMIN — Medication 10 UNIT(S): at 08:41

## 2019-08-21 RX ADMIN — Medication 500000 UNIT(S): at 17:12

## 2019-08-21 RX ADMIN — ATORVASTATIN CALCIUM 80 MILLIGRAM(S): 80 TABLET, FILM COATED ORAL at 22:37

## 2019-08-21 RX ADMIN — Medication 81 MILLIGRAM(S): at 13:20

## 2019-08-21 RX ADMIN — POLYETHYLENE GLYCOL 3350 17 GRAM(S): 17 POWDER, FOR SOLUTION ORAL at 13:20

## 2019-08-21 RX ADMIN — CARVEDILOL PHOSPHATE 6.25 MILLIGRAM(S): 80 CAPSULE, EXTENDED RELEASE ORAL at 17:12

## 2019-08-21 RX ADMIN — CARVEDILOL PHOSPHATE 6.25 MILLIGRAM(S): 80 CAPSULE, EXTENDED RELEASE ORAL at 06:08

## 2019-08-21 RX ADMIN — Medication 20 MILLIGRAM(S): at 13:18

## 2019-08-21 RX ADMIN — Medication 500000 UNIT(S): at 06:14

## 2019-08-21 RX ADMIN — LISINOPRIL 20 MILLIGRAM(S): 2.5 TABLET ORAL at 06:08

## 2019-08-21 RX ADMIN — LEVETIRACETAM 500 MILLIGRAM(S): 250 TABLET, FILM COATED ORAL at 06:08

## 2019-08-21 RX ADMIN — Medication 10 UNIT(S): at 13:18

## 2019-08-21 RX ADMIN — Medication 1 APPLICATION(S): at 06:08

## 2019-08-21 RX ADMIN — Medication 10 UNIT(S): at 17:14

## 2019-08-21 RX ADMIN — INSULIN GLARGINE 30 UNIT(S): 100 INJECTION, SOLUTION SUBCUTANEOUS at 22:54

## 2019-08-21 RX ADMIN — Medication 1 APPLICATION(S): at 17:16

## 2019-08-21 RX ADMIN — ENOXAPARIN SODIUM 40 MILLIGRAM(S): 100 INJECTION SUBCUTANEOUS at 13:20

## 2019-08-21 NOTE — CHART NOTE - NSCHARTNOTEFT_GEN_A_CORE
follow up- late entry for 8/20/19- pt with left upper chest breast area skin abrasion ; pt denies any complaints; bacitracin ointment daily and monitor for any s/s of infection; d/w RN.  Ele Chan(NP)  3 [facility], [phone]

## 2019-08-21 NOTE — PROGRESS NOTE ADULT - ASSESSMENT
· Assessment	  54 year old female admitted with acute CVA.     Problem/Plan - 2:  ·  Problem: CVA (cerebral vascular accident).  Plan: - Right MCA and ENRIQUE distribution stroke   - Dysphagia diet and PEG feeding    DM II:  FSSS/Lantus    Dc planning · Assessment	  54 year old female admitted with acute CVA.     Problem/Plan - 2:  ·  Problem: CVA (cerebral vascular accident).  Plan: - Right MCA and ENRIQUE distribution stroke   - Dysphagia diet.     DM II:  FSSS/Lantus    Dc planning

## 2019-08-21 NOTE — PROGRESS NOTE ADULT - SUBJECTIVE AND OBJECTIVE BOX
Patient is a 54y old  Female who presents with a chief complaint of CVA (02 Aug 2019 16:08)      SUBJECTIVE / OVERNIGHT EVENTS:    No new events noted.  CONSTITUTIONAL: No fever,  No N/V      MEDICATIONS  (STANDING):  AQUAPHOR (petrolatum Ointment) 1 Application(s) Topical three times a day  aspirin  chewable 81 milliGRAM(s) Oral daily  atorvastatin 80 milliGRAM(s) Oral at bedtime  carvedilol 6.25 milliGRAM(s) Oral every 12 hours  clopidogrel Tablet 75 milliGRAM(s) Oral daily  dextrose 5%. 1000 milliLiter(s) (50 mL/Hr) IV Continuous <Continuous>  dextrose 50% Injectable 12.5 Gram(s) IV Push once  dextrose 50% Injectable 25 Gram(s) IV Push once  dextrose 50% Injectable 25 Gram(s) IV Push once  enoxaparin Injectable 40 milliGRAM(s) SubCutaneous daily  FLUoxetine Solution 20 milliGRAM(s) Oral daily  hydrocortisone 1% Ointment 1 Application(s) Topical two times a day  insulin glargine Injectable (LANTUS) 38 Unit(s) SubCutaneous at bedtime  insulin lispro (HumaLOG) corrective regimen sliding scale   SubCutaneous every 8 hours  insulin lispro Injectable (HumaLOG) 12 Unit(s) SubCutaneous every 8 hours  levETIRAcetam  Solution 500 milliGRAM(s) Oral two times a day  lisinopril 20 milliGRAM(s) Oral daily  loratadine Syrup 10 milliGRAM(s) Oral <User Schedule>  nystatin    Suspension 390453 Unit(s) Oral two times a day  polyethylene glycol 3350 17 Gram(s) Oral daily    MEDICATIONS  (PRN):  acetaminophen    Suspension .. 650 milliGRAM(s) Enteral Tube every 6 hours PRN Moderate Pain (4 - 6)  bisacodyl Suppository 10 milliGRAM(s) Rectal daily PRN Constipation  dextrose 40% Gel 15 Gram(s) Oral once PRN Blood Glucose LESS THAN 70 milliGRAM(s)/deciliter  diphenhydrAMINE 25 milliGRAM(s) Oral every 8 hours PRN Rash and/or Itching  glucagon  Injectable 1 milliGRAM(s) IntraMuscular once PRN Glucose LESS THAN 70 milligrams/deciliter  nystatin Powder 1 Application(s) Topical two times a day PRN dermatitis        CAPILLARY BLOOD GLUCOSE      POCT Blood Glucose.: 249 mg/dL (05 Aug 2019 21:50)  POCT Blood Glucose.: 221 mg/dL (05 Aug 2019 14:07)  POCT Blood Glucose.: 176 mg/dL (05 Aug 2019 05:58)    I&O's Summary    04 Aug 2019 07:01  -  05 Aug 2019 07:00  --------------------------------------------------------  IN: 2560 mL / OUT: 1950 mL / NET: 610 mL    05 Aug 2019 07:01  -  05 Aug 2019 22:45  --------------------------------------------------------  IN: 480 mL / OUT: 1201 mL / NET: -721 mL        PHYSICAL EXAM:    NECK: Supple, No JVD  CHEST/LUNG: Clear to auscultation bilaterally; No wheezing.  HEART: Regular rate and rhythm; No murmurs, rubs, or gallops  ABDOMEN: Soft, Nontender, Nondistended; Bowel sounds present  EXTREMITIES:   No edema  NEUROLOGY: Awake      LABS:                        10.1   6.12  )-----------( 241      ( 05 Aug 2019 07:52 )             30.8     08-05    137  |  100  |  23  ----------------------------<  162<H>  4.4   |  27  |  0.72    Ca    9.3      05 Aug 2019 06:48              CAPILLARY BLOOD GLUCOSE      POCT Blood Glucose.: 249 mg/dL (05 Aug 2019 21:50)  POCT Blood Glucose.: 221 mg/dL (05 Aug 2019 14:07)  POCT Blood Glucose.: 176 mg/dL (05 Aug 2019 05:58)                RADIOLOGY & ADDITIONAL TESTS:    Imaging Personally Reviewed:    Consultant(s) Notes Reviewed:      Care Discussed with Consultants/Other Providers:

## 2019-08-22 ENCOUNTER — TRANSCRIPTION ENCOUNTER (OUTPATIENT)
Age: 54
End: 2019-08-22

## 2019-08-22 LAB
GLUCOSE BLDC GLUCOMTR-MCNC: 117 MG/DL — HIGH (ref 70–99)
GLUCOSE BLDC GLUCOMTR-MCNC: 153 MG/DL — HIGH (ref 70–99)
GLUCOSE BLDC GLUCOMTR-MCNC: 212 MG/DL — HIGH (ref 70–99)
GLUCOSE BLDC GLUCOMTR-MCNC: 214 MG/DL — HIGH (ref 70–99)

## 2019-08-22 RX ORDER — INSULIN ASPART 100 [IU]/ML
10 INJECTION, SOLUTION SUBCUTANEOUS
Qty: 1 | Refills: 0
Start: 2019-08-22 | End: 2019-09-20

## 2019-08-22 RX ORDER — INSULIN DETEMIR 100/ML (3)
30 INSULIN PEN (ML) SUBCUTANEOUS
Qty: 1 | Refills: 0
Start: 2019-08-22 | End: 2019-09-20

## 2019-08-22 RX ADMIN — Medication 81 MILLIGRAM(S): at 12:55

## 2019-08-22 RX ADMIN — Medication 1 APPLICATION(S): at 05:43

## 2019-08-22 RX ADMIN — Medication 1 APPLICATION(S): at 21:17

## 2019-08-22 RX ADMIN — Medication 2: at 17:55

## 2019-08-22 RX ADMIN — ATORVASTATIN CALCIUM 80 MILLIGRAM(S): 80 TABLET, FILM COATED ORAL at 21:16

## 2019-08-22 RX ADMIN — Medication 1 APPLICATION(S): at 12:00

## 2019-08-22 RX ADMIN — LEVETIRACETAM 500 MILLIGRAM(S): 250 TABLET, FILM COATED ORAL at 05:43

## 2019-08-22 RX ADMIN — Medication 500000 UNIT(S): at 17:56

## 2019-08-22 RX ADMIN — Medication 20 MILLIGRAM(S): at 12:56

## 2019-08-22 RX ADMIN — LISINOPRIL 20 MILLIGRAM(S): 2.5 TABLET ORAL at 05:44

## 2019-08-22 RX ADMIN — LEVETIRACETAM 500 MILLIGRAM(S): 250 TABLET, FILM COATED ORAL at 17:56

## 2019-08-22 RX ADMIN — Medication 10 UNIT(S): at 12:55

## 2019-08-22 RX ADMIN — Medication 10 UNIT(S): at 17:55

## 2019-08-22 RX ADMIN — Medication 500000 UNIT(S): at 05:43

## 2019-08-22 RX ADMIN — ENOXAPARIN SODIUM 40 MILLIGRAM(S): 100 INJECTION SUBCUTANEOUS at 12:56

## 2019-08-22 RX ADMIN — INSULIN GLARGINE 30 UNIT(S): 100 INJECTION, SOLUTION SUBCUTANEOUS at 22:25

## 2019-08-22 RX ADMIN — CARVEDILOL PHOSPHATE 6.25 MILLIGRAM(S): 80 CAPSULE, EXTENDED RELEASE ORAL at 05:43

## 2019-08-22 RX ADMIN — Medication 2: at 12:54

## 2019-08-22 RX ADMIN — Medication 10 UNIT(S): at 08:45

## 2019-08-22 RX ADMIN — CARVEDILOL PHOSPHATE 6.25 MILLIGRAM(S): 80 CAPSULE, EXTENDED RELEASE ORAL at 17:56

## 2019-08-22 RX ADMIN — CLOPIDOGREL BISULFATE 75 MILLIGRAM(S): 75 TABLET, FILM COATED ORAL at 12:56

## 2019-08-22 NOTE — PROGRESS NOTE ADULT - ASSESSMENT
· Assessment	  54 year old female admitted with acute CVA.     Problem/Plan - 2:  ·  Problem: CVA (cerebral vascular accident).  Plan: - Right MCA and ENRIQUE distribution stroke   - Dysphagia diet.     DM II:  FSSS/Lantus    Dc planning

## 2019-08-22 NOTE — DISCHARGE NOTE NURSING/CASE MANAGEMENT/SOCIAL WORK - NSDCDPATPORTLINK_GEN_ALL_CORE
You can access the [facility], offered by [facility], by registering with the following website: [email or web address]

## 2019-08-22 NOTE — CHART NOTE - NSCHARTNOTEFT_GEN_A_CORE
follow up- discussed and reviewed pt  medication/follow up on discharge with pt daughter Keily at the bedside; daughter understands care and aware of the follow up; RN/team educated pt/family on current plan of care and home care follow up. per d/w SW possible dc plan for tomorrow if home care is arranged.  Ele Chan(NP)  3 [facility], [phone]

## 2019-08-22 NOTE — PROGRESS NOTE ADULT - SUBJECTIVE AND OBJECTIVE BOX
Patient is a 54y old  Female who presents with a chief complaint of CVA (02 Aug 2019 16:08)      SUBJECTIVE / OVERNIGHT EVENTS:    No new events noted.  CONSTITUTIONAL: No fever,  No N/V      MEDICATIONS  (STANDING):  AQUAPHOR (petrolatum Ointment) 1 Application(s) Topical three times a day  aspirin  chewable 81 milliGRAM(s) Oral daily  atorvastatin 80 milliGRAM(s) Oral at bedtime  carvedilol 6.25 milliGRAM(s) Oral every 12 hours  clopidogrel Tablet 75 milliGRAM(s) Oral daily  dextrose 5%. 1000 milliLiter(s) (50 mL/Hr) IV Continuous <Continuous>  dextrose 50% Injectable 12.5 Gram(s) IV Push once  dextrose 50% Injectable 25 Gram(s) IV Push once  dextrose 50% Injectable 25 Gram(s) IV Push once  enoxaparin Injectable 40 milliGRAM(s) SubCutaneous daily  FLUoxetine Solution 20 milliGRAM(s) Oral daily  hydrocortisone 1% Ointment 1 Application(s) Topical two times a day  insulin glargine Injectable (LANTUS) 38 Unit(s) SubCutaneous at bedtime  insulin lispro (HumaLOG) corrective regimen sliding scale   SubCutaneous every 8 hours  insulin lispro Injectable (HumaLOG) 12 Unit(s) SubCutaneous every 8 hours  levETIRAcetam  Solution 500 milliGRAM(s) Oral two times a day  lisinopril 20 milliGRAM(s) Oral daily  loratadine Syrup 10 milliGRAM(s) Oral <User Schedule>  nystatin    Suspension 382262 Unit(s) Oral two times a day  polyethylene glycol 3350 17 Gram(s) Oral daily    MEDICATIONS  (PRN):  acetaminophen    Suspension .. 650 milliGRAM(s) Enteral Tube every 6 hours PRN Moderate Pain (4 - 6)  bisacodyl Suppository 10 milliGRAM(s) Rectal daily PRN Constipation  dextrose 40% Gel 15 Gram(s) Oral once PRN Blood Glucose LESS THAN 70 milliGRAM(s)/deciliter  diphenhydrAMINE 25 milliGRAM(s) Oral every 8 hours PRN Rash and/or Itching  glucagon  Injectable 1 milliGRAM(s) IntraMuscular once PRN Glucose LESS THAN 70 milligrams/deciliter  nystatin Powder 1 Application(s) Topical two times a day PRN dermatitis        CAPILLARY BLOOD GLUCOSE      POCT Blood Glucose.: 249 mg/dL (05 Aug 2019 21:50)  POCT Blood Glucose.: 221 mg/dL (05 Aug 2019 14:07)  POCT Blood Glucose.: 176 mg/dL (05 Aug 2019 05:58)    I&O's Summary    04 Aug 2019 07:01  -  05 Aug 2019 07:00  --------------------------------------------------------  IN: 2560 mL / OUT: 1950 mL / NET: 610 mL    05 Aug 2019 07:01  -  05 Aug 2019 22:45  --------------------------------------------------------  IN: 480 mL / OUT: 1201 mL / NET: -721 mL        PHYSICAL EXAM:    NECK: Supple, No JVD  CHEST/LUNG: Clear to auscultation bilaterally; No wheezing.  HEART: Regular rate and rhythm; No murmurs, rubs, or gallops  ABDOMEN: Soft, Nontender, Nondistended; Bowel sounds present  EXTREMITIES:   No edema  NEUROLOGY: Awake      LABS:                        10.1   6.12  )-----------( 241      ( 05 Aug 2019 07:52 )             30.8     08-05    137  |  100  |  23  ----------------------------<  162<H>  4.4   |  27  |  0.72    Ca    9.3      05 Aug 2019 06:48              CAPILLARY BLOOD GLUCOSE      POCT Blood Glucose.: 249 mg/dL (05 Aug 2019 21:50)  POCT Blood Glucose.: 221 mg/dL (05 Aug 2019 14:07)  POCT Blood Glucose.: 176 mg/dL (05 Aug 2019 05:58)                RADIOLOGY & ADDITIONAL TESTS:    Imaging Personally Reviewed:    Consultant(s) Notes Reviewed:      Care Discussed with Consultants/Other Providers:

## 2019-08-22 NOTE — DISCHARGE NOTE NURSING/CASE MANAGEMENT/SOCIAL WORK - NSDCPEPTSTRK_GEN_ALL_CORE
Need for follow up after discharge/Stroke education booklet/Stroke support groups for patients, families, and friends/Risk factors for stroke/Stroke warning signs and symptoms/Signs and symptoms of stroke/Prescribed medications/Call 911 for stroke

## 2019-08-23 LAB
GLUCOSE BLDC GLUCOMTR-MCNC: 129 MG/DL — HIGH (ref 70–99)
GLUCOSE BLDC GLUCOMTR-MCNC: 146 MG/DL — HIGH (ref 70–99)
GLUCOSE BLDC GLUCOMTR-MCNC: 162 MG/DL — HIGH (ref 70–99)
GLUCOSE BLDC GLUCOMTR-MCNC: 166 MG/DL — HIGH (ref 70–99)

## 2019-08-23 RX ADMIN — Medication 20 MILLIGRAM(S): at 13:13

## 2019-08-23 RX ADMIN — INSULIN GLARGINE 30 UNIT(S): 100 INJECTION, SOLUTION SUBCUTANEOUS at 22:30

## 2019-08-23 RX ADMIN — Medication 1: at 18:05

## 2019-08-23 RX ADMIN — Medication 81 MILLIGRAM(S): at 13:13

## 2019-08-23 RX ADMIN — Medication 500000 UNIT(S): at 05:03

## 2019-08-23 RX ADMIN — POLYETHYLENE GLYCOL 3350 17 GRAM(S): 17 POWDER, FOR SOLUTION ORAL at 13:13

## 2019-08-23 RX ADMIN — Medication 1 APPLICATION(S): at 13:27

## 2019-08-23 RX ADMIN — CARVEDILOL PHOSPHATE 6.25 MILLIGRAM(S): 80 CAPSULE, EXTENDED RELEASE ORAL at 05:03

## 2019-08-23 RX ADMIN — ENOXAPARIN SODIUM 40 MILLIGRAM(S): 100 INJECTION SUBCUTANEOUS at 13:12

## 2019-08-23 RX ADMIN — LEVETIRACETAM 500 MILLIGRAM(S): 250 TABLET, FILM COATED ORAL at 05:03

## 2019-08-23 RX ADMIN — LEVETIRACETAM 500 MILLIGRAM(S): 250 TABLET, FILM COATED ORAL at 18:06

## 2019-08-23 RX ADMIN — Medication 1 APPLICATION(S): at 05:03

## 2019-08-23 RX ADMIN — Medication 10 UNIT(S): at 08:47

## 2019-08-23 RX ADMIN — LISINOPRIL 20 MILLIGRAM(S): 2.5 TABLET ORAL at 05:04

## 2019-08-23 RX ADMIN — CARVEDILOL PHOSPHATE 6.25 MILLIGRAM(S): 80 CAPSULE, EXTENDED RELEASE ORAL at 18:06

## 2019-08-23 RX ADMIN — CLOPIDOGREL BISULFATE 75 MILLIGRAM(S): 75 TABLET, FILM COATED ORAL at 13:13

## 2019-08-23 RX ADMIN — Medication 1 APPLICATION(S): at 22:30

## 2019-08-23 RX ADMIN — ATORVASTATIN CALCIUM 80 MILLIGRAM(S): 80 TABLET, FILM COATED ORAL at 22:29

## 2019-08-23 RX ADMIN — Medication 1 APPLICATION(S): at 13:14

## 2019-08-23 RX ADMIN — Medication 500000 UNIT(S): at 18:05

## 2019-08-23 RX ADMIN — Medication 10 UNIT(S): at 18:05

## 2019-08-23 RX ADMIN — Medication 10 UNIT(S): at 13:13

## 2019-08-23 NOTE — CHART NOTE - NSCHARTNOTEFT_GEN_A_CORE
follow up- pt is cleared by MD for discharge home; per  hospital bed is to be set up surgical supplies possibly by tomorrow.  will inform the staff once its arranged;   Ele Chan(NP)  3 [facility], [phone]

## 2019-08-23 NOTE — PROGRESS NOTE ADULT - SUBJECTIVE AND OBJECTIVE BOX
Patient is a 54y old  Female who presents with a chief complaint of CVA (02 Aug 2019 16:08)      SUBJECTIVE / OVERNIGHT EVENTS:    No new events noted.  CONSTITUTIONAL: No fever,  No N/V      MEDICATIONS  (STANDING):  AQUAPHOR (petrolatum Ointment) 1 Application(s) Topical three times a day  aspirin  chewable 81 milliGRAM(s) Oral daily  atorvastatin 80 milliGRAM(s) Oral at bedtime  carvedilol 6.25 milliGRAM(s) Oral every 12 hours  clopidogrel Tablet 75 milliGRAM(s) Oral daily  dextrose 5%. 1000 milliLiter(s) (50 mL/Hr) IV Continuous <Continuous>  dextrose 50% Injectable 12.5 Gram(s) IV Push once  dextrose 50% Injectable 25 Gram(s) IV Push once  dextrose 50% Injectable 25 Gram(s) IV Push once  enoxaparin Injectable 40 milliGRAM(s) SubCutaneous daily  FLUoxetine Solution 20 milliGRAM(s) Oral daily  hydrocortisone 1% Ointment 1 Application(s) Topical two times a day  insulin glargine Injectable (LANTUS) 38 Unit(s) SubCutaneous at bedtime  insulin lispro (HumaLOG) corrective regimen sliding scale   SubCutaneous every 8 hours  insulin lispro Injectable (HumaLOG) 12 Unit(s) SubCutaneous every 8 hours  levETIRAcetam  Solution 500 milliGRAM(s) Oral two times a day  lisinopril 20 milliGRAM(s) Oral daily  loratadine Syrup 10 milliGRAM(s) Oral <User Schedule>  nystatin    Suspension 802217 Unit(s) Oral two times a day  polyethylene glycol 3350 17 Gram(s) Oral daily    MEDICATIONS  (PRN):  acetaminophen    Suspension .. 650 milliGRAM(s) Enteral Tube every 6 hours PRN Moderate Pain (4 - 6)  bisacodyl Suppository 10 milliGRAM(s) Rectal daily PRN Constipation  dextrose 40% Gel 15 Gram(s) Oral once PRN Blood Glucose LESS THAN 70 milliGRAM(s)/deciliter  diphenhydrAMINE 25 milliGRAM(s) Oral every 8 hours PRN Rash and/or Itching  glucagon  Injectable 1 milliGRAM(s) IntraMuscular once PRN Glucose LESS THAN 70 milligrams/deciliter  nystatin Powder 1 Application(s) Topical two times a day PRN dermatitis        CAPILLARY BLOOD GLUCOSE      POCT Blood Glucose.: 249 mg/dL (05 Aug 2019 21:50)  POCT Blood Glucose.: 221 mg/dL (05 Aug 2019 14:07)  POCT Blood Glucose.: 176 mg/dL (05 Aug 2019 05:58)    I&O's Summary    04 Aug 2019 07:01  -  05 Aug 2019 07:00  --------------------------------------------------------  IN: 2560 mL / OUT: 1950 mL / NET: 610 mL    05 Aug 2019 07:01  -  05 Aug 2019 22:45  --------------------------------------------------------  IN: 480 mL / OUT: 1201 mL / NET: -721 mL        PHYSICAL EXAM:    NECK: Supple, No JVD  CHEST/LUNG: Clear to auscultation bilaterally; No wheezing.  HEART: Regular rate and rhythm; No murmurs, rubs, or gallops  ABDOMEN: Soft, Nontender, Nondistended; Bowel sounds present  EXTREMITIES:   No edema  NEUROLOGY: Awake      LABS:                        10.1   6.12  )-----------( 241      ( 05 Aug 2019 07:52 )             30.8     08-05    137  |  100  |  23  ----------------------------<  162<H>  4.4   |  27  |  0.72    Ca    9.3      05 Aug 2019 06:48              CAPILLARY BLOOD GLUCOSE      POCT Blood Glucose.: 249 mg/dL (05 Aug 2019 21:50)  POCT Blood Glucose.: 221 mg/dL (05 Aug 2019 14:07)  POCT Blood Glucose.: 176 mg/dL (05 Aug 2019 05:58)                RADIOLOGY & ADDITIONAL TESTS:    Imaging Personally Reviewed:    Consultant(s) Notes Reviewed:      Care Discussed with Consultants/Other Providers:

## 2019-08-24 LAB
GLUCOSE BLDC GLUCOMTR-MCNC: 104 MG/DL — HIGH (ref 70–99)
GLUCOSE BLDC GLUCOMTR-MCNC: 121 MG/DL — HIGH (ref 70–99)
GLUCOSE BLDC GLUCOMTR-MCNC: 158 MG/DL — HIGH (ref 70–99)
GLUCOSE BLDC GLUCOMTR-MCNC: 82 MG/DL — SIGNIFICANT CHANGE UP (ref 70–99)
GLUCOSE BLDC GLUCOMTR-MCNC: 83 MG/DL — SIGNIFICANT CHANGE UP (ref 70–99)

## 2019-08-24 RX ADMIN — CARVEDILOL PHOSPHATE 6.25 MILLIGRAM(S): 80 CAPSULE, EXTENDED RELEASE ORAL at 17:41

## 2019-08-24 RX ADMIN — Medication 1 APPLICATION(S): at 13:30

## 2019-08-24 RX ADMIN — LEVETIRACETAM 500 MILLIGRAM(S): 250 TABLET, FILM COATED ORAL at 17:41

## 2019-08-24 RX ADMIN — Medication 20 MILLIGRAM(S): at 12:43

## 2019-08-24 RX ADMIN — Medication 1: at 12:44

## 2019-08-24 RX ADMIN — CLOPIDOGREL BISULFATE 75 MILLIGRAM(S): 75 TABLET, FILM COATED ORAL at 12:43

## 2019-08-24 RX ADMIN — Medication 1 APPLICATION(S): at 06:02

## 2019-08-24 RX ADMIN — LEVETIRACETAM 500 MILLIGRAM(S): 250 TABLET, FILM COATED ORAL at 06:03

## 2019-08-24 RX ADMIN — Medication 650 MILLIGRAM(S): at 18:40

## 2019-08-24 RX ADMIN — Medication 10 UNIT(S): at 17:41

## 2019-08-24 RX ADMIN — Medication 500000 UNIT(S): at 17:40

## 2019-08-24 RX ADMIN — Medication 10 UNIT(S): at 08:57

## 2019-08-24 RX ADMIN — Medication 10 UNIT(S): at 12:44

## 2019-08-24 RX ADMIN — Medication 500000 UNIT(S): at 06:03

## 2019-08-24 RX ADMIN — ATORVASTATIN CALCIUM 80 MILLIGRAM(S): 80 TABLET, FILM COATED ORAL at 22:29

## 2019-08-24 RX ADMIN — POLYETHYLENE GLYCOL 3350 17 GRAM(S): 17 POWDER, FOR SOLUTION ORAL at 12:43

## 2019-08-24 RX ADMIN — CARVEDILOL PHOSPHATE 6.25 MILLIGRAM(S): 80 CAPSULE, EXTENDED RELEASE ORAL at 06:03

## 2019-08-24 RX ADMIN — Medication 650 MILLIGRAM(S): at 17:41

## 2019-08-24 RX ADMIN — Medication 1 APPLICATION(S): at 22:30

## 2019-08-24 RX ADMIN — Medication 1 APPLICATION(S): at 12:44

## 2019-08-24 RX ADMIN — Medication 81 MILLIGRAM(S): at 12:43

## 2019-08-24 RX ADMIN — ENOXAPARIN SODIUM 40 MILLIGRAM(S): 100 INJECTION SUBCUTANEOUS at 12:43

## 2019-08-24 RX ADMIN — LISINOPRIL 20 MILLIGRAM(S): 2.5 TABLET ORAL at 06:03

## 2019-08-24 NOTE — PROGRESS NOTE ADULT - SUBJECTIVE AND OBJECTIVE BOX
Patient is a 54y old  Female who presents with a chief complaint of CVA (02 Aug 2019 16:08)      INTERVAL HPI/OVERNIGHT EVENTS:  T(C): 36.7 (08-24-19 @ 11:37), Max: 36.9 (08-24-19 @ 05:37)  HR: 77 (08-24-19 @ 11:37) (73 - 77)  BP: 146/80 (08-24-19 @ 11:37) (131/62 - 146/80)  RR: 18 (08-24-19 @ 11:37) (18 - 18)  SpO2: 98% (08-24-19 @ 11:37) (98% - 99%)  Wt(kg): --  I&O's Summary    23 Aug 2019 07:01  -  24 Aug 2019 07:00  --------------------------------------------------------  IN: 1920 mL / OUT: 2500 mL / NET: -580 mL        LABS:              CAPILLARY BLOOD GLUCOSE      POCT Blood Glucose.: 158 mg/dL (24 Aug 2019 12:25)  POCT Blood Glucose.: 121 mg/dL (24 Aug 2019 08:46)  POCT Blood Glucose.: 162 mg/dL (23 Aug 2019 21:46)  POCT Blood Glucose.: 166 mg/dL (23 Aug 2019 17:43)            MEDICATIONS  (STANDING):  AQUAPHOR (petrolatum Ointment) 1 Application(s) Topical three times a day  aspirin  chewable 81 milliGRAM(s) Oral daily  atorvastatin 80 milliGRAM(s) Oral at bedtime  BACItracin   Ointment 1 Application(s) Topical daily  carvedilol 6.25 milliGRAM(s) Oral every 12 hours  clopidogrel Tablet 75 milliGRAM(s) Oral daily  dextrose 5%. 1000 milliLiter(s) (50 mL/Hr) IV Continuous <Continuous>  dextrose 50% Injectable 12.5 Gram(s) IV Push once  dextrose 50% Injectable 25 Gram(s) IV Push once  dextrose 50% Injectable 25 Gram(s) IV Push once  enoxaparin Injectable 40 milliGRAM(s) SubCutaneous daily  FLUoxetine 20 milliGRAM(s) Oral daily  insulin glargine Injectable (LANTUS) 30 Unit(s) SubCutaneous at bedtime  insulin lispro (HumaLOG) corrective regimen sliding scale   SubCutaneous at bedtime  insulin lispro (HumaLOG) corrective regimen sliding scale   SubCutaneous three times a day before meals  insulin lispro Injectable (HumaLOG) 10 Unit(s) SubCutaneous three times a day with meals  levETIRAcetam 500 milliGRAM(s) Oral two times a day  lisinopril 20 milliGRAM(s) Oral daily  nystatin    Suspension 829142 Unit(s) Oral two times a day  polyethylene glycol 3350 17 Gram(s) Oral daily    MEDICATIONS  (PRN):  acetaminophen   Tablet .. 650 milliGRAM(s) Oral every 6 hours PRN Moderate Pain (4 - 6)  aluminum hydroxide/magnesium hydroxide/simethicone Suspension 30 milliLiter(s) Oral every 6 hours PRN Dyspepsia  bisacodyl Suppository 10 milliGRAM(s) Rectal daily PRN Constipation  dextrose 40% Gel 15 Gram(s) Oral once PRN Blood Glucose LESS THAN 70 milliGRAM(s)/deciliter  glucagon  Injectable 1 milliGRAM(s) IntraMuscular once PRN Glucose LESS THAN 70 milligrams/deciliter  nystatin Powder 1 Application(s) Topical two times a day PRN dermatitis          PHYSICAL EXAM:  GENERAL: NAD, well-groomed, well-developed  HEAD:  Atraumatic, Normocephalic  CHEST/LUNG: Clear to percussion bilaterally; No rales, rhonchi, wheezing, or rubs  HEART: Regular rate and rhythm; No murmurs, rubs, or gallops  ABDOMEN: Soft, Nontender, Nondistended; Bowel sounds present  EXTREMITIES:  2+ Peripheral Pulses, No clubbing, cyanosis, or edema  LYMPH: No lymphadenopathy noted  SKIN: No rashes or lesions    Care Discussed with Consultants/Other Providers [ ] YES  [ ] NO

## 2019-08-24 NOTE — PROGRESS NOTE ADULT - ASSESSMENT
54 year old female admitted with acute CVA.     Problem/Plan - 2:  ·  Problem: CVA (cerebral vascular accident).  Plan: - Right MCA and ENRIQEU distribution stroke   - Dysphagia diet.     DM II:  FSSS/Lantus    Dc planning

## 2019-08-25 LAB
GLUCOSE BLDC GLUCOMTR-MCNC: 132 MG/DL — HIGH (ref 70–99)
GLUCOSE BLDC GLUCOMTR-MCNC: 139 MG/DL — HIGH (ref 70–99)
GLUCOSE BLDC GLUCOMTR-MCNC: 140 MG/DL — HIGH (ref 70–99)
GLUCOSE BLDC GLUCOMTR-MCNC: 141 MG/DL — HIGH (ref 70–99)
GLUCOSE BLDC GLUCOMTR-MCNC: 147 MG/DL — HIGH (ref 70–99)
GLUCOSE BLDC GLUCOMTR-MCNC: 221 MG/DL — HIGH (ref 70–99)
GLUCOSE BLDC GLUCOMTR-MCNC: 87 MG/DL — SIGNIFICANT CHANGE UP (ref 70–99)

## 2019-08-25 RX ADMIN — ATORVASTATIN CALCIUM 80 MILLIGRAM(S): 80 TABLET, FILM COATED ORAL at 21:13

## 2019-08-25 RX ADMIN — ENOXAPARIN SODIUM 40 MILLIGRAM(S): 100 INJECTION SUBCUTANEOUS at 12:20

## 2019-08-25 RX ADMIN — Medication 1 APPLICATION(S): at 12:26

## 2019-08-25 RX ADMIN — Medication 81 MILLIGRAM(S): at 12:20

## 2019-08-25 RX ADMIN — CLOPIDOGREL BISULFATE 75 MILLIGRAM(S): 75 TABLET, FILM COATED ORAL at 12:20

## 2019-08-25 RX ADMIN — Medication 500000 UNIT(S): at 17:44

## 2019-08-25 RX ADMIN — Medication 20 MILLIGRAM(S): at 12:20

## 2019-08-25 RX ADMIN — LEVETIRACETAM 500 MILLIGRAM(S): 250 TABLET, FILM COATED ORAL at 17:44

## 2019-08-25 RX ADMIN — INSULIN GLARGINE 30 UNIT(S): 100 INJECTION, SOLUTION SUBCUTANEOUS at 22:55

## 2019-08-25 RX ADMIN — Medication 10 UNIT(S): at 08:49

## 2019-08-25 RX ADMIN — Medication 1 APPLICATION(S): at 07:13

## 2019-08-25 RX ADMIN — Medication 2: at 12:25

## 2019-08-25 RX ADMIN — Medication 10 UNIT(S): at 12:25

## 2019-08-25 RX ADMIN — Medication 650 MILLIGRAM(S): at 12:27

## 2019-08-25 RX ADMIN — Medication 10 UNIT(S): at 17:43

## 2019-08-25 RX ADMIN — Medication 1 APPLICATION(S): at 21:14

## 2019-08-25 RX ADMIN — POLYETHYLENE GLYCOL 3350 17 GRAM(S): 17 POWDER, FOR SOLUTION ORAL at 12:20

## 2019-08-25 RX ADMIN — CARVEDILOL PHOSPHATE 6.25 MILLIGRAM(S): 80 CAPSULE, EXTENDED RELEASE ORAL at 07:15

## 2019-08-25 RX ADMIN — Medication 500000 UNIT(S): at 07:14

## 2019-08-25 RX ADMIN — Medication 1 APPLICATION(S): at 12:21

## 2019-08-25 RX ADMIN — CARVEDILOL PHOSPHATE 6.25 MILLIGRAM(S): 80 CAPSULE, EXTENDED RELEASE ORAL at 17:44

## 2019-08-25 RX ADMIN — LISINOPRIL 20 MILLIGRAM(S): 2.5 TABLET ORAL at 07:15

## 2019-08-25 RX ADMIN — Medication 650 MILLIGRAM(S): at 13:42

## 2019-08-25 RX ADMIN — LEVETIRACETAM 500 MILLIGRAM(S): 250 TABLET, FILM COATED ORAL at 07:14

## 2019-08-25 NOTE — PROGRESS NOTE ADULT - SUBJECTIVE AND OBJECTIVE BOX
Patient is a 54y old  Female who presents with a chief complaint of CVA (02 Aug 2019 16:08)      INTERVAL HPI/OVERNIGHT EVENTS:  T(C): 36.7 (08-25-19 @ 16:40), Max: 36.8 (08-24-19 @ 20:50)  HR: 73 (08-25-19 @ 16:40) (73 - 91)  BP: 159/85 (08-25-19 @ 16:40) (106/63 - 159/85)  RR: 18 (08-25-19 @ 16:40) (18 - 18)  SpO2: 100% (08-25-19 @ 16:40) (95% - 100%)  Wt(kg): --  I&O's Summary    24 Aug 2019 07:01  -  25 Aug 2019 07:00  --------------------------------------------------------  IN: 1500 mL / OUT: 1500 mL / NET: 0 mL    25 Aug 2019 07:01  -  25 Aug 2019 17:41  --------------------------------------------------------  IN: 240 mL / OUT: 900 mL / NET: -660 mL        LABS:              CAPILLARY BLOOD GLUCOSE      POCT Blood Glucose.: 147 mg/dL (25 Aug 2019 17:27)  POCT Blood Glucose.: 221 mg/dL (25 Aug 2019 12:24)  POCT Blood Glucose.: 139 mg/dL (25 Aug 2019 08:10)  POCT Blood Glucose.: 140 mg/dL (25 Aug 2019 06:19)  POCT Blood Glucose.: 87 mg/dL (25 Aug 2019 00:13)  POCT Blood Glucose.: 82 mg/dL (24 Aug 2019 22:58)  POCT Blood Glucose.: 83 mg/dL (24 Aug 2019 22:27)            MEDICATIONS  (STANDING):  AQUAPHOR (petrolatum Ointment) 1 Application(s) Topical three times a day  aspirin  chewable 81 milliGRAM(s) Oral daily  atorvastatin 80 milliGRAM(s) Oral at bedtime  BACItracin   Ointment 1 Application(s) Topical daily  carvedilol 6.25 milliGRAM(s) Oral every 12 hours  clopidogrel Tablet 75 milliGRAM(s) Oral daily  dextrose 5%. 1000 milliLiter(s) (50 mL/Hr) IV Continuous <Continuous>  dextrose 50% Injectable 12.5 Gram(s) IV Push once  dextrose 50% Injectable 25 Gram(s) IV Push once  dextrose 50% Injectable 25 Gram(s) IV Push once  enoxaparin Injectable 40 milliGRAM(s) SubCutaneous daily  FLUoxetine 20 milliGRAM(s) Oral daily  insulin glargine Injectable (LANTUS) 30 Unit(s) SubCutaneous at bedtime  insulin lispro (HumaLOG) corrective regimen sliding scale   SubCutaneous at bedtime  insulin lispro (HumaLOG) corrective regimen sliding scale   SubCutaneous three times a day before meals  insulin lispro Injectable (HumaLOG) 10 Unit(s) SubCutaneous three times a day with meals  levETIRAcetam 500 milliGRAM(s) Oral two times a day  lisinopril 20 milliGRAM(s) Oral daily  nystatin    Suspension 201106 Unit(s) Oral two times a day  polyethylene glycol 3350 17 Gram(s) Oral daily    MEDICATIONS  (PRN):  acetaminophen   Tablet .. 650 milliGRAM(s) Oral every 6 hours PRN Moderate Pain (4 - 6)  aluminum hydroxide/magnesium hydroxide/simethicone Suspension 30 milliLiter(s) Oral every 6 hours PRN Dyspepsia  bisacodyl Suppository 10 milliGRAM(s) Rectal daily PRN Constipation  dextrose 40% Gel 15 Gram(s) Oral once PRN Blood Glucose LESS THAN 70 milliGRAM(s)/deciliter  glucagon  Injectable 1 milliGRAM(s) IntraMuscular once PRN Glucose LESS THAN 70 milligrams/deciliter  nystatin Powder 1 Application(s) Topical two times a day PRN dermatitis          PHYSICAL EXAM:  GENERAL: NAD, well-groomed, well-developed  HEAD:  Atraumatic, Normocephalic  CHEST/LUNG: Clear to percussion bilaterally; No rales, rhonchi, wheezing, or rubs  HEART: Regular rate and rhythm; No murmurs, rubs, or gallops  ABDOMEN: Soft, Nontender, Nondistended; Bowel sounds present  EXTREMITIES:  2+ Peripheral Pulses, No clubbing, cyanosis, or edema  LYMPH: No lymphadenopathy noted  SKIN: No rashes or lesions    Care Discussed with Consultants/Other Providers [ ] YES  [ ] NO

## 2019-08-25 NOTE — PROVIDER CONTACT NOTE (MEDICATION) - ACTION/TREATMENT ORDERED:
As per provider NP Jass, administer high carb snacks to pt and retake fingerstick. Follow up with provider, continue to monitor.

## 2019-08-25 NOTE — CHART NOTE - NSCHARTNOTEFT_GEN_A_CORE
MEDICINE NP - Late Entry     CHAPIS ENRIQUEZ  54y Female    Patient is a 54y old  Female who presents with a chief complaint of CVA (02 Aug 2019 16:08)       > Event Summary:  @ 23:00 Notified by RN, Patient with POCT BG 83/82, due for Lantus 30U @[facility]  -RN instructed to provide 15g fast acting Carbohydrate snacks to patient until POCT BG >100 and notify NP of value to adjust Lantus @HS dose.   -Endorsed to incoming floor NP       CAPILLARY BLOOD GLUCOSE  POCT Blood Glucose.: 87 mg/dL (25 Aug 2019 00:13)  POCT Blood Glucose.: 82 mg/dL (24 Aug 2019 22:58)  POCT Blood Glucose.: 83 mg/dL (24 Aug 2019 22:27)  POCT Blood Glucose.: 104 mg/dL (24 Aug 2019 17:28)  POCT Blood Glucose.: 158 mg/dL (24 Aug 2019 12:25)  POCT Blood Glucose.: 121 mg/dL (24 Aug 2019 08:46)      JERROD Lamar-BC  Medicine Department  #[id number]

## 2019-08-25 NOTE — PROGRESS NOTE ADULT - ASSESSMENT
54 year old female admitted with acute CVA.     Problem/Plan - 2:  ·  Problem: CVA (cerebral vascular accident).  Plan: - Right MCA and ENRIQUE distribution stroke   - Dysphagia diet.     DM II:  FSSS/Lantus    Dc planning

## 2019-08-25 NOTE — PROGRESS NOTE ADULT - PROVIDER SPECIALTY LIST ADULT
Cardiology
Endocrinology
Gastroenterology
Hospitalist
Infectious Disease
Internal Medicine
Neurology
Neurosurgery
Rehab Medicine
Rehab Medicine
Endocrinology
Internal Medicine
Endocrinology
Hospitalist
Internal Medicine
Neurology
Endocrinology
Gastroenterology
Gastroenterology

## 2019-08-26 VITALS
SYSTOLIC BLOOD PRESSURE: 139 MMHG | HEART RATE: 91 BPM | TEMPERATURE: 98 F | OXYGEN SATURATION: 97 % | DIASTOLIC BLOOD PRESSURE: 78 MMHG | RESPIRATION RATE: 18 BRPM

## 2019-08-26 LAB
GLUCOSE BLDC GLUCOMTR-MCNC: 176 MG/DL — HIGH (ref 70–99)
GLUCOSE BLDC GLUCOMTR-MCNC: 193 MG/DL — HIGH (ref 70–99)

## 2019-08-26 RX ORDER — CLOPIDOGREL BISULFATE 75 MG/1
1 TABLET, FILM COATED ORAL
Qty: 30 | Refills: 0
Start: 2019-08-26 | End: 2019-09-24

## 2019-08-26 RX ORDER — NYSTATIN 500MM UNIT
5 POWDER (EA) MISCELLANEOUS
Qty: 150 | Refills: 0
Start: 2019-08-26 | End: 2019-09-09

## 2019-08-26 RX ORDER — ATORVASTATIN CALCIUM 80 MG/1
1 TABLET, FILM COATED ORAL
Qty: 30 | Refills: 0
Start: 2019-08-26 | End: 2019-09-24

## 2019-08-26 RX ORDER — FLUOXETINE HCL 10 MG
1 CAPSULE ORAL
Qty: 30 | Refills: 0
Start: 2019-08-26 | End: 2019-09-24

## 2019-08-26 RX ORDER — INSULIN DETEMIR 100/ML (3)
30 INSULIN PEN (ML) SUBCUTANEOUS
Qty: 1 | Refills: 0
Start: 2019-08-26 | End: 2019-09-24

## 2019-08-26 RX ORDER — LOSARTAN POTASSIUM 100 MG/1
1 TABLET, FILM COATED ORAL
Qty: 30 | Refills: 0
Start: 2019-08-26 | End: 2019-09-24

## 2019-08-26 RX ORDER — LEVETIRACETAM 250 MG/1
1 TABLET, FILM COATED ORAL
Qty: 60 | Refills: 0
Start: 2019-08-26 | End: 2019-09-24

## 2019-08-26 RX ORDER — CARVEDILOL PHOSPHATE 80 MG/1
1 CAPSULE, EXTENDED RELEASE ORAL
Qty: 60 | Refills: 0
Start: 2019-08-26 | End: 2019-09-24

## 2019-08-26 RX ORDER — NYSTATIN CREAM 100000 [USP'U]/G
1 CREAM TOPICAL
Qty: 1 | Refills: 0
Start: 2019-08-26 | End: 2019-09-09

## 2019-08-26 RX ORDER — ATORVASTATIN CALCIUM 80 MG/1
1 TABLET, FILM COATED ORAL
Qty: 0 | Refills: 0 | DISCHARGE

## 2019-08-26 RX ORDER — INSULIN ASPART 100 [IU]/ML
10 INJECTION, SOLUTION SUBCUTANEOUS
Qty: 1 | Refills: 0
Start: 2019-08-26 | End: 2019-09-24

## 2019-08-26 RX ADMIN — LEVETIRACETAM 500 MILLIGRAM(S): 250 TABLET, FILM COATED ORAL at 06:00

## 2019-08-26 RX ADMIN — Medication 1 APPLICATION(S): at 06:01

## 2019-08-26 RX ADMIN — LISINOPRIL 20 MILLIGRAM(S): 2.5 TABLET ORAL at 06:00

## 2019-08-26 RX ADMIN — Medication 1 APPLICATION(S): at 13:19

## 2019-08-26 RX ADMIN — Medication 10 UNIT(S): at 09:18

## 2019-08-26 RX ADMIN — Medication 1 APPLICATION(S): at 12:30

## 2019-08-26 RX ADMIN — Medication 500000 UNIT(S): at 06:00

## 2019-08-26 RX ADMIN — Medication 20 MILLIGRAM(S): at 12:27

## 2019-08-26 RX ADMIN — ENOXAPARIN SODIUM 40 MILLIGRAM(S): 100 INJECTION SUBCUTANEOUS at 12:27

## 2019-08-26 RX ADMIN — Medication 1: at 13:18

## 2019-08-26 RX ADMIN — Medication 81 MILLIGRAM(S): at 12:27

## 2019-08-26 RX ADMIN — Medication 1: at 09:18

## 2019-08-26 RX ADMIN — Medication 10 UNIT(S): at 13:18

## 2019-08-26 RX ADMIN — CARVEDILOL PHOSPHATE 6.25 MILLIGRAM(S): 80 CAPSULE, EXTENDED RELEASE ORAL at 06:00

## 2019-08-26 RX ADMIN — CLOPIDOGREL BISULFATE 75 MILLIGRAM(S): 75 TABLET, FILM COATED ORAL at 12:27

## 2019-08-26 NOTE — CHART NOTE - NSCHARTNOTEFT_GEN_A_CORE
follow up- pt is cleared by Md for discharge home; received call from social work  to send prescription to [facility] in [facility] as per pt family request; pt did not  medication from [facility]; sent prescription to [facility].  Ele Chan(NP)  [facility], [phone]

## 2019-08-26 NOTE — CHART NOTE - NSCHARTNOTESELECT_GEN_ALL_CORE
Event Note
Endocrine/Off Service Note
Event Note
Event Note/Medicine NP
Event Note/Neurology
Event Note/Sluggish left pupil
Low blood glucose/Event Note
NSGY/Event Note
Nutrition Services
hospitlaist

## 2019-10-31 PROCEDURE — 97110 THERAPEUTIC EXERCISES: CPT

## 2019-10-31 PROCEDURE — 97162 PT EVAL MOD COMPLEX 30 MIN: CPT

## 2019-10-31 PROCEDURE — 85576 BLOOD PLATELET AGGREGATION: CPT

## 2019-10-31 PROCEDURE — 83735 ASSAY OF MAGNESIUM: CPT

## 2019-10-31 PROCEDURE — 92526 ORAL FUNCTION THERAPY: CPT

## 2019-10-31 PROCEDURE — 81003 URINALYSIS AUTO W/O SCOPE: CPT

## 2019-10-31 PROCEDURE — C1769: CPT

## 2019-10-31 PROCEDURE — 99291 CRITICAL CARE FIRST HOUR: CPT

## 2019-10-31 PROCEDURE — 93005 ELECTROCARDIOGRAM TRACING: CPT

## 2019-10-31 PROCEDURE — 80048 BASIC METABOLIC PNL TOTAL CA: CPT

## 2019-10-31 PROCEDURE — 80061 LIPID PANEL: CPT

## 2019-10-31 PROCEDURE — 86901 BLOOD TYPING SEROLOGIC RH(D): CPT

## 2019-10-31 PROCEDURE — C1894: CPT

## 2019-10-31 PROCEDURE — 70450 CT HEAD/BRAIN W/O DYE: CPT

## 2019-10-31 PROCEDURE — 87086 URINE CULTURE/COLONY COUNT: CPT

## 2019-10-31 PROCEDURE — 71045 X-RAY EXAM CHEST 1 VIEW: CPT

## 2019-10-31 PROCEDURE — 85027 COMPLETE CBC AUTOMATED: CPT

## 2019-10-31 PROCEDURE — 97165 OT EVAL LOW COMPLEX 30 MIN: CPT

## 2019-10-31 PROCEDURE — 85610 PROTHROMBIN TIME: CPT

## 2019-10-31 PROCEDURE — 97116 GAIT TRAINING THERAPY: CPT

## 2019-10-31 PROCEDURE — 81001 URINALYSIS AUTO W/SCOPE: CPT

## 2019-10-31 PROCEDURE — 80053 COMPREHEN METABOLIC PANEL: CPT

## 2019-10-31 PROCEDURE — 93880 EXTRACRANIAL BILAT STUDY: CPT

## 2019-10-31 PROCEDURE — 93970 EXTREMITY STUDY: CPT

## 2019-10-31 PROCEDURE — 86900 BLOOD TYPING SEROLOGIC ABO: CPT

## 2019-10-31 PROCEDURE — 70498 CT ANGIOGRAPHY NECK: CPT

## 2019-10-31 PROCEDURE — 92523 SPEECH SOUND LANG COMPREHEN: CPT

## 2019-10-31 PROCEDURE — 70551 MRI BRAIN STEM W/O DYE: CPT

## 2019-10-31 PROCEDURE — 84100 ASSAY OF PHOSPHORUS: CPT

## 2019-10-31 PROCEDURE — 36223 PLACE CATH CAROTID/INOM ART: CPT

## 2019-10-31 PROCEDURE — 87186 SC STD MICRODIL/AGAR DIL: CPT

## 2019-10-31 PROCEDURE — 95951: CPT

## 2019-10-31 PROCEDURE — 80307 DRUG TEST PRSMV CHEM ANLYZR: CPT

## 2019-10-31 PROCEDURE — 85730 THROMBOPLASTIN TIME PARTIAL: CPT

## 2019-10-31 PROCEDURE — 74230 X-RAY XM SWLNG FUNCJ C+: CPT

## 2019-10-31 PROCEDURE — 97530 THERAPEUTIC ACTIVITIES: CPT

## 2019-10-31 PROCEDURE — 92611 MOTION FLUOROSCOPY/SWALLOW: CPT

## 2019-10-31 PROCEDURE — 86803 HEPATITIS C AB TEST: CPT

## 2019-10-31 PROCEDURE — A9585: CPT

## 2019-10-31 PROCEDURE — 82962 GLUCOSE BLOOD TEST: CPT

## 2019-10-31 PROCEDURE — 97535 SELF CARE MNGMENT TRAINING: CPT

## 2019-10-31 PROCEDURE — 86850 RBC ANTIBODY SCREEN: CPT

## 2019-10-31 PROCEDURE — L8699: CPT

## 2019-10-31 PROCEDURE — 92610 EVALUATE SWALLOWING FUNCTION: CPT

## 2019-10-31 PROCEDURE — 97112 NEUROMUSCULAR REEDUCATION: CPT

## 2019-10-31 PROCEDURE — 70549 MR ANGIOGRAPH NECK W/O&W/DYE: CPT

## 2019-10-31 PROCEDURE — 95816 EEG AWAKE AND DROWSY: CPT

## 2019-10-31 PROCEDURE — C8929: CPT

## 2019-10-31 PROCEDURE — 97760 ORTHOTIC MGMT&TRAING 1ST ENC: CPT

## 2019-10-31 PROCEDURE — C1887: CPT

## 2019-10-31 PROCEDURE — 83036 HEMOGLOBIN GLYCOSYLATED A1C: CPT

## 2019-10-31 PROCEDURE — 70546 MR ANGIOGRAPH HEAD W/O&W/DYE: CPT

## 2019-10-31 PROCEDURE — 70496 CT ANGIOGRAPHY HEAD: CPT

## 2019-10-31 PROCEDURE — 87040 BLOOD CULTURE FOR BACTERIA: CPT

## 2020-05-18 NOTE — SWALLOW BEDSIDE ASSESSMENT ADULT - PHARYNGEAL PHASE
Duration Of Freeze Thaw-Cycle (Seconds): 5 Consent: The patient's consent was obtained including but not limited to risks of crusting, scabbing, blistering, scarring, darker or lighter pigmentary change, recurrence, incomplete removal and infection. Render Post-Care Instructions In Note?: no Post-Care Instructions: I reviewed with the patient in detail post-care instructions. Patient is to wear sunprotection, and avoid picking at any of the treated lesions. Pt may apply Vaseline to crusted or scabbing areas. Detail Level: Zone significantly delayed pharyngeal swallows triggered post verbal cues/oral cavity stimulation

## 2020-07-11 NOTE — PATIENT PROFILE ADULT - FAMILY NEEDS
resp care      07/11/20 0526   Non-Invasive Information   Interface HFNC prongs   Non-Invasive Ventilation Mode HFNC   SpO2 100 %   $ Pulse Oximetry Spot Check Charge Completed   Resp Comments pt found on 100%/ 60 lpm with sat 100%   will start to titrate pt off hfnc no

## 2023-01-03 NOTE — ED ADULT NURSE NOTE - CARDIO ASSESSMENT
Dialysis : Treatment tolerated well for 3.5 hours, able to achieve UFG with 2 L net UF. Left CVC tunneled HD catheter  functioning well without any issues with the flow, achieved desired BFR. Dressing changed, exit site appears clean and dry, no redness and drainage noted. Patient denies any discomforts post HD.    Vitals:    01/02/23 1750   BP: (!) 152/58   Pulse: 60   Resp: 17   Temp: 98.1 °F (36.7 °C)          WDL

## 2023-06-30 NOTE — PROGRESS NOTE ADULT - ASSESSMENT
54 year old female admitted with acute CVA. GI consulted for dysphagia. My signature below certifies that the above stated patient is homebound and upon completion of the Face-To-Face encounter, has the need for intermittent skilled nursing, physical therapy and/or speech or occupational therapy services in their home for their current diagnosis as outlined in their initial plan of care. These services will continue to be monitored by myself or another physician.

## 2023-08-04 NOTE — PROGRESS NOTE ADULT - ASSESSMENT
· Assessment	  54 year old female admitted with acute CVA.     Problem/Plan - 2:  ·  Problem: CVA (cerebral vascular accident).  Plan: - Right MCA and ENRIQUE distribution stroke   - Dysphagia diet and PEG feeding    DM II:  FSSS/Lantus    Dc planning fall precautions fall precautions/seizure precautions

## 2025-04-11 NOTE — PROGRESS NOTE ADULT - SUBJECTIVE AND OBJECTIVE BOX
Hormonal testing negative. Situation improved in skin when changed routine.   Diabetes Follow up note:  Interval Hx:  54 year old female w/uncontrolled T2DM (on insulin PTA) w/retinopathy, morbid obesity here w/CVA s/p PEG now off bolus feeds and on PO diet only. BG values at goal on present basal/bolus regimen. No hypoglycemia. Pt reports good appetite but believes she is eating too much. Pt endorses that she hasn't heard from her daughter since last week.       Review of Systems:  General: + hand cramping.   GI: Tolerating POs without any N/V/D/ABD PAIN.  CV: No CP/SOB  ENDO: No S&Sx of hypoglycemia  MEDS:  atorvastatin 80 milliGRAM(s) Oral at bedtime    insulin glargine Injectable (LANTUS) 30 Unit(s) SubCutaneous at bedtime  insulin lispro (HumaLOG) corrective regimen sliding scale   SubCutaneous at bedtime  insulin lispro (HumaLOG) corrective regimen sliding scale   SubCutaneous three times a day before meals  insulin lispro Injectable (HumaLOG) 10 Unit(s) SubCutaneous three times a day with meals    nystatin    Suspension 047033 Unit(s) Oral two times a day    Allergies    No Known Allergies        PE:  General: Female lying in bed. NAD.   Vital Signs Last 24 Hrs  T(C): 36.6 (14 Aug 2019 11:34), Max: 37.2 (13 Aug 2019 20:00)  T(F): 97.9 (14 Aug 2019 11:34), Max: 99 (13 Aug 2019 20:00)  HR: 78 (14 Aug 2019 11:34) (73 - 82)  BP: 120/67 (14 Aug 2019 11:34) (120/67 - 157/80)  BP(mean): --  RR: 18 (14 Aug 2019 11:34) (17 - 18)  SpO2: 97% (14 Aug 2019 11:34) (97% - 99%)  Abd: Soft, NT,ND, Obese. PEG in place.   Extremities: Warm. L sided hemiplegia.   Neuro: A&O X3    LABS:    POCT Blood Glucose.: 177 mg/dL (08-14-19 @ 12:34)  POCT Blood Glucose.: 140 mg/dL (08-14-19 @ 08:07)  POCT Blood Glucose.: 106 mg/dL (08-14-19 @ 02:28)  POCT Blood Glucose.: 108 mg/dL (08-13-19 @ 22:08)  POCT Blood Glucose.: 162 mg/dL (08-13-19 @ 16:38)  POCT Blood Glucose.: 197 mg/dL (08-13-19 @ 12:28)  POCT Blood Glucose.: 139 mg/dL (08-13-19 @ 08:44)  POCT Blood Glucose.: 169 mg/dL (08-12-19 @ 21:52)  POCT Blood Glucose.: 148 mg/dL (08-12-19 @ 17:33)  POCT Blood Glucose.: 204 mg/dL (08-12-19 @ 13:19)  POCT Blood Glucose.: 176 mg/dL (08-12-19 @ 05:59)  POCT Blood Glucose.: 309 mg/dL (08-11-19 @ 21:33)                Hemoglobin A1C, Whole Blood: 10.4 % <H> [4.0 - 5.6] (06-29-19 @ 09:33)            Contact number: [place] [phone] or [phone]